# Patient Record
Sex: MALE | Race: BLACK OR AFRICAN AMERICAN | NOT HISPANIC OR LATINO | Employment: OTHER | URBAN - METROPOLITAN AREA
[De-identification: names, ages, dates, MRNs, and addresses within clinical notes are randomized per-mention and may not be internally consistent; named-entity substitution may affect disease eponyms.]

---

## 2018-02-26 ENCOUNTER — OFFICE VISIT CONVERTED (OUTPATIENT)
Dept: CARDIOLOGY | Facility: CLINIC | Age: 73
End: 2018-02-26
Attending: INTERNAL MEDICINE

## 2018-02-26 ENCOUNTER — CONVERSION ENCOUNTER (OUTPATIENT)
Dept: CARDIOLOGY | Facility: CLINIC | Age: 73
End: 2018-02-26

## 2018-03-12 ENCOUNTER — CONVERSION ENCOUNTER (OUTPATIENT)
Dept: PODIATRY | Facility: CLINIC | Age: 73
End: 2018-03-12

## 2018-03-12 ENCOUNTER — PROCEDURE VISIT CONVERTED (OUTPATIENT)
Dept: PODIATRY | Facility: CLINIC | Age: 73
End: 2018-03-12
Attending: PODIATRIST

## 2018-05-21 ENCOUNTER — PROCEDURE VISIT CONVERTED (OUTPATIENT)
Dept: PODIATRY | Facility: CLINIC | Age: 73
End: 2018-05-21
Attending: PODIATRIST

## 2018-06-01 ENCOUNTER — OFFICE VISIT CONVERTED (OUTPATIENT)
Dept: UROLOGY | Facility: CLINIC | Age: 73
End: 2018-06-01
Attending: UROLOGY

## 2018-06-04 ENCOUNTER — PROCEDURE VISIT CONVERTED (OUTPATIENT)
Dept: UROLOGY | Facility: CLINIC | Age: 73
End: 2018-06-04
Attending: UROLOGY

## 2018-06-27 ENCOUNTER — PROCEDURE VISIT CONVERTED (OUTPATIENT)
Dept: UROLOGY | Facility: CLINIC | Age: 73
End: 2018-06-27
Attending: NURSE PRACTITIONER

## 2018-08-01 ENCOUNTER — OFFICE VISIT CONVERTED (OUTPATIENT)
Dept: UROLOGY | Facility: CLINIC | Age: 73
End: 2018-08-01
Attending: UROLOGY

## 2018-08-13 ENCOUNTER — PROCEDURE VISIT CONVERTED (OUTPATIENT)
Dept: PODIATRY | Facility: CLINIC | Age: 73
End: 2018-08-13
Attending: PODIATRIST

## 2018-08-29 ENCOUNTER — PROCEDURE VISIT CONVERTED (OUTPATIENT)
Dept: UROLOGY | Facility: CLINIC | Age: 73
End: 2018-08-29
Attending: UROLOGY

## 2018-10-11 ENCOUNTER — OFFICE VISIT CONVERTED (OUTPATIENT)
Dept: UROLOGY | Facility: CLINIC | Age: 73
End: 2018-10-11
Attending: UROLOGY

## 2018-10-11 ENCOUNTER — CONVERSION ENCOUNTER (OUTPATIENT)
Dept: UROLOGY | Facility: CLINIC | Age: 73
End: 2018-10-11

## 2018-11-05 ENCOUNTER — PROCEDURE VISIT CONVERTED (OUTPATIENT)
Dept: PODIATRY | Facility: CLINIC | Age: 73
End: 2018-11-05
Attending: PODIATRIST

## 2018-11-05 ENCOUNTER — CONVERSION ENCOUNTER (OUTPATIENT)
Dept: PODIATRY | Facility: CLINIC | Age: 73
End: 2018-11-05

## 2019-01-29 ENCOUNTER — OFFICE VISIT CONVERTED (OUTPATIENT)
Dept: CARDIOLOGY | Facility: CLINIC | Age: 74
End: 2019-01-29
Attending: INTERNAL MEDICINE

## 2019-02-05 ENCOUNTER — PROCEDURE VISIT CONVERTED (OUTPATIENT)
Dept: PODIATRY | Facility: CLINIC | Age: 74
End: 2019-02-05
Attending: PODIATRIST

## 2019-03-07 ENCOUNTER — HOSPITAL ENCOUNTER (OUTPATIENT)
Dept: OTHER | Facility: HOSPITAL | Age: 74
Discharge: HOME | End: 2019-03-07
Attending: INTERNAL MEDICINE

## 2019-03-07 LAB
ALBUMIN SERPL-MCNC: 4.2 G/DL (ref 3.5–5)
ALBUMIN/GLOB SERPL: 1.2 {RATIO} (ref 1.4–2.6)
ALP SERPL-CCNC: 74 U/L (ref 56–155)
ALT SERPL-CCNC: 21 U/L (ref 10–40)
ANION GAP SERPL CALC-SCNC: 18 MMOL/L (ref 8–19)
APPEARANCE UR: CLEAR
AST SERPL-CCNC: 23 U/L (ref 15–50)
BASOPHILS # BLD AUTO: 0.03 10*3/UL (ref 0–0.2)
BASOPHILS NFR BLD AUTO: 0.5 % (ref 0–3)
BILIRUB SERPL-MCNC: 0.49 MG/DL (ref 0.2–1.3)
BILIRUB UR QL: NEGATIVE
BUN SERPL-MCNC: 26 MG/DL (ref 5–25)
BUN/CREAT SERPL: 15 {RATIO} (ref 6–20)
CALCIUM SERPL-MCNC: 9.6 MG/DL (ref 8.7–10.4)
CHLORIDE SERPL-SCNC: 97 MMOL/L (ref 99–111)
COLOR UR: YELLOW
CONV ABS IMM GRAN: 0.02 10*3/UL (ref 0–0.2)
CONV CO2: 24 MMOL/L (ref 22–32)
CONV COLLECTION SOURCE (UA): NORMAL
CONV CREATININE URINE, RANDOM: 57.6 MG/DL (ref 10–300)
CONV IMMATURE GRAN: 0.3 % (ref 0–1.8)
CONV MICROALBUM.,U,RANDOM: <12 MG/L (ref 0–20)
CONV PROTEIN TO CREATININE RATIO (RANDOM URINE): 0.07 {RATIO} (ref 0–0.1)
CONV TOTAL PROTEIN: 7.7 G/DL (ref 6.3–8.2)
CONV UROBILINOGEN IN URINE BY AUTOMATED TEST STRIP: 0.2 {EHRLICHU}/DL (ref 0.1–1)
CREAT UR-MCNC: 1.72 MG/DL (ref 0.7–1.2)
DEPRECATED RDW RBC AUTO: 39.6 FL (ref 35.1–43.9)
EOSINOPHIL # BLD AUTO: 0.05 10*3/UL (ref 0–0.7)
EOSINOPHIL # BLD AUTO: 0.8 % (ref 0–7)
ERYTHROCYTE [DISTWIDTH] IN BLOOD BY AUTOMATED COUNT: 11.9 % (ref 11.6–14.4)
EST. AVERAGE GLUCOSE BLD GHB EST-MCNC: 192 MG/DL
GFR SERPLBLD BASED ON 1.73 SQ M-ARVRAT: 45 ML/MIN/{1.73_M2}
GLOBULIN UR ELPH-MCNC: 3.5 G/DL (ref 2–3.5)
GLUCOSE SERPL-MCNC: 173 MG/DL (ref 70–99)
GLUCOSE UR QL: NEGATIVE MG/DL
HBA1C MFR BLD: 13.9 G/DL (ref 14–18)
HBA1C MFR BLD: 8.3 % (ref 3.5–5.7)
HCT VFR BLD AUTO: 42 % (ref 42–52)
HGB UR QL STRIP: NEGATIVE
KETONES UR QL STRIP: NEGATIVE MG/DL
LEUKOCYTE ESTERASE UR QL STRIP: NEGATIVE
LYMPHOCYTES # BLD AUTO: 3.72 10*3/UL (ref 1–5)
MCH RBC QN AUTO: 30.2 PG (ref 27–31)
MCHC RBC AUTO-ENTMCNC: 33.1 G/DL (ref 33–37)
MCV RBC AUTO: 91.3 FL (ref 80–96)
MICROALBUMIN/CREAT UR: 20.8 MG/G{CRE} (ref 0–25)
MONOCYTES # BLD AUTO: 0.64 10*3/UL (ref 0.2–1.2)
MONOCYTES NFR BLD AUTO: 9.9 % (ref 3–10)
NEUTROPHILS # BLD AUTO: 1.99 10*3/UL (ref 2–8)
NEUTROPHILS NFR BLD AUTO: 30.8 % (ref 30–85)
NITRITE UR QL STRIP: NEGATIVE
NRBC CBCN: 0 % (ref 0–0.7)
OSMOLALITY SERPL CALC.SUM OF ELEC: 287 MOSM/KG (ref 273–304)
PH UR STRIP.AUTO: 5 [PH] (ref 5–8)
PLATELET # BLD AUTO: 173 10*3/UL (ref 130–400)
PMV BLD AUTO: 11.2 FL (ref 9.4–12.4)
POTASSIUM SERPL-SCNC: 4.7 MMOL/L (ref 3.5–5.3)
PROT UR QL: NEGATIVE MG/DL
PROT UR-MCNC: <4 MG/DL
RBC # BLD AUTO: 4.6 10*6/UL (ref 4.7–6.1)
SODIUM SERPL-SCNC: 134 MMOL/L (ref 135–147)
SP GR UR: 1.01 (ref 1–1.03)
VARIANT LYMPHS NFR BLD MANUAL: 57.7 % (ref 20–45)
WBC # BLD AUTO: 6.45 10*3/UL (ref 4.8–10.8)

## 2019-03-25 ENCOUNTER — CONVERSION ENCOUNTER (OUTPATIENT)
Dept: UROLOGY | Facility: CLINIC | Age: 74
End: 2019-03-25

## 2019-03-25 ENCOUNTER — OFFICE VISIT CONVERTED (OUTPATIENT)
Dept: UROLOGY | Facility: CLINIC | Age: 74
End: 2019-03-25
Attending: NURSE PRACTITIONER

## 2019-03-25 ENCOUNTER — HOSPITAL ENCOUNTER (OUTPATIENT)
Dept: UROLOGY | Facility: CLINIC | Age: 74
Discharge: HOME | End: 2019-03-25
Attending: NURSE PRACTITIONER

## 2019-03-25 LAB — PSA SERPL-MCNC: 0.22 NG/ML (ref 0–4)

## 2019-04-23 ENCOUNTER — PROCEDURE VISIT CONVERTED (OUTPATIENT)
Dept: PODIATRY | Facility: CLINIC | Age: 74
End: 2019-04-23
Attending: PODIATRIST

## 2019-04-23 ENCOUNTER — CONVERSION ENCOUNTER (OUTPATIENT)
Dept: PODIATRY | Facility: CLINIC | Age: 74
End: 2019-04-23

## 2019-04-25 ENCOUNTER — PROCEDURE VISIT CONVERTED (OUTPATIENT)
Dept: UROLOGY | Facility: CLINIC | Age: 74
End: 2019-04-25
Attending: UROLOGY

## 2019-04-29 ENCOUNTER — CONVERSION ENCOUNTER (OUTPATIENT)
Dept: CARDIOLOGY | Facility: CLINIC | Age: 74
End: 2019-04-29

## 2019-04-29 ENCOUNTER — CONVERSION ENCOUNTER (OUTPATIENT)
Dept: CARDIOLOGY | Facility: CLINIC | Age: 74
End: 2019-04-29
Attending: INTERNAL MEDICINE

## 2019-05-02 ENCOUNTER — PROCEDURE VISIT CONVERTED (OUTPATIENT)
Dept: UROLOGY | Facility: CLINIC | Age: 74
End: 2019-05-02
Attending: UROLOGY

## 2019-05-10 ENCOUNTER — HOSPITAL ENCOUNTER (OUTPATIENT)
Dept: PREADMISSION TESTING | Facility: HOSPITAL | Age: 74
Discharge: HOME | End: 2019-05-10
Attending: UROLOGY

## 2019-05-10 LAB
ALBUMIN SERPL-MCNC: 4 G/DL (ref 3.5–5)
ALBUMIN/GLOB SERPL: 1.3 {RATIO} (ref 1.4–2.6)
ALP SERPL-CCNC: 83 U/L (ref 56–155)
ALT SERPL-CCNC: 22 U/L (ref 10–40)
ANION GAP SERPL CALC-SCNC: 16 MMOL/L (ref 8–19)
AST SERPL-CCNC: 27 U/L (ref 15–50)
BASOPHILS # BLD AUTO: 0.02 10*3/UL (ref 0–0.2)
BASOPHILS NFR BLD AUTO: 0.5 % (ref 0–3)
BILIRUB SERPL-MCNC: 0.38 MG/DL (ref 0.2–1.3)
BUN SERPL-MCNC: 18 MG/DL (ref 5–25)
BUN/CREAT SERPL: 12 {RATIO} (ref 6–20)
CALCIUM SERPL-MCNC: 9.4 MG/DL (ref 8.7–10.4)
CHLORIDE SERPL-SCNC: 97 MMOL/L (ref 99–111)
CONV ABS IMM GRAN: 0.01 10*3/UL (ref 0–0.2)
CONV CO2: 23 MMOL/L (ref 22–32)
CONV IMMATURE GRAN: 0.2 % (ref 0–1.8)
CONV TOTAL PROTEIN: 7.1 G/DL (ref 6.3–8.2)
CREAT UR-MCNC: 1.55 MG/DL (ref 0.7–1.2)
DEPRECATED RDW RBC AUTO: 39.9 FL (ref 35.1–43.9)
EOSINOPHIL # BLD AUTO: 0.05 10*3/UL (ref 0–0.7)
EOSINOPHIL # BLD AUTO: 1.2 % (ref 0–7)
ERYTHROCYTE [DISTWIDTH] IN BLOOD BY AUTOMATED COUNT: 11.9 % (ref 11.6–14.4)
GFR SERPLBLD BASED ON 1.73 SQ M-ARVRAT: 51 ML/MIN/{1.73_M2}
GLOBULIN UR ELPH-MCNC: 3.1 G/DL (ref 2–3.5)
GLUCOSE SERPL-MCNC: 179 MG/DL (ref 70–99)
HBA1C MFR BLD: 12.9 G/DL (ref 14–18)
HCT VFR BLD AUTO: 37.7 % (ref 42–52)
LYMPHOCYTES # BLD AUTO: 2.2 10*3/UL (ref 1–5)
MCH RBC QN AUTO: 31.3 PG (ref 27–31)
MCHC RBC AUTO-ENTMCNC: 34.2 G/DL (ref 33–37)
MCV RBC AUTO: 91.5 FL (ref 80–96)
MONOCYTES # BLD AUTO: 0.4 10*3/UL (ref 0.2–1.2)
MONOCYTES NFR BLD AUTO: 9.4 % (ref 3–10)
NEUTROPHILS # BLD AUTO: 1.56 10*3/UL (ref 2–8)
NEUTROPHILS NFR BLD AUTO: 36.8 % (ref 30–85)
NRBC CBCN: 0 % (ref 0–0.7)
OSMOLALITY SERPL CALC.SUM OF ELEC: 280 MOSM/KG (ref 273–304)
PLATELET # BLD AUTO: 158 10*3/UL (ref 130–400)
PMV BLD AUTO: 11.4 FL (ref 9.4–12.4)
POTASSIUM SERPL-SCNC: 4.3 MMOL/L (ref 3.5–5.3)
RBC # BLD AUTO: 4.12 10*6/UL (ref 4.7–6.1)
SODIUM SERPL-SCNC: 132 MMOL/L (ref 135–147)
VARIANT LYMPHS NFR BLD MANUAL: 51.9 % (ref 20–45)
WBC # BLD AUTO: 4.24 10*3/UL (ref 4.8–10.8)

## 2019-05-17 ENCOUNTER — HOSPITAL ENCOUNTER (OUTPATIENT)
Dept: PERIOP | Facility: HOSPITAL | Age: 74
Setting detail: HOSPITAL OUTPATIENT SURGERY
Discharge: HOME | End: 2019-05-18
Attending: UROLOGY

## 2019-05-17 LAB
GLUCOSE BLD-MCNC: 192 MG/DL (ref 70–99)
GLUCOSE BLD-MCNC: 194 MG/DL (ref 70–99)
GLUCOSE BLD-MCNC: 201 MG/DL (ref 70–99)
GLUCOSE BLD-MCNC: 212 MG/DL (ref 70–99)
GLUCOSE BLD-MCNC: 241 MG/DL (ref 70–99)

## 2019-05-18 LAB
ALBUMIN SERPL-MCNC: 3.9 G/DL (ref 3.5–5)
ALBUMIN/GLOB SERPL: 1.3 {RATIO} (ref 1.4–2.6)
ALP SERPL-CCNC: 67 U/L (ref 56–155)
ALT SERPL-CCNC: 16 U/L (ref 10–40)
ANION GAP SERPL CALC-SCNC: 18 MMOL/L (ref 8–19)
AST SERPL-CCNC: 17 U/L (ref 15–50)
BASOPHILS # BLD AUTO: 0.01 10*3/UL (ref 0–0.2)
BASOPHILS NFR BLD AUTO: 0.2 % (ref 0–3)
BILIRUB SERPL-MCNC: 0.64 MG/DL (ref 0.2–1.3)
BUN SERPL-MCNC: 17 MG/DL (ref 5–25)
BUN/CREAT SERPL: 11 {RATIO} (ref 6–20)
CALCIUM SERPL-MCNC: 9.7 MG/DL (ref 8.7–10.4)
CHLORIDE SERPL-SCNC: 98 MMOL/L (ref 99–111)
CONV ABS IMM GRAN: 0.01 10*3/UL (ref 0–0.2)
CONV CO2: 23 MMOL/L (ref 22–32)
CONV IMMATURE GRAN: 0.2 % (ref 0–1.8)
CONV TOTAL PROTEIN: 6.8 G/DL (ref 6.3–8.2)
CREAT UR-MCNC: 1.49 MG/DL (ref 0.7–1.2)
DEPRECATED RDW RBC AUTO: 39.3 FL (ref 35.1–43.9)
EOSINOPHIL # BLD AUTO: 0.06 10*3/UL (ref 0–0.7)
EOSINOPHIL # BLD AUTO: 1.2 % (ref 0–7)
ERYTHROCYTE [DISTWIDTH] IN BLOOD BY AUTOMATED COUNT: 11.6 % (ref 11.6–14.4)
GFR SERPLBLD BASED ON 1.73 SQ M-ARVRAT: 53 ML/MIN/{1.73_M2}
GLOBULIN UR ELPH-MCNC: 2.9 G/DL (ref 2–3.5)
GLUCOSE BLD-MCNC: 221 MG/DL (ref 70–99)
GLUCOSE SERPL-MCNC: 190 MG/DL (ref 70–99)
HBA1C MFR BLD: 14 G/DL (ref 14–18)
HCT VFR BLD AUTO: 41.8 % (ref 42–52)
LYMPHOCYTES # BLD AUTO: 2.45 10*3/UL (ref 1–5)
MCH RBC QN AUTO: 30.6 PG (ref 27–31)
MCHC RBC AUTO-ENTMCNC: 33.5 G/DL (ref 33–37)
MCV RBC AUTO: 91.5 FL (ref 80–96)
MONOCYTES # BLD AUTO: 0.53 10*3/UL (ref 0.2–1.2)
MONOCYTES NFR BLD AUTO: 10.8 % (ref 3–10)
NEUTROPHILS # BLD AUTO: 1.83 10*3/UL (ref 2–8)
NEUTROPHILS NFR BLD AUTO: 37.5 % (ref 30–85)
NRBC CBCN: 0 % (ref 0–0.7)
OSMOLALITY SERPL CALC.SUM OF ELEC: 285 MOSM/KG (ref 273–304)
PLATELET # BLD AUTO: 158 10*3/UL (ref 130–400)
PMV BLD AUTO: 11.7 FL (ref 9.4–12.4)
POTASSIUM SERPL-SCNC: 4.5 MMOL/L (ref 3.5–5.3)
RBC # BLD AUTO: 4.57 10*6/UL (ref 4.7–6.1)
SODIUM SERPL-SCNC: 134 MMOL/L (ref 135–147)
VARIANT LYMPHS NFR BLD MANUAL: 50.1 % (ref 20–45)
WBC # BLD AUTO: 4.89 10*3/UL (ref 4.8–10.8)

## 2019-06-05 ENCOUNTER — HOSPITAL ENCOUNTER (OUTPATIENT)
Dept: OTHER | Facility: HOSPITAL | Age: 74
Discharge: HOME | End: 2019-06-05
Attending: INTERNAL MEDICINE

## 2019-06-05 LAB
ALBUMIN SERPL-MCNC: 4.3 G/DL (ref 3.5–5)
ALBUMIN/GLOB SERPL: 1.3 {RATIO} (ref 1.4–2.6)
ALP SERPL-CCNC: 92 U/L (ref 56–155)
ALT SERPL-CCNC: 23 U/L (ref 10–40)
ANION GAP SERPL CALC-SCNC: 21 MMOL/L (ref 8–19)
APPEARANCE UR: ABNORMAL
AST SERPL-CCNC: 25 U/L (ref 15–50)
BASOPHILS # BLD AUTO: 0.02 10*3/UL (ref 0–0.2)
BASOPHILS NFR BLD AUTO: 0.4 % (ref 0–3)
BILIRUB SERPL-MCNC: 0.59 MG/DL (ref 0.2–1.3)
BILIRUB UR QL: NEGATIVE
BUN SERPL-MCNC: 27 MG/DL (ref 5–25)
BUN/CREAT SERPL: 14 {RATIO} (ref 6–20)
CALCIUM SERPL-MCNC: 9.8 MG/DL (ref 8.7–10.4)
CHLORIDE SERPL-SCNC: 96 MMOL/L (ref 99–111)
COLOR UR: YELLOW
CONV ABS IMM GRAN: 0.01 10*3/UL (ref 0–0.2)
CONV BACTERIA: NEGATIVE
CONV CO2: 23 MMOL/L (ref 22–32)
CONV COLLECTION SOURCE (UA): ABNORMAL
CONV CREATININE URINE, RANDOM: 98.2 MG/DL (ref 10–300)
CONV IMMATURE GRAN: 0.2 % (ref 0–1.8)
CONV MICROALBUM.,U,RANDOM: 63.3 MG/L (ref 0–20)
CONV PROTEIN TO CREATININE RATIO (RANDOM URINE): 0.14 {RATIO} (ref 0–0.1)
CONV TOTAL PROTEIN: 7.7 G/DL (ref 6.3–8.2)
CONV UROBILINOGEN IN URINE BY AUTOMATED TEST STRIP: 0.2 {EHRLICHU}/DL (ref 0.1–1)
CONV YEAST, UA: ABNORMAL
CREAT UR-MCNC: 1.99 MG/DL (ref 0.7–1.2)
DEPRECATED RDW RBC AUTO: 37.3 FL (ref 35.1–43.9)
EOSINOPHIL # BLD AUTO: 0.12 10*3/UL (ref 0–0.7)
EOSINOPHIL # BLD AUTO: 2.4 % (ref 0–7)
ERYTHROCYTE [DISTWIDTH] IN BLOOD BY AUTOMATED COUNT: 11.5 % (ref 11.6–14.4)
EST. AVERAGE GLUCOSE BLD GHB EST-MCNC: 194 MG/DL
GFR SERPLBLD BASED ON 1.73 SQ M-ARVRAT: 37 ML/MIN/{1.73_M2}
GLOBULIN UR ELPH-MCNC: 3.4 G/DL (ref 2–3.5)
GLUCOSE SERPL-MCNC: 225 MG/DL (ref 70–99)
GLUCOSE UR QL: NEGATIVE MG/DL
HBA1C MFR BLD: 14.5 G/DL (ref 14–18)
HBA1C MFR BLD: 8.4 % (ref 3.5–5.7)
HCT VFR BLD AUTO: 42.2 % (ref 42–52)
HGB UR QL STRIP: ABNORMAL
KETONES UR QL STRIP: NEGATIVE MG/DL
LEUKOCYTE ESTERASE UR QL STRIP: ABNORMAL
LYMPHOCYTES # BLD AUTO: 2.62 10*3/UL (ref 1–5)
MCH RBC QN AUTO: 30.7 PG (ref 27–31)
MCHC RBC AUTO-ENTMCNC: 34.4 G/DL (ref 33–37)
MCV RBC AUTO: 89.2 FL (ref 80–96)
MICROALBUMIN/CREAT UR: 64.5 MG/G{CRE} (ref 0–25)
MONOCYTES # BLD AUTO: 0.48 10*3/UL (ref 0.2–1.2)
MONOCYTES NFR BLD AUTO: 9.4 % (ref 3–10)
NEUTROPHILS # BLD AUTO: 1.83 10*3/UL (ref 2–8)
NEUTROPHILS NFR BLD AUTO: 36 % (ref 30–85)
NITRITE UR QL STRIP: NEGATIVE
NRBC CBCN: 0 % (ref 0–0.7)
OSMOLALITY SERPL CALC.SUM OF ELEC: 292 MOSM/KG (ref 273–304)
PH UR STRIP.AUTO: 5 [PH] (ref 5–8)
PLATELET # BLD AUTO: 179 10*3/UL (ref 130–400)
PMV BLD AUTO: 11.4 FL (ref 9.4–12.4)
POTASSIUM SERPL-SCNC: 4.5 MMOL/L (ref 3.5–5.3)
PROT UR QL: NEGATIVE MG/DL
PROT UR-MCNC: 13.6 MG/DL
RBC # BLD AUTO: 4.73 10*6/UL (ref 4.7–6.1)
RBC #/AREA URNS HPF: ABNORMAL /[HPF]
SODIUM SERPL-SCNC: 135 MMOL/L (ref 135–147)
SP GR UR: 1.01 (ref 1–1.03)
VARIANT LYMPHS NFR BLD MANUAL: 51.6 % (ref 20–45)
WBC # BLD AUTO: 5.08 10*3/UL (ref 4.8–10.8)
WBC #/AREA URNS HPF: ABNORMAL /[HPF]

## 2019-06-20 ENCOUNTER — OFFICE VISIT CONVERTED (OUTPATIENT)
Dept: UROLOGY | Facility: CLINIC | Age: 74
End: 2019-06-20
Attending: UROLOGY

## 2019-06-20 ENCOUNTER — CONVERSION ENCOUNTER (OUTPATIENT)
Dept: UROLOGY | Facility: CLINIC | Age: 74
End: 2019-06-20

## 2019-06-26 ENCOUNTER — HOSPITAL ENCOUNTER (OUTPATIENT)
Dept: CARDIOLOGY | Facility: HOSPITAL | Age: 74
Discharge: HOME | End: 2019-06-26
Attending: INTERNAL MEDICINE

## 2019-07-16 ENCOUNTER — PROCEDURE VISIT CONVERTED (OUTPATIENT)
Dept: PODIATRY | Facility: CLINIC | Age: 74
End: 2019-07-16
Attending: PODIATRIST

## 2019-07-25 ENCOUNTER — OFFICE VISIT CONVERTED (OUTPATIENT)
Dept: UROLOGY | Facility: CLINIC | Age: 74
End: 2019-07-25
Attending: UROLOGY

## 2019-10-01 ENCOUNTER — PROCEDURE VISIT CONVERTED (OUTPATIENT)
Dept: PODIATRY | Facility: CLINIC | Age: 74
End: 2019-10-01
Attending: PODIATRIST

## 2019-10-09 ENCOUNTER — HOSPITAL ENCOUNTER (OUTPATIENT)
Dept: OTHER | Facility: HOSPITAL | Age: 74
Discharge: HOME | End: 2019-10-09
Attending: INTERNAL MEDICINE

## 2019-10-09 LAB
ALBUMIN SERPL-MCNC: 4.3 G/DL (ref 3.5–5)
ALBUMIN/GLOB SERPL: 1.3 {RATIO} (ref 1.4–2.6)
ALP SERPL-CCNC: 79 U/L (ref 56–155)
ALT SERPL-CCNC: 23 U/L (ref 10–40)
ANION GAP SERPL CALC-SCNC: 19 MMOL/L (ref 8–19)
APPEARANCE UR: CLEAR
AST SERPL-CCNC: 27 U/L (ref 15–50)
BASOPHILS # BLD AUTO: 0.04 10*3/UL (ref 0–0.2)
BASOPHILS NFR BLD AUTO: 0.7 % (ref 0–3)
BILIRUB SERPL-MCNC: 0.52 MG/DL (ref 0.2–1.3)
BILIRUB UR QL: NEGATIVE
BUN SERPL-MCNC: 25 MG/DL (ref 5–25)
BUN/CREAT SERPL: 14 {RATIO} (ref 6–20)
CALCIUM SERPL-MCNC: 9.7 MG/DL (ref 8.7–10.4)
CHLORIDE SERPL-SCNC: 95 MMOL/L (ref 99–111)
COLOR UR: YELLOW
CONV ABS IMM GRAN: 0.02 10*3/UL (ref 0–0.2)
CONV CO2: 23 MMOL/L (ref 22–32)
CONV COLLECTION SOURCE (UA): NORMAL
CONV CREATININE URINE, RANDOM: 146 MG/DL (ref 10–300)
CONV IMMATURE GRAN: 0.4 % (ref 0–1.8)
CONV MICROALBUM.,U,RANDOM: <12 MG/L (ref 0–20)
CONV PROTEIN TO CREATININE RATIO (RANDOM URINE): 0.04 {RATIO} (ref 0–0.1)
CONV TOTAL PROTEIN: 7.7 G/DL (ref 6.3–8.2)
CONV UROBILINOGEN IN URINE BY AUTOMATED TEST STRIP: 0.2 {EHRLICHU}/DL (ref 0.1–1)
CREAT UR-MCNC: 1.79 MG/DL (ref 0.7–1.2)
DEPRECATED RDW RBC AUTO: 36.7 FL (ref 35.1–43.9)
EOSINOPHIL # BLD AUTO: 0.04 10*3/UL (ref 0–0.7)
EOSINOPHIL # BLD AUTO: 0.7 % (ref 0–7)
ERYTHROCYTE [DISTWIDTH] IN BLOOD BY AUTOMATED COUNT: 11.4 % (ref 11.6–14.4)
GFR SERPLBLD BASED ON 1.73 SQ M-ARVRAT: 42 ML/MIN/{1.73_M2}
GLOBULIN UR ELPH-MCNC: 3.4 G/DL (ref 2–3.5)
GLUCOSE SERPL-MCNC: 214 MG/DL (ref 70–99)
GLUCOSE UR QL: NEGATIVE MG/DL
HCT VFR BLD AUTO: 38.2 % (ref 42–52)
HGB BLD-MCNC: 13.2 G/DL (ref 14–18)
HGB UR QL STRIP: NEGATIVE
KETONES UR QL STRIP: NEGATIVE MG/DL
LEUKOCYTE ESTERASE UR QL STRIP: NEGATIVE
LYMPHOCYTES # BLD AUTO: 2.88 10*3/UL (ref 1–5)
LYMPHOCYTES NFR BLD AUTO: 52.8 % (ref 20–45)
MCH RBC QN AUTO: 30.6 PG (ref 27–31)
MCHC RBC AUTO-ENTMCNC: 34.6 G/DL (ref 33–37)
MCV RBC AUTO: 88.6 FL (ref 80–96)
MICROALBUMIN/CREAT UR: 8.2 MG/G{CRE} (ref 0–25)
MONOCYTES # BLD AUTO: 0.6 10*3/UL (ref 0.2–1.2)
MONOCYTES NFR BLD AUTO: 11 % (ref 3–10)
NEUTROPHILS # BLD AUTO: 1.87 10*3/UL (ref 2–8)
NEUTROPHILS NFR BLD AUTO: 34.4 % (ref 30–85)
NITRITE UR QL STRIP: NEGATIVE
NRBC CBCN: 0 % (ref 0–0.7)
OSMOLALITY SERPL CALC.SUM OF ELEC: 287 MOSM/KG (ref 273–304)
PH UR STRIP.AUTO: 5 [PH] (ref 5–8)
PHOSPHATE SERPL-MCNC: 3.2 MG/DL (ref 2.4–4.5)
PLATELET # BLD AUTO: 194 10*3/UL (ref 130–400)
PMV BLD AUTO: 11.3 FL (ref 9.4–12.4)
POTASSIUM SERPL-SCNC: 4.1 MMOL/L (ref 3.5–5.3)
PROT UR QL: NEGATIVE MG/DL
PROT UR-MCNC: 6.1 MG/DL
RBC # BLD AUTO: 4.31 10*6/UL (ref 4.7–6.1)
SODIUM SERPL-SCNC: 133 MMOL/L (ref 135–147)
SP GR UR: 1.02 (ref 1–1.03)
WBC # BLD AUTO: 5.45 10*3/UL (ref 4.8–10.8)

## 2019-10-31 ENCOUNTER — OFFICE VISIT CONVERTED (OUTPATIENT)
Dept: UROLOGY | Facility: CLINIC | Age: 74
End: 2019-10-31
Attending: UROLOGY

## 2019-10-31 ENCOUNTER — CONVERSION ENCOUNTER (OUTPATIENT)
Dept: UROLOGY | Facility: CLINIC | Age: 74
End: 2019-10-31

## 2019-12-17 ENCOUNTER — PROCEDURE VISIT CONVERTED (OUTPATIENT)
Dept: PODIATRY | Facility: CLINIC | Age: 74
End: 2019-12-17
Attending: PODIATRIST

## 2020-01-02 ENCOUNTER — HOSPITAL ENCOUNTER (OUTPATIENT)
Dept: OTHER | Facility: HOSPITAL | Age: 75
Discharge: HOME | End: 2020-01-02
Attending: NURSE PRACTITIONER

## 2020-01-02 LAB
ALBUMIN SERPL-MCNC: 4.3 G/DL (ref 3.5–5)
ALBUMIN/GLOB SERPL: 1.3 {RATIO} (ref 1.4–2.6)
ALP SERPL-CCNC: 83 U/L (ref 56–155)
ALT SERPL-CCNC: 20 U/L (ref 10–40)
ANION GAP SERPL CALC-SCNC: 18 MMOL/L (ref 8–19)
APPEARANCE UR: CLEAR
AST SERPL-CCNC: 21 U/L (ref 15–50)
BASOPHILS # BLD AUTO: 0.03 10*3/UL (ref 0–0.2)
BASOPHILS NFR BLD AUTO: 0.4 % (ref 0–3)
BILIRUB SERPL-MCNC: 0.42 MG/DL (ref 0.2–1.3)
BILIRUB UR QL: NEGATIVE
BUN SERPL-MCNC: 29 MG/DL (ref 5–25)
BUN/CREAT SERPL: 17 {RATIO} (ref 6–20)
CALCIUM SERPL-MCNC: 9.9 MG/DL (ref 8.7–10.4)
CHLORIDE SERPL-SCNC: 95 MMOL/L (ref 99–111)
COLOR UR: YELLOW
CONV ABS IMM GRAN: 0.02 10*3/UL (ref 0–0.2)
CONV CO2: 25 MMOL/L (ref 22–32)
CONV COLLECTION SOURCE (UA): ABNORMAL
CONV CREATININE URINE, RANDOM: 96.1 MG/DL (ref 10–300)
CONV IMMATURE GRAN: 0.3 % (ref 0–1.8)
CONV MICROALBUM.,U,RANDOM: <12 MG/L (ref 0–20)
CONV TOTAL PROTEIN: 7.5 G/DL (ref 6.3–8.2)
CONV UROBILINOGEN IN URINE BY AUTOMATED TEST STRIP: 0.2 {EHRLICHU}/DL (ref 0.1–1)
CREAT UR-MCNC: 1.73 MG/DL (ref 0.7–1.2)
DEPRECATED RDW RBC AUTO: 38.9 FL (ref 35.1–43.9)
EOSINOPHIL # BLD AUTO: 0.03 10*3/UL (ref 0–0.7)
EOSINOPHIL # BLD AUTO: 0.4 % (ref 0–7)
ERYTHROCYTE [DISTWIDTH] IN BLOOD BY AUTOMATED COUNT: 11.9 % (ref 11.6–14.4)
GFR SERPLBLD BASED ON 1.73 SQ M-ARVRAT: 44 ML/MIN/{1.73_M2}
GLOBULIN UR ELPH-MCNC: 3.2 G/DL (ref 2–3.5)
GLUCOSE SERPL-MCNC: 232 MG/DL (ref 70–99)
GLUCOSE UR QL: 250 MG/DL
HCT VFR BLD AUTO: 40.8 % (ref 42–52)
HGB BLD-MCNC: 14 G/DL (ref 14–18)
HGB UR QL STRIP: NEGATIVE
KETONES UR QL STRIP: NEGATIVE MG/DL
LEUKOCYTE ESTERASE UR QL STRIP: NEGATIVE
LYMPHOCYTES # BLD AUTO: 3.87 10*3/UL (ref 1–5)
LYMPHOCYTES NFR BLD AUTO: 50.1 % (ref 20–45)
MCH RBC QN AUTO: 31 PG (ref 27–31)
MCHC RBC AUTO-ENTMCNC: 34.3 G/DL (ref 33–37)
MCV RBC AUTO: 90.3 FL (ref 80–96)
MICROALBUMIN/CREAT UR: 12.5 MG/G{CRE} (ref 0–25)
MONOCYTES # BLD AUTO: 0.63 10*3/UL (ref 0.2–1.2)
MONOCYTES NFR BLD AUTO: 8.2 % (ref 3–10)
NEUTROPHILS # BLD AUTO: 3.15 10*3/UL (ref 2–8)
NEUTROPHILS NFR BLD AUTO: 40.6 % (ref 30–85)
NITRITE UR QL STRIP: NEGATIVE
NRBC CBCN: 0 % (ref 0–0.7)
OSMOLALITY SERPL CALC.SUM OF ELEC: 291 MOSM/KG (ref 273–304)
PH UR STRIP.AUTO: 5 [PH] (ref 5–8)
PLATELET # BLD AUTO: 174 10*3/UL (ref 130–400)
PMV BLD AUTO: 11.7 FL (ref 9.4–12.4)
POTASSIUM SERPL-SCNC: 4.2 MMOL/L (ref 3.5–5.3)
PROT UR QL: NEGATIVE MG/DL
RBC # BLD AUTO: 4.52 10*6/UL (ref 4.7–6.1)
SODIUM SERPL-SCNC: 134 MMOL/L (ref 135–147)
SP GR UR: 1.02 (ref 1–1.03)
WBC # BLD AUTO: 7.73 10*3/UL (ref 4.8–10.8)

## 2020-01-09 ENCOUNTER — OFFICE VISIT CONVERTED (OUTPATIENT)
Dept: PULMONOLOGY | Facility: CLINIC | Age: 75
End: 2020-01-09
Attending: INTERNAL MEDICINE

## 2020-01-09 ENCOUNTER — HOSPITAL ENCOUNTER (OUTPATIENT)
Dept: GENERAL RADIOLOGY | Facility: HOSPITAL | Age: 75
Discharge: HOME | End: 2020-01-09
Attending: INTERNAL MEDICINE

## 2020-01-27 ENCOUNTER — HOSPITAL ENCOUNTER (OUTPATIENT)
Dept: NUCLEAR MEDICINE | Facility: HOSPITAL | Age: 75
Discharge: HOME | End: 2020-01-27
Attending: INTERNAL MEDICINE

## 2020-02-06 ENCOUNTER — OFFICE VISIT CONVERTED (OUTPATIENT)
Dept: PULMONOLOGY | Facility: CLINIC | Age: 75
End: 2020-02-06
Attending: INTERNAL MEDICINE

## 2020-02-10 ENCOUNTER — OFFICE VISIT CONVERTED (OUTPATIENT)
Dept: CARDIOLOGY | Facility: CLINIC | Age: 75
End: 2020-02-10
Attending: INTERNAL MEDICINE

## 2020-02-20 ENCOUNTER — HOSPITAL ENCOUNTER (OUTPATIENT)
Dept: INFUSION THERAPY | Facility: HOSPITAL | Age: 75
Setting detail: HOSPITAL OUTPATIENT SURGERY
Discharge: HOME | End: 2020-02-20
Attending: INTERNAL MEDICINE

## 2020-02-20 LAB
ANION GAP SERPL CALC-SCNC: 17 MMOL/L (ref 8–19)
BASE EXCESS BLD CALC-SCNC: -0.9 MMOL/L
BASE EXCESS BLD CALC-SCNC: -1 MMOL/L
BASOPHILS # BLD AUTO: 0.02 10*3/UL (ref 0–0.2)
BASOPHILS NFR BLD AUTO: 0.4 % (ref 0–3)
BUN SERPL-MCNC: 29 MG/DL (ref 5–25)
BUN/CREAT SERPL: 16 {RATIO} (ref 6–20)
CALCIUM SERPL-MCNC: 9.6 MG/DL (ref 8.7–10.4)
CHLORIDE SERPL-SCNC: 96 MMOL/L (ref 99–111)
COHGB MFR BLD: 0.1 % (ref 0–1.5)
COHGB MFR BLD: 0.3 % (ref 0–1.5)
CONV ABS IMM GRAN: 0.01 10*3/UL (ref 0–0.2)
CONV CO2: 28 MMOL/L (ref 22–32)
CONV FHHB: 27.9 % (ref 0–5)
CONV FHHB: 28.3 % (ref 0–5)
CONV FIO2: ABNORMAL % (ref 21–100)
CONV FIO2: ABNORMAL % (ref 21–100)
CONV IMMATURE GRAN: 0.2 % (ref 0–1.8)
CONV SITE: ABNORMAL
CONV SITE: ABNORMAL
CREAT UR-MCNC: 1.86 MG/DL (ref 0.7–1.2)
DEPRECATED RDW RBC AUTO: 37.5 FL (ref 35.1–43.9)
EOSINOPHIL # BLD AUTO: 0.05 10*3/UL (ref 0–0.7)
EOSINOPHIL # BLD AUTO: 1.1 % (ref 0–7)
ERYTHROCYTE [DISTWIDTH] IN BLOOD BY AUTOMATED COUNT: 11.9 % (ref 11.6–14.4)
GFR SERPLBLD BASED ON 1.73 SQ M-ARVRAT: 40 ML/MIN/{1.73_M2}
GLUCOSE SERPL-MCNC: 269 MG/DL (ref 70–99)
HBA1C MFR BLD: 13 % (ref 13.8–16.4)
HBA1C MFR BLD: 13 % (ref 13.8–16.4)
HCO3 BLDA-SCNC: 25.1 MMOL/L (ref 22–26)
HCO3 BLDA-SCNC: 25.5 MMOL/L (ref 22–26)
HCT VFR BLD AUTO: 41.3 % (ref 42–52)
HGB BLD-MCNC: 14 G/DL (ref 14–18)
INR PPP: 1.08 (ref 2–3)
LITERS PER MINUTE: 2 L/MIN
LITERS PER MINUTE: 2 L/MIN
LYMPHOCYTES # BLD AUTO: 2.39 10*3/UL (ref 1–5)
LYMPHOCYTES NFR BLD AUTO: 50.2 % (ref 20–45)
Lab: ABNORMAL
Lab: ABNORMAL
MCH RBC QN AUTO: 30.2 PG (ref 27–31)
MCHC RBC AUTO-ENTMCNC: 33.9 G/DL (ref 33–37)
MCV RBC AUTO: 89.2 FL (ref 80–96)
METHGB MFR BLD: 0.4 % (ref 0–1.5)
METHGB MFR BLD: 0.4 % (ref 0–1.5)
MONOCYTES # BLD AUTO: 0.46 10*3/UL (ref 0.2–1.2)
MONOCYTES NFR BLD AUTO: 9.7 % (ref 3–10)
NEUTROPHILS # BLD AUTO: 1.83 10*3/UL (ref 2–8)
NEUTROPHILS NFR BLD AUTO: 38.4 % (ref 30–85)
NRBC CBCN: 0 % (ref 0–0.7)
OSMOLALITY SERPL CALC.SUM OF ELEC: 299 MOSM/KG (ref 273–304)
OXYHGB MFR BLD: 71.2 % (ref 94–98)
OXYHGB MFR BLD: 71.4 % (ref 94–98)
PCO2 BLD: 46.9 MM[HG] (ref 35–45)
PCO2 BLD: 49.4 MM[HG] (ref 35–45)
PH UR: 7.33 [PH] (ref 7.35–7.45)
PH UR: 7.35 [PH] (ref 7.35–7.45)
PLATELET # BLD AUTO: 179 10*3/UL (ref 130–400)
PMV BLD AUTO: 11.9 FL (ref 9.4–12.4)
PO2 BLD: <40.5 MM[HG] (ref 80–100)
PO2 BLD: <40.5 MM[HG] (ref 80–100)
POTASSIUM SERPL-SCNC: 4.2 MMOL/L (ref 3.5–5.3)
PROTHROMBIN TIME: 11.5 S (ref 9.4–12)
RBC # BLD AUTO: 4.63 10*6/UL (ref 4.7–6.1)
SAO2 % BLDCOA: 71.6 % (ref 95–99)
SAO2 % BLDCOA: 71.9 % (ref 95–99)
SODIUM SERPL-SCNC: 137 MMOL/L (ref 135–147)
SPECIMEN SOURCE: ABNORMAL
SPECIMEN SOURCE: ABNORMAL
WBC # BLD AUTO: 4.76 10*3/UL (ref 4.8–10.8)

## 2020-03-10 ENCOUNTER — PROCEDURE VISIT CONVERTED (OUTPATIENT)
Dept: PODIATRY | Facility: CLINIC | Age: 75
End: 2020-03-10
Attending: PODIATRIST

## 2020-03-11 ENCOUNTER — OFFICE VISIT CONVERTED (OUTPATIENT)
Dept: PULMONOLOGY | Facility: CLINIC | Age: 75
End: 2020-03-11
Attending: INTERNAL MEDICINE

## 2020-03-26 ENCOUNTER — HOSPITAL ENCOUNTER (OUTPATIENT)
Dept: OTHER | Facility: HOSPITAL | Age: 75
Discharge: HOME | End: 2020-03-26
Attending: INTERNAL MEDICINE

## 2020-03-26 LAB
ANION GAP SERPL CALC-SCNC: 20 MMOL/L (ref 8–19)
BUN SERPL-MCNC: 31 MG/DL (ref 5–25)
BUN/CREAT SERPL: 16 {RATIO} (ref 6–20)
CALCIUM SERPL-MCNC: 9.8 MG/DL (ref 8.7–10.4)
CHLORIDE SERPL-SCNC: 91 MMOL/L (ref 99–111)
CONV CO2: 26 MMOL/L (ref 22–32)
CREAT UR-MCNC: 1.89 MG/DL (ref 0.7–1.2)
GFR SERPLBLD BASED ON 1.73 SQ M-ARVRAT: 40 ML/MIN/{1.73_M2}
GLUCOSE SERPL-MCNC: 298 MG/DL (ref 70–99)
OSMOLALITY SERPL CALC.SUM OF ELEC: 294 MOSM/KG (ref 273–304)
POTASSIUM SERPL-SCNC: 4.2 MMOL/L (ref 3.5–5.3)
SODIUM SERPL-SCNC: 133 MMOL/L (ref 135–147)

## 2020-05-01 ENCOUNTER — HOSPITAL ENCOUNTER (OUTPATIENT)
Dept: OTHER | Facility: HOSPITAL | Age: 75
Discharge: HOME | End: 2020-05-01
Attending: INTERNAL MEDICINE

## 2020-05-01 LAB
ALBUMIN SERPL-MCNC: 4.3 G/DL (ref 3.5–5)
ALBUMIN/GLOB SERPL: 1.3 {RATIO} (ref 1.4–2.6)
ALP SERPL-CCNC: 78 U/L (ref 56–155)
ALT SERPL-CCNC: 18 U/L (ref 10–40)
ANION GAP SERPL CALC-SCNC: 21 MMOL/L (ref 8–19)
APPEARANCE UR: CLEAR
AST SERPL-CCNC: 23 U/L (ref 15–50)
BASOPHILS # BLD AUTO: 0.03 10*3/UL (ref 0–0.2)
BASOPHILS NFR BLD AUTO: 0.6 % (ref 0–3)
BILIRUB SERPL-MCNC: 0.55 MG/DL (ref 0.2–1.3)
BILIRUB UR QL: NEGATIVE
BUN SERPL-MCNC: 18 MG/DL (ref 5–25)
BUN/CREAT SERPL: 10 {RATIO} (ref 6–20)
CALCIUM SERPL-MCNC: 9.9 MG/DL (ref 8.7–10.4)
CHLORIDE SERPL-SCNC: 98 MMOL/L (ref 99–111)
COLOR UR: YELLOW
CONV ABS IMM GRAN: 0.01 10*3/UL (ref 0–0.2)
CONV CO2: 24 MMOL/L (ref 22–32)
CONV COLLECTION SOURCE (UA): ABNORMAL
CONV CREATININE URINE, RANDOM: 238.6 MG/DL (ref 10–300)
CONV IMMATURE GRAN: 0.2 % (ref 0–1.8)
CONV MICROALBUM.,U,RANDOM: <12 MG/L (ref 0–20)
CONV PROTEIN TO CREATININE RATIO (RANDOM URINE): 0.03 {RATIO} (ref 0–0.1)
CONV TOTAL PROTEIN: 7.6 G/DL (ref 6.3–8.2)
CONV UROBILINOGEN IN URINE BY AUTOMATED TEST STRIP: 1 {EHRLICHU}/DL (ref 0.1–1)
CREAT UR-MCNC: 1.89 MG/DL (ref 0.7–1.2)
DEPRECATED RDW RBC AUTO: 40.4 FL (ref 35.1–43.9)
EOSINOPHIL # BLD AUTO: 0.06 10*3/UL (ref 0–0.7)
EOSINOPHIL # BLD AUTO: 1.2 % (ref 0–7)
ERYTHROCYTE [DISTWIDTH] IN BLOOD BY AUTOMATED COUNT: 12.4 % (ref 11.6–14.4)
EST. AVERAGE GLUCOSE BLD GHB EST-MCNC: 217 MG/DL
GFR SERPLBLD BASED ON 1.73 SQ M-ARVRAT: 40 ML/MIN/{1.73_M2}
GLOBULIN UR ELPH-MCNC: 3.3 G/DL (ref 2–3.5)
GLUCOSE SERPL-MCNC: 198 MG/DL (ref 70–99)
GLUCOSE UR QL: 250 MG/DL
HBA1C MFR BLD: 9.2 % (ref 3.5–5.7)
HCT VFR BLD AUTO: 41.6 % (ref 42–52)
HGB BLD-MCNC: 14 G/DL (ref 14–18)
HGB UR QL STRIP: NEGATIVE
KETONES UR QL STRIP: NEGATIVE MG/DL
LEUKOCYTE ESTERASE UR QL STRIP: NEGATIVE
LYMPHOCYTES # BLD AUTO: 2.89 10*3/UL (ref 1–5)
LYMPHOCYTES NFR BLD AUTO: 59.2 % (ref 20–45)
MCH RBC QN AUTO: 30.2 PG (ref 27–31)
MCHC RBC AUTO-ENTMCNC: 33.7 G/DL (ref 33–37)
MCV RBC AUTO: 89.8 FL (ref 80–96)
MICROALBUMIN/CREAT UR: 5 MG/G{CRE} (ref 0–25)
MONOCYTES # BLD AUTO: 0.44 10*3/UL (ref 0.2–1.2)
MONOCYTES NFR BLD AUTO: 9 % (ref 3–10)
NEUTROPHILS # BLD AUTO: 1.45 10*3/UL (ref 2–8)
NEUTROPHILS NFR BLD AUTO: 29.8 % (ref 30–85)
NITRITE UR QL STRIP: NEGATIVE
NRBC CBCN: 0 % (ref 0–0.7)
OSMOLALITY SERPL CALC.SUM OF ELEC: 295 MOSM/KG (ref 273–304)
PH UR STRIP.AUTO: 5 [PH] (ref 5–8)
PLATELET # BLD AUTO: 182 10*3/UL (ref 130–400)
PMV BLD AUTO: 10.9 FL (ref 9.4–12.4)
POTASSIUM SERPL-SCNC: 4 MMOL/L (ref 3.5–5.3)
PROT UR QL: NEGATIVE MG/DL
PROT UR-MCNC: 7.5 MG/DL
RBC # BLD AUTO: 4.63 10*6/UL (ref 4.7–6.1)
SODIUM SERPL-SCNC: 139 MMOL/L (ref 135–147)
SP GR UR: 1.02 (ref 1–1.03)
WBC # BLD AUTO: 4.88 10*3/UL (ref 4.8–10.8)

## 2020-05-04 ENCOUNTER — TELEPHONE CONVERTED (OUTPATIENT)
Dept: GASTROENTEROLOGY | Facility: CLINIC | Age: 75
End: 2020-05-04
Attending: NURSE PRACTITIONER

## 2020-05-05 ENCOUNTER — OFFICE VISIT CONVERTED (OUTPATIENT)
Dept: PULMONOLOGY | Facility: CLINIC | Age: 75
End: 2020-05-05
Attending: INTERNAL MEDICINE

## 2020-05-26 ENCOUNTER — HOSPITAL ENCOUNTER (OUTPATIENT)
Dept: CARDIOLOGY | Facility: HOSPITAL | Age: 75
Discharge: HOME | End: 2020-05-26
Attending: INTERNAL MEDICINE

## 2020-05-26 ENCOUNTER — PROCEDURE VISIT CONVERTED (OUTPATIENT)
Dept: PODIATRY | Facility: CLINIC | Age: 75
End: 2020-05-26
Attending: PODIATRIST

## 2020-07-02 ENCOUNTER — HOSPITAL ENCOUNTER (OUTPATIENT)
Dept: PREADMISSION TESTING | Facility: HOSPITAL | Age: 75
Discharge: HOME | End: 2020-07-02
Attending: INTERNAL MEDICINE

## 2020-07-04 LAB — SARS-COV-2 RNA SPEC QL NAA+PROBE: NOT DETECTED

## 2020-07-06 ENCOUNTER — HOSPITAL ENCOUNTER (OUTPATIENT)
Dept: GASTROENTEROLOGY | Facility: HOSPITAL | Age: 75
Setting detail: HOSPITAL OUTPATIENT SURGERY
Discharge: HOME | End: 2020-07-06
Attending: INTERNAL MEDICINE

## 2020-07-06 LAB — GLUCOSE BLD-MCNC: 125 MG/DL (ref 70–99)

## 2020-07-08 ENCOUNTER — HOSPITAL ENCOUNTER (OUTPATIENT)
Dept: NUCLEAR MEDICINE | Facility: HOSPITAL | Age: 75
Discharge: HOME | End: 2020-07-08
Attending: INTERNAL MEDICINE

## 2020-07-27 ENCOUNTER — HOSPITAL ENCOUNTER (OUTPATIENT)
Dept: PREADMISSION TESTING | Facility: HOSPITAL | Age: 75
Discharge: HOME | End: 2020-07-27
Attending: INTERNAL MEDICINE

## 2020-07-27 LAB — SARS-COV-2 RNA SPEC QL NAA+PROBE: NOT DETECTED

## 2020-07-29 ENCOUNTER — HOSPITAL ENCOUNTER (OUTPATIENT)
Dept: CARDIOLOGY | Facility: HOSPITAL | Age: 75
Discharge: HOME | End: 2020-07-29
Attending: INTERNAL MEDICINE

## 2020-08-18 ENCOUNTER — PROCEDURE VISIT CONVERTED (OUTPATIENT)
Dept: PODIATRY | Facility: CLINIC | Age: 75
End: 2020-08-18
Attending: PODIATRIST

## 2020-08-18 ENCOUNTER — OFFICE VISIT CONVERTED (OUTPATIENT)
Dept: PULMONOLOGY | Facility: CLINIC | Age: 75
End: 2020-08-18
Attending: INTERNAL MEDICINE

## 2020-08-24 ENCOUNTER — OFFICE VISIT CONVERTED (OUTPATIENT)
Dept: CARDIOLOGY | Facility: CLINIC | Age: 75
End: 2020-08-24
Attending: INTERNAL MEDICINE

## 2020-09-01 ENCOUNTER — HOSPITAL ENCOUNTER (OUTPATIENT)
Dept: OTHER | Facility: HOSPITAL | Age: 75
Discharge: HOME | End: 2020-09-01
Attending: INTERNAL MEDICINE

## 2020-09-01 LAB
ALBUMIN SERPL-MCNC: 4.3 G/DL (ref 3.5–5)
ALBUMIN/GLOB SERPL: 1.2 {RATIO} (ref 1.4–2.6)
ALP SERPL-CCNC: 71 U/L (ref 56–155)
ALT SERPL-CCNC: 20 U/L (ref 10–40)
ANION GAP SERPL CALC-SCNC: 17 MMOL/L (ref 8–19)
APPEARANCE UR: CLEAR
AST SERPL-CCNC: 30 U/L (ref 15–50)
BASOPHILS # BLD AUTO: 0.03 10*3/UL (ref 0–0.2)
BASOPHILS NFR BLD AUTO: 0.6 % (ref 0–3)
BILIRUB SERPL-MCNC: 0.53 MG/DL (ref 0.2–1.3)
BILIRUB UR QL: NEGATIVE
BUN SERPL-MCNC: 25 MG/DL (ref 5–25)
BUN/CREAT SERPL: 14 {RATIO} (ref 6–20)
CALCIUM SERPL-MCNC: 10.4 MG/DL (ref 8.7–10.4)
CHLORIDE SERPL-SCNC: 97 MMOL/L (ref 99–111)
COLOR UR: YELLOW
CONV ABS IMM GRAN: 0 10*3/UL (ref 0–0.2)
CONV CO2: 26 MMOL/L (ref 22–32)
CONV COLLECTION SOURCE (UA): NORMAL
CONV CREATININE URINE, RANDOM: 62.4 MG/DL (ref 10–300)
CONV IMMATURE GRAN: 0 % (ref 0–1.8)
CONV MICROALBUM.,U,RANDOM: <12 MG/L (ref 0–20)
CONV PROTEIN TO CREATININE RATIO (RANDOM URINE): 0.06 {RATIO} (ref 0–0.1)
CONV TOTAL PROTEIN: 7.8 G/DL (ref 6.3–8.2)
CONV UROBILINOGEN IN URINE BY AUTOMATED TEST STRIP: 0.2 {EHRLICHU}/DL (ref 0.1–1)
CREAT UR-MCNC: 1.81 MG/DL (ref 0.7–1.2)
DEPRECATED RDW RBC AUTO: 40 FL (ref 35.1–43.9)
EOSINOPHIL # BLD AUTO: 0.07 10*3/UL (ref 0–0.7)
EOSINOPHIL # BLD AUTO: 1.4 % (ref 0–7)
ERYTHROCYTE [DISTWIDTH] IN BLOOD BY AUTOMATED COUNT: 12.1 % (ref 11.6–14.4)
EST. AVERAGE GLUCOSE BLD GHB EST-MCNC: 148 MG/DL
GFR SERPLBLD BASED ON 1.73 SQ M-ARVRAT: 42 ML/MIN/{1.73_M2}
GLOBULIN UR ELPH-MCNC: 3.5 G/DL (ref 2–3.5)
GLUCOSE SERPL-MCNC: 148 MG/DL (ref 70–99)
GLUCOSE UR QL: NEGATIVE MG/DL
HBA1C MFR BLD: 6.8 % (ref 3.5–5.7)
HCT VFR BLD AUTO: 43 % (ref 42–52)
HGB BLD-MCNC: 14.3 G/DL (ref 14–18)
HGB UR QL STRIP: NEGATIVE
KETONES UR QL STRIP: NEGATIVE MG/DL
LEUKOCYTE ESTERASE UR QL STRIP: NEGATIVE
LYMPHOCYTES # BLD AUTO: 2.7 10*3/UL (ref 1–5)
LYMPHOCYTES NFR BLD AUTO: 53.1 % (ref 20–45)
MCH RBC QN AUTO: 30.5 PG (ref 27–31)
MCHC RBC AUTO-ENTMCNC: 33.3 G/DL (ref 33–37)
MCV RBC AUTO: 91.7 FL (ref 80–96)
MICROALBUMIN/CREAT UR: 19.2 MG/G{CRE} (ref 0–25)
MONOCYTES # BLD AUTO: 0.53 10*3/UL (ref 0.2–1.2)
MONOCYTES NFR BLD AUTO: 10.4 % (ref 3–10)
NEUTROPHILS # BLD AUTO: 1.75 10*3/UL (ref 2–8)
NEUTROPHILS NFR BLD AUTO: 34.5 % (ref 30–85)
NITRITE UR QL STRIP: NEGATIVE
NRBC CBCN: 0 % (ref 0–0.7)
OSMOLALITY SERPL CALC.SUM OF ELEC: 289 MOSM/KG (ref 273–304)
PH UR STRIP.AUTO: 7 [PH] (ref 5–8)
PLATELET # BLD AUTO: 197 10*3/UL (ref 130–400)
PMV BLD AUTO: 12 FL (ref 9.4–12.4)
POTASSIUM SERPL-SCNC: 4.3 MMOL/L (ref 3.5–5.3)
PROT UR QL: NEGATIVE MG/DL
PROT UR-MCNC: <4 MG/DL
RBC # BLD AUTO: 4.69 10*6/UL (ref 4.7–6.1)
SODIUM SERPL-SCNC: 136 MMOL/L (ref 135–147)
SP GR UR: 1.01 (ref 1–1.03)
WBC # BLD AUTO: 5.08 10*3/UL (ref 4.8–10.8)

## 2020-09-03 ENCOUNTER — OFFICE VISIT CONVERTED (OUTPATIENT)
Dept: GASTROENTEROLOGY | Facility: CLINIC | Age: 75
End: 2020-09-03
Attending: NURSE PRACTITIONER

## 2020-10-12 ENCOUNTER — HOSPITAL ENCOUNTER (OUTPATIENT)
Dept: PREADMISSION TESTING | Facility: HOSPITAL | Age: 75
Discharge: HOME | End: 2020-10-12
Attending: INTERNAL MEDICINE

## 2020-10-13 LAB — SARS-COV-2 RNA SPEC QL NAA+PROBE: NOT DETECTED

## 2020-10-16 ENCOUNTER — HOSPITAL ENCOUNTER (OUTPATIENT)
Dept: GASTROENTEROLOGY | Facility: HOSPITAL | Age: 75
Setting detail: HOSPITAL OUTPATIENT SURGERY
Discharge: HOME | End: 2020-10-16
Attending: INTERNAL MEDICINE

## 2020-10-16 LAB — GLUCOSE BLD-MCNC: 119 MG/DL (ref 70–99)

## 2020-11-05 ENCOUNTER — CONVERSION ENCOUNTER (OUTPATIENT)
Dept: UROLOGY | Facility: CLINIC | Age: 75
End: 2020-11-05

## 2020-11-05 ENCOUNTER — OFFICE VISIT CONVERTED (OUTPATIENT)
Dept: UROLOGY | Facility: CLINIC | Age: 75
End: 2020-11-05
Attending: UROLOGY

## 2020-11-10 ENCOUNTER — HOSPITAL ENCOUNTER (OUTPATIENT)
Dept: OTHER | Facility: HOSPITAL | Age: 75
Discharge: HOME | End: 2020-11-10
Attending: UROLOGY

## 2020-11-10 ENCOUNTER — PROCEDURE VISIT CONVERTED (OUTPATIENT)
Dept: PODIATRY | Facility: CLINIC | Age: 75
End: 2020-11-10
Attending: PODIATRIST

## 2020-11-10 LAB — PSA SERPL-MCNC: 0.26 NG/ML (ref 0–4)

## 2020-12-23 ENCOUNTER — HOSPITAL ENCOUNTER (OUTPATIENT)
Dept: OTHER | Facility: HOSPITAL | Age: 75
Discharge: HOME | End: 2020-12-23
Attending: INTERNAL MEDICINE

## 2020-12-23 LAB
ALBUMIN SERPL-MCNC: 4.2 G/DL (ref 3.5–5)
ALBUMIN/GLOB SERPL: 1.2 {RATIO} (ref 1.4–2.6)
ALP SERPL-CCNC: 93 U/L (ref 56–155)
ALT SERPL-CCNC: 22 U/L (ref 10–40)
ANION GAP SERPL CALC-SCNC: 17 MMOL/L (ref 8–19)
APPEARANCE UR: CLEAR
AST SERPL-CCNC: 29 U/L (ref 15–50)
BASOPHILS # BLD AUTO: 0.03 10*3/UL (ref 0–0.2)
BASOPHILS NFR BLD AUTO: 0.5 % (ref 0–3)
BILIRUB SERPL-MCNC: 0.55 MG/DL (ref 0.2–1.3)
BILIRUB UR QL: NEGATIVE
BUN SERPL-MCNC: 19 MG/DL (ref 5–25)
BUN/CREAT SERPL: 12 {RATIO} (ref 6–20)
CALCIUM SERPL-MCNC: 9.9 MG/DL (ref 8.7–10.4)
CHLORIDE SERPL-SCNC: 99 MMOL/L (ref 99–111)
COLOR UR: YELLOW
CONV ABS IMM GRAN: 0.01 10*3/UL (ref 0–0.2)
CONV CO2: 24 MMOL/L (ref 22–32)
CONV COLLECTION SOURCE (UA): NORMAL
CONV CREATININE URINE, RANDOM: 24.1 MG/DL (ref 10–300)
CONV IMMATURE GRAN: 0.2 % (ref 0–1.8)
CONV MICROALBUM.,U,RANDOM: 46.6 MG/L (ref 0–20)
CONV PROTEIN TO CREATININE RATIO (RANDOM URINE): 0.34 {RATIO} (ref 0–0.1)
CONV TOTAL PROTEIN: 7.7 G/DL (ref 6.3–8.2)
CONV UROBILINOGEN IN URINE BY AUTOMATED TEST STRIP: 0.2 {EHRLICHU}/DL (ref 0.1–1)
CREAT UR-MCNC: 1.55 MG/DL (ref 0.7–1.2)
DEPRECATED RDW RBC AUTO: 38.2 FL (ref 35.1–43.9)
EOSINOPHIL # BLD AUTO: 0.03 10*3/UL (ref 0–0.7)
EOSINOPHIL # BLD AUTO: 0.5 % (ref 0–7)
ERYTHROCYTE [DISTWIDTH] IN BLOOD BY AUTOMATED COUNT: 11.8 % (ref 11.6–14.4)
EST. AVERAGE GLUCOSE BLD GHB EST-MCNC: 177 MG/DL
GFR SERPLBLD BASED ON 1.73 SQ M-ARVRAT: 50 ML/MIN/{1.73_M2}
GLOBULIN UR ELPH-MCNC: 3.5 G/DL (ref 2–3.5)
GLUCOSE SERPL-MCNC: 177 MG/DL (ref 70–99)
GLUCOSE UR QL: NEGATIVE MG/DL
HBA1C MFR BLD: 7.8 % (ref 3.5–5.7)
HCT VFR BLD AUTO: 41.9 % (ref 42–52)
HGB BLD-MCNC: 14.3 G/DL (ref 14–18)
HGB UR QL STRIP: NEGATIVE
KETONES UR QL STRIP: NEGATIVE MG/DL
LEUKOCYTE ESTERASE UR QL STRIP: NEGATIVE
LYMPHOCYTES # BLD AUTO: 3.31 10*3/UL (ref 1–5)
LYMPHOCYTES NFR BLD AUTO: 59.9 % (ref 20–45)
MCH RBC QN AUTO: 30.6 PG (ref 27–31)
MCHC RBC AUTO-ENTMCNC: 34.1 G/DL (ref 33–37)
MCV RBC AUTO: 89.5 FL (ref 80–96)
MICROALBUMIN/CREAT UR: 193.4 MG/G{CRE} (ref 0–25)
MONOCYTES # BLD AUTO: 0.6 10*3/UL (ref 0.2–1.2)
MONOCYTES NFR BLD AUTO: 10.8 % (ref 3–10)
NEUTROPHILS # BLD AUTO: 1.55 10*3/UL (ref 2–8)
NEUTROPHILS NFR BLD AUTO: 28.1 % (ref 30–85)
NITRITE UR QL STRIP: NEGATIVE
NRBC CBCN: 0 % (ref 0–0.7)
OSMOLALITY SERPL CALC.SUM OF ELEC: 289 MOSM/KG (ref 273–304)
PH UR STRIP.AUTO: 7 [PH] (ref 5–8)
PLATELET # BLD AUTO: 182 10*3/UL (ref 130–400)
PMV BLD AUTO: 11.4 FL (ref 9.4–12.4)
POTASSIUM SERPL-SCNC: 4.4 MMOL/L (ref 3.5–5.3)
PROT UR QL: NEGATIVE MG/DL
PROT UR-MCNC: 8.2 MG/DL
RBC # BLD AUTO: 4.68 10*6/UL (ref 4.7–6.1)
SODIUM SERPL-SCNC: 136 MMOL/L (ref 135–147)
SP GR UR: 1 (ref 1–1.03)
WBC # BLD AUTO: 5.53 10*3/UL (ref 4.8–10.8)

## 2021-01-11 ENCOUNTER — OFFICE VISIT CONVERTED (OUTPATIENT)
Dept: CARDIOLOGY | Facility: CLINIC | Age: 76
End: 2021-01-11
Attending: INTERNAL MEDICINE

## 2021-02-02 ENCOUNTER — PROCEDURE VISIT CONVERTED (OUTPATIENT)
Dept: PODIATRY | Facility: CLINIC | Age: 76
End: 2021-02-02
Attending: PODIATRIST

## 2021-02-02 ENCOUNTER — HOSPITAL ENCOUNTER (OUTPATIENT)
Dept: ULTRASOUND IMAGING | Facility: HOSPITAL | Age: 76
Discharge: HOME | End: 2021-02-02
Attending: INTERNAL MEDICINE

## 2021-02-04 ENCOUNTER — OFFICE VISIT CONVERTED (OUTPATIENT)
Dept: GASTROENTEROLOGY | Facility: CLINIC | Age: 76
End: 2021-02-04
Attending: NURSE PRACTITIONER

## 2021-02-16 ENCOUNTER — HOSPITAL ENCOUNTER (OUTPATIENT)
Dept: OTHER | Facility: HOSPITAL | Age: 76
Discharge: HOME | End: 2021-02-16
Attending: INTERNAL MEDICINE

## 2021-02-16 LAB
ALBUMIN SERPL-MCNC: 4.4 G/DL (ref 3.5–5)
ALBUMIN/GLOB SERPL: 1.3 {RATIO} (ref 1.4–2.6)
ALP SERPL-CCNC: 92 U/L (ref 56–155)
ALT SERPL-CCNC: 19 U/L (ref 10–40)
ANION GAP SERPL CALC-SCNC: 14 MMOL/L (ref 8–19)
AST SERPL-CCNC: 21 U/L (ref 15–50)
BASOPHILS # BLD AUTO: 0.02 10*3/UL (ref 0–0.2)
BASOPHILS NFR BLD AUTO: 0.5 % (ref 0–3)
BILIRUB SERPL-MCNC: 0.43 MG/DL (ref 0.2–1.3)
BUN SERPL-MCNC: 21 MG/DL (ref 5–25)
BUN/CREAT SERPL: 14 {RATIO} (ref 6–20)
CALCIUM SERPL-MCNC: 9.8 MG/DL (ref 8.7–10.4)
CHLORIDE 24H UR-SCNC: 125.9 MMOL/L
CHLORIDE SERPL-SCNC: 97 MMOL/L (ref 99–111)
CONV ABS IMM GRAN: 0.01 10*3/UL (ref 0–0.2)
CONV CO2: 29 MMOL/L (ref 22–32)
CONV CREATININE URINE, RANDOM: 104.8 MG/DL (ref 10–300)
CONV IMMATURE GRAN: 0.2 % (ref 0–1.8)
CONV MICROALBUM.,U,RANDOM: 32.4 MG/L (ref 0–20)
CONV PROTEIN TO CREATININE RATIO (RANDOM URINE): 0.11 {RATIO} (ref 0–0.1)
CONV TOTAL PROTEIN: 7.7 G/DL (ref 6.3–8.2)
CREAT UR-MCNC: 1.51 MG/DL (ref 0.7–1.2)
DEPRECATED RDW RBC AUTO: 39.2 FL (ref 35.1–43.9)
EOSINOPHIL # BLD AUTO: 0.04 10*3/UL (ref 0–0.7)
EOSINOPHIL # BLD AUTO: 0.9 % (ref 0–7)
ERYTHROCYTE [DISTWIDTH] IN BLOOD BY AUTOMATED COUNT: 12 % (ref 11.6–14.4)
EST. AVERAGE GLUCOSE BLD GHB EST-MCNC: 200 MG/DL
GFR SERPLBLD BASED ON 1.73 SQ M-ARVRAT: 52 ML/MIN/{1.73_M2}
GLOBULIN UR ELPH-MCNC: 3.3 G/DL (ref 2–3.5)
GLUCOSE SERPL-MCNC: 188 MG/DL (ref 70–99)
HBA1C MFR BLD: 8.6 % (ref 3.5–5.7)
HCT VFR BLD AUTO: 41.6 % (ref 42–52)
HGB BLD-MCNC: 14.3 G/DL (ref 14–18)
LYMPHOCYTES # BLD AUTO: 2.15 10*3/UL (ref 1–5)
LYMPHOCYTES NFR BLD AUTO: 48.9 % (ref 20–45)
MAGNESIUM SERPL-MCNC: 1.77 MG/DL (ref 1.6–2.3)
MCH RBC QN AUTO: 30.9 PG (ref 27–31)
MCHC RBC AUTO-ENTMCNC: 34.4 G/DL (ref 33–37)
MCV RBC AUTO: 89.8 FL (ref 80–96)
MICROALBUMIN/CREAT UR: 30.9 MG/G{CRE} (ref 0–25)
MONOCYTES # BLD AUTO: 0.5 10*3/UL (ref 0.2–1.2)
MONOCYTES NFR BLD AUTO: 11.4 % (ref 3–10)
NEUTROPHILS # BLD AUTO: 1.68 10*3/UL (ref 2–8)
NEUTROPHILS NFR BLD AUTO: 38.1 % (ref 30–85)
NRBC CBCN: 0 % (ref 0–0.7)
OSMOLALITY SERPL CALC.SUM OF ELEC: 288 MOSM/KG (ref 273–304)
PLATELET # BLD AUTO: 192 10*3/UL (ref 130–400)
PMV BLD AUTO: 11.1 FL (ref 9.4–12.4)
POTASSIUM SERPL-SCNC: 4.5 MMOL/L (ref 3.5–5.3)
POTASSIUM UR-SCNC: 85.3 MMOL/L
PROT UR-MCNC: 11.1 MG/DL
RBC # BLD AUTO: 4.63 10*6/UL (ref 4.7–6.1)
SODIUM SERPL-SCNC: 135 MMOL/L (ref 135–147)
SODIUM UR-SCNC: 108 MMOL/L
WBC # BLD AUTO: 4.4 10*3/UL (ref 4.8–10.8)

## 2021-02-17 LAB
1,25(OH)2D3 SERPL-MCNC: 59.4 PG/ML (ref 19.9–79.3)
ACE SERPL-CCNC: 102 U/L (ref 14–82)
CALCIUM 24H UR-MCNC: 3.2 MG/DL
CALCIUM/CREAT UR: 34 MG/G CREAT (ref 14–318)
CREAT UR-MCNC: 94.7 MG/DL
FREE LIGHT CHAINS: 33.7 MG/L (ref 3.3–19.4)
KAPPA LC/LAMBDA SER: 1.76 {RATIO} (ref 0.26–1.65)
LAMBDA LC FREE SERPL-MCNC: 19.1 MG/L (ref 5.7–26.3)

## 2021-02-18 LAB
DSDNA AB SER-ACNC: NEGATIVE [IU]/ML
ENA AB SER IA-ACNC: NEGATIVE {RATIO}

## 2021-03-04 ENCOUNTER — HOSPITAL ENCOUNTER (OUTPATIENT)
Dept: ULTRASOUND IMAGING | Facility: HOSPITAL | Age: 76
Discharge: HOME | End: 2021-03-04
Attending: INTERNAL MEDICINE

## 2021-03-04 LAB
ABO GROUP BLD: NORMAL
ALBUMIN SERPL-MCNC: 4.2 G/DL (ref 3.5–5)
ALBUMIN/GLOB SERPL: 1.3 {RATIO} (ref 1.4–2.6)
ALP SERPL-CCNC: 73 U/L (ref 56–155)
ALT SERPL-CCNC: 23 U/L (ref 10–40)
ANION GAP SERPL CALC-SCNC: 12 MMOL/L (ref 8–19)
APTT BLD: 23.6 S (ref 22.2–34.2)
AST SERPL-CCNC: 32 U/L (ref 15–50)
BASOPHILS # BLD AUTO: 0.02 10*3/UL (ref 0–0.2)
BASOPHILS NFR BLD AUTO: 0.4 % (ref 0–3)
BILIRUB SERPL-MCNC: 0.41 MG/DL (ref 0.2–1.3)
BLD GP AB SCN SERPL QL: NORMAL
BUN SERPL-MCNC: 21 MG/DL (ref 5–25)
BUN/CREAT SERPL: 12 {RATIO} (ref 6–20)
CALCIUM SERPL-MCNC: 10.9 MG/DL (ref 8.7–10.4)
CHLORIDE SERPL-SCNC: 99 MMOL/L (ref 99–111)
CONV ABD CONTROL: NORMAL
CONV ABS IMM GRAN: 0.01 10*3/UL (ref 0–0.2)
CONV CO2: 30 MMOL/L (ref 22–32)
CONV IMMATURE GRAN: 0.2 % (ref 0–1.8)
CONV TOTAL PROTEIN: 7.5 G/DL (ref 6.3–8.2)
CREAT UR-MCNC: 1.71 MG/DL (ref 0.7–1.2)
DEPRECATED RDW RBC AUTO: 38.8 FL (ref 35.1–43.9)
EOSINOPHIL # BLD AUTO: 0.03 10*3/UL (ref 0–0.7)
EOSINOPHIL # BLD AUTO: 0.6 % (ref 0–7)
ERYTHROCYTE [DISTWIDTH] IN BLOOD BY AUTOMATED COUNT: 12 % (ref 11.6–14.4)
GFR SERPLBLD BASED ON 1.73 SQ M-ARVRAT: 44 ML/MIN/{1.73_M2}
GLOBULIN UR ELPH-MCNC: 3.3 G/DL (ref 2–3.5)
GLUCOSE SERPL-MCNC: 90 MG/DL (ref 70–99)
HCT VFR BLD AUTO: 39 % (ref 42–52)
HCT VFR BLD AUTO: 43 % (ref 42–52)
HGB BLD-MCNC: 13.5 G/DL (ref 14–18)
HGB BLD-MCNC: 14.1 G/DL (ref 14–18)
INR PPP: 1.02 (ref 2–3)
LYMPHOCYTES # BLD AUTO: 2.35 10*3/UL (ref 1–5)
LYMPHOCYTES NFR BLD AUTO: 44.3 % (ref 20–45)
MCH RBC QN AUTO: 31 PG (ref 27–31)
MCHC RBC AUTO-ENTMCNC: 34.6 G/DL (ref 33–37)
MCV RBC AUTO: 89.4 FL (ref 80–96)
MONOCYTES # BLD AUTO: 0.52 10*3/UL (ref 0.2–1.2)
MONOCYTES NFR BLD AUTO: 9.8 % (ref 3–10)
NEUTROPHILS # BLD AUTO: 2.37 10*3/UL (ref 2–8)
NEUTROPHILS NFR BLD AUTO: 44.7 % (ref 30–85)
NRBC CBCN: 0 % (ref 0–0.7)
OSMOLALITY SERPL CALC.SUM OF ELEC: 287 MOSM/KG (ref 273–304)
PLATELET # BLD AUTO: 179 10*3/UL (ref 130–400)
PMV BLD AUTO: 11.5 FL (ref 9.4–12.4)
POTASSIUM SERPL-SCNC: 4.1 MMOL/L (ref 3.5–5.3)
PROTHROMBIN TIME: 11.2 S (ref 9.4–12)
RBC # BLD AUTO: 4.36 10*6/UL (ref 4.7–6.1)
RH BLD: NORMAL
SODIUM SERPL-SCNC: 137 MMOL/L (ref 135–147)
WBC # BLD AUTO: 5.3 10*3/UL (ref 4.8–10.8)

## 2021-03-10 ENCOUNTER — HOSPITAL ENCOUNTER (OUTPATIENT)
Dept: OTHER | Facility: HOSPITAL | Age: 76
Discharge: HOME | End: 2021-03-10
Attending: INTERNAL MEDICINE

## 2021-03-10 LAB
ALBUMIN SERPL-MCNC: 4.1 G/DL (ref 3.5–5)
ALBUMIN/GLOB SERPL: 1.3 {RATIO} (ref 1.4–2.6)
ALP SERPL-CCNC: 88 U/L (ref 56–155)
ALT SERPL-CCNC: 20 U/L (ref 10–40)
ANION GAP SERPL CALC-SCNC: 10 MMOL/L (ref 8–19)
APPEARANCE UR: CLEAR
AST SERPL-CCNC: 23 U/L (ref 15–50)
BASOPHILS # BLD AUTO: 0.03 10*3/UL (ref 0–0.2)
BASOPHILS NFR BLD AUTO: 0.6 % (ref 0–3)
BILIRUB SERPL-MCNC: 0.34 MG/DL (ref 0.2–1.3)
BILIRUB UR QL: NEGATIVE
BUN SERPL-MCNC: 23 MG/DL (ref 5–25)
BUN/CREAT SERPL: 12 {RATIO} (ref 6–20)
CALCIUM SERPL-MCNC: 9.7 MG/DL (ref 8.7–10.4)
CHLORIDE SERPL-SCNC: 100 MMOL/L (ref 99–111)
COLOR UR: YELLOW
CONV ABS IMM GRAN: 0.01 10*3/UL (ref 0–0.2)
CONV CO2: 30 MMOL/L (ref 22–32)
CONV COLLECTION SOURCE (UA): NORMAL
CONV CREATININE URINE, RANDOM: 81.6 MG/DL (ref 10–300)
CONV IMMATURE GRAN: 0.2 % (ref 0–1.8)
CONV PROTEIN TO CREATININE RATIO (RANDOM URINE): 0.05 {RATIO} (ref 0–0.1)
CONV TOTAL PROTEIN: 7.3 G/DL (ref 6.3–8.2)
CONV UROBILINOGEN IN URINE BY AUTOMATED TEST STRIP: 1 {EHRLICHU}/DL (ref 0.1–1)
CREAT UR-MCNC: 1.9 MG/DL (ref 0.7–1.2)
DEPRECATED RDW RBC AUTO: 39.5 FL (ref 35.1–43.9)
EOSINOPHIL # BLD AUTO: 0.05 10*3/UL (ref 0–0.7)
EOSINOPHIL # BLD AUTO: 1 % (ref 0–7)
ERYTHROCYTE [DISTWIDTH] IN BLOOD BY AUTOMATED COUNT: 11.9 % (ref 11.6–14.4)
GFR SERPLBLD BASED ON 1.73 SQ M-ARVRAT: 39 ML/MIN/{1.73_M2}
GLOBULIN UR ELPH-MCNC: 3.2 G/DL (ref 2–3.5)
GLUCOSE SERPL-MCNC: 148 MG/DL (ref 70–99)
GLUCOSE UR QL: NEGATIVE MG/DL
HCT VFR BLD AUTO: 40.5 % (ref 42–52)
HGB BLD-MCNC: 13.7 G/DL (ref 14–18)
HGB UR QL STRIP: NEGATIVE
KETONES UR QL STRIP: NEGATIVE MG/DL
LEUKOCYTE ESTERASE UR QL STRIP: NEGATIVE
LYMPHOCYTES # BLD AUTO: 2.88 10*3/UL (ref 1–5)
LYMPHOCYTES NFR BLD AUTO: 56.4 % (ref 20–45)
MCH RBC QN AUTO: 30.6 PG (ref 27–31)
MCHC RBC AUTO-ENTMCNC: 33.8 G/DL (ref 33–37)
MCV RBC AUTO: 90.4 FL (ref 80–96)
MONOCYTES # BLD AUTO: 0.57 10*3/UL (ref 0.2–1.2)
MONOCYTES NFR BLD AUTO: 11.2 % (ref 3–10)
NEUTROPHILS # BLD AUTO: 1.57 10*3/UL (ref 2–8)
NEUTROPHILS NFR BLD AUTO: 30.6 % (ref 30–85)
NITRITE UR QL STRIP: NEGATIVE
NRBC CBCN: 0 % (ref 0–0.7)
OSMOLALITY SERPL CALC.SUM OF ELEC: 288 MOSM/KG (ref 273–304)
PH UR STRIP.AUTO: 6.5 [PH] (ref 5–8)
PLATELET # BLD AUTO: 172 10*3/UL (ref 130–400)
PMV BLD AUTO: 11.7 FL (ref 9.4–12.4)
POTASSIUM SERPL-SCNC: 4.4 MMOL/L (ref 3.5–5.3)
PROT UR QL: NEGATIVE MG/DL
PROT UR-MCNC: 4.4 MG/DL
RBC # BLD AUTO: 4.48 10*6/UL (ref 4.7–6.1)
SODIUM SERPL-SCNC: 136 MMOL/L (ref 135–147)
SP GR UR: 1.01 (ref 1–1.03)
WBC # BLD AUTO: 5.11 10*3/UL (ref 4.8–10.8)

## 2021-03-24 ENCOUNTER — OFFICE VISIT CONVERTED (OUTPATIENT)
Dept: GASTROENTEROLOGY | Facility: CLINIC | Age: 76
End: 2021-03-24
Attending: NURSE PRACTITIONER

## 2021-03-31 ENCOUNTER — HOSPITAL ENCOUNTER (OUTPATIENT)
Dept: LAB | Facility: HOSPITAL | Age: 76
Discharge: HOME | End: 2021-03-31
Attending: INTERNAL MEDICINE

## 2021-03-31 LAB
25(OH)D3 SERPL-MCNC: 42.6 NG/ML (ref 30–100)
ANION GAP SERPL CALC-SCNC: 17 MMOL/L (ref 8–19)
BUN SERPL-MCNC: 27 MG/DL (ref 5–25)
BUN/CREAT SERPL: 16 {RATIO} (ref 6–20)
CALCIUM SERPL-MCNC: 9.9 MG/DL (ref 8.7–10.4)
CHLORIDE SERPL-SCNC: 97 MMOL/L (ref 99–111)
CONV CO2: 27 MMOL/L (ref 22–32)
CREAT UR-MCNC: 1.7 MG/DL (ref 0.7–1.2)
GFR SERPLBLD BASED ON 1.73 SQ M-ARVRAT: 45 ML/MIN/{1.73_M2}
GLUCOSE SERPL-MCNC: 156 MG/DL (ref 70–99)
OSMOLALITY SERPL CALC.SUM OF ELEC: 290 MOSM/KG (ref 273–304)
POTASSIUM SERPL-SCNC: 4.5 MMOL/L (ref 3.5–5.3)
SODIUM SERPL-SCNC: 136 MMOL/L (ref 135–147)

## 2021-04-01 LAB
PTH-INTACT SERPL-MCNC: 38.5 PG/ML (ref 11.1–79.5)
VIT B12 SERPL-MCNC: 555 PG/ML (ref 211–911)

## 2021-04-02 LAB — 1,25(OH)2D3 SERPL-MCNC: 72.7 PG/ML (ref 19.9–79.3)

## 2021-04-27 ENCOUNTER — PROCEDURE VISIT CONVERTED (OUTPATIENT)
Dept: PODIATRY | Facility: CLINIC | Age: 76
End: 2021-04-27
Attending: PODIATRIST

## 2021-05-13 NOTE — PROGRESS NOTES
Progress Note      Patient Name: Joon Zhao   Patient ID: [id number]   Sex: Male   YOB: 1945    Primary Care Provider: Brian Henson MD   Referring Provider: Carl Payne DPM    Visit Date: November 10, 2020    Provider: Carl Payne DPM   Location: [facility]   Location Address: [address] [place] [address]  [id number]   Location Phone: [phone]          Chief Complaint  · Routine Foot Care Visit  · Diabetic Foot Examination      History Of Present Illness  Joon Zhao complains of painful, elongated toenails which are thickened, yellowed, chalky, and cause pain with shoe gear and ambulation.      New, Established, New Problem:  Established   Location:  Toenails  Duration:   Greater than five years  Onset:  Gradual  Nature:  Sore with palpation.  Stable, worsening, improving:   worsening  Aggravating factors:  Pain with shoe gear and ambulation.  Previous Treatment:  Debridement    Patient denies any fevers, chills, nausea, vomiting, nor any other constitutional signs nor symptoms.    Patient relates no medical changes since their last visit.           Past Medical History  Anemia; Arthritis; BPH (benign prostatic hyperplasia); BPH with Urinary Obstruction; CAD (coronary artery disease); CKD (chronic kidney disease); Controlled diabetes mellitus with diabetic polyneuropathy; Dizziness; DM2 (diabetes mellitus, type 2); DVT (deep venous thrombosis); ED (erectile dysfunction) of organic origin; Foot pain, left; Foot pain, right; High cholesterol; HTN (hypertension); Ingrowing nail; Pain in left foot; Pain in right foot; Polyneuropathy; Tinea unguium         Past Surgical History  Adrenalectomy; Colonoscopy; Cystoscopy; TUNA; TURP         Medication List  acetaminophen-codeine 300-30 mg oral tablet; amlodipine 10 mg oral tablet; aspirin 81 mg oral tablet,delayed release (DR/EC); azelastine 137 mcg (0.1 %) nasal aerosol,spray; chlorthalidone 25 mg oral  "tablet; Claritin-D 12 Hour 5-120 mg oral tablet extended release 12 hr; Co Q-10 100 mg oral capsule; Eliquis 5 mg oral tablet; ergocalciferol (vitamin D2) 1,250 mcg (50,000 unit) oral capsule; fiber oral powder; Flonase Allergy Relief 50 mcg/actuation nasal spray,suspension; gabapentin 600 mg oral tablet; glipizide 5 mg oral tablet extended release 24hr; hydroxyzine HCl 25 mg oral tablet; latanoprost 0.005 % ophthalmic drops; LiquiTears 1.4 % ophthalmic (eye) drops; omeprazole 20 mg oral capsule,delayed release(DR/EC); Probiotic Acidophilus 1.5 mg (250 million cell) oral capsule; ropinirole 0.25 mg oral tablet; Trulicity 0.75 mg/0.5 mL subcutaneous pen injector; Ventolin HFA 90 mcg/actuation inhalation HFA aerosol inhaler         Allergy List  hydrochlorothiazide; Lasix; STATINS-HMG-COA REDUCTASE INHIBITORS; Zetia       Allergies Reconciled  Family Medical History  Hypertension; Diabetes; No family history of colorectal cancer         Social History  Alcohol (Current some day); ; Retired; Tobacco (Never)         Review of Systems  · Constitutional  o Denies  o : fever, chills  · Eyes  o Denies  o : double vision  · HENT  o Denies  o : vertigo, recent head injury, hearing loss or ringing  · Cardiovascular  o Denies  o : chest pain, irregular heart beats  · Respiratory  o Denies  o : shortness of breath, productive cough  · Gastrointestinal  o Denies  o : nausea, vomiting  · Integument  o * See HPI  · Neurologic  o Admits  o : tingling or numbness  o Denies  o : altered mental status, seizures  · Musculoskeletal  o Denies  o : joint pain, joint swelling, limitation of motion      Vitals  Date Time BP Position Site L\R Cuff Size HR RR TEMP (F) WT  HT  BMI kg/m2 BSA m2 O2 Sat FR L/min FiO2 HC       11/10/2020 07:56 /93 Sitting    68 - R  97.1 219lbs 0oz 5'  9\" 32.34 2.2 100 %      11/10/2020 07:56 /86 Sitting                       Physical Examination  · Constitutional  o Appearance  o : " well-nourished, well developed, no obvious deformities present, appears to be chronically ill   · Eyes  o Vision  o : Patient wearing glasses.   · Respiratory  o Respiratory Effort  o : No labored breathing. Good respiratory effort.   · Cardiovascular  o Peripheral Vascular System  o :   § Pedal Pulses  § : Pedal Pulses are 2+ and symmetrical  § Extremities  § : There is no edema of the lower extremities  · Musculoskeletal  o Extremeties/Joint  o : Lower extremity muscle strength and range of motion is equal and symmetrical bilaterally. The knees are noted to be in normal alignment. Ankle alignment and range of motion is normal and foot structure is normal.  · Skin and Subcutaneous Tissue  o General Inspection  o : no lesions present, no areas of discoloration, skin turgor normal, texture normal  · Neurologic  o Sensation  o : Huntington-Willie 5.07 monofilament absent to all assessed areas. Sharp/dull sensation is absent.   · Toes  o Toes: Right Foot  o :   § Toenails  § : Toenails are hypertrophic, mycotc, dystrophic, thickened, with sublingual debris, incurvated, brittle toenail(s) at nail-1, 2, 3, 4,, Oncholysis of the foot   o Toes: Left Foot  o :   § Toenails  § : Toenails are hypertrophic, mycotc, dystrophic, thickened, with sublingual debris, incurvated, brittle toenail(s) at nail-1, 2, 3,, Onycholysis of the foot   · Procedures  o Nail Debridement  o : Nail debridement is indicated for the following toenails:-left hallux, left 2nd toe, left 3rd toe, right hallux, right 2nd toe, right 3rd toe, right 4th toe,, The nail was debrided of excessive thickness to appropriate levels of comfort and contour using nail nippers. The procedure was without complications          Assessment  · Foot pain, bilateral       Pain in right foot     729.5/M79.671  Pain in left foot     729.5/M79.672  · Ingrowing nail     703.0/L60.0  · Polyneuropathy     356.9/G62.9  · Tinea unguium     110.1/B35.1  · DM2 (diabetes mellitus, type  2)     250.00/E11.9  · Pain in left foot     729.5/M79.672  · Pain in right foot     729.5/M79.671      Plan  · Orders  o Debridement of six or more nails (13594, 02621, 36052, 40245, 16044, 36620, 98035, 99897) - 729.5/M79.672, 729.5/M79.671, 250.00/E11.9, 110.1/B35.1 - 11/10/2020  o ACO-16: BMI above normal range and follow-up plan is documented (, , , , , , , ) - - 11/10/2020  o Diabetic Foot (Motor and Sensory) Exam Completed [facility] (, , 2028F) - - 11/10/2020  · Medications  o Medications have been Reconciled  o Transition of Care or Provider Policy  · Instructions  o I have discussed the findings of this evaluation with the patient. The discussion included a complete verbal explanation of any changes in the examination results, diagnosis, and the current treatment plan. A schedule for future care needs was explained. If any questions should arise after returning home, I have encouraged the patient to feel free to contact Dr. Payne. The patient states understanding and agreement with this plan.   o Patient is to monitor for problems and to contact Dr. Payne for follow-up should such signs occur. Patient states understanding and agreement with this plan.   o Encouraged to follow-up with Primary Care Provider for preventative care.   o BMI Counseling: Discussed weight loss and the need for exercise.   o Follow up in 9 weeks for Routine Foot Care.   o Electronically Identified Patient Education Materials Provided Electronically  · Disposition  o Call or Return if symptoms worsen or persist.            Electronically Signed by: Carl Payne DPM -Author on November 10, 2020 08:09:04 AM

## 2021-05-13 NOTE — PROGRESS NOTES
Quick Note      Patient Name: Joon Zhao   Patient ID: [id number]   Sex: Male   YOB: 1945    Primary Care Provider: Brian Henson MD   Referring Provider: Arturo Ruffin MD    Visit Date: May 4, 2020    Provider: ROSALIA Boateng   Location: [facility]   Location Address: [address] [place] [address]  [id number]   Location Phone: [phone]          History Of Present Illness  TELEHEALTH TELEPHONE VISIT  Chief Complaint: Pt states last week he had diarrhea uncontrollable. Stomach cramps on left side. Pt states he went to er on the 22nd. Pt states he was taking linzess but stopped due to diarrhea.   Joon Zhao is a 74 year old /Black male who is presenting for evaluation via telehealth telephone visit. Verbal consent obtained before beginning visit.   Provider spent 17 minutes with the patient during telehealth visit.   The following staff were present during this visit: Doris Young MA; Kalyn Donaldson MA   Past Medical History/Overview of Patient Symptoms     Patient initially presented November 2014--not available today.  Patient has not been seen since May 2016--not available today. Chart has been requested.   Last colonoscopy January 2015--severe diverticulosis of the right colon all the way to cecum, internal hemorrhoids.     Today, pt c/o abd pain on left side, +tender, states he's had this on/off for a couple years. Reports going to ER for diarrhea. ER 4/22/20 for abd pain and diarrhea--CT abd/pelvis w/o IV contrast--negative, CBC unremarkable, CMP unremarkable except GFR 37, follows w Dr Quiroga, lipase 121. Cdiff negative.  No diarrhea now. No blood in stool. 1 black BM about 3 weeks ago. No unint wt loss. Fair/poor appetite but no change. Stools are formed now and denies straining.   Pt is taking Omeprazole, he takes PRN, does not require daily dosing. Has had EGD prior to 2014.     On Eliquis d/t hx DVT 2014, 2019.            Assessment  · Left lower quadrant abdominal pain     789.04/R10.32  · Diverticulosis     562.10/K57.90  · Heartburn     787.1/R12  intermittent, not persistent      Plan  · Orders  o Physician Telephone Evaluation, 11-20 minutes (64018) - - 05/04/2020  o [facility] Pre-Op Covid-19 Screening (48057) - - 05/04/2020  · Medications  o NuLYTELY with Flavor Packs 420 gram oral recon soln   SIG: take as directed for 1 day per office instructions   DISP: (1) 4000 ml bottle with 0 refills  Prescribed on 05/04/2020     o Medications have been Reconciled  o Transition of Care or Provider Policy  · Instructions  o Chronic conditions reviewed and taken into consideration for today's treatment plan.  o Patient instructed to seek medical attention urgently for new or worsening symptoms.  o Patient was educated/instructed on their diagnosis, treatment and medications prior to discharge from the clinic today.  o Pt advised to self isolate after COVID testing  o Please Sign Permit for: Colonoscopy Indication: LLQ pain Surgical Risk and Benefits: Possible risks/complications, benefits, and alternatives to surgical or invasive procedure have been explained to patient and/or legal guardian; Patient has been evaluated and can tolerate anesthesia and/or sedation. Risks, benefits, and alternatives to anesthesia and sedation have been explained to patient and/or legal guardian. OTHER: Vince Ruffin            Electronically Signed by: ROSALIA Boateng -Author on May 4, 2020 02:47:59 PM

## 2021-05-13 NOTE — PROGRESS NOTES
Progress Note      Patient Name: Joon Zhao   Patient ID: [id number]   Sex: Male   YOB: 1945    Primary Care Provider: Brian Henson MD   Referring Provider: Gogo LINDSEY    Visit Date: September 3, 2020    Provider: ROSALIA Boateng   Location: [facility]   Location Address: [address] [place] [address]  [id number]   Location Phone: [phone]          Chief Complaint  · stomach pain   · follow up colon       History Of Present Illness  Joon Zhao is a 74 year old /Black male who presents to the office today.      Patient was last seen in May 2020 in follow-up from an ER visit in April where he had abdominal pain and diarrhea; cdiff neg.  CT of the abdomen and pelvis without IV contrast there was negative, labs were negative except GFR 37patient follows with Dr. Phong Galvez.  Patient is on Eliquis due to history of DVT in 2014, 2019, also had PE.    Patient was also seen here in 2014and 2016 and chart has been requested.    Colonoscopy 7/6/2020: Adequate prep, multiple diverticula seen in the whole colon otherwise negative, random colon biopsies negative    Pt states he's had some abd cramping w meals or w drinking cold liquids, pt states is subsides after a few minutes. Denies HB/acid reflux c/o. Pt states this started after his colonoscopy.   Pt takes Omeprazole PRN, hasn't taken recently. Pt is on Eliquis and ASA.  Bowels moving every 2-3 days, +urgency. Takes Metamucil daily, denies constipation. LLQ pain resolved.          Past Medical History  Anemia; Arthritis; BPH (benign prostatic hyperplasia); BPH with Urinary Obstruction; CAD (coronary artery disease); CKD (chronic kidney disease); Controlled diabetes mellitus with diabetic polyneuropathy; Dizziness; DM2 (diabetes mellitus, type 2); DVT (deep venous thrombosis); ED (erectile dysfunction) of organic origin; Foot pain, left; Foot pain, right; High cholesterol; HTN  (hypertension); Ingrowing nail; Pain in left foot; Pain in right foot; Polyneuropathy; Tinea unguium         Past Surgical History  Adrenalectomy; Colonoscopy; Cystoscopy; TUNA; TURP         Medication List  Name Date Started Instructions   acetaminophen-codeine 300-30 mg oral tablet  take 1-2 tabs qhs   amlodipine 10 mg oral tablet  take 1 tablet (10 mg) by oral route once daily   aspirin 81 mg oral tablet,delayed release (DR/EC)  take 1 tablet (81 mg) by oral route once daily   azelastine 137 mcg (0.1 %) nasal aerosol,spray  spray 2 sprays in each nostril by intranasal route 2 times per day   chlorthalidone 25 mg oral tablet  take 1 tablet (25 mg) by oral route once daily   Claritin-D 12 Hour 5-120 mg oral tablet extended release 12 hr  take 1 tablet by oral route 2 times per day   Co Q-10 100 mg oral capsule  take 1 capsule by oral route daily   Eliquis 5 mg oral tablet  take 1 tablet (5 mg) by oral route 2 times per day   ergocalciferol (vitamin D2) 1,250 mcg (50,000 unit) oral capsule  take 1 capsule by oral route   fiber oral powder  use as directed by oral route daily   Flonase Allergy Relief 50 mcg/actuation nasal spray,suspension  spray 1 spray (50 mcg) in each nostril by intranasal route once daily   gabapentin 600 mg oral tablet  take 1 tablet (600 mg) by oral route 3 times per day   glipizide 5 mg oral tablet extended release 24hr  take 1 tablet (5 mg) by oral route bid daily with food   hydroxyzine HCl 25 mg oral tablet  take 1 tablet (25 mg) by oral route 4 times per day as needed   latanoprost 0.005 % ophthalmic drops  instill 1 drop into affected eye(s) by ophthalmic route once daily in the evening   LiquiTears 1.4 % ophthalmic (eye) drops  instill 1 drop in affected eye As needed   omeprazole 20 mg oral capsule,delayed release(DR/EC)  take 1 capsule by oral route 2 times a day   Probiotic Acidophilus 1.5 mg (250 million cell) oral capsule  take 1 capsule by oral route daily   ropinirole 0.25 mg oral  "tablet  take 1 tablet (0.25 mg) by oral route 1-3 hours before bedtime   Trulicity 0.75 mg/0.5 mL subcutaneous pen injector  inject 0.5 milliliter (0.75 mg) by subcutaneous route every 7 days in the abdomen, thigh, or upper arm rotating injection sites   Ventolin HFA 90 mcg/actuation inhalation HFA aerosol inhaler  inhale 2 puffs (180 mcg) by inhalation route every 6 hours         Allergy List  hydrochlorothiazide; Lasix; STATINS-HMG-COA REDUCTASE INHIBITORS; Zetia         Family Medical History  Hypertension; Diabetes; No family history of colorectal cancer         Social History  Alcohol (Current some day); ; Retired; Tobacco (Never)         Review of Systems  · Constitutional  o Admits  o : good general health lately, no acute distress  · Cardiovascular  o Admits  o : blood clots or phlebitis--on Eliquis, Dr Ruffin  o Denies  o : chest pain  · Respiratory  o Admits  o : shortness of breath--on exertion, not new, PCP aware per pt and cardio-had stress test and neg per pt  · Gastrointestinal  o Denies  o : additional gastrointestinal symptoms except as noted in the HPI  · Psychiatric  o Admits  o : pleasant affect      Vitals  Date Time BP Position Site L\R Cuff Size HR RR TEMP (F) WT  HT  BMI kg/m2 BSA m2 O2 Sat        08/24/2020 10:44 /82 Sitting    62 - R   214lbs 16oz 5'  9\" 31.75 2.18     09/03/2020 10:57 /90 Sitting    66 - R  97.7 220lbs 4oz 5'  9\" 32.52 2.21           Physical Examination  · Constitutional  o Appearance  o : well developed, well-nourished, in no acute distress  · Head and Face  o Head  o :   § Inspection  § : atraumatic, normocephalic  · Eyes  o Sclerae  o : sclerae white, no sclerae icterus  · Neck  o Inspection/Palpation  o : supple  · Respiratory  o Respiratory Effort  o : breathing unlabored  o Inspection of Chest  o : normal appearance, no retractions  · Cardiovascular  o Peripheral Vascular System  o :   § Extremities  § : no cyanosis, clubbing or " edema  · Gastrointestinal  o Abdominal Examination  o : soft, nontender to palpation--pt had some tenderness to LUQ  · Skin and Subcutaneous Tissue  o General Inspection  o : no lesions present, no rashes present  · Neurologic  o Mental Status Examination  o :   § Orientation  § : grossly oriented to person, place and time  § Speech/Language  § : communication ability within normal limits, voice quality normal, articulation of speech normal, no evidence of aphasia  § Attention  § : attention normal, concentration abilities normal  o Sensation  o : grossly intact  o Gait and Station  o :   § Gait Screening  § : normal gait  · Psychiatric  o General  o : Alert and oriented x3  o Mood and Affect  o : Mood and affect are appropriate to circumstances          Assessment  · Pre-op exam     V72.84/Z01.818  · Abdominal pain     789.00/R10.9  epigastric w meals; LUQ w palpation  · Diverticulosis     562.10/K57.90      Plan  · Orders  o [facility] Pre-Op Covid-19 Screening (70213) - - 09/03/2020  · Medications  o Medications have been Reconciled  o Transition of Care or Provider Policy  · Instructions  o Please Sign Permit for: EGD  o Indication: abd pain w meals; on Eliquis and ASA  o Surgical Risk and Benefits: Possible risks/complications, benefits, and alternatives to surgical or invasive procedure have been explained to patient and/or legal guardian; Patient has been evaluated and can tolerate anesthesia and/or sedation. Risks, benefits, and alternatives to anesthesia and sedation have been explained to patient and/or legal guardian.  o Follow Up after Procedure.  o Other Instructions: Dr Ruffin/Dr norman  o Patient was educated/instructed on their diagnosis, treatment and medications prior to discharge from the clinic today.  o Patient instructed to seek medical attention urgently for new or worsening symptoms.  o Encouraged to follow-up with Primary Care Provider for preventative care.  o Advised pt to stay on PPI for  prevention since he's on Eliquis and ASA and age >60  o add Colace to regimen of Fiber            Electronically Signed by: ROSALIA Boateng -Author on September 3, 2020 11:43:47 AM

## 2021-05-13 NOTE — PROGRESS NOTES
Progress Note      Patient Name: Joon Zhao   Patient ID: [id number]   Sex: Male   YOB: 1945    Primary Care Provider: Brian Henson MD   Referring Provider: Carl Payne DPM    Visit Date: August 24, 2020    Provider: Trip Mirza MD   Location: [facility]   Location Address: [address] [place] [address], [facility]   [place], [place]  [id number]   Location Phone: [phone]          Chief Complaint     Followup visit for shortness of breath, recent pulmonary embolism.       History Of Present Illness  REFERRING CARE PROVIDER: Carl Payne DPM   Joon Zhao is a 74 year old /Black male with hypertension, chronic kidney disease, diabetes mellitus, and history of DVT and pulmonary embolism who is here for a followup visit. Since last visit in February 2020, patient underwent a right heart catherization, which showed normal right-sided filling pressures. Patient also had a CT scan of the chest, which showed complete resolution of the pulmonary embolism. Today, he reports the shortness of breath is significantly better than before. Denies having any chest pain, tightness, or pressure. He still has pedal edema, which is stable.   PAST MEDICAL HISTORY: 1) Deep venous thrombosis and pulmonary embolism, second one on anticoagulation; 2) Hypertension; 3) Chronic kidney disease, stage 3; 4) Diabetes mellitus; 5) Negative for coronary artery disease.   PSYCHOSOCIAL HISTORY: Denies tobacco use. Rarely consumes alcohol. Admits mood changes and depression.   CURRENT MEDICATIONS: Acetaminophen-codeine 300-30 mg b.i.d. q. h.s.; albuterol; amlodipine 10 mg daily; apixaban 5 mg b.i.d.; artificial saliva 240 mL 3 sprays p.o. b.i.d. p.r.n.; aspirin 81 mg daily; azelastine 137 mcg spray b.i.d.; Claritin-D p.r.n.; chlorthalidone 25 mg b.i.d.; dulaglutide 0.7 mg subcutaneous weekly; ergocalciferol 50,000 units weekly; Flonase; gabapentin 600 mg t.i.d.; glipizide 5 mg  "b.i.d.; hydroxyzine 25 mg q. 6 h. p.r.n.; latanoprost eye drops; omeprazole 20 mg p.r.n.; Liquitears p.r.n.; ropinirole 0.25 mg q. h.s.; Co-Q10 100 mg daily; fiber supplement; probiotic.       Review of Systems  · Cardiovascular  o Admits  o : swelling (feet, ankles, hands)  o Denies  o : palpitations (fast, fluttering, or skipping beats), shortness of breath while walking or lying flat, chest pain or angina pectoris   · Respiratory  o Denies  o : chronic or frequent cough, asthma or wheezing      Vitals  Date Time BP Position Site L\R Cuff Size HR RR TEMP (F) WT  HT  BMI kg/m2 BSA m2 O2 Sat HC       08/24/2020 10:44 /82 Sitting    62 - R   214lbs 16oz 5'  9\" 31.75 2.18           Physical Examination  · Respiratory  o Auscultation of Lungs  o : Clear to auscultation bilaterally. No crackles or rhonchi.  · Cardiovascular  o Heart  o : S1, S2 normally heard. No S3. No murmur, rubs, or gallops.  · Gastrointestinal  o Abdominal Examination  o : Soft, nontender, nondistended. No free fluid. Bowel sounds heard in all four quadrants.  · Extremities  o Extremities  o : Warm and well perfused. 1+ pitting pedal edema bilaterally. Distal pulses present.  · Labs  o Labs  o : 05/01/2020, hemoglobin 14, hematocrit 41.6, WBC 4.88, platelet count 182,000, sodium 139, potassium 4.0, chloride 98, BUN 18, creatinine 1.89, hemoglobin A1c 9.2%, calcium 9.9, total bilirubin 0.55, AST 23, ALT 18, alkaline phosphatase 78, total protein 7.6, albumin 4.3, globulin 3.3.          Assessment     ASSESSMENT & PLAN:    1.  Shortness of breath/pulmonary embolism.  Currently, symptoms are better.  Continue anticoagulation        indefinitely.  Patient is also followed by Dr. Ruffin, hematologist, and Dr. Cazares, pulmonologist.  2.  Hypertension.  Blood pressure is well controlled.  Continue current regimen.  3.  Follow up in 9 months.        MD GISSELL Thomas:vm             Electronically Signed by: Lillian KeenanMedical " , Other -Author on August 25, 2020 10:40:26 AM  Electronically Co-signed by: Trip Mirza MD -Reviewer on August 25, 2020 07:13:46 PM

## 2021-05-13 NOTE — PROGRESS NOTES
"   Progress Note      Patient Name: Joon Zhao   Patient ID: [id number]   Sex: Male   YOB: 1945    Primary Care Provider: Brian Henson MD   Referring Provider: Gogo LINDSEY    Visit Date: November 5, 2020    Provider: Isabelle Florian MD   Location: [facility]   Location Address: [address] [place] [address], [address] [place] [address]  [id number]   Location Phone: [phone]          Chief Complaint  · BPH      History Of Present Illness  Joon Zhao is a 74 year old /Black male who comes in for a scheduled follow-up visit for BPH. Patient is urinating with no problems. His stream is good. His stream is good in the daytime he has no problem with urination. No dysuria or gross hematuria. Patient has pulmonary problem with blood clots. And has back problem. Patient is a [place]  and was exposed to agent orange.       Review of Systems  · Constitutional  o Denies  o : fever, chills  · Cardiovascular  o Denies  o : chest pain, cardiac murmurs, irregular heart beats, dyspnea on exertion  · Respiratory  o Denies  o : shortness of breath, wheezing  · Gastrointestinal  o Denies  o : nausea, vomiting, change in abdominal girth, diarrhea, constipation, blood in stools  · Genitourinary  o Denies  o : additional symptoms, except as noted in HPI  · Neurologic  o Denies  o : tingling or numbness, incoordination, seizures      Vitals  Date Time BP Position Site L\R Cuff Size HR RR TEMP (F) WT  HT  BMI kg/m2 BSA m2 O2 Sat FR L/min FiO2        11/05/2020 10:30 /93 Sitting    66 - R  97.6 220lbs 0oz 5'  9\" 32.49 2.2             Physical Examination  · Constitutional  o Appearance  o : well-nourished, well developed, alert, in no acute distress. 74-year-old -American male who is obese  · Neck  o Thyroid  o : gland size normal, nontender, no nodules or masses present on palpation  · Respiratory  o Respiratory Effort  o : breathing unlabored  o Inspection of " Chest  o : normal appearance, no retractions  o Auscultation of Lungs  o : normal breath sounds throughout  · Cardiovascular  o Heart  o :   § Auscultation of Heart  § : regular rate and rhythm, no murmurs, gallops or rubs  o Peripheral Vascular System  o : No abnormalities  · Gastrointestinal  o Abdominal Examination  o : abdomen nontender to palpation, tone normal without rigidity or guarding, no masses present, abdominal contour obese with divarication of recti  o Liver and spleen  o : no abnormalities  · Genitourinary  o Bladder  o : no abnormalities  o Penis  o : Normal appearance without lesion, discharge or masses.  o Urethral Meatus  o : no abnormalities  o Scrotum and Scrotal Contents  o :   § Scrotum  § : no abnormalities  § Epididymides  § : no abnormalities  § Testes  § : no abnormalities  o Digital Rectal Examination  o :   § Prostate  § : nontender to palpation, size normal, no nodules present, consistency normal  · Lymphatic  o Groin  o : no lymphadenopathy present, normal size  · Skin and Subcutaneous Tissue  o General Inspection  o : no lesions present, no areas of discoloration, skin turgor normal, texture normal  · Neurologic  o Mental Status Examination  o : grossly oriented to person, place and time  o Gait and Station  o : normal gait, able to stand without difficulty  · Psychiatric  o Mood and Affect  o : mood normal, affect appropriate          Results  · In-Office Procedures  o Lab procedure  § Automated dipstick urinalysis with microscopy (30214)   § Color Ur: Yellow   § Clarity Ur: Clear   § Glucose Ur Ql Strip: 250 mg/dL   § Bilirub Ur Ql Strip: Negative   § Ketones Ur Ql Strip: Negative   § Sp Gr Ur Qn: 1.020   § Hgb Ur Ql Strip: Negative   § pH Ur-LsCnc: 5.5   § Prot Ur Ql Strip: Negative   § Urobilinogen Ur Strip-mCnc: 0.2 E.U./dL   § Nitrite Ur Ql Strip: Negative   § WBC Est Ur Ql Strip: Negative   § RBC UrnS Qn HPF: 0   § WBC UrnS Qn HPF: 0   § Bacteria UrnS Qn HPF: 0   § Crystals  UrnS Qn HPF: 0-1   § Epithelial Cells (non renal): 0 /HPF  § Epithelial Cells (renal): 0       Assessment  · Prostate Cancer Screening     V76.44/Z12.5  · BPH (benign prostatic hyperplasia)     600.00/N40.0      Plan  · Orders  o PSA Ultrasensitive, ANNUAL SCREENING [facility] (11158, ) - V76.44/Z12.5 - 11/05/2020  · Instructions  o Patient is doing very well as per his urologic health is concerned. We will recheck him in 1 year's time            Electronically Signed by: Isabelle Florian MD -Author on November 5, 2020 10:59:46 AM

## 2021-05-13 NOTE — PROGRESS NOTES
Progress Note      Patient Name: Joon Zhao   Patient ID: [id number]   Sex: Male   YOB: 1945    Primary Care Provider: Brian Henson MD   Referring Provider: Carl Payne DPM    Visit Date: August 18, 2020    Provider: Carl Payne DPM   Location: [facility]   Location Address: [address] [place] [address]  [id number]   Location Phone: [phone]          Chief Complaint  · Routine Foot Care Visit  · Diabetic Foot Examination      History Of Present Illness  Joon Zhao complains of painful, elongated toenails which are thickened, yellowed, chalky, and cause pain with shoe gear and ambulation.      New, Established, New Problem:  Established   Location:  Toenails  Duration:   Greater than five years  Onset:  Gradual  Nature:  Sore with palpation.  Stable, worsening, improving:   stable  Aggravating factors:  Pain with shoe gear and ambulation.  Previous Treatment:  Debridement    Patient denies any fevers, chills, nausea, vomiting, nor any other constitutional signs nor symptoms.    Patient reports that no changes in their medications with their recent appointment with their primary care provider.    Pt states their most recent blood glucose reading was 143.       Past Medical History  Anemia; Arthritis; BPH (benign prostatic hyperplasia); BPH with Urinary Obstruction; CAD (coronary artery disease); CKD (chronic kidney disease); Controlled diabetes mellitus with diabetic polyneuropathy; Dizziness; DM2 (diabetes mellitus, type 2); DVT (deep venous thrombosis); ED (erectile dysfunction) of organic origin; Foot pain, left; Foot pain, right; High cholesterol; HTN (hypertension); Ingrowing nail; Pain in left foot; Pain in right foot; Polyneuropathy; Tinea unguium         Past Surgical History  Adrenalectomy; Colonoscopy; Cystoscopy; TUNA; TURP         Medication List  acetaminophen-codeine 300-30 mg oral tablet; amlodipine 10 mg oral tablet;  "aspirin 81 mg oral tablet,delayed release (DR/EC); azelastine 137 mcg (0.1 %) nasal aerosol,spray; chlorthalidone 25 mg oral tablet; Claritin-D 12 Hour 5-120 mg oral tablet extended release 12 hr; Co Q-10 100 mg oral capsule; Eliquis 5 mg oral tablet; ergocalciferol (vitamin D2) 1,250 mcg (50,000 unit) oral capsule; fiber oral powder; Flonase Allergy Relief 50 mcg/actuation nasal spray,suspension; gabapentin 600 mg oral tablet; glipizide 5 mg oral tablet extended release 24hr; hydroxyzine HCl 25 mg oral tablet; latanoprost 0.005 % ophthalmic drops; LiquiTears 1.4 % ophthalmic (eye) drops; omeprazole 20 mg oral capsule,delayed release(DR/EC); Probiotic Acidophilus 1.5 mg (250 million cell) oral capsule; ropinirole 0.25 mg oral tablet; Trulicity 0.75 mg/0.5 mL subcutaneous pen injector; Ventolin HFA 90 mcg/actuation inhalation HFA aerosol inhaler         Allergy List  hydrochlorothiazide; Lasix; STATINS-HMG-COA REDUCTASE INHIBITORS; Zetia       Allergies Reconciled  Family Medical History  Hypertension; Diabetes; No family history of colorectal cancer         Social History  Alcohol (Current some day); ; Retired; Tobacco (Never)         Review of Systems  · Constitutional  o Denies  o : fever, chills  · Eyes  o Denies  o : double vision  · HENT  o Denies  o : vertigo, recent head injury, hearing loss or ringing  · Cardiovascular  o Denies  o : chest pain, irregular heart beats  · Respiratory  o Denies  o : shortness of breath, productive cough  · Gastrointestinal  o Denies  o : nausea, vomiting  · Integument  o * See HPI  · Neurologic  o Admits  o : tingling or numbness  o Denies  o : altered mental status, seizures  · Musculoskeletal  o Denies  o : joint pain, joint swelling, limitation of motion      Vitals  Date Time BP Position Site L\R Cuff Size HR RR TEMP (F) WT  HT  BMI kg/m2 BSA m2 O2 Sat        08/18/2020 07:52 /80 Sitting    65 - R  97.1 218lbs 0oz 5'  9\" 32.19 2.19 100 %          Physical " Examination  · Constitutional  o Appearance  o : well-nourished, well developed, no obvious deformities present, appears to be chronically ill   · Eyes  o Vision  o : Patient wearing glasses.   · Respiratory  o Respiratory Effort  o : No labored breathing. Good respiratory effort.   · Cardiovascular  o Peripheral Vascular System  o :   § Pedal Pulses  § : Pedal Pulses are 2+ and symmetrical  § Extremities  § : There is no edema of the lower extremities  · Musculoskeletal  o Extremeties/Joint  o : Lower extremity muscle strength and range of motion is equal and symmetrical bilaterally. The knees are noted to be in normal alignment. Ankle alignment and range of motion is normal and foot structure is normal.  · Skin and Subcutaneous Tissue  o General Inspection  o : no lesions present, no areas of discoloration, skin turgor normal, texture normal  · Neurologic  o Sensation  o : [place]-Willie 5.07 monofilament absent to all assessed areas. Sharp/dull sensation is absent.   · Toes  o Toes: Right Foot  o :   § Toenails  § : Toenails are hypertrophic, mycotc, dystrophic, thickened, with sublingual debris, incurvated, brittle toenail(s) at nail-1, 2, 3, 4,, Oncholysis of the foot   o Toes: Left Foot  o :   § Toenails  § : Toenails are hypertrophic, mycotc, dystrophic, thickened, with sublingual debris, incurvated, brittle toenail(s) at nail-1, 2, 3,, Onycholysis of the foot   · Procedures  o Nail Debridement  o : Nail debridement is indicated for the following toenails:-left hallux, left 2nd toe, left 3rd toe, right hallux, right 2nd toe, right 3rd toe, right 4th toe,, The nail was debrided of excessive thickness to appropriate levels of comfort and contour using nail nippers. The procedure was without complications          Assessment  · Ingrowing nail     703.0/L60.0  · Polyneuropathy     356.9/G62.9  · Tinea unguium     110.1/B35.1  · DM2 (diabetes mellitus, type 2)     250.00/E11.9  · Pain in left  foot     729.5/M79.672  · Pain in right foot     729.5/M79.671      Plan  · Orders  o Debridement of six or more nails (24650, 03762, 41844, 46665, 06816, 43703, 43492) - 729.5/M79.672, 729.5/M79.671, 250.00/E11.9, 110.1/B35.1 - 08/18/2020  o ACO-16: BMI above normal range and follow-up plan is documented (, , , , , , ) - - 08/18/2020  o Diabetic Foot (Motor and Sensory) Exam Completed [facility] (, , 2028F) - - 08/18/2020  · Medications  o Medications have been Reconciled  o Transition of Care or Provider Policy  · Instructions  o I have discussed the findings of this evaluation with the patient. The discussion included a complete verbal explanation of any changes in the examination results, diagnosis, and the current treatment plan. A schedule for future care needs was explained. If any questions should arise after returning home, I have encouraged the patient to feel free to contact Dr. Payne. The patient states understanding and agreement with this plan.   o Patient is to monitor for problems and to contact Dr. Payne for follow-up should such signs occur. Patient states understanding and agreement with this plan.   o Encouraged to follow-up with Primary Care Provider for preventative care.   o BMI Counseling: Discussed weight loss and the need for exercise.   o Follow up in 9 weeks for Routine Foot Care.   o Electronically Identified Patient Education Materials Provided Electronically  · Disposition  o Call or Return if symptoms worsen or persist.            Electronically Signed by: Carl Payne DPM -Author on August 18, 2020 08:15:55 AM

## 2021-05-13 NOTE — PROGRESS NOTES
Progress Note      Patient Name: Joon Zhao   Patient ID: [id number]   Sex: Male   YOB: 1945    Primary Care Provider: Brian Henson MD   Referring Provider: Carl Payne DPM    Visit Date: May 26, 2020    Provider: Carl Payne DPM   Location: [facility]   Location Address: [address] [place] [address]  [id number]   Location Phone: [phone]          Chief Complaint  · Routine Foot Care Visit  · Diabetic Foot Examination      History Of Present Illness  Joon Zhao complains of painful, elongated toenails which are thickened, yellowed, chalky, and cause pain with shoe gear and ambulation.      New, Established, New Problem:  Established   Location:  Toenails  Duration:   Greater than five years  Onset:  Gradual  Nature:  Sore with palpation.  Stable, worsening, improving:   stable  Aggravating factors:  Pain with shoe gear and ambulation.  Previous Treatment:  Debridement    Patient denies any fevers, chills, nausea, vomiting, nor any other constitutional signs nor symptoms.    Patient relates no medical changes since their last visit.    Pt states their most recent blood glucose reading was 177.       Past Medical History  Anemia; Arthritis; BPH (benign prostatic hyperplasia); BPH with urinary obstruction; CAD (coronary artery disease); CKD (chronic kidney disease); Controlled diabetes mellitus with diabetic polyneuropathy; Dizziness; DM2 (diabetes mellitus, type 2); DVT (deep venous thrombosis); ED (erectile dysfunction) of organic origin; Foot pain, left; Foot pain, right; High cholesterol; HTN (hypertension); Ingrowing nail; Pain in left foot; Pain in right foot; Polyneuropathy; Tinea unguium         Past Surgical History  Adrenalectomy; Colonoscopy; Cystoscopy; TUNA; TURP         Medication List  acetaminophen-codeine 300-30 mg oral tablet; amlodipine 10 mg oral tablet; aspirin 81 mg oral tablet,delayed release (DR/EC);  "azelastine 137 mcg (0.1 %) nasal aerosol,spray; chlorthalidone 25 mg oral tablet; Claritin-D 12 Hour 5-120 mg oral tablet extended release 12 hr; Co Q-10 100 mg oral capsule; Eliquis 5 mg oral tablet; fiber oral powder; Fiber Supplement (inulin) 2 gram oral tablet,chewable; gabapentin 600 mg oral tablet; glipizide 5 mg oral tablet extended release 24hr; latanoprost 0.005 % ophthalmic drops; LiquiTears 1.4 % ophthalmic (eye) drops; NuLYTELY with Flavor Packs 420 gram oral recon soln; omeprazole 20 mg oral capsule,delayed release(DR/EC); Probiotic oral         Allergy List  hydrochlorothiazide; Lasix; STATINS-HMG-COA REDUCTASE INHIBITORS; Zetia       Allergies Reconciled  Family Medical History  Hypertension; Diabetes; No family history of colorectal cancer         Social History  Alcohol (Current some day); ; Retired; Tobacco (Never)         Review of Systems  · Constitutional  o Denies  o : fever, chills  · Eyes  o Denies  o : double vision  · HENT  o Denies  o : vertigo, recent head injury, hearing loss or ringing  · Cardiovascular  o Denies  o : chest pain, irregular heart beats  · Respiratory  o Denies  o : shortness of breath, productive cough  · Gastrointestinal  o Denies  o : nausea, vomiting  · Integument  o * See HPI  · Neurologic  o Admits  o : tingling or numbness  o Denies  o : altered mental status, seizures  · Musculoskeletal  o Denies  o : joint pain, joint swelling, limitation of motion      Vitals  Date Time BP Position Site L\R Cuff Size HR RR TEMP (F) WT  HT  BMI kg/m2 BSA m2 O2 Sat HC       05/26/2020 08:09 /75 Sitting    71 - R  98 220lbs 0oz 5'  9\" 32.49 2.2 100 %          Physical Examination  · Constitutional  o Appearance  o : well-nourished, well developed, no obvious deformities present, appears to be chronically ill   · Eyes  o Vision  o : Patient wearing glasses.   · Respiratory  o Respiratory Effort  o : No labored breathing. Good respiratory effort. "   · Cardiovascular  o Peripheral Vascular System  o :   § Pedal Pulses  § : Pedal Pulses are 2+ and symmetrical  § Extremities  § : There is no edema of the lower extremities  · Musculoskeletal  o Extremeties/Joint  o : Lower extremity muscle strength and range of motion is equal and symmetrical bilaterally. The knees are noted to be in normal alignment. Ankle alignment and range of motion is normal and foot structure is normal.  · Skin and Subcutaneous Tissue  o General Inspection  o : no lesions present, no areas of discoloration, skin turgor normal, texture normal  · Neurologic  o Sensation  o : [place]-Willie 5.07 monofilament absent to all assessed areas. Sharp/dull sensation is absent.   · Toes  o Toes: Right Foot  o :   § Toenails  § : Toenails are hypertrophic, mycotc, dystrophic, thickened, with sublingual debris, incurvated, brittle toenail(s) at nail-1, 2, 3, 4,, Oncholysis of the foot   o Toes: Left Foot  o :   § Toenails  § : Toenails are hypertrophic, mycotc, dystrophic, thickened, with sublingual debris, incurvated, brittle toenail(s) at nail-1, 2, 3,, Onycholysis of the foot   · Procedures  o Nail Debridement  o : Nail debridement is indicated for the following toenails:-left hallux, left 2nd toe, left 3rd toe, right hallux, right 2nd toe, right 3rd toe, right 4th toe,, The nail was debrided of excessive thickness to appropriate levels of comfort and contour using nail nippers. The procedure was without complications              Assessment  · Ingrowing nail     703.0/L60.0  · Polyneuropathy     356.9/G62.9  · Tinea unguium     110.1/B35.1  · DM2 (diabetes mellitus, type 2)     250.00/E11.9  · Pain in left foot     729.5/M79.672  · Pain in right foot     729.5/M79.671      Plan  · Orders  o Debridement of six or more nails (15003, 34686, 54540, 18337, 93307, 01117) - 729.5/M79.672, 729.5/M79.671, 250.00/E11.9, 110.1/B35.1 - 05/26/2020  o ACO-16: BMI above normal range and follow-up plan is  documented (, , , , , ) - - 05/26/2020  o Diabetic Foot (Motor and Sensory) Exam Completed [facility] (, , 2028F) - - 05/26/2020  · Medications  o Medications have been Reconciled  o Transition of Care or Provider Policy  · Instructions  o I have discussed the findings of this evaluation with the patient. The discussion included a complete verbal explanation of any changes in the examination results, diagnosis, and the current treatment plan. A schedule for future care needs was explained. If any questions should arise after returning home, I have encouraged the patient to feel free to contact Dr. Payne. The patient states understanding and agreement with this plan.   o Patient is to monitor for problems and to contact Dr. Payne for follow-up should such signs occur. Patient states understanding and agreement with this plan.   o Encouraged to follow-up with Primary Care Provider for preventative care.   o BMI Counseling: Discussed weight loss and the need for exercise.   o Follow up in 9 weeks for Routine Foot Care.   o Electronically Identified Patient Education Materials Provided Electronically  · Disposition  o Call or Return if symptoms worsen or persist.            Electronically Signed by: Carl Payne DPM -Author on May 26, 2020 08:16:45 AM

## 2021-05-14 VITALS
SYSTOLIC BLOOD PRESSURE: 137 MMHG | DIASTOLIC BLOOD PRESSURE: 93 MMHG | HEART RATE: 68 BPM | HEIGHT: 69 IN | WEIGHT: 219 LBS | BODY MASS INDEX: 32.44 KG/M2 | TEMPERATURE: 97.1 F | OXYGEN SATURATION: 100 %

## 2021-05-14 VITALS
HEART RATE: 78 BPM | OXYGEN SATURATION: 100 % | TEMPERATURE: 97.3 F | DIASTOLIC BLOOD PRESSURE: 74 MMHG | WEIGHT: 220 LBS | HEIGHT: 69 IN | SYSTOLIC BLOOD PRESSURE: 123 MMHG | BODY MASS INDEX: 32.58 KG/M2

## 2021-05-14 VITALS
BODY MASS INDEX: 32.88 KG/M2 | DIASTOLIC BLOOD PRESSURE: 88 MMHG | HEART RATE: 84 BPM | WEIGHT: 222 LBS | HEIGHT: 69 IN | SYSTOLIC BLOOD PRESSURE: 140 MMHG

## 2021-05-14 VITALS
TEMPERATURE: 97.8 F | HEIGHT: 69 IN | WEIGHT: 222.25 LBS | SYSTOLIC BLOOD PRESSURE: 134 MMHG | DIASTOLIC BLOOD PRESSURE: 84 MMHG | HEART RATE: 70 BPM | BODY MASS INDEX: 32.92 KG/M2

## 2021-05-14 VITALS
BODY MASS INDEX: 32.58 KG/M2 | DIASTOLIC BLOOD PRESSURE: 93 MMHG | HEART RATE: 66 BPM | TEMPERATURE: 97.6 F | WEIGHT: 220 LBS | SYSTOLIC BLOOD PRESSURE: 149 MMHG | HEIGHT: 69 IN

## 2021-05-14 VITALS
DIASTOLIC BLOOD PRESSURE: 65 MMHG | SYSTOLIC BLOOD PRESSURE: 116 MMHG | HEIGHT: 69 IN | WEIGHT: 228.25 LBS | TEMPERATURE: 98.1 F | BODY MASS INDEX: 33.81 KG/M2 | HEART RATE: 75 BPM

## 2021-05-14 VITALS
HEIGHT: 69 IN | DIASTOLIC BLOOD PRESSURE: 59 MMHG | HEART RATE: 78 BPM | SYSTOLIC BLOOD PRESSURE: 115 MMHG | OXYGEN SATURATION: 100 % | WEIGHT: 227 LBS | BODY MASS INDEX: 33.62 KG/M2 | TEMPERATURE: 97.1 F

## 2021-05-14 VITALS
WEIGHT: 215 LBS | HEIGHT: 69 IN | SYSTOLIC BLOOD PRESSURE: 128 MMHG | BODY MASS INDEX: 31.84 KG/M2 | HEART RATE: 62 BPM | DIASTOLIC BLOOD PRESSURE: 82 MMHG

## 2021-05-14 VITALS
HEART RATE: 66 BPM | SYSTOLIC BLOOD PRESSURE: 126 MMHG | TEMPERATURE: 97.7 F | WEIGHT: 220.25 LBS | DIASTOLIC BLOOD PRESSURE: 90 MMHG | BODY MASS INDEX: 32.62 KG/M2 | HEIGHT: 69 IN

## 2021-05-14 NOTE — PROGRESS NOTES
"   Progress Note      Patient Name: Joon Zhao   Patient ID: [id number]   Sex: Male   YOB: 1945    Primary Care Provider: Brian Henson MD   Referring Provider: Gogo LINDSEY    Visit Date: March 24, 2021    Provider: ROSALIA Boateng   Location: [facility]   Location Address: [address] [place] [address]  [id number]   Location Phone: [phone]          Chief Complaint  · F/U ABD pain      History Of Present Illness  Joon Zhao is a 75 year old /Black male who presents to the office today.      Patient initially presented November 2014, he has been seen intermittently but returned in 2020 to follow-up from an ER visit for abdominal pain and diarrhea.  CT of the abdomen and pelvis without IV contrast was negative, C. difficile was negative.    Patient follows with Dr. Phong Galvez for low GFR. On Eliquis and ASA d/t hx DVT and PE.   Follows w Dr Ruffin also, reported \"abnormal blood tests.\"     Colonoscopy July 2020: negative with negative random colon biopsies, diverticulosis seen, adequate prep.  EGD 10/16/2020:small hiatal hernia otherwise neg., stomach bx negative  CT abd/pelvis w/o contrast 1/2021 by DR SNEED: diverticulosis, umbilical hernia 3.6 cm containing nondilated loops of bowel.    Pt last seen Feb 2021 c/o abd pain after meals, more in LLQ, w negative w/u so far, Hyoscyamine was Rx'd.     Today, pt states Hyoscyamine has helped abd pain. States LLQ pain not as often. States after exercising he had abd pain (points lower) that radiated into chest and back lasting 45 min 2 days ago. Pt laid down and improved. Pt does see cardio states he has had w/u w stress test and echo in Dec and states negative. Pt has exercised 2 other days this week since then w/o any issue. Pt is taking all morning meds on empty stomach. No blood in stool or constipation. States tracie moving regularly.       Past Medical History  Anemia; " Arthritis; BPH (benign prostatic hyperplasia); BPH with Urinary Obstruction; CAD (coronary artery disease); CKD (chronic kidney disease); Controlled diabetes mellitus with diabetic polyneuropathy; Dizziness; DM2 (diabetes mellitus, type 2); DVT (deep venous thrombosis); ED (erectile dysfunction) of organic origin; Foot pain, left; Foot pain, right; High cholesterol; HTN (hypertension); Ingrowing nail; Pain in left foot; Pain in right foot; Polyneuropathy; Tinea unguium         Past Surgical History  Adrenalectomy; Colonoscopy; Cystoscopy; TUNA; TURP         Medication List  Name Date Started Instructions   acetaminophen-codeine 300-30 mg oral tablet  take 1-2 tabs qhs   amlodipine 10 mg oral tablet  take 1 tablet (10 mg) by oral route once daily   aspirin 81 mg oral tablet,delayed release (DR/EC)  take 1 tablet (81 mg) by oral route once daily   azelastine 137 mcg (0.1 %) nasal aerosol,spray  spray 2 sprays in each nostril by intranasal route 2 times per day   baclofen 10 mg oral tablet  take 1 tablet by oral route 2 times a day   chlorthalidone 25 mg oral tablet  take 1 tablet (25 mg) by oral route once daily   Claritin-D 12 Hour 5-120 mg oral tablet extended release 12 hr  take 1 tablet by oral route 2 times per day   Co Q-10 100 mg oral capsule  take 1 capsule by oral route daily   Eliquis 5 mg oral tablet  take 1 tablet (5 mg) by oral route 2 times per day   ergocalciferol (vitamin D2) 1,250 mcg (50,000 unit) oral capsule  take 1 capsule by oral route   fiber oral powder  use as directed by oral route daily   Flonase Allergy Relief 50 mcg/actuation nasal spray,suspension  spray 1 spray (50 mcg) in each nostril by intranasal route once daily   gabapentin 800 mg oral tablet  take 1 tablet (800 mg) by oral route 3 times per day   glipizide 5 mg oral tablet extended release 24hr  take 1 tablet (5 mg) by oral route bid daily with food   hydroxyzine HCl 25 mg oral tablet  take 1 tablet (25 mg) by oral route 4 times  "per day as needed   hyoscyamine sulfate 0.125 mg sublingual tablet, sublingual 02/04/2021 place 1 tablet under tongue by translingual route every 4 to 6 hours for 30 days PRN diarrhea/abd pain   latanoprost 0.005 % ophthalmic (eye) drops  instill 1 drop into affected eye(s) by ophthalmic route once daily in the evening   losartan 25 mg oral tablet  take 1 tablet (25 mg) by oral route once daily   Ozzy-Stir mucous membrane spray with pump  take 3 spray with pumps by mucous membrane route 2 times a day   omeprazole 20 mg oral capsule,delayed release(DR/EC)  take 1 capsule by oral route 2 times a day   Probiotic Acidophilus 1.5 mg (250 million cell) oral capsule  take 1 capsule by oral route daily   ropinirole 0.25 mg oral tablet  take 1 tablet (0.25 mg) by oral route 1-3 hours before bedtime   Rubbing Alcohol (ethanol) 70 % topical solution  apply to affected area by topical route As needed   Ventolin HFA 90 mcg/actuation inhalation HFA aerosol inhaler  inhale 2 puffs (180 mcg) by inhalation route every 6 hours         Allergy List  hydrochlorothiazide; Lasix; STATINS-HMG-COA REDUCTASE INHIBITORS       Allergies Reconciled  Family Medical History  Hypertension; Diabetes; No family history of colorectal cancer         Social History  Alcohol (Current some day); ; Retired; Tobacco (Never)         Review of Systems  · Constitutional  o Admits  o : good general health lately, no acute distress  · Gastrointestinal  o Denies  o : additional gastrointestinal symptoms except as noted in the HPI  · Psychiatric  o Admits  o : pleasant affect      Vitals  Date Time BP Position Site L\R Cuff Size HR RR TEMP (F) WT  HT  BMI kg/m2 BSA m2 O2 Sat FR L/min FiO2 HC       02/04/2021 08:38 /84 Sitting    70 - R  97.8 222lbs 4oz 5'  9\" 32.82 2.22       03/24/2021 08:31 /65 Sitting    75 - R  98.1 228lbs 4oz 5'  9\" 33.71 2.25             Physical Examination  · Constitutional  o Appearance  o : well developed, " well-nourished, in no acute distress  · Head and Face  o Head  o :   § Inspection  § : atraumatic, normocephalic  · Eyes  o Sclerae  o : sclerae white, no sclerae icterus  · Neck  o Inspection/Palpation  o : supple  · Respiratory  o Respiratory Effort  o : breathing unlabored  o Inspection of Chest  o : normal appearance, no retractions  · Cardiovascular  o Peripheral Vascular System  o :   § Extremities  § : no cyanosis, clubbing or edema  · Gastrointestinal  o Abdominal Examination  o : soft, nontender to palpation  · Skin and Subcutaneous Tissue  o General Inspection  o : no lesions present, no rashes present  · Neurologic  o Mental Status Examination  o :   § Orientation  § : grossly oriented to person, place and time  § Speech/Language  § : communication ability within normal limits, voice quality normal, articulation of speech normal, no evidence of aphasia  § Attention  § : attention normal, concentration abilities normal  o Sensation  o : grossly intact  o Gait and Station  o :   § Gait Screening  § : normal gait  · Psychiatric  o General  o : Alert and oriented x3  o Mood and Affect  o : Mood and affect are appropriate to circumstances          Assessment  · Diverticulosis     562.10/K57.90  · Hiatal hernia     553.3/K44.9  · Irritable bowel syndrome     564.1/K58.9      Plan  · Medications  o Medications have been Reconciled  o Transition of Care or Provider Policy  · Instructions  o Encouraged to follow-up with Primary Care Provider for preventative care.  o Patient was educated/instructed on their diagnosis, treatment and medications prior to discharge from the clinic today.  o Patient instructed to seek medical attention urgently for new or worsening symptoms.  o F/U PRN. Advised pt to call if any GI symptoms such as change in bowel pattern, abd pain, wt loss, or blood in stool.   o Cont w current regimen, can add FLorajen 3 daily; also recommended pt take morning meds w food/after meal (except for  Omeprazole and Glipizide, he will take prior to meal).             Electronically Signed by: ROSALIA Boateng -Author on March 24, 2021 04:22:21 PM

## 2021-05-14 NOTE — PROGRESS NOTES
Progress Note      Patient Name: Joon Zhao   Patient ID: [id number]   Sex: Male   YOB: 1945    Primary Care Provider: Brian Henson MD   Referring Provider: Carl Payne DPM    Visit Date: April 27, 2021    Provider: Carl Payne DPM   Location: [facility]   Location Address: [address] [place] [address]  [id number]   Location Phone: [phone]          Chief Complaint  · Routine Foot Care Visit  · Diabetic Foot Examination      History Of Present Illness  Joon Zhao complains of painful, elongated toenails which are thickened, yellowed, chalky, and cause pain with shoe gear and ambulation.      New, Established, New Problem:  Established   Location:  Toenails  Duration:   Greater than five years  Onset:  Gradual  Nature:  Sore with palpation.  Stable, worsening, improving:   worsening  Aggravating factors:  Pain with shoe gear and ambulation.  Previous Treatment:  Debridement    Patient denies any fevers, chills, nausea, vomiting, nor any other constitutional signs nor symptoms.    Patient relates no medical changes since their last visit.    Pt states their most recent blood glucose reading was 175.       Past Medical History  Anemia; Arthritis; BPH (benign prostatic hyperplasia); BPH with Urinary Obstruction; CAD (coronary artery disease); CKD (chronic kidney disease); Controlled diabetes mellitus with diabetic polyneuropathy; Dizziness; DM2 (diabetes mellitus, type 2); DVT (deep venous thrombosis); ED (erectile dysfunction) of organic origin; Foot pain, left; Foot pain, right; High cholesterol; HTN (hypertension); Ingrowing nail; Pain in left foot; Pain in right foot; Polyneuropathy; Tinea unguium         Past Surgical History  Adrenalectomy; Colonoscopy; Cystoscopy; TUNA; TURP         Medication List  acetaminophen-codeine 300-30 mg oral tablet; amlodipine 5 mg oral tablet; aspirin 81 mg oral tablet,delayed release (DR/EC); azelastine 137 mcg (0.1  %) nasal aerosol,spray; baclofen 10 mg oral tablet; chlorthalidone 25 mg oral tablet; Claritin-D 12 Hour 5-120 mg oral tablet extended release 12 hr; Co Q-10 100 mg oral capsule; Eliquis 5 mg oral tablet; ergocalciferol (vitamin D2) 1,250 mcg (50,000 unit) oral capsule; fiber oral powder; Flonase Allergy Relief 50 mcg/actuation nasal spray,suspension; gabapentin 800 mg oral tablet; glipizide 5 mg oral tablet extended release 24hr; hydroxyzine HCl 25 mg oral tablet; hyoscyamine sulfate 0.125 mg sublingual tablet, sublingual; latanoprost 0.005 % ophthalmic (eye) drops; losartan 25 mg oral tablet; magnesium oxide 400 mg magnesium oral tablet; Ozzy-Stir mucous membrane spray with pump; omeprazole 20 mg oral capsule,delayed release(DR/EC); polyvinyl alcohol 1.4 % ophthalmic (eye) drops; Probiotic Acidophilus 1.5 mg (250 million cell) oral capsule; ropinirole 0.25 mg oral tablet; Trulicity 1.5 mg/0.5 mL subcutaneous pen injector; Ventolin HFA 90 mcg/actuation inhalation HFA aerosol inhaler         Allergy List  hydrochlorothiazide; Lasix; STATINS-HMG-COA REDUCTASE INHIBITORS       Allergies Reconciled  Family Medical History  Hypertension; Diabetes; No family history of colorectal cancer         Social History  Alcohol (Current some day); ; Retired; Tobacco (Never)         Review of Systems  · Constitutional  o Denies  o : fever, chills  · Eyes  o Denies  o : double vision  · HENT  o Denies  o : vertigo, recent head injury, hearing loss or ringing  · Cardiovascular  o Denies  o : chest pain, irregular heart beats  · Respiratory  o Denies  o : shortness of breath, productive cough  · Gastrointestinal  o Denies  o : nausea, vomiting  · Integument  o * See HPI  · Neurologic  o Admits  o : tingling or numbness  o Denies  o : altered mental status, seizures  · Musculoskeletal  o Denies  o : joint pain, joint swelling, limitation of motion      Vitals  Date Time BP Position Site L\R Cuff Size HR RR TEMP (F) WT  HT  BMI  "kg/m2 BSA m2 O2 Sat FR L/min FiO2 HC       04/27/2021 07:53 /59 Sitting    78 - R  97.1 227lbs 0oz 5'  9\" 33.52 2.24 100 %            Physical Examination  · Constitutional  o Appearance  o : well-nourished, well developed, no obvious deformities present, appears to be chronically ill   · Eyes  o Vision  o : Patient wearing glasses.   · Respiratory  o Respiratory Effort  o : No labored breathing. Good respiratory effort.   · Cardiovascular  o Peripheral Vascular System  o :   § Pedal Pulses  § : Pedal Pulses are 2+ and symmetrical  § Extremities  § : There is no edema of the lower extremities  · Musculoskeletal  o Extremeties/Joint  o : Lower extremity muscle strength and range of motion is equal and symmetrical bilaterally. The knees are noted to be in normal alignment. Ankle alignment and range of motion is normal and foot structure is normal.  · Skin and Subcutaneous Tissue  o General Inspection  o : no lesions present, no areas of discoloration, skin turgor normal, texture normal  · Neurologic  o Sensation  o : Charlotte-Willie 5.07 monofilament absent to all assessed areas. Sharp/dull sensation is absent.   · Toes  o Toes: Right Foot  o :   § Toenails  § : Toenails are hypertrophic, mycotc, dystrophic, thickened, with sublingual debris, incurvated, brittle toenail(s) at nail-1, 2, 3, 4,, Oncholysis of the foot   o Toes: Left Foot  o :   § Toenails  § : Toenails are hypertrophic, mycotc, dystrophic, thickened, with sublingual debris, incurvated, brittle toenail(s) at nail-1, 2, 3,, Onycholysis of the foot   · Procedures  o Nail Debridement  o : Nail debridement is indicated for the following toenails:-left hallux, left 2nd toe, left 3rd toe, right hallux, right 2nd toe, right 3rd toe, right 4th toe,, The nail was debrided of excessive thickness to appropriate levels of comfort and contour using nail nippers. The procedure was without complications          Assessment  · Foot pain, bilateral       Pain in " right foot     729.5/M79.671  Pain in left foot     729.5/M79.672  · Ingrowing nail     703.0/L60.0  · Polyneuropathy     356.9/G62.9  · Tinea unguium     110.1/B35.1  · DM2 (diabetes mellitus, type 2)     250.00/E11.9  · Pain in left foot     729.5/M79.672  · Pain in right foot     729.5/M79.671      Plan  · Orders  o Debridement of six or more nails (39320, 93453, 38170, 53773, 84620, 41650, 33194, 87228, 29879, 21211) - 729.5/M79.672, 729.5/M79.671, 250.00/E11.9, 110.1/B35.1 - 04/27/2021  o ACO-16: BMI above normal range and follow-up plan is documented (, , , , , , , , , ) - - 04/27/2021  o Diabetic Foot (Motor and Sensory) Exam Completed [facility] (, , 2028F) - - 04/27/2021  · Medications  o Medications have been Reconciled  o Transition of Care or Provider Policy  · Instructions  o I have discussed the findings of this evaluation with the patient. The discussion included a complete verbal explanation of any changes in the examination results, diagnosis, and the current treatment plan. A schedule for future care needs was explained. If any questions should arise after returning home, I have encouraged the patient to feel free to contact Dr. Payne. The patient states understanding and agreement with this plan.   o Patient is to monitor for problems and to contact Dr. Payne for follow-up should such signs occur. Patient states understanding and agreement with this plan.   o Encouraged to follow-up with Primary Care Provider for preventative care.   o BMI Counseling: Discussed weight loss and the need for exercise.   o Follow up in 9 weeks for Routine Foot Care.   o Electronically Identified Patient Education Materials Provided Electronically  · Disposition  o Call or Return if symptoms worsen or persist.            Electronically Signed by: Carl Payne DPM -Author on April 27, 2021 08:16:39 AM

## 2021-05-14 NOTE — PROGRESS NOTES
Progress Note      Patient Name: Joon Zhao   Patient ID: [id number]   Sex: Male   YOB: 1945    Primary Care Provider: Brian Henson MD   Referring Provider: Gogo LINDSEY    Visit Date: January 11, 2021    Provider: Trip Mirza MD   Location: [facility]   Location Address: [address] [place] [address], [address]   [place]  [id number]   Location Phone: [phone]          Chief Complaint  · Followup for recent hospital stay, chest pain.      History Of Present Illness  REFERRING CARE PROVIDER: Brian Henson MD   Joon Zhao is a 75-year-old male with hypertension, history of DVT and PE, chronic kidney disease, and diabetes mellitus. The patient was recently admitted to the hospital on 12/27/2020 because of chest pain. Multiple cardiac markers were unremarkable. He also reports significant headache as well. Echocardiogram was unremarkable. SPECT stress test did not show any reversible ischemic. He was discharged home the next day. Today the patient reports that the headache is still persisting, almost happening on a daily basis. He also has chest pain, almost on a daily basis but with milder intensity. The pain is not related to activities.   PAST MEDICAL HISTORY: 1) Deep venous thrombosis and pulmonary embolism, second one on anticoagulation; 2) Hypertension; 3) Chronic kidney disease, stage 3; 4) Diabetes mellitus; 5) Negative for coronary artery disease.   PSYCHOSOCIAL HISTORY: Denies alcohol or tobacco use.   CURRENT MEDICATIONS: Acetaminophen/Codeine 300/30 mg 1-2 daily; Amlodipine 10 mg daily; Apixaban 5 mg b.i.d.; aspirin 81 mg daily; Chlorthalidone 25mg b.i.d.; vitamin D 50,000 units once a week; Gabapentin 800 mg t.i.d.; Glipizide 5 mg b.i.d.; Ropinirole 0.25 mg daily; Albuterol; Azelastine 137 mcg b.i.d.; Flonase; Latanoprost; Omeprazole.      ALLERGIES: HCTZ, Spironolactone.       Review of Systems  · Cardiovascular  o Admits  o : swelling (feet, ankles,  "hands), chest pain or angina pectoris   o Denies  o : palpitations (fast, fluttering, or skipping beats), shortness of breath while walking or lying flat  · Respiratory  o Denies  o : chronic or frequent cough      Vitals  Date Time BP Position Site L\R Cuff Size HR RR TEMP (F) WT  HT  BMI kg/m2 BSA m2 O2 Sat FR L/min FiO2 HC       01/11/2021 01:28 /88 Sitting    84 - R   222lbs 0oz 5'  9\" 32.78 2.21       01/11/2021 01:28 /82 Sitting                       Physical Examination  · Respiratory  o Auscultation of Lungs  o : Clear to auscultation bilaterally. No crackles or rhonchi.  · Cardiovascular  o Heart  o : S1, S2 is normally heard. No S3. No murmur, rubs, or gallops.  · Gastrointestinal  o Abdominal Examination  o : Soft, nontender, nondistended. No free fluid. Bowel sounds heard in all four quadrants.  · Extremities  o Extremities  o : Warm and well perfused. No pitting pedal edema. Distal pulses present.     Records from recent hospital stay including labs and EKG reviewed.           Assessment     ASSESSMENT AND PLAN:  1.  Chest pain. Symptoms are rather atypical for angina. The patient has no history of CAD, and SPECT stress        test done during the recent hospital stay did not show any reversible ischemia. The options were discussed        with the patient in detail. If the symptoms continue to persist, a cardiac catheterization is certainly an        option. However, the patient wants to wait since he had two cardiac caths in the past which were        unremarkable. He cannot tolerate long-acting nitrates because of severe headache. Will continue        Amlodipine and aspirin.  2.  Hypertension, well controlled. Continue current regimen.  3.  Followup closely in the next 3 to 4 months. He is advised to call our office earlier for any worsening or        recurrent symptoms.       MD GISSELL Thomas/pap             Electronically Signed by: Vicenta Lange-, Other -Author " on January 19, 2021 12:42:29 PM  Electronically Co-signed by: Trip Mirza MD -Reviewer on January 19, 2021 12:49:47 PM

## 2021-05-14 NOTE — PROGRESS NOTES
Progress Note      Patient Name: Joon Zhao   Patient ID: [id number]   Sex: Male   YOB: 1945    Primary Care Provider: Brian Henson MD   Referring Provider: Gogo LINDSEY    Visit Date: February 2, 2021    Provider: Carl Payne DPM   Location: [facility]   Location Address: [address] [place] [address]  [id number]   Location Phone: [phone]          Chief Complaint  · Routine Foot Care Visit  · Diabetic Foot Examination      History Of Present Illness  Joon Zhao complains of painful, elongated toenails which are thickened, yellowed, chalky, and cause pain with shoe gear and ambulation.      New, Established, New Problem:  Established   Location:  Toenails  Duration:   Greater than five years  Onset:  Gradual  Nature:  Sore with palpation.  Stable, worsening, improving:   worsening  Aggravating factors:  Pain with shoe gear and ambulation.  Previous Treatment:  Debridement    Patient denies any fevers, chills, nausea, vomiting, nor any other constitutional signs nor symptoms.    Patient reports the following medical changes since their last visit:    - [facility] admission due to cardiac issues    Pt states their most recent blood glucose reading was 147.       Past Medical History  Anemia; Arthritis; BPH (benign prostatic hyperplasia); BPH with Urinary Obstruction; CAD (coronary artery disease); CKD (chronic kidney disease); Controlled diabetes mellitus with diabetic polyneuropathy; Dizziness; DM2 (diabetes mellitus, type 2); DVT (deep venous thrombosis); ED (erectile dysfunction) of organic origin; Foot pain, left; Foot pain, right; High cholesterol; HTN (hypertension); Ingrowing nail; Pain in left foot; Pain in right foot; Polyneuropathy; Tinea unguium         Past Surgical History  Adrenalectomy; Colonoscopy; Cystoscopy; TUNA; TURP         Medication List  acetaminophen-codeine 300-30 mg oral tablet; amlodipine 10 mg oral tablet; aspirin 81 mg  "oral tablet,delayed release (DR/EC); azelastine 137 mcg (0.1 %) nasal aerosol,spray; chlorthalidone 25 mg oral tablet; Claritin-D 12 Hour 5-120 mg oral tablet extended release 12 hr; Co Q-10 100 mg oral capsule; Eliquis 5 mg oral tablet; ergocalciferol (vitamin D2) 1,250 mcg (50,000 unit) oral capsule; fiber oral powder; Flonase Allergy Relief 50 mcg/actuation nasal spray,suspension; gabapentin 600 mg oral tablet; glipizide 5 mg oral tablet extended release 24hr; hydroxyzine HCl 25 mg oral tablet; latanoprost 0.005 % ophthalmic drops; LiquiTears 1.4 % ophthalmic (eye) drops; omeprazole 20 mg oral capsule,delayed release(DR/EC); Probiotic Acidophilus 1.5 mg (250 million cell) oral capsule; ropinirole 0.25 mg oral tablet; Trulicity 0.75 mg/0.5 mL subcutaneous pen injector; Ventolin HFA 90 mcg/actuation inhalation HFA aerosol inhaler         Allergy List  hydrochlorothiazide; Lasix; STATINS-HMG-COA REDUCTASE INHIBITORS; Zetia       Allergies Reconciled  Family Medical History  Hypertension; Diabetes; No family history of colorectal cancer         Social History  Alcohol (Current some day); ; Retired; Tobacco (Never)         Review of Systems  · Constitutional  o Denies  o : fever, chills  · Eyes  o Denies  o : double vision  · HENT  o Denies  o : vertigo, recent head injury, hearing loss or ringing  · Cardiovascular  o Denies  o : chest pain, irregular heart beats  · Respiratory  o Denies  o : shortness of breath, productive cough  · Gastrointestinal  o Denies  o : nausea, vomiting  · Integument  o * See HPI  · Neurologic  o Admits  o : tingling or numbness  o Denies  o : altered mental status, seizures  · Musculoskeletal  o Denies  o : joint pain, joint swelling, limitation of motion      Vitals  Date Time BP Position Site L\R Cuff Size HR RR TEMP (F) WT  HT  BMI kg/m2 BSA m2 O2 Sat FR L/min FiO2        02/02/2021 07:50 /74 Sitting    78 - R  97.3 220lbs 0oz 5'  9\" 32.49 2.2 100 %            Physical " Examination  · Constitutional  o Appearance  o : well-nourished, well developed, no obvious deformities present, appears to be chronically ill   · Eyes  o Vision  o : Patient wearing glasses.   · Respiratory  o Respiratory Effort  o : No labored breathing. Good respiratory effort.   · Cardiovascular  o Peripheral Vascular System  o :   § Pedal Pulses  § : Pedal Pulses are 2+ and symmetrical  § Extremities  § : There is no edema of the lower extremities  · Musculoskeletal  o Extremeties/Joint  o : Lower extremity muscle strength and range of motion is equal and symmetrical bilaterally. The knees are noted to be in normal alignment. Ankle alignment and range of motion is normal and foot structure is normal.  · Skin and Subcutaneous Tissue  o General Inspection  o : no lesions present, no areas of discoloration, skin turgor normal, texture normal  · Neurologic  o Sensation  o : Antlers-Willie 5.07 monofilament absent to all assessed areas. Sharp/dull sensation is absent.   · Toes  o Toes: Right Foot  o :   § Toenails  § : Toenails are hypertrophic, mycotc, dystrophic, thickened, with sublingual debris, incurvated, brittle toenail(s) at nail-1, 2, 3, 4,, Oncholysis of the foot   o Toes: Left Foot  o :   § Toenails  § : Toenails are hypertrophic, mycotc, dystrophic, thickened, with sublingual debris, incurvated, brittle toenail(s) at nail-1, 2, 3,, Onycholysis of the foot   · Procedures  o Nail Debridement  o : Nail debridement is indicated for the following toenails:-left hallux, left 2nd toe, left 3rd toe, right hallux, right 2nd toe, right 3rd toe, right 4th toe,, The nail was debrided of excessive thickness to appropriate levels of comfort and contour using nail nippers. The procedure was without complications          Assessment  · Foot pain, bilateral       Pain in right foot     729.5/M79.671  Pain in left foot     729.5/M79.672  · Ingrowing nail     703.0/L60.0  · Polyneuropathy     356.9/G62.9  · Tinea  unguium     110.1/B35.1  · DM2 (diabetes mellitus, type 2)     250.00/E11.9  · Pain in left foot     729.5/M79.672  · Pain in right foot     729.5/M79.671      Plan  · Orders  o Debridement of six or more nails (09368, 86515, 35375, 57339, 44274, 89812, 84351, 28474, 69270) - 729.5/M79.672, 729.5/M79.671, 250.00/E11.9, 110.1/B35.1 - 02/02/2021  o ACO-16: BMI above normal range and follow-up plan is documented (, , , , , , , , ) - - 02/02/2021  o Diabetic Foot (Motor and Sensory) Exam Completed [facility] (, , 2028F) - - 02/02/2021  · Medications  o Medications have been Reconciled  o Transition of Care or Provider Policy  · Instructions  o I have discussed the findings of this evaluation with the patient. The discussion included a complete verbal explanation of any changes in the examination results, diagnosis, and the current treatment plan. A schedule for future care needs was explained. If any questions should arise after returning home, I have encouraged the patient to feel free to contact Dr. Payne. The patient states understanding and agreement with this plan.   o Patient is to monitor for problems and to contact Dr. Payne for follow-up should such signs occur. Patient states understanding and agreement with this plan.   o Encouraged to follow-up with Primary Care Provider for preventative care.   o BMI Counseling: Discussed weight loss and the need for exercise.   o Follow up in 9 weeks for Routine Foot Care.   o Electronically Identified Patient Education Materials Provided Electronically  · Disposition  o Call or Return if symptoms worsen or persist.            Electronically Signed by: Carl Payne DPM -Author on February 2, 2021 07:58:46 AM

## 2021-05-14 NOTE — PROGRESS NOTES
Progress Note      Patient Name: Joon Zhao   Patient ID: [id number]   Sex: Male   YOB: 1945    Primary Care Provider: Brian Henson MD   Referring Provider: Gogo LINDSEY    Visit Date: February 4, 2021    Provider: ROSALIA Boateng   [facility]   Location Address: [address] [place] [address]  [id number]   Location Phone: [phone]          Chief Complaint  · Follow up   · ABD pain       History Of Present Illness  Joon Zhao is a 75 year old /Black male who presents to the office today.      Patient initially presented November 2014, he has been seen intermittently but returned in 2020 to follow-up from an ER visit for abdominal pain and diarrhea.  CT of the abdomen and pelvis without IV contrast was negative, C. difficile was negative.  Patient follows with Dr. Phong Galvez for low GFR.    Colonoscopy performed in July 2020 which was negative with negative random colon biopsies, diverticulosis seen, adequate prep.    Patient was last seen September 2020 reporting some abdominal cramping with meals or with drinking cold liquids, he reported the symptoms began after the colonoscopy.  Was taking omeprazole as needed, patient is on Eliquis and aspirin due to history of DVT and PE.  EGD was ordered, recommended PPI use preventatively due to age and anticoagulants.    EGD 10/16/20: small hiatal hernia otherwise negative, stomach bx negative    Pt states he is still having abd pain after PO intake, but describes it now occurring more in the LLQ. This pain comes and goes but does occur daily. Denies constipation or blood in stool. He also notices pain w movement at times. Denies pain at umbilicus, he has noticed this will protrude at times. Denies HB, pt is on Omeprazole also on Eliquis.   Follows w Dr Ruffin for some abnormal blood tests, pt states he is going to have a bone marrow bx.     CT abd pelvis w/o contrast  1/13/21 by DR Ruffin diverticulosis, umbilical hernia 3.6 cm containing nondilated loops of bowel.       Past Medical History  Anemia; Arthritis; BPH (benign prostatic hyperplasia); BPH with Urinary Obstruction; CAD (coronary artery disease); CKD (chronic kidney disease); Controlled diabetes mellitus with diabetic polyneuropathy; Dizziness; DM2 (diabetes mellitus, type 2); DVT (deep venous thrombosis); ED (erectile dysfunction) of organic origin; Foot pain, left; Foot pain, right; High cholesterol; HTN (hypertension); Ingrowing nail; Pain in left foot; Pain in right foot; Polyneuropathy; Tinea unguium         Past Surgical History  Adrenalectomy; Colonoscopy; Cystoscopy; TUNA; TURP         Medication List  Name Date Started Instructions   acetaminophen-codeine 300-30 mg oral tablet  take 1-2 tabs qhs   amlodipine 10 mg oral tablet  take 1 tablet (10 mg) by oral route once daily   aspirin 81 mg oral tablet,delayed release (/EC)  take 1 tablet (81 mg) by oral route once daily   azelastine 137 mcg (0.1 %) nasal aerosol,spray  spray 2 sprays in each nostril by intranasal route 2 times per day   chlorthalidone 25 mg oral tablet  take 1 tablet (25 mg) by oral route once daily   Claritin-D 12 Hour 5-120 mg oral tablet extended release 12 hr  take 1 tablet by oral route 2 times per day   Co Q-10 100 mg oral capsule  take 1 capsule by oral route daily   Eliquis 5 mg oral tablet  take 1 tablet (5 mg) by oral route 2 times per day   ergocalciferol (vitamin D2) 1,250 mcg (50,000 unit) oral capsule  take 1 capsule by oral route   fiber oral powder  use as directed by oral route daily   Flonase Allergy Relief 50 mcg/actuation nasal spray,suspension  spray 1 spray (50 mcg) in each nostril by intranasal route once daily   gabapentin 600 mg oral tablet  take 1 tablet (600 mg) by oral route 3 times per day   glipizide 5 mg oral tablet extended release 24hr  take 1 tablet (5 mg) by oral route bid daily with food   hydroxyzine HCl  "25 mg oral tablet  take 1 tablet (25 mg) by oral route 4 times per day as needed   omeprazole 20 mg oral capsule,delayed release(DR/EC)  take 1 capsule by oral route 2 times a day   Probiotic Acidophilus 1.5 mg (250 million cell) oral capsule  take 1 capsule by oral route daily   ropinirole 0.25 mg oral tablet  take 1 tablet (0.25 mg) by oral route 1-3 hours before bedtime   Rubbing Alcohol (ethanol) 70 % topical solution  apply to affected area by topical route As needed   Trulicity 0.75 mg/0.5 mL subcutaneous pen injector  inject 0.5 milliliter (0.75 mg) by subcutaneous route every 7 days in the abdomen, thigh, or upper arm rotating injection sites   Ventolin HFA 90 mcg/actuation inhalation HFA aerosol inhaler  inhale 2 puffs (180 mcg) by inhalation route every 6 hours         Allergy List  hydrochlorothiazide; Lasix; STATINS-HMG-COA REDUCTASE INHIBITORS         Family Medical History  Hypertension; Diabetes; No family history of colorectal cancer         Social History  Alcohol (Current some day); ; Retired; Tobacco (Never)         Review of Systems  · Constitutional  o Admits  o : good general health lately, no acute distress  · Gastrointestinal  o Denies  o : additional gastrointestinal symptoms except as noted in the HPI  · Psychiatric  o Admits  o : pleasant affect      Vitals  Date Time BP Position Site L\R Cuff Size HR RR TEMP (F) WT  HT  BMI kg/m2 BSA m2 O2 Sat FR L/min FiO2 HC       02/02/2021 07:50 /74 Sitting    78 - R  97.3 220lbs 0oz 5'  9\" 32.49 2.2 100 %      02/04/2021 08:38 /84 Sitting    70 - R  97.8 222lbs 4oz 5'  9\" 32.82 2.22             Physical Examination  · Constitutional  o Appearance  o : well developed, well-nourished, in no acute distress  · Head and Face  o Head  o :   § Inspection  § : atraumatic, normocephalic  · Eyes  o Sclerae  o : sclerae white, no sclerae icterus  · Neck  o Inspection/Palpation  o : supple  · Respiratory  o Respiratory Effort  o : breathing " unlabored  o Inspection of Chest  o : normal appearance, no retractions  · Cardiovascular  o Peripheral Vascular System  o :   § Extremities  § : no cyanosis, clubbing or edema  · Gastrointestinal  o Abdominal Examination  o : soft, nontender to palpation  · Skin and Subcutaneous Tissue  o General Inspection  o : no lesions present, no rashes present  · Neurologic  o Mental Status Examination  o :   § Orientation  § : grossly oriented to person, place and time  § Speech/Language  § : communication ability within normal limits, voice quality normal, articulation of speech normal, no evidence of aphasia  § Attention  § : attention normal, concentration abilities normal  o Sensation  o : grossly intact  o Gait and Station  o :   § Gait Screening  § : normal gait  · Psychiatric  o General  o : Alert and oriented x3  o Mood and Affect  o : Mood and affect are appropriate to circumstances          Assessment  · Abdominal pain     789.00/R10.9  · Diverticulosis     562.10/K57.90  · Hiatal hernia     553.3/K44.9      Plan  · Medications  o hyoscyamine sulfate 0.125 mg sublingual tablet, sublingual   SIG: place 1 tablet under tongue by translingual route every 4 to 6 hours for 30 days PRN diarrhea/abd pain   DISP: (90) Tablet with 1 refills  Prescribed on 02/04/2021     o Medications have been Reconciled  o Transition of Care or Provider Policy  · Instructions  o Encouraged to follow-up with Primary Care Provider for preventative care.  o Patient was educated/instructed on their diagnosis, treatment and medications prior to discharge from the clinic today.  o Patient instructed to seek medical attention urgently for new or worsening symptoms.  o Follow Up: Next avaliable appointment  o R/W pt negative w/u other than umbilical hernia finding, and the pain he describes and location of LLQ does not seem to coincide with this. Recommended trial of Hyoscyamine to see if this helps, ?IBS.             Electronically Signed by:  Gogo Thomas, APRN -Author on February 8, 2021 04:50:17 PM

## 2021-05-15 VITALS
WEIGHT: 220 LBS | SYSTOLIC BLOOD PRESSURE: 128 MMHG | HEIGHT: 69 IN | HEART RATE: 71 BPM | BODY MASS INDEX: 32.58 KG/M2 | OXYGEN SATURATION: 100 % | DIASTOLIC BLOOD PRESSURE: 75 MMHG | TEMPERATURE: 98 F

## 2021-05-15 VITALS
SYSTOLIC BLOOD PRESSURE: 134 MMHG | BODY MASS INDEX: 31.7 KG/M2 | DIASTOLIC BLOOD PRESSURE: 92 MMHG | HEIGHT: 69 IN | HEART RATE: 70 BPM | WEIGHT: 214 LBS

## 2021-05-15 VITALS
WEIGHT: 227 LBS | DIASTOLIC BLOOD PRESSURE: 69 MMHG | OXYGEN SATURATION: 100 % | BODY MASS INDEX: 33.62 KG/M2 | HEART RATE: 66 BPM | SYSTOLIC BLOOD PRESSURE: 121 MMHG | HEIGHT: 69 IN

## 2021-05-15 VITALS
OXYGEN SATURATION: 100 % | BODY MASS INDEX: 32.58 KG/M2 | HEIGHT: 69 IN | SYSTOLIC BLOOD PRESSURE: 117 MMHG | HEART RATE: 68 BPM | DIASTOLIC BLOOD PRESSURE: 74 MMHG | WEIGHT: 220 LBS

## 2021-05-15 VITALS
BODY MASS INDEX: 32.14 KG/M2 | DIASTOLIC BLOOD PRESSURE: 75 MMHG | WEIGHT: 217 LBS | SYSTOLIC BLOOD PRESSURE: 133 MMHG | HEART RATE: 64 BPM | OXYGEN SATURATION: 100 % | HEIGHT: 69 IN

## 2021-05-15 VITALS
WEIGHT: 221 LBS | HEIGHT: 69 IN | BODY MASS INDEX: 32.73 KG/M2 | HEART RATE: 60 BPM | DIASTOLIC BLOOD PRESSURE: 96 MMHG | SYSTOLIC BLOOD PRESSURE: 144 MMHG

## 2021-05-15 VITALS
DIASTOLIC BLOOD PRESSURE: 88 MMHG | SYSTOLIC BLOOD PRESSURE: 135 MMHG | TEMPERATURE: 98.4 F | HEIGHT: 69 IN | BODY MASS INDEX: 32.14 KG/M2 | WEIGHT: 217 LBS | HEART RATE: 69 BPM

## 2021-05-15 VITALS
DIASTOLIC BLOOD PRESSURE: 85 MMHG | BODY MASS INDEX: 32.73 KG/M2 | HEIGHT: 69 IN | TEMPERATURE: 98.1 F | HEART RATE: 79 BPM | SYSTOLIC BLOOD PRESSURE: 139 MMHG | WEIGHT: 221 LBS

## 2021-05-15 VITALS
DIASTOLIC BLOOD PRESSURE: 80 MMHG | HEART RATE: 65 BPM | BODY MASS INDEX: 32.29 KG/M2 | HEIGHT: 69 IN | SYSTOLIC BLOOD PRESSURE: 120 MMHG | WEIGHT: 218 LBS | OXYGEN SATURATION: 100 % | TEMPERATURE: 97.1 F

## 2021-05-15 VITALS
TEMPERATURE: 98 F | DIASTOLIC BLOOD PRESSURE: 99 MMHG | HEART RATE: 61 BPM | SYSTOLIC BLOOD PRESSURE: 146 MMHG | HEIGHT: 69 IN | WEIGHT: 220 LBS | BODY MASS INDEX: 32.58 KG/M2

## 2021-05-15 VITALS
SYSTOLIC BLOOD PRESSURE: 128 MMHG | HEIGHT: 69 IN | BODY MASS INDEX: 32.58 KG/M2 | DIASTOLIC BLOOD PRESSURE: 85 MMHG | OXYGEN SATURATION: 100 % | HEART RATE: 65 BPM | WEIGHT: 220 LBS

## 2021-05-15 VITALS — HEART RATE: 65 BPM | TEMPERATURE: 98.1 F | DIASTOLIC BLOOD PRESSURE: 90 MMHG | SYSTOLIC BLOOD PRESSURE: 132 MMHG

## 2021-05-15 VITALS
BODY MASS INDEX: 32.88 KG/M2 | WEIGHT: 222 LBS | SYSTOLIC BLOOD PRESSURE: 132 MMHG | HEIGHT: 69 IN | OXYGEN SATURATION: 98 % | HEART RATE: 80 BPM | DIASTOLIC BLOOD PRESSURE: 77 MMHG

## 2021-05-16 VITALS
DIASTOLIC BLOOD PRESSURE: 86 MMHG | SYSTOLIC BLOOD PRESSURE: 145 MMHG | HEIGHT: 69 IN | HEART RATE: 64 BPM | TEMPERATURE: 98.5 F | WEIGHT: 221 LBS | BODY MASS INDEX: 32.73 KG/M2

## 2021-05-16 VITALS — OXYGEN SATURATION: 96 % | HEART RATE: 74 BPM | BODY MASS INDEX: 33.62 KG/M2 | WEIGHT: 227 LBS | HEIGHT: 69 IN

## 2021-05-16 VITALS
WEIGHT: 226 LBS | SYSTOLIC BLOOD PRESSURE: 132 MMHG | BODY MASS INDEX: 33.47 KG/M2 | HEIGHT: 69 IN | DIASTOLIC BLOOD PRESSURE: 86 MMHG | HEART RATE: 66 BPM

## 2021-05-16 VITALS
SYSTOLIC BLOOD PRESSURE: 150 MMHG | WEIGHT: 225 LBS | HEART RATE: 64 BPM | BODY MASS INDEX: 33.33 KG/M2 | DIASTOLIC BLOOD PRESSURE: 86 MMHG | TEMPERATURE: 98.3 F | HEIGHT: 69 IN

## 2021-05-16 VITALS
SYSTOLIC BLOOD PRESSURE: 126 MMHG | TEMPERATURE: 98.4 F | HEIGHT: 69 IN | WEIGHT: 225 LBS | HEART RATE: 72 BPM | DIASTOLIC BLOOD PRESSURE: 79 MMHG | BODY MASS INDEX: 33.33 KG/M2

## 2021-05-16 VITALS — OXYGEN SATURATION: 97 % | BODY MASS INDEX: 33.18 KG/M2 | HEIGHT: 69 IN | WEIGHT: 224 LBS | HEART RATE: 64 BPM

## 2021-05-16 VITALS
HEART RATE: 70 BPM | HEIGHT: 69 IN | BODY MASS INDEX: 31.99 KG/M2 | SYSTOLIC BLOOD PRESSURE: 150 MMHG | DIASTOLIC BLOOD PRESSURE: 100 MMHG | WEIGHT: 216 LBS

## 2021-05-16 VITALS
BODY MASS INDEX: 31.4 KG/M2 | OXYGEN SATURATION: 99 % | WEIGHT: 212 LBS | DIASTOLIC BLOOD PRESSURE: 84 MMHG | SYSTOLIC BLOOD PRESSURE: 132 MMHG | HEART RATE: 76 BPM | HEIGHT: 69 IN

## 2021-05-16 VITALS — BODY MASS INDEX: 33.47 KG/M2 | WEIGHT: 226 LBS | OXYGEN SATURATION: 97 % | HEIGHT: 69 IN | HEART RATE: 87 BPM

## 2021-05-16 VITALS
BODY MASS INDEX: 32.88 KG/M2 | OXYGEN SATURATION: 97 % | HEART RATE: 66 BPM | SYSTOLIC BLOOD PRESSURE: 137 MMHG | WEIGHT: 222 LBS | HEIGHT: 69 IN | DIASTOLIC BLOOD PRESSURE: 90 MMHG

## 2021-05-24 ENCOUNTER — OFFICE VISIT CONVERTED (OUTPATIENT)
Dept: CARDIOLOGY | Facility: CLINIC | Age: 76
End: 2021-05-24
Attending: INTERNAL MEDICINE

## 2021-05-28 VITALS
TEMPERATURE: 97.8 F | HEART RATE: 62 BPM | BODY MASS INDEX: 32.58 KG/M2 | OXYGEN SATURATION: 100 % | RESPIRATION RATE: 16 BRPM | HEIGHT: 69 IN | TEMPERATURE: 98.2 F | BODY MASS INDEX: 31.77 KG/M2 | SYSTOLIC BLOOD PRESSURE: 133 MMHG | RESPIRATION RATE: 16 BRPM | DIASTOLIC BLOOD PRESSURE: 88 MMHG | WEIGHT: 220 LBS | OXYGEN SATURATION: 98 % | DIASTOLIC BLOOD PRESSURE: 92 MMHG | WEIGHT: 214.5 LBS | OXYGEN SATURATION: 90 % | SYSTOLIC BLOOD PRESSURE: 123 MMHG | RESPIRATION RATE: 16 BRPM | TEMPERATURE: 99 F | HEART RATE: 98 BPM | DIASTOLIC BLOOD PRESSURE: 88 MMHG | WEIGHT: 217 LBS | RESPIRATION RATE: 16 BRPM | HEIGHT: 69 IN | BODY MASS INDEX: 32.14 KG/M2 | HEIGHT: 69 IN | SYSTOLIC BLOOD PRESSURE: 149 MMHG | HEART RATE: 101 BPM | DIASTOLIC BLOOD PRESSURE: 94 MMHG | SYSTOLIC BLOOD PRESSURE: 136 MMHG | OXYGEN SATURATION: 98 % | HEART RATE: 69 BPM | BODY MASS INDEX: 31.84 KG/M2 | HEIGHT: 69 IN | WEIGHT: 215 LBS

## 2021-05-28 NOTE — PROGRESS NOTES
Patient: ISIDORO FAIR     Acct: [id number]     Report: #[id number]  UNIT #: [id number]     : 1945    Encounter Date:2020  PRIMARY CARE: CARMEN PACE  ***Signed***  --------------------------------------------------------------------------------------------------------------------  Chief Complaint      Encounter Date      Aug 18, 2020            Primary Care Provider      CARMEN PACE            Referring Provider      GRACIELA APONTE            Patient Complaint      Patient is complaining of      Pt here for 3m f/u and test results, chad            VITALS      Height 5 ft 9 in / 175.26 cm      Weight 217 lbs 0 oz / 98.368933 kg      BSA 2.14 m2      BMI 32.0 kg/m2      Temperature 99.0 F / 37.22 C - Temporal      Pulse 101      Respirations 16      Blood Pressure 136/88 Sitting, Left Arm      Pulse Oximetry 90%, Room air      Initial Exhaled Nitrous Oxide      Date:  2020      Exhaled Nitrous Oxide Results:  25            HPI      The patient is a very pleasant 74 year old -American male who is here     today for follow up.  The patient had a CT scan that showed resolution of the     previously seen clots. The patient feels that his breathing is much better. The     patient had PFTs that showed 25% improvement in DLCO. The patient feels that his    breathing is nearly at his baseline.  He will have some very mild shortness of     breath at times, however his breathing is overall much improved since his     anticoagulation dosing has been improved.            ROS      Constitutional:  Denies: Fatigue, Fever, Weight gain, Weight loss, Chills, Insom    jake, Other      Respiratory/Breathing:  Complains of: Shortness of air; Denies: Wheezing, Cough,    Hemoptysis, Pleuritic pain, Other      Endocrine:  Denies: Polydipsia, Polyuria, Heat/cold intolerance, Diabetes, Other      Eyes:  Denies: Blurred vision, Vision Changes, Other      Ears, nose, mouth, throat:  Denies: Mouth lesions,  Thrush, Throat pain,     Hoarseness, Allergies/Hay Fever, Post Nasal Drip, Headaches, Recent Head Injury,    Nose Bleeding, Neck Stiffness, Thyroid Mass, Hearing Loss, Ear Fullness, Dry     Mouth, Nasal or Sinus Pain, Dry Lips, Nasal discharge, Nasal congestion, Other      Cardiovascular:  Denies: Palpitations, Syncope, Claudication, Chest Pain, Wake     up Gasping for air, Leg Swelling, Irregular Heart Rate, Cyanosis, Dyspnea on     Exertion, Other      Gastrointestinal:  Denies: Nausea, Constipation, Diarrhea, Abdominal pain,     Vomiting, Difficulty Swallowing, Reflux/Heartburn, Dysphagia, Jaundice,     Bloating, Melena, Bloody stools, Other      Genitourinary:  Denies: Urinary frequency, Incontinence, Hematuria, Urgency, N    octuria, Dysuria, Testicular problems, Other      Musculoskeletal:  Denies: Joint Pain, Joint Stiffness, Joint Swelling, Myalgias,    Other      Hematologic/lymphatic:  DENIES: Lymphadenopathy, Bruising, Bleeding tendencies,     Other      Neurological:  Denies: Headache, Numbness, Weakness, Seizures, Other      Psychiatric:  Denies: Anxiety, Appropriate Effect, Depression, Other      Sleep:  No: Excessive daytime sleep, Morning Headache?, Snoring, Insomnia?, Stop    breathing at sleep?, Other      Integumentary:  Denies: Rash, Dry skin, Skin Warm to Touch, Other      Immunologic/Allergic:  Denies: Latex allergy, Seasonal allergies, Asthma,     Urticaria, Eczema, Other      Immunization status:  No: Up to date            FAMILY/SOCIAL/MEDICAL HX      Surgical History:  Yes: Bladder Surgery (CYSTOSCOPY WITH URO LIFT, PROSTATE SX     2019), Bowel Surgery (COLONOSCOPY ); No: Abdominal Surgery, Appendectomy, CABG,     Cholecystectomy, Head Surgery, Oral Surgery, Orthopedic Surgery, Vascular     Surgery      Heart - Family Hx:  Sister      Diabetes - Family Hx:  Child, Uncle      Is Father Still Living?:  No      Is Mother Still Living?:  No      Social History:  No Tobacco Use, No Alcohol Use,  No Recreational Drug use      Smoking status:  Former smoker (1 year in 1965.)      Anticoagulation Therapy:  Yes      Antibiotic Prophylaxis:  No      Medical History:  Yes: Allergies, Arthritis, Depression, Anxiety, Diabetes (TYPE    II  ), Glaucoma, Hemorrhoids/Rectal Prob (IBS, DIARRHEA, ABD PAIN , LLQ PAIN,     HIATAL HERNIA, ), Hiatal Hernia (gerd, medicated for), High Blood Pressure,     Reflux Disease, Shortness Of Breath ( SOA- RECENT PE IN 1/2019 - ON ELIQUIS );     No: Asthma, Blood Disease, Chemotherapy/Cancer, Chronic Bronchitis/COPD,     Congestive Heart Failu, Deafness or Ringing Ears, Seizures, Heart Attack (NO     FURTHER PROBLEMS), Sinus Trouble, Miscellaneous Medical/oth      Psychiatric History      Anxiety and depression            PREVENTION      Hx Influenza Vaccination:  Yes      Date Influenza Vaccine Given:  Oct 1, 2019      Influenza Vaccine Declined:  No      2 or More Falls in Past Year?:  No      Fall Past Year with Injury?:  No      Hx Pneumococcal Vaccination:  Yes      Encouraged to follow-up with:  PCP regarding preventative exams.      Chart initiated by      Nan Cardoso MA            ALLERGIES/MEDICATIONS      Allergies:        Coded Allergies:             FUROSEMIDE (Verified  Allergy, Unknown, BREAST ENLARGEMENT, 8/18/20)           HYDROCHLOROTHIAZIDE (Verified  Allergy, Unknown, HIVES, 8/18/20)           STATINS-HMG-COA REDUCTASE INHIBITOR (Verified  Adverse Reaction, Unknown,     CRAMPS, PAIN CHEST HANDS LEGS, 8/18/20)      Medications    Last Reconciled on 8/18/20 10:24 by NORTH VARGAS MD      Dulaglutide (Trulicity) 0.75 Mg/0.5 Ml Pen.injctr      0.75 MG SUBQ Q7DAY, #1 PEN.INJCTR         Reported         8/18/20       hydrOXYzine HCL (hydrOXYzine HCL) 25 Mg Tablet      25 MG PO Q6H PRN for ITCHING, #90 TAB 0 Refills         Reported         8/18/20       Gabapentin (Gabapentin) 600 Mg Tablet      600 MG PO TID, #90 TAB 0 Refills         Reported         8/18/20        Ki-Fluticasone (Fluticasone 50 mcg) 16 Gm Spray.susp      2 PUFFS NARE EACH QDAY, #1 BOTTLE 0 Refills         Reported         8/18/20       Apixaban (Eliquis) 5 Mg Tablet      5 MG PO BID for 30 Days, #60 TAB         Reported         5/5/20       Cholecalciferol (Vitamin D3) 50,000 Unit Capsule      35958 UNITS PO Q7D, CAP         Reported         5/5/20       rOPINIRole HCl (rOPINIRole HCl) 0.25 Mg Tablet      0.25 MG PO HS for 30 Days, #30 TAB         Reported         2/6/20       Polyvinyl Alcohol (Liquitears*) 15 Ml Bottle      2 DROPS EYE EACH BID PRN for DRY EYES, BOTTLE         Reported         1/9/20       amLODIPine (amLODIPine) 10 Mg Tablet      10 MG PO QDAY, #30 TAB 0 Refills         Reported         5/17/19       Gabapentin (Gabapentin) 600 Mg Tablet      600 MG PO QID, #90 TAB 0 Refills         Reported         5/17/19       Lactobacillus Rhamnosus  (Probiotic Digestive Care) 1 Each Capsule      1 EACH PO QDAY, CAP         Reported         5/10/19       Methylcellulose (Fiber) 500 Mg Tablet      500 MG PO QDAY, TAB         Reported         5/10/19       glipiZIDE XL (glipiZIDE XL) 5 Mg Tab.er.24      5 MG PO BID, #30 TAB.SR 0 Refills         Reported         5/10/19       Chlorthalidone (CHLORTHALIDONE) 25 Mg Tablet      25 MG PO BID for 30 Days, #60 TAB         Reported         5/10/19       (Artificial Saliva )   No Conflict Check      3 SPRAYS PO BID PRN for DRY MOUTH         Reported         5/10/19       Acetaminophen/Codeine 300/30 MG (Acetaminophen with Codeine #3) 1 Tab Tablet      1 TAB PO HS PRN for JOINT PAIN, #21 TAB         Reported         1/21/19       Aspirin Chew (Aspirin Baby) 81 Mg Tab.chew      81 MG PO QDAY, #30 TAB.CHEW 0 Refills         Reported         1/21/19       Latanoprost (Xalatan Ophth*) 2.5 Ml Drops      1 DROPS EYE EACH QDAY, BOTTLE         Reported         1/21/19       Loratadine/Pseudoephedrine 10/240 MG (CLARITIN-D 24 HOUR TABLET) 1 Each     Tab.er.24h       0.5 TAB PO QDAY for may take up to 1 tab qd, TAB         Reported         6/22/18       Omeprazole (priLOSEC) 20 Mg Capcr      20 MG PO prn, CAP         Reported         5/28/14       Azelastine Hcl (Astelin*) 137 Mcg Spray.pump      1 PUFFS NARE EACH QDAY         Reported         1/14/14       MDI-Albuterol (Ventolin HFA) 8.5 Gm Aer.w.adap      2 PUFFS INH RTQID PRN for SHORTNESS OF BREATH/WHEEZING, INH         Reported         1/14/14       Ki-Fluticasone (Flonase*) 16 Gm Naspr      1 PUFF NA QDAY         Reported         4/8/12       Coenzyme Q10 (Co Q-10) 100 Mg Capsule      100 MG PO QDAY         Reported         4/8/12      Current Medications      Current Medications Reviewed 8/18/20            EXAM      Vital Signs Reviewed      Gen: WDWN, Alert, NAD.        HEENT:  PERRL, EOMI.  OP, nares clear, no sinus tenderness.      Neck:  Supple, no JVD, no thyromegaly.      Lymph: No axillary, cervical, supraclavicular lymphadenopathy noted bilaterally.      Chest:  Good aeration, clear to auscultation bilaterally, tympanic to percussion    bilaterally, no work of breathing noted.      CV:  RRR, no MGR, pulses 2+, equal.      Abd:  Soft, NT, ND, + BS, no HSM.      EXT:  No clubbing, no cyanosis, no edema, no joint tenderness.       Neuro:  A  Skin: No rashes or lesions.      Vtials      Vitals:             Height 5 ft 9 in / 175.26 cm           Weight 217 lbs 0 oz / 98.655392 kg           BSA 2.14 m2           BMI 32.0 kg/m2           Temperature 99.0 F / 37.22 C - Temporal           Pulse 101           Respirations 16           Blood Pressure 136/88 Sitting, Left Arm           Pulse Oximetry 90%, Room air            REVIEW      Results Reviewed      PCCS Results Reviewed?:  Yes Prev Lab Results, Yes Prev Radiology Results, Yes     Previous [facility] Records            Assessment      Notes      New Medications      * Ki-Fluticasone (Fluticasone 50 mcg) 16 GM SPRAY.SUSP: 2 PUFFS NARE EACH QDAY       #1      *  Gabapentin 600 MG TABLET: 600 MG PO TID #90      * hydrOXYzine HCL 25 MG TABLET: 25 MG PO Q6H PRN ITCHING #90      * DULAGLUTIDE (Trulicity) 0.75 MG/0.5 ML PEN.INJCTR: 0.75 MG SUBQ Q7DAY #1      ASSESSMENT/PLAN:       1. Chronic thromboembolic disease, appears to have resolved since being on     appropriate anticoagulation. Patient doing well with this.        2. We will see patient back in one year.        3. I have personally reviewed laboratory data, imaging as well as previous     medical records.            Patient Education      Education resources provided:  Yes      Patient Education Provided:  Pulmonary Hypertension            Electronically signed by Tristian Cazares  08/25/2020 11:09       Disclaimer: Converted document may not contain table formatting or lab diagrams. Please see [facility] for the authenticated document.

## 2021-05-28 NOTE — PROGRESS NOTES
Patient: ISIDORO FAIR     Acct: [id number]     Report: #[id number]  UNIT #: [id number]     : 1945    Encounter Date:2020  PRIMARY CARE: CARMEN PACE  ***Signed***  --------------------------------------------------------------------------------------------------------------------  Chief Complaint      Encounter Date      2020            Primary Care Provider      CARMEN PACE            Referring Provider      GRACIELA APONTE            Patient Complaint      Patient is complaining of      Pt here for f/u,  chad and pulmonary hypertension            VITALS      Height 5 ft 9 in / 175.26 cm      Weight 214 lbs 8 oz / 97.26574 kg      BSA 2.13 m2      BMI 31.7 kg/m2      Pulse 69      Respirations 16      Blood Pressure 123/92 Sitting, Left Arm      Pulse Oximetry 98%, Room air      Initial Exhaled Nitrous Oxide      Date:  2020      Exhaled Nitrous Oxide Results:  25            HPI      The patient is a very pleasant 74 year old  male who presents     for follow up today.             He had pulmonary function test since his last office visit that showed decreased    DLCO, echocardiogram showed mild elevation of PA pressures            atient is a very pleasant 74 year old  male who presents for     follow up today.             He had pulmonary function test since his last office visit that showed decreased    DLCO, echocardiogram showed mild elevation in PA pressures. The patient had a VQ    scan that showed persistent clot in the anterior segment of the left upper lobe.    He is still dyspneic, worse with exertion, relieved with rest but no worse since    his last office visit. He has some persistent right lower extremity swelling due    to his chronic nonresolving clot it the right lower leg. The patient has no     fever or chills. Shortness of breath is moderate in severity, no significant     improvement with the use of bronchodilator therapies  which he has tried in the     past. He describes his shortness of breath overall as moderate in severity and     it does limit his day to day activities.            ROS      Constitutional:  Denies: Fatigue, Fever, Weight gain, Weight loss, Chills,     Insomnia, Other      Respiratory/Breathing:  Complains of: Shortness of air; Denies: Wheezing, Cough,    Hemoptysis, Pleuritic pain, Other      Endocrine:  Denies: Polydipsia, Polyuria, Heat/cold intolerance, Diabetes, Other      Eyes:  Denies: Blurred vision, Vision Changes, Other      Ears, nose, mouth, throat:  Denies: Mouth lesions, Thrush, Throat pain,     Hoarseness, Allergies/Hay Fever, Post Nasal Drip, Headaches, Recent Head Injury,    Nose Bleeding, Neck Stiffness, Thyroid Mass, Hearing Loss, Ear Fullness, Dry     Mouth, Nasal or Sinus Pain, Dry Lips, Nasal discharge, Nasal congestion, Other      Cardiovascular:  Denies: Palpitations, Syncope, Claudication, Chest Pain, Wake     up Gasping for air, Leg Swelling, Irregular Heart Rate, Cyanosis, Dyspnea on     Exertion, Other      Gastrointestinal:  Denies: Nausea, Constipation, Diarrhea, Abdominal pain,     Vomiting, Difficulty Swallowing, Reflux/Heartburn, Dysphagia, Jaundice,     Bloating, Melena, Bloody stools, Other      Genitourinary:  Denies: Urinary frequency, Incontinence, Hematuria, Urgency,     Nocturia, Dysuria, Testicular problems, Other      Musculoskeletal:  Denies: Joint Pain, Joint Stiffness, Joint Swelling, Myalgias,    Other      Hematologic/lymphatic:  DENIES: Lymphadenopathy, Bruising, Bleeding tendencies,     Other      Neurological:  Denies: Headache, Numbness, Weakness, Seizures, Other      Psychiatric:  Denies: Anxiety, Appropriate Effect, Depression, Other      Sleep:  No: Excessive daytime sleep, Morning Headache?, Snoring, Insomnia?, Stop    breathing at sleep?, Other      Integumentary:  Denies: Rash, Dry skin, Skin Warm to Touch, Other      Immunologic/Allergic:  Denies: Latex  allergy, Seasonal allergies, Asthma, U    rticaria, Eczema, Other      Immunization status:  No: Up to date            FAMILY/SOCIAL/MEDICAL HX      Surgical History:  Yes: Bladder Surgery (CYSTOSCOPY WITH URO LIFT ), Bowel     Surgery (COLONOSCOPY 2014); No: Abdominal Surgery, Appendectomy, CABG,     Cholecystectomy, Head Surgery, Oral Surgery, Orthopedic Surgery, Vascular     Surgery      Heart - Family Hx:  Sister      Diabetes - Family Hx:  Child, Uncle      Is Father Still Living?:  No      Is Mother Still Living?:  No      Social History:  No Tobacco Use, No Alcohol Use, No Recreational Drug use      Smoking status:  Former smoker (1 year in 1965.)      Anticoagulation Therapy:  Yes      Antibiotic Prophylaxis:  No      Medical History:  Yes: Allergies, Arthritis, Depression, Anxiety, 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), Glaucoma, Hemorrhoids/Rectal Prob (IBS, DIARRHEA, ABD PAIN ),     Hiatal Hernia (gerd, medicated for), High Blood Pressure, Reflux Disease,     Shortness Of Breath (SLEEP APNEA, SOA- RECENT PE IN 1/2019 - ON ELIQUIS ); No:     Asthma, Blood Disease, Chemotherapy/Cancer, Chronic Bronchitis/COPD, Congestive     Heart Failu, Deafness or Ringing Ears, Seizures, Heart Attack, Sinus Trouble,     Miscellaneous Medical/oth      Psychiatric History      Anxiety and depression            PREVENTION      Hx Influenza Vaccination:  Yes      Date Influenza Vaccine Given:  Oct 1, 2019      Influenza Vaccine Declined:  No      2 or More Falls Past Year?:  No      Fall Past Year with Injury?:  No      Hx Pneumococcal Vaccination:  Yes      Encouraged to follow-up with:  PCP regarding preventative exams.      Chart initiated by      Nan Cardoso MA            ALLERGIES/MEDICATIONS      Allergies:        Coded Allergies:             FUROSEMIDE (Verified  Allergy, Unknown, BREAST ENLARGEMENT, 1/9/20)           HYDROCHLOROTHIAZIDE (Verified  Allergy, Unknown, HIVES, 1/9/20)           STATINS-HMG-COA REDUCTASE  INHIBITOR (Verified  Adverse Reaction, Unknown,     CRAMPS, PAIN CHEST HANDS LEGS, 1/9/20)      Medications    Last Reconciled on 2/6/20 12:20 by NORTH VARGAS MD      rOPINIRole HCl (rOPINIRole HCl) 0.25 Mg Tablet      0.25 MG PO HS for 30 Days, #30 TAB         Reported         2/6/20       Polyvinyl Alcohol (Liquitears*) 15 Ml Bottle      2 DROPS EYE EACH BID PRN for DRY EYES, BOTTLE         Reported         1/9/20       amLODIPine (amLODIPine) 10 Mg Tablet      10 MG PO QDAY, #30 TAB 0 Refills         Reported         5/17/19       Gabapentin (Gabapentin) 600 Mg Tablet      600 MG PO QID, #90 TAB 0 Refills         Reported         5/17/19       Lactobacillus Rhamnosus  (Probiotic Digestive Care) 1 Each Capsule      1 EACH PO QDAY, CAP         Reported         5/10/19       Methylcellulose (Fiber) 500 Mg Tablet      500 MG PO QDAY, TAB         Reported         5/10/19       glipiZIDE XL (glipiZIDE XL) 5 Mg Tab.er.24      5 MG PO QDAY, #30 TAB.SR 0 Refills         Reported         5/10/19       Chlorthalidone (CHLORTHALIDONE) 25 Mg Tablet      25 MG PO BID for 30 Days, #60 TAB         Reported         5/10/19       (Artificial Saliva )   No Conflict Check      3 SPRAYS PO BID PRN for DRY MOUTH         Reported         5/10/19       Apixaban (Eliquis) 5 Mg Tablet      5 MG PO BID for 30 Days, #60 TAB         Reported         5/10/19       Acetaminophen/Codeine 300/30 MG (Acetaminophen with Codeine #3) 1 Tab Tablet      1 TAB PO HS PRN for JOINT PAIN, #21 TAB         Reported         1/21/19       Aspirin Chew (Aspirin Baby) 81 Mg Tab.chew      81 MG PO QDAY, #30 TAB.CHEW 0 Refills         Reported         1/21/19       Latanoprost (Xalatan Ophth*) 2.5 Ml Drops      1 DROPS EYE EACH QDAY, BOTTLE         Reported         1/21/19       Loratadine/Pseudoephedrine 10/240 MG (CLARITIN-D 24 HOUR TABLET) 1 Each     Tab.er.24h      0.5 TAB PO QDAY for may take up to 1 tab qd, TAB         Reported         6/22/18        Colesevelam HCl (Welchol) 3.75 Gm Packet      3.75 GM PO QDAY, PACKET         Reported         5/28/14       Omeprazole (priLOSEC) 20 Mg Capcr      20 MG PO prn, CAP         Reported         5/28/14       Azelastine Hcl (Astelin*) 137 Mcg Spray.pump      1 PUFFS NARE EACH QDAY         Reported         1/14/14       MDI-Albuterol (Ventolin HFA) 8.5 Gm Aer.w.adap      2 PUFFS INH RTQID PRN for SHORTNESS OF BREATH/WHEEZING, INH         Reported         1/14/14       Ki-Fluticasone (Flonase*) 16 Gm Naspr      1 PUFF NA QDAY         Reported         4/8/12       Coenzyme Q10 (Co Q-10) 100 Mg Capsule      100 MG PO QDAY         Reported         4/8/12      Current Medications      Current Medications Reviewed 2/6/20            EXAM      Vital Signs Reviewed      Gen: WDWN, Alert, NAD.        HEENT:  PERRL, EOMI.  OP, nares clear, no sinus tenderness.      Neck:  Supple, no JVD, no thyromegaly.      Lymph: No axillary, cervical, supraclavicular lymphadenopathy noted bilaterally.      Chest:  Good aeration, clear to auscultation bilaterally, tympanic to percussion    bilaterally, no work of breathing noted.      CV:  RRR, no MGR, pulses 2+, equal.      Abd:  Soft, NT, ND, + BS, no HSM.      EXT:  No clubbing, no cyanosis, no edema, no joint tenderness.       Neuro:  A  Skin: No rashes or lesions.      Vtials      Vitals:             Height 5 ft 9 in / 175.26 cm           Weight 214 lbs 8 oz / 97.87889 kg           BSA 2.13 m2           BMI 31.7 kg/m2           Pulse 69           Respirations 16           Blood Pressure 123/92 Sitting, Left Arm           Pulse Oximetry 98%, Room air            REVIEW      Results Reviewed      PCCS Results Reviewed?:  Yes Prev Lab Results, Yes Prev Radiology Results, Yes     Previous [facility] Records            Assessment      Notes      New Medications      * rOPINIRole HCl 0.25 MG TABLET: 0.25 MG PO HS 30 Days #30      Changed Medications      * Gabapentin 600 MG TABLET: 600 MG PO QID #90          Instructions: PT  MG FILLED ON 4/3/19. PHARMACIST SAID IT WAS CHANGED         MG 4/27/19 TID.      New Referrals      * Cardiology, SCHEDULED PROCEDURE         MORENA JOHNSTON with [facility]         Status changed from Active to Complete.         Dx: Aortic root enlargement - I77.89      ASSESSMENT:       1. Chronic thromboembolic disease.       2.?  Chronic thromboembolic pulmonary hypertension.       3. Dyspnea.       4. Reduced DLCO.       5. History of PE.             PLAN:      1.  Continue eliquis, the patient will need lifelong anticoagulation.       2. We will make referral for the patient to see Dr. Johnston his cardiologist and    recommend right heart catheterization. The patient has elevated PVR and elevated    PA pressures. The patient will need to be started on medications and may need to    be referred to a tertiary care center to be evaluated for possible pulmonary     artery endarterectomy. I explained these findings to the patient and he is     agreeable.        3. I have personally reviewed laboratory data, imaging and previous medical     records.            Patient Education      Education resources provided:  Yes      Patient Education Provided:  Pulmonary Hypertension            Electronically signed by Tristian Cazares  02/26/2020 11:39       Disclaimer: Converted document may not contain table formatting or lab diagrams. Please see [facility] for the authenticated document.

## 2021-05-28 NOTE — PROGRESS NOTES
Patient: ISIDORO FAIR     Acct: [id number]     Report: #[id number]  UNIT #: [id number]     : 1945    Encounter Date:2020  PRIMARY CARE: CARMEN PACE  ***Signed***  --------------------------------------------------------------------------------------------------------------------  Chief Complaint      Encounter Date      2020            Primary Care Provider      CARMEN PACE            Referring Provider      GRACIELA APONTE            Patient Complaint      Patient is complaining of      New pt here for exertion            VITALS      Height 5 ft 9 in / 175.26 cm      Weight 220 lbs 0 oz / 99.056501 kg      BSA 2.15 m2      BMI 32.5 kg/m2      Temperature 98.2 F / 36.78 C - Oral      Pulse 62      Respirations 16      Blood Pressure 149/94 Sitting, Left Arm      Pulse Oximetry 98%, Room air      Initial Exhaled Nitrous Oxide      Date:  2020      Exhaled Nitrous Oxide Results:  25            HPI      The patient is a 74 year old  male who is here today to be     evaluated for shortness of breath.             I had seen the patient back in 2019 and he was diagnosed with a PE at     that time after having a trauma, that was his second PE and he is now on chronic    lifelong anticoagulation. He takes eliquis as prescribed 5 mg PO twice daily and    has to stop it 3 days prior to his injections that he gets for his chronic pain     management. He has dyspnea that has been going on now and getting worse over the    course of the past 1 year. It is worse with exertion such as raking leaves,     worse when he walks long distances such as [place] [facility] [place] [facility]     [place]. He denies having any associate cough but does have some occasional     chest discomfort. The patient has had a recent extensive evaluation by     cardiology that showed no evidence of any coronary artery disease or blockages.     The patient had an echocardiogram back in January  2019 that showed hypokensis of    the RV.  The patient has no infectious type symptoms at this time, he has no     other aggravating or relieving factors for his shortness of breath other than     rest.            ROS      Constitutional:  Denies: Fatigue, Fever, Weight gain, Weight loss, Chills,     Insomnia, Other      Respiratory/Breathing:  Complains of: Shortness of air; Denies: Wheezing, Cough,    Hemoptysis, Pleuritic pain, Other      Endocrine:  Denies: Polydipsia, Polyuria, Heat/cold intolerance, Diabetes, Other      Eyes:  Denies: Blurred vision, Vision Changes, Other      Ears, nose, mouth, throat:  Denies: Mouth lesions, Thrush, Throat pain, Hoarse    ness, Allergies/Hay Fever, Post Nasal Drip, Headaches, Recent Head Injury, Nose     Bleeding, Neck Stiffness, Thyroid Mass, Hearing Loss, Ear Fullness, Dry Mouth,     Nasal or Sinus Pain, Dry Lips, Nasal discharge, Nasal congestion, Other      Cardiovascular:  Denies: Palpitations, Syncope, Claudication, Chest Pain, Wake     up Gasping for air, Leg Swelling, Irregular Heart Rate, Cyanosis, Dyspnea on     Exertion, Other      Gastrointestinal:  Denies: Nausea, Constipation, Diarrhea, Abdominal pain,     Vomiting, Difficulty Swallowing, Reflux/Heartburn, Dysphagia, Jaundice,     Bloating, Melena, Bloody stools, Other      Genitourinary:  Denies: Urinary frequency, Incontinence, Hematuria, Urgency,     Nocturia, Dysuria, Testicular problems, Other      Musculoskeletal:  Denies: Joint Pain, Joint Stiffness, Joint Swelling, Myalgias,    Other      Hematologic/lymphatic:  DENIES: Lymphadenopathy, Bruising, Bleeding tendencies,     Other      Neurological:  Denies: Headache, Numbness, Weakness, Seizures, Other      Psychiatric:  Denies: Anxiety, Appropriate Effect, Depression, Other      Sleep:  No: Excessive daytime sleep, Morning Headache?, Snoring, Insomnia?, Stop    breathing at sleep?, Other      Integumentary:  Denies: Rash, Dry skin, Skin Warm to Touch,  Other      Immunologic/Allergic:  Denies: Latex allergy, Seasonal allergies, Asthma,     Urticaria, Eczema, Other      Immunization status:  No: Up to date            FAMILY/SOCIAL/MEDICAL HX      Surgical History:  Yes: Bladder Surgery (CYSTOSCOPY WITH URO LIFT ), Bowel     Surgery (COLONOSCOPY 2014); No: Abdominal Surgery, Appendectomy, CABG,     Cholecystectomy, Head Surgery, Oral Surgery, Orthopedic Surgery, Vascular     Surgery      Heart - Family Hx:  Sister      Diabetes - Family Hx:  Child, Uncle      Is Father Still Living?:  No      Is Mother Still Living?:  No      Social History:  No Tobacco Use, No Alcohol Use, No Recreational Drug use      Smoking status:  Former smoker (1 year in 1965.)      Anticoagulation Therapy:  Yes      Antibiotic Prophylaxis:  No      Medical History:  Yes: Allergies, Arthritis, Depression, Anxiety, 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), Glaucoma, Hemorrhoids/Rectal Prob (IBS, DIARRHEA, ABD PAIN ),     Hiatal Hernia (gerd, medicated for), High Blood Pressure, Reflux Disease,     Shortness Of Breath (SLEEP APNEA, SOA- RECENT PE IN 1/2019 - ON ELIQUIS ); No:     Asthma, Blood Disease, Chemotherapy/Cancer, Chronic Bronchitis/COPD, Congestive     Heart Failu, Deafness or Ringing Ears, Seizures, Heart Attack, Miscellaneous     Medical/oth      Psychiatric History      Anxiety and depression            PREVENTION      Hx Influenza Vaccination:  Yes      Date Influenza Vaccine Given:  Oct 1, 2019      Influenza Vaccine Declined:  No      2 or More Falls Past Year?:  Yes      Fall Past Year with Injury?:  Yes      Hx Pneumococcal Vaccination:  Yes      Encouraged to follow-up with:  PCP regarding preventative exams.      Chart initiated by      Nan Cardoso MA            ALLERGIES/MEDICATIONS      Allergies:        Coded Allergies:             FUROSEMIDE (Verified  Allergy, Unknown, BREAST ENLARGEMENT, 1/9/20)           HYDROCHLOROTHIAZIDE (Verified  Allergy, Unknown, HIVES, 1/9/20)            STATINS-HMG-COA REDUCTASE INHIBITOR (Verified  Adverse Reaction, Unknown,     CRAMPS, PAIN CHEST HANDS LEGS, 1/9/20)      Medications    Last Reconciled on 1/9/20 14:33 by NORTH VARGAS MD      Colesevelam HCl (Welchol) 3.75 Gm Packet      3.75 GM PO QDAY, PACKET         Reported         1/9/20       Polyvinyl Alcohol (Liquitears*) 15 Ml Bottle      2 DROPS EYE EACH BID PRN for DRY EYES, BOTTLE         Reported         1/9/20       amLODIPine (amLODIPine) 10 Mg Tablet      10 MG PO QDAY, #30 TAB 0 Refills         Reported         5/17/19       Gabapentin (Gabapentin) 600 Mg Tablet      600 MG PO TID, #90 TAB 0 Refills         Reported         5/17/19       Lactobacillus Rhamnosus  (Probiotic Digestive Care) 1 Each Capsule      1 EACH PO QDAY, CAP         Reported         5/10/19       Methylcellulose (Fiber) 500 Mg Tablet      500 MG PO QDAY, TAB         Reported         5/10/19       glipiZIDE XL (glipiZIDE XL) 5 Mg Tab.er.24      5 MG PO QDAY, #30 TAB.SR 0 Refills         Reported         5/10/19       Chlorthalidone (CHLORTHALIDONE) 25 Mg Tablet      25 MG PO BID for 30 Days, #60 TAB         Reported         5/10/19       (Artificial Saliva )   No Conflict Check      3 SPRAYS PO BID PRN for DRY MOUTH         Reported         5/10/19       Apixaban (Eliquis) 5 Mg Tablet      5 MG PO BID for 30 Days, #60 TAB         Reported         5/10/19       Acetaminophen/Codeine 300/30 MG (Acetaminophen with Codeine #3) 1 Tab Tablet      1 TAB PO HS PRN for JOINT PAIN, #21 TAB         Reported         1/21/19       Aspirin Chew (Aspirin Baby) 81 Mg Tab.chew      81 MG PO QDAY, #30 TAB.CHEW 0 Refills         Reported         1/21/19       Latanoprost (Xalatan Ophth*) 2.5 Ml Drops      1 DROPS EYE EACH QDAY, BOTTLE         Reported         1/21/19       Loratadine/Pseudoephedrine 10/240 MG (CLARITIN-D 24 HOUR TABLET) 1 Each     Tab.er.24h      0.5 TAB PO QDAY for may take up to 1 tab qd, TAB         Reported          6/22/18       Colesevelam HCl (Welchol) 3.75 Gm Packet      3.75 GM PO QDAY, PACKET         Reported         5/28/14       Omeprazole (priLOSEC) 20 Mg Capcr      20 MG PO prn, CAP         Reported         5/28/14       Azelastine Hcl (Astelin*) 137 Mcg Spray.pump      1 PUFFS NARE EACH QDAY         Reported         1/14/14       MDI-Albuterol (Ventolin HFA) 8.5 Gm Aer.w.adap      2 PUFFS INH RTQID PRN for SHORTNESS OF BREATH/WHEEZING, INH         Reported         1/14/14       Ki-Fluticasone (Flonase*) 16 Gm Naspr      1 PUFF NA QDAY         Reported         4/8/12       Coenzyme Q10 (Co Q-10) 100 Mg Capsule      100 MG PO QDAY         Reported         4/8/12      Current Medications      Current Medications Reviewed 1/9/20            EXAM      Vital Signs Reviewed      Gen: WDWN, Alert, NAD.        HEENT:  PERRL, EOMI.  OP, nares clear, no sinus tenderness.      Neck:  Supple, no JVD, no thyromegaly.      Lymph: No axillary, cervical, supraclavicular lymphadenopathy noted bilaterally.      Chest:  Good aeration, clear to auscultation bilaterally, tympanic to percussion    bilaterally, no work of breathing noted.      CV:  RRR, no MGR, pulses 2+, equal.      Abd:  Soft, NT, ND, + BS, no HSM.      EXT:  No clubbing, no cyanosis, no edema, no joint tenderness.       Neuro:  A  Skin: No rashes or lesions.      Vtials      Vitals:             Height 5 ft 9 in / 175.26 cm           Weight 220 lbs 0 oz / 99.686610 kg           BSA 2.15 m2           BMI 32.5 kg/m2           Temperature 98.2 F / 36.78 C - Oral           Pulse 62           Respirations 16           Blood Pressure 149/94 Sitting, Left Arm           Pulse Oximetry 98%, Room air            REVIEW      Results Reviewed      PCCS Results Reviewed?:  Yes Prev Lab Results, Yes Prev Radiology Results, Yes     Previous [facility] Records            Assessment      Pulmonary hypertension - I27.20            Notes      New Medications      * Polyvinyl Alcohol  (Liquitears*) 15 ML BOTTLE: 2 DROPS EYE EACH BID PRN DRY       EYES      * Colesevelam HCl (Welchol) 3.75 GM PACKET: 3.75 GM PO QDAY      New Diagnostics      * Echo Complete, Week         Dx: Pulmonary hypertension - I27.20      * PFT-Comp, PrePost,DLCO,BodyBox, Week         Dx: Pulmonary hypertension - I27.20      * Chest 2 View, Week         Dx: Pulmonary hypertension - I27.20      * Lung Vent.Perf NM, SCHEDULED PROCEDURE         Dx: Pulmonary hypertension - I27.20      ASSESSMENT:      1.  Dyspnea on exertion.       2. ? Pulmonary hypertension.       3. ? Thromboembolic disease.       4. Abnormal RV.       5. Obstructive sleep apnea.             PLAN:      1. At this time we will obtain echocardiogram to assess the patient's RV, the     patient had hypokenetic RV back in January 2019. I suspect this was related to     acute clot however I need to make sure the patient has chronic RV failure or     pulmonary hypertension. This will be established with an echocardiogram.       2. Obtain full pulmonary function test. The patient recently had pulmonary     function test in June 2019 however he only had spirometry at that time.      3. We will obtain chest x-ray.       4. We will obtain ventilation profusion scan to rule out chronic thromboembolic     pulmonary disease which is the gold standard in diagnosing this with VQ scan.       5. We will see the patient back in approximately 4 weeks to go over results of     these studies.       6.  I have personally reviewed laboratory data, imaging and previous medical     records.            Patient Education      Education resources provided:  Yes            Electronically signed by Tristian Cazares  01/23/2020 11:56       Disclaimer: Converted document may not contain table formatting or lab diagrams. Please see [employer] for the authenticated document.

## 2021-05-28 NOTE — PROGRESS NOTES
Patient: ISIDORO FAIR     Acct: [id number]     Report: #[id number]  UNIT #: [id number]     : 1945    Encounter Date:2020  PRIMARY CARE: CARMEN PACE  ***Signed***  --------------------------------------------------------------------------------------------------------------------  Chief Complaint      Encounter Date      Mar 11, 2020            Primary Care Provider      CARMEN PACE            Referring Provider      GRACIELA APONTE            Patient Complaint      Patient is complaining of      Pt here for f/u, chad            VITALS      Height 5 ft 9 in / 175.26 cm      Weight 215 lbs 0 oz / 97.358892 kg      BSA 2.13 m2      BMI 31.7 kg/m2      Temperature 97.8 F / 36.56 C - Oral      Pulse 98      Respirations 16      Blood Pressure 133/88 Sitting, Left Arm      Pulse Oximetry 100%, Room air      Initial Exhaled Nitrous Oxide      Date:  2020      Exhaled Nitrous Oxide Results:  25            HPI      The patient is a 74 year old -American male who is here today for follow     up.  The patient is doing well.  He had right and left heart catheterization     since last office visit that was unremarkable for any pulmonary hypertension.      The patient's mean pulmonary artery pressure was indeed four to be 3. He had a     low wedge and had a very good cardiac output and had very low PVR.  The patient     still has dyspnea, worse with exertion, better with rest.  He does have     associated chest pains at times. He was recently in the ED due to some leg pain.    The patient had an ultrasound that showed an acute on chronic clot on 2020    in his right popliteal vein.  The patient states he has been taking Eliquis as     prescribed, he takes 5 mg one tablet twice a day and has not missed a dose other    than having his pain injections.  He denies any fevers or chills. He denies     having any worsening shortness of breath over his baseline. He does have his     chronic daily  shortness of breath, worse with exertion, better with rest,     moderate in severity.            ROS      Constitutional:  Complains of: Fatigue; Denies: Fever, Weight gain, Weight loss,    Chills, Insomnia, Other      Respiratory/Breathing:  Denies: Shortness of air, Wheezing, Cough, Hemoptysis,     Pleuritic pain, Other      Endocrine:  Denies: Polydipsia, Polyuria, Heat/cold intolerance, Diabetes, Other      Eyes:  Denies: Blurred vision, Vision Changes, Other      Ears, nose, mouth, throat:  Denies: Mouth lesions, Thrush, Throat pain,     Hoarseness, Allergies/Hay Fever, Post Nasal Drip, Headaches, Recent Head Injury,    Nose Bleeding, Neck Stiffness, Thyroid Mass, Hearing Loss, Ear Fullness, Dry     Mouth, Nasal or Sinus Pain, Dry Lips, Nasal discharge, Nasal congestion, Other      Cardiovascular:  Denies: Palpitations, Syncope, Claudication, Chest Pain, Wake     up Gasping for air, Leg Swelling, Irregular Heart Rate, Cyanosis, Dyspnea on     Exertion, Other      Gastrointestinal:  Denies: Nausea, Constipation, Diarrhea, Abdominal pain,     Vomiting, Difficulty Swallowing, Reflux/Heartburn, Dysphagia, Jaundice,     Bloating, Melena, Bloody stools, Other      Genitourinary:  Denies: Urinary frequency, Incontinence, Hematuria, Urgency,     Nocturia, Dysuria, Testicular problems, Other      Musculoskeletal:  Denies: Joint Pain, Joint Stiffness, Joint Swelling, Myalgias,    Other      Hematologic/lymphatic:  DENIES: Lymphadenopathy, Bruising, Bleeding tendencies,     Other      Neurological:  Denies: Headache, Numbness, Weakness, Seizures, Other      Psychiatric:  Denies: Anxiety, Appropriate Effect, Depression, Other      Sleep:  No: Excessive daytime sleep, Morning Headache?, Snoring, Insomnia?, Stop    breathing at sleep?, Other      Integumentary:  Denies: Rash, Dry skin, Skin Warm to Touch, Other      Immunologic/Allergic:  Denies: Latex allergy, Seasonal allergies, Asthma,     Urticaria, Eczema, Other       Immunization status:  No: Up to date            FAMILY/SOCIAL/MEDICAL HX      Surgical History:  Yes: Bladder Surgery (CYSTOSCOPY WITH URO LIFT, PROSTATE SX     2019), Bowel Surgery (COLONOSCOPY 2014); No: Abdominal Surgery, Appendectomy,     CABG, Cholecystectomy, Head Surgery, Oral Surgery, Orthopedic Surgery, Vascular     Surgery      Heart - Family Hx:  Sister      Diabetes - Family Hx:  Child, Uncle      Is Father Still Living?:  No      Is Mother Still Living?:  No      Social History:  No Tobacco Use, No Alcohol Use, No Recreational Drug use      Smoking status:  Former smoker (1 year in 1965.)      Anticoagulation Therapy:  Yes      Antibiotic Prophylaxis:  No      Medical History:  Yes: Allergies, Arthritis, Depression, Anxiety, 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), Glaucoma, Hemorrhoids/Rectal Prob (IBS, DIARRHEA, ABD PAIN ),     Hiatal Hernia (gerd, medicated for), High Blood Pressure, Reflux Disease,     Shortness Of Breath (SLEEP APNEA, SOA- RECENT PE IN 1/2019 - ON ELIQUIS ); No:     Asthma, Blood Disease, Chemotherapy/Cancer, Chronic Bronchitis/COPD, Congestive     Heart Failu, Deafness or Ringing Ears, Seizures, Heart Attack, Sinus Trouble,     Miscellaneous Medical/oth      Psychiatric History      Depression and anxiety            PREVENTION      Hx Influenza Vaccination:  Yes      Date Influenza Vaccine Given:  Oct 1, 2019      Influenza Vaccine Declined:  No      2 or More Falls Past Year?:  No      Fall Past Year with Injury?:  No      Hx Pneumococcal Vaccination:  Yes      Encouraged to follow-up with:  PCP regarding preventative exams.      Chart initiated by      Nan Cardoso MA            ALLERGIES/MEDICATIONS      Allergies:        Coded Allergies:             FUROSEMIDE (Verified  Allergy, Unknown, BREAST ENLARGEMENT, 3/11/20)           HYDROCHLOROTHIAZIDE (Verified  Allergy, Unknown, HIVES, 3/11/20)           STATINS-HMG-COA REDUCTASE INHIBITOR (Verified  Adverse Reaction, Unknown,      CRAMPS, PAIN CHEST HANDS LEGS, 3/11/20)      Medications    Last Reconciled on 3/11/20 12:43 by NORTH CAZARES MD      Warfarin Sod (Coumadin) 5 Mg Tablet      5 MG PO QDAY@16, #30 TAB         Prov: [place]         3/11/20       Enoxaparin (Lovenox) 100 Mg/1 Ml Syringe      100 MG SUBQ Q12H, #20 SYRINGE         Prov: [place] Cazares         3/11/20       rOPINIRole HCl (rOPINIRole HCl) 0.25 Mg Tablet      0.25 MG PO HS for 30 Days, #30 TAB         Reported         2/6/20       Polyvinyl Alcohol (Liquitears*) 15 Ml Bottle      2 DROPS EYE EACH BID PRN for DRY EYES, BOTTLE         Reported         1/9/20       amLODIPine (amLODIPine) 10 Mg Tablet      10 MG PO QDAY, #30 TAB 0 Refills         Reported         5/17/19       Gabapentin (Gabapentin) 600 Mg Tablet      600 MG PO QID, #90 TAB 0 Refills         Reported         5/17/19       Lactobacillus Rhamnosus  (Probiotic Digestive Care) 1 Each Capsule      1 EACH PO QDAY, CAP         Reported         5/10/19       Methylcellulose (Fiber) 500 Mg Tablet      500 MG PO QDAY, TAB         Reported         5/10/19       glipiZIDE XL (glipiZIDE XL) 5 Mg Tab.er.24      5 MG PO QDAY, #30 TAB.SR 0 Refills         Reported         5/10/19       Chlorthalidone (CHLORTHALIDONE) 25 Mg Tablet      25 MG PO BID for 30 Days, #60 TAB         Reported         5/10/19       (Artificial Saliva )   No Conflict Check      3 SPRAYS PO BID PRN for DRY MOUTH         Reported         5/10/19       Acetaminophen/Codeine 300/30 MG (Acetaminophen with Codeine #3) 1 Tab Tablet      1 TAB PO HS PRN for JOINT PAIN, #21 TAB         Reported         1/21/19       Aspirin Chew (Aspirin Baby) 81 Mg Tab.chew      81 MG PO QDAY, #30 TAB.CHEW 0 Refills         Reported         1/21/19       Latanoprost (Xalatan Ophth*) 2.5 Ml Drops      1 DROPS EYE EACH QDAY, BOTTLE         Reported         1/21/19       Loratadine/Pseudoephedrine 10/240 MG (CLARITIN-D 24 HOUR TABLET) 1 Each     Tab.er.24h       0.5 TAB PO QDAY for may take up to 1 tab qd, TAB         Reported         6/22/18       Omeprazole (priLOSEC) 20 Mg Capcr      20 MG PO prn, CAP         Reported         5/28/14       Azelastine Hcl (Astelin*) 137 Mcg Spray.pump      1 PUFFS NARE EACH QDAY         Reported         1/14/14       MDI-Albuterol (Ventolin HFA) 8.5 Gm Aer.w.adap      2 PUFFS INH RTQID PRN for SHORTNESS OF BREATH/WHEEZING, INH         Reported         1/14/14       Ki-Fluticasone (Flonase*) 16 Gm Naspr      1 PUFF NA QDAY         Reported         4/8/12       Coenzyme Q10 (Co Q-10) 100 Mg Capsule      100 MG PO QDAY         Reported         4/8/12      Current Medications      Current Medications Reviewed 3/11/20            EXAM      Vital Signs Reviewed      Gen: WDWN, Alert, NAD.        HEENT:  PERRL, EOMI.  OP, nares clear, no sinus tenderness.      Neck:  Supple, no JVD, no thyromegaly.      Lymph: No axillary, cervical, supraclavicular lymphadenopathy noted bilaterally.      Chest:  Good aeration, clear to auscultation bilaterally, tympanic to percussion    bilaterally, no work of breathing noted.      CV:  RRR, no MGR, pulses 2+, equal.      Abd:  Soft, NT, ND, + BS, no HSM.      EXT:  No clubbing, no cyanosis, no edema, no joint tenderness.       Neuro:  A  Skin: No rashes or lesions.      Vtials      Vitals:             Height 5 ft 9 in / 175.26 cm           Weight 215 lbs 0 oz / 97.714736 kg           BSA 2.13 m2           BMI 31.7 kg/m2           Temperature 97.8 F / 36.56 C - Oral           Pulse 98           Respirations 16           Blood Pressure 133/88 Sitting, Left Arm           Pulse Oximetry 100%, Room air            REVIEW      Results Reviewed      PCCS Results Reviewed?:  Yes Prev Lab Results, Yes Prev Radiology Results, Yes     Previous [facility] Records            Assessment      Thrombophilia - D68.59            Notes      New Medications      * Enoxaparin (Lovenox) 100 MG/1 ML SYRINGE: 100 MG SUBQ Q12H #20      *  Warfarin Sod (Coumadin) 5 MG TABLET: 5 MG PO QDAY@16 #30      New Referrals      * Hematology/Oncology, SCHEDULED PROCEDURE         Arturo Ruffin with [facility].         Status changed from Active to Complete.         Dx: Thrombophilia - D68.59      ASSESSMENT:       1. Chronic thromboembolic disease.      2.  No evidence of pulmonary hypertension.      3. Dyspnea.      4. Acute on chronic DVT.            PLAN:      1. At this time, I am concerned that the patient is failing outpatient Eliquis     given his recurrent clots despite being on Eliquis. I have explained this to the    patient at this time, he also agrees, he feels that he has had several clots     since being on Eliquis in regards to symptoms that he has with his legs and some    episodic chest pain and also feels that sometimes his symptoms are worse when he    holds his Eliquis for a few days prior to having his pain injections.  At this     time, I will discontinue Eliquis and I will start patient on Warfarin 5 mg daily    as well as Lovenox 100 mg injected subcu q 12 hours.  I discussed with pharmacy     this dosing and they agree based on his height, weight age, sex and renal     function.      2. I have also talked to Dr. Ruffin who has agreed to see the patient on     Monday to assume care of the patient's chronic anticoagulation.      3. For the patient's dyspnea, I feel this will improve once his clot burden has     significantly improved.      4. Follow up in two months.      5. I have personally reviewed laboratory data, imaging as well as previous     medical records.            Patient Education      Education resources provided:  Yes      Patient Education Provided:  Pulmonary Hypertension            Electronically signed by Tristian Cazares  04/01/2020 15:02       Disclaimer: Converted document may not contain table formatting or lab diagrams. Please see [employer] for the authenticated document.

## 2021-05-28 NOTE — PROGRESS NOTES
Patient: ISIDORO ZHAO     Acct: [id number]     Report: #[id number]  UNIT #: [id number]     : 1945    Encounter Date:2020  PRIMARY CARE: [facility]  ***Signed***  --------------------------------------------------------------------------------------------------------------------  TELEHEALTH NOTE      History of Present Illness      Chief Complaint: f/u            Isidoro Zhao is presenting for evaluation via Telehealth visit. Verbal     consent obtained before beginning visit.            Provider spent 11 minutes with the patient during telehealth visit.            The following staff were present during the visit: Nan Cardoso MA, Tristian Cazares DO            The patient is a 74 year old  male with blood clots and dyspnea     who presents for Telehealth visit today. He is still dyspneic but his dyspnea     has not gotten any worse. It is worse when he walks up an incline or graded     hill. He has no chest pains at this time, no significant cough, no fever or     chills. He reports compliance with the use of his anticoagulation. He is     currently taking eliquis and the dose has been increased to 5 mg twice daily.     The patient has evidence of on VQ scan that there ws chronic clot present. The     patient had a right heart catheterization that showed normal pulmonary pressures    and no increased PVR.             Physical exam is deferred due to Telehealth visit.                           Past Med History      PAH      Never smoked      Flu-Current      Pneumonia-Current      Overview of Symptoms      Pt complains of soa            Most Recent Lab Findings      Laboratory Tests      20 07:29            Allergies/Medications      Allergies:        Coded Allergies:             FUROSEMIDE (Verified  Allergy, Unknown, BREAST ENLARGEMENT, 20)           HYDROCHLOROTHIAZIDE (Verified  Allergy, Unknown, HIVES, 20)           STATINS-HMG-COA REDUCTASE  INHIBITOR (Verified  Adverse Reaction, Unknown,     CRAMPS, PAIN CHEST HANDS LEGS, 5/5/20)      Medications    Last Reconciled on 3/11/20 12:43 by NORTH CAZARES MD      Apixaban (Eliquis) 5 Mg Tablet      5 MG PO BID for 30 Days, #60 TAB         Reported         5/5/20       Cholecalciferol (Vitamin D3) 50,000 Unit Capsule      18300 UNITS PO Q7D, CAP         Reported         5/5/20       Enoxaparin (Lovenox) 100 Mg/1 Ml Syringe      100 MG SUBQ Q12H, #20 SYRINGE         Prov: North Cazares         3/18/20       rOPINIRole HCl (rOPINIRole HCl) 0.25 Mg Tablet      0.25 MG PO HS for 30 Days, #30 TAB         Reported         2/6/20       Polyvinyl Alcohol (Liquitears*) 15 Ml Bottle      2 DROPS EYE EACH BID PRN for DRY EYES, BOTTLE         Reported         1/9/20       amLODIPine (amLODIPine) 10 Mg Tablet      10 MG PO QDAY, #30 TAB 0 Refills         Reported         5/17/19       Gabapentin (Gabapentin) 600 Mg Tablet      600 MG PO QID, #90 TAB 0 Refills         Reported         5/17/19       Lactobacillus Rhamnosus  (Probiotic Digestive Care) 1 Each Capsule      1 EACH PO QDAY, CAP         Reported         5/10/19       Methylcellulose (Fiber) 500 Mg Tablet      500 MG PO QDAY, TAB         Reported         5/10/19       glipiZIDE XL (glipiZIDE XL) 5 Mg Tab.er.24      5 MG PO QDAY, #30 TAB.SR 0 Refills         Reported         5/10/19       Chlorthalidone (CHLORTHALIDONE) 25 Mg Tablet      25 MG PO BID for 30 Days, #60 TAB         Reported         5/10/19       (Artificial Saliva )   No Conflict Check      3 SPRAYS PO BID PRN for DRY MOUTH         Reported         5/10/19       Acetaminophen/Codeine 300/30 MG (Acetaminophen with Codeine #3) 1 Tab Tablet      1 TAB PO HS PRN for JOINT PAIN, #21 TAB         Reported         1/21/19       Aspirin Chew (Aspirin Baby) 81 Mg Tab.chew      81 MG PO QDAY, #30 TAB.CHEW 0 Refills         Reported         1/21/19       Latanoprost (Xalatan Ophth*) 2.5 Ml Drops      1  DROPS EYE EACH QDAY, BOTTLE         Reported         1/21/19       Loratadine/Pseudoephedrine 10/240 MG (CLARITIN-D 24 HOUR TABLET) 1 Each     Tab.er.24h      0.5 TAB PO QDAY for may take up to 1 tab qd, TAB         Reported         6/22/18       Omeprazole (priLOSEC) 20 Mg Capcr      20 MG PO prn, CAP         Reported         5/28/14       Azelastine Hcl (Astelin*) 137 Mcg Spray.pump      1 PUFFS NARE EACH QDAY         Reported         1/14/14       MDI-Albuterol (Ventolin HFA) 8.5 Gm Aer.w.adap      2 PUFFS INH RTQID PRN for SHORTNESS OF BREATH/WHEEZING, INH         Reported         1/14/14       Ki-Fluticasone (Flonase*) 16 Gm Naspr      1 PUFF NA QDAY         Reported         4/8/12       Coenzyme Q10 (Co Q-10) 100 Mg Capsule      100 MG PO QDAY         Reported         4/8/12            Plan/Instructions      Ambulatory Assessment/Plan:        Notes      New Medications      * Cholecalciferol (Vitamin D3) 50,000 UNIT CAPSULE: 50,000 UNITS PO Q7D      * Apixaban (Eliquis) 5 MG TABLET: 5 MG PO BID 30 Days #60      Discontinued Medications      * Warfarin Sod (Coumadin) 5 MG TABLET: 5 MG PO QDAY@16 #30      New Diagnostics      * PFT-Comp, PrePost,DLCO,BodyBox, 2 Months         Dx: Dyspnea - R06.00      Plan/Instructions      * Plan Of Care            * Chronic conditions reviewed and taken into consideration for today's treatment      plan.      * Patient instructed to seek medical attention urgently for new or worsening       symptoms.      * Patient was educated/instructed on their diagnosis, treatment and medications       prior to discharge from the clinic today.            ASSESSMENT:      1. Dyspnea.       2. Chronic clot in left upper lobe on VQ scan.       3. Recurrent PE's and deep vein thrombosis on chronic anticoagulation.             PLAN:      1. Continue eliquis at current dose. The patient has been on a higher dose of     eliquis for about 6 weeks. We will obtain a VQ scan and a pulmonary function      test in 2 months. If the VQ scan shows persistent clot and the pulmonary     function test show consistent decreased DLCO and if the patient has persistent     dyspnea, we will make referral to pulmonary vascular center to be evaluated.     Despite not having PA pressures, the patient could still have increased dead     space ventilation due to chronic clot causing his significant dyspnea.       2. I have personally reviewed laboratory data, imaging and previous medical     records.      Codes:  Phone Eval 11-20 mi [id number]            Electronically signed by Tristian Cazares  05/06/2020 15:45       Disclaimer: Converted document may not contain table formatting or lab diagrams. Please see [employer] for the authenticated document.

## 2021-06-05 NOTE — PROGRESS NOTES
Progress Note      Patient Name: Joon Zhao   Patient ID: [id number]   Sex: Male   YOB: 1945    Primary Care Provider: Brian Henson MD   Referring Provider: Gogo LINDSEY    Visit Date: May 24, 2021    Provider: Trip Mirza MD   Location: [facility]   Location Address: [address] [place] [address], [address]   [place]  [id number]   Location Phone: [phone]          Chief Complaint     Chest pain and recurrent DVTs.       History Of Present Illness  REFERRING CARE PROVIDER: Gogo LINDSEY   Joon Zhao is a 75 year old /Black male with a history of recurrent DVT and PE, chronic kidney disease and diabetes mellitus who is here for a follow-up visit. Recently, the patient is reporting chest pain, both at rest and with activities. A SPECT stress test was done in the hospital in December which was negative for ischemia. Today, the patient again reports having some chest pain but better than before. Denies any palpitations. He has shortness of breath and pedal edema which is stable. No recent weight gain. Currently, he is following up with an endocrinologist as well.   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 (2) Hypertension. (3) Chronic kidney disease, Stage 3. (4) Diabetes mellitus. (5) Negative for coronary artery disease.   PSYCHOSOCIAL HISTORY: Denies alcohol consumption.   CURRENT MEDICATIONS: Medications have been reviewed and are as stated.      ALLERGIES:  HCTZ; Spironolactone.       Review of Systems  · Cardiovascular  o Admits  o : swelling (feet, ankles, hands), shortness of breath while walking or lying flat, chest pain or angina pectoris   o Denies  o : palpitations (fast, fluttering, or skipping beats)  · Respiratory  o Denies  o : chronic or frequent cough      Vitals  Date Time BP Position Site L\R Cuff Size HR RR TEMP (F) WT  HT  BMI kg/m2 BSA m2 O2 Sat FR  "L/min FiO2 HC       05/24/2021 08:57 /88 Sitting    70 - R   214lbs 16oz 5'  9\" 31.75 2.18             Physical Examination  · Respiratory  o Auscultation of Lungs  o : Clear to auscultation bilaterally. No crackles or rhonchi.  · Cardiovascular  o Heart  o : S1, S2 is normally heard. No S3. No murmur, rubs, or gallops.  · Gastrointestinal  o Abdominal Examination  o : Soft, nontender, nondistended. No free fluid. Bowel sounds heard in all four quadrants.  · Extremities  o Extremities  o : Trace pitting pedal edema bilaterally, distal pulses are present.  · Labs  o Labs  o : Done on 03/10/2021 shows hgb of 13.7, hct is 40.5, platelet count is 172, sodium is 136, potassium is 4.5, BUN is 27, creatinine is 1.70.           Assessment     1.  Chest pain. He again reports recurrent chest pain episodes. A SPECT stress test done in December 2020 was negative for ischemia. The option of cardiac catheterization was discussed again with the patient today because of ongoing pain, however, he prefers not to have any testing done, especially since he had 2 cardiac catheterizations done in the past which are unremarkable. Of note, the patient has a history of recurrent DVT and PE which also could be causing some pleuritic chest pain. We will monitor for now. He is unable to take any long-acting nitrates because of severe headache. We will continue amlodipine for antianginal benefits along with aspirin.  2.  Hypertension, well controlled. Continue current regimen.   3.  Recurrent DVT and PE. On long term anticoagulation, managed by primary care provider.  4.  Follow-up in 6 months with repeat labs.         ROSALIA Henry  JF:                Electronically Signed by: Jocelynn Carvajal-, OT -Author on May 31, 2021 07:46:10 AM  Electronically Co-signed by: Trip Mirza MD -Reviewer on May 31, 2021 12:20:46 PM  "

## 2021-06-15 ENCOUNTER — TRANSCRIBE ORDERS (OUTPATIENT)
Dept: CARDIOLOGY | Facility: CLINIC | Age: 76
End: 2021-06-15

## 2021-06-15 DIAGNOSIS — I10 HYPERTENSION, UNSPECIFIED TYPE: Primary | ICD-10-CM

## 2021-07-12 ENCOUNTER — APPOINTMENT (OUTPATIENT)
Dept: GENERAL RADIOLOGY | Facility: HOSPITAL | Age: 76
End: 2021-07-12

## 2021-07-12 ENCOUNTER — HOSPITAL ENCOUNTER (EMERGENCY)
Facility: HOSPITAL | Age: 76
Discharge: HOME | End: 2021-07-12
Attending: EMERGENCY MEDICINE | Admitting: EMERGENCY MEDICINE

## 2021-07-12 VITALS
TEMPERATURE: 98.5 F | OXYGEN SATURATION: 98 % | DIASTOLIC BLOOD PRESSURE: 82 MMHG | SYSTOLIC BLOOD PRESSURE: 113 MMHG | BODY MASS INDEX: 32.03 KG/M2 | WEIGHT: 216.27 LBS | HEIGHT: 69 IN | HEART RATE: 64 BPM | RESPIRATION RATE: 18 BRPM

## 2021-07-12 DIAGNOSIS — R07.9 CHEST PAIN, UNSPECIFIED TYPE: Primary | ICD-10-CM

## 2021-07-12 LAB
ALBUMIN SERPL-MCNC: 4.7 G/DL (ref 3.5–5.2)
ALBUMIN/GLOB SERPL: 1.5 G/DL
ALP SERPL-CCNC: 87 U/L (ref 39–117)
ALT SERPL W P-5'-P-CCNC: 28 U/L (ref 1–41)
ANION GAP SERPL CALCULATED.3IONS-SCNC: 11.6 MMOL/L (ref 5–15)
AST SERPL-CCNC: 28 U/L (ref 1–40)
BASOPHILS # BLD AUTO: 0.04 10*3/MM3 (ref 0–0.2)
BASOPHILS NFR BLD AUTO: 0.6 % (ref 0–1.5)
BILIRUB SERPL-MCNC: 0.4 MG/DL (ref 0–1.2)
BUN SERPL-MCNC: 29 MG/DL (ref 8–23)
BUN/CREAT SERPL: 15.1 (ref 7–25)
CALCIUM SPEC-SCNC: 10 MG/DL (ref 8.6–10.5)
CHLORIDE SERPL-SCNC: 98 MMOL/L (ref 98–107)
CK MB SERPL-CCNC: 6.11 NG/ML
CK SERPL-CCNC: 335 U/L (ref 20–200)
CO2 SERPL-SCNC: 26.4 MMOL/L (ref 22–29)
CREAT SERPL-MCNC: 1.92 MG/DL (ref 0.76–1.27)
DEPRECATED RDW RBC AUTO: 40.5 FL (ref 37–54)
EOSINOPHIL # BLD AUTO: 0.08 10*3/MM3 (ref 0–0.4)
EOSINOPHIL NFR BLD AUTO: 1.2 % (ref 0.3–6.2)
ERYTHROCYTE [DISTWIDTH] IN BLOOD BY AUTOMATED COUNT: 12.2 % (ref 12.3–15.4)
GFR SERPL CREATININE-BSD FRML MDRD: 42 ML/MIN/1.73
GLOBULIN UR ELPH-MCNC: 3.1 GM/DL
GLUCOSE SERPL-MCNC: 166 MG/DL (ref 65–99)
HCT VFR BLD AUTO: 41.6 % (ref 37.5–51)
HGB BLD-MCNC: 14.1 G/DL (ref 13–17.7)
HOLD SPECIMEN: NORMAL
IMM GRANULOCYTES # BLD AUTO: 0.01 10*3/MM3 (ref 0–0.05)
IMM GRANULOCYTES NFR BLD AUTO: 0.2 % (ref 0–0.5)
LIPASE SERPL-CCNC: 125 U/L (ref 13–60)
LYMPHOCYTES # BLD AUTO: 3.24 10*3/MM3 (ref 0.7–3.1)
LYMPHOCYTES NFR BLD AUTO: 49.5 % (ref 19.6–45.3)
MAGNESIUM SERPL-MCNC: 1.5 MG/DL (ref 1.6–2.4)
MCH RBC QN AUTO: 30.9 PG (ref 26.6–33)
MCHC RBC AUTO-ENTMCNC: 33.9 G/DL (ref 31.5–35.7)
MCV RBC AUTO: 91.2 FL (ref 79–97)
MONOCYTES # BLD AUTO: 0.67 10*3/MM3 (ref 0.1–0.9)
MONOCYTES NFR BLD AUTO: 10.2 % (ref 5–12)
NEUTROPHILS NFR BLD AUTO: 2.51 10*3/MM3 (ref 1.7–7)
NEUTROPHILS NFR BLD AUTO: 38.3 % (ref 42.7–76)
NRBC BLD AUTO-RTO: 0 /100 WBC (ref 0–0.2)
NT-PROBNP SERPL-MCNC: 19.3 PG/ML (ref 0–1800)
PLATELET # BLD AUTO: 196 10*3/MM3 (ref 140–450)
PMV BLD AUTO: 10.9 FL (ref 6–12)
POTASSIUM SERPL-SCNC: 4.2 MMOL/L (ref 3.5–5.2)
PROT SERPL-MCNC: 7.8 G/DL (ref 6–8.5)
RBC # BLD AUTO: 4.56 10*6/MM3 (ref 4.14–5.8)
SODIUM SERPL-SCNC: 136 MMOL/L (ref 136–145)
TROPONIN I SERPL-MCNC: 0 NG/ML (ref 0–0.6)
TROPONIN I SERPL-MCNC: 0 NG/ML (ref 0–0.6)
WBC # BLD AUTO: 6.55 10*3/MM3 (ref 3.4–10.8)
WHOLE BLOOD HOLD SPECIMEN: NORMAL

## 2021-07-12 PROCEDURE — 83880 ASSAY OF NATRIURETIC PEPTIDE: CPT | Performed by: EMERGENCY MEDICINE

## 2021-07-12 PROCEDURE — 93005 ELECTROCARDIOGRAM TRACING: CPT

## 2021-07-12 PROCEDURE — 85025 COMPLETE CBC W/AUTO DIFF WBC: CPT

## 2021-07-12 PROCEDURE — 82550 ASSAY OF CK (CPK): CPT | Performed by: EMERGENCY MEDICINE

## 2021-07-12 PROCEDURE — 84484 ASSAY OF TROPONIN QUANT: CPT

## 2021-07-12 PROCEDURE — 93010 ELECTROCARDIOGRAM REPORT: CPT | Performed by: INTERNAL MEDICINE

## 2021-07-12 PROCEDURE — 71045 X-RAY EXAM CHEST 1 VIEW: CPT

## 2021-07-12 PROCEDURE — 80053 COMPREHEN METABOLIC PANEL: CPT | Performed by: EMERGENCY MEDICINE

## 2021-07-12 PROCEDURE — 82553 CREATINE MB FRACTION: CPT | Performed by: EMERGENCY MEDICINE

## 2021-07-12 PROCEDURE — 36415 COLL VENOUS BLD VENIPUNCTURE: CPT

## 2021-07-12 PROCEDURE — 83690 ASSAY OF LIPASE: CPT | Performed by: EMERGENCY MEDICINE

## 2021-07-12 PROCEDURE — 99283 EMERGENCY DEPT VISIT LOW MDM: CPT

## 2021-07-12 PROCEDURE — 83735 ASSAY OF MAGNESIUM: CPT | Performed by: EMERGENCY MEDICINE

## 2021-07-12 PROCEDURE — 93005 ELECTROCARDIOGRAM TRACING: CPT | Performed by: EMERGENCY MEDICINE

## 2021-07-12 RX ORDER — ASPIRIN 81 MG/1
324 TABLET, CHEWABLE ORAL ONCE
Status: COMPLETED | OUTPATIENT
Start: 2021-07-12 | End: 2021-07-12

## 2021-07-12 RX ORDER — ALUMINA, MAGNESIA, AND SIMETHICONE 2400; 2400; 240 MG/30ML; MG/30ML; MG/30ML
15 SUSPENSION ORAL ONCE
Status: COMPLETED | OUTPATIENT
Start: 2021-07-12 | End: 2021-07-12

## 2021-07-12 RX ORDER — LIDOCAINE HYDROCHLORIDE 20 MG/ML
15 SOLUTION OROPHARYNGEAL ONCE
Status: COMPLETED | OUTPATIENT
Start: 2021-07-12 | End: 2021-07-12

## 2021-07-12 RX ORDER — SODIUM CHLORIDE 0.9 % (FLUSH) 0.9 %
10 SYRINGE (ML) INJECTION AS NEEDED
Status: DISCONTINUED | OUTPATIENT
Start: 2021-07-12 | End: 2021-07-12 | Stop reason: HOSPADM

## 2021-07-12 RX ORDER — ACETAMINOPHEN AND CODEINE PHOSPHATE 300; 30 MG/1; MG/1
1 TABLET ORAL EVERY 4 HOURS PRN
COMMUNITY

## 2021-07-12 RX ADMIN — ALUMINUM HYDROXIDE, MAGNESIUM HYDROXIDE, AND DIMETHICONE 15 ML: 400; 400; 40 SUSPENSION ORAL at 04:56

## 2021-07-12 RX ADMIN — ASPIRIN 324 MG: 81 TABLET, CHEWABLE ORAL at 01:51

## 2021-07-12 RX ADMIN — LIDOCAINE HYDROCHLORIDE 15 ML: 20 SOLUTION ORAL; TOPICAL at 04:56

## 2021-07-12 NOTE — ED PROVIDER NOTES
Time: 1:47 AM EDT  Arrived by: private car  Chief Complaint:   Chief Complaint   Patient presents with   • Chest Pain     PATIENT STATES CHEST PAIN STARTED LAST NIGHT. PATIENT STATES HAD ELEVATED BLOOD PRESSURE 167/105 AT HOME    • Dizziness     History provided by: pt    History of Present Illness:  Patient is a 75 y.o. year old male that presents to the emergency department with CHEST PAIN.    Pt presents to the ED with complaints of chest pain that started one day ago and is still present and constant. The symptoms are described as moderate in severity. Nothing improves or worsens these symtoms.  Pt complains of stomach pain with diarrhea that started this morning and chest pain that radiates to his back. Pt states the chest pain is sharp in the middle of his chest. Pt states that his legs have been swollen for a while.  Pt has hx of hypertension. Pt denies any prior issues with his heart. Pt had hx of clots in his lungs.      History provided by:  Patient  History limited by: N/A.   used: No        Similar Symptoms Previously: none  Recently seen: not recently seen in this ED    Patient Care Team  Primary Care Provider: Brian Henson MD    Past Medical History:     Allergies   Allergen Reactions   • Atorvastatin Hives   • Ezetimibe Unknown - High Severity   • Furosemide Unknown - High Severity   • Simvastatin Hives   • Statins Unknown - High Severity   • Hydrochlorothiazide Hives     Past Medical History:   Diagnosis Date   • Anemia    • Arthritis    • Back pain     CHRONIC NECK AND BACK PAIN   • BPH (benign prostatic hyperplasia) 03/02/2015   • BPH with urinary obstruction 06/01/2018   • CKD (chronic kidney disease), stage III (CMS/[facility])    • Controlled type 2 diabetes mellitus with diabetic polyneuropathy (CMS/[facility]) 07/05/2017   • Coronary artery disease    • Diabetes mellitus (CMS/[facility])    • Dizziness 08/10/2017   • DVT (deep venous thrombosis) (CMS/[facility])    • ED (erectile dysfunction) of  organic origin    • Foot pain, bilateral 03/12/2018   • Glaucoma (increased eye pressure)    • High cholesterol    • Hyperlipidemia    • Hypertension    • Ingrowing nail 07/05/2017   • Kidney failure     STAGE III   • Pain in both feet    • PE (pulmonary thromboembolism) (CMS/HCC)    • Polyneuropathy 07/05/2017   • Tinea unguium 07/05/2017     Past Surgical History:   Procedure Laterality Date   • ADRENAL GLAND SURGERY     • COLONOSCOPY     • CYSTOSCOPY     • KIDNEY SURGERY      KIDNEY BIOSPY   • PROSTATE SURGERY  12/2007   • TURP / TRANSURETHRAL INCISION / DRAINAGE PROSTATE  06/2019     Family History   Problem Relation Age of Onset   • Hypertension Mother    • Hypertension Sister    • Diabetes Brother    • Diabetes Maternal Grandmother    • Diabetes Other    • Diabetes Son        Home Medications:  Prior to Admission medications    Medication Sig Start Date End Date Taking? Authorizing Provider   albuterol sulfate HFA (Ventolin HFA) 108 (90 Base) MCG/ACT inhaler Ventolin HFA 90 mcg/actuation inhalation HFA aerosol inhaler inhale 2 puffs (180 mcg) by inhalation route every 6 hours   Active    Emergency, Nurse Che RN   Alcohol, USP 70 % solution Rubbing Alcohol (ethanol) 70 % topical solution apply to affected area by topical route As needed   Suspended    Emergency, Nurse Che, RN   amLODIPine (NORVASC) 5 MG tablet  5/24/21   Emergency, Nurse Epic, RN   aspirin (aspirin) 81 MG EC tablet aspirin 81 mg oral tablet,delayed release (DR/EC) take 1 tablet (81 mg) by oral route once daily   Active    Emergency, Nurse Epic, RN   azelastine (ASTELIN) 0.1 % nasal spray azelastine 137 mcg (0.1 %) nasal aerosol,spray spray 2 sprays in each nostril by intranasal route 2 times per day   Active    Emergency, Nurse Che RN   baclofen (LIORESAL) 10 MG tablet  6/7/21   Emergency, Nurse Che RN   chlorthalidone (HYGROTON) 25 MG tablet  5/11/21   Emergency, Nurse Che RN   coenzyme Q10 100 MG capsule Co Q-10 100 mg oral capsule  take 1 capsule by oral route daily   Active    Emergency, Nurse Epic, RN   Eliquis 5 MG tablet tablet  5/11/21   Emergency, Nurse Che RN   ergocalciferol (ERGOCALCIFEROL) 1.25 MG (96243 UT) capsule ergocalciferol (vitamin D2) 1,250 mcg (50,000 unit) oral capsule take 1 capsule by oral route   Active    Emergency, Nurse Che RN   fexofenadine (ALLEGRA) 180 MG tablet fexofenadine 180 mg oral tablet take 1 tablet (180 mg) by oral route once daily   Suspended    Emergency, Nurse Epic, RN   fluticasone (Flonase) 50 MCG/ACT nasal spray Flonase 50 mcg/actuation nasal spray,suspension inhale 1 spray (50 mcg) in each nostril by intranasal route once daily   Suspended    Emergency, Nurse Che RN   gabapentin (NEURONTIN) 800 MG tablet  5/24/21   Emergency, Nurse Che RN   glipizide (GLUCOTROL XL) 5 MG ER tablet glipizide 5 mg oral tablet extended release 24hr take 1 tablet (5 mg) by oral route bid daily with food   Active    Emergency, Nurse Che RN   hydrOXYzine (ATARAX) 25 MG tablet hydroxyzine HCl 25 mg oral tablet take 1 tablet (25 mg) by oral route 4 times per day as needed   Active    Emergency, Nurse Che RN   Hyoscyamine Sulfate SL 0.125 MG sublingual tablet  2/4/21   Emergency, Nurse Che RN   latanoprost (XALATAN) 0.005 % ophthalmic solution 1 drop.    Emergency, Nurse Che RN   loratadine-pseudoephedrine (Claritin-D 12 Hour) 5-120 MG per 12 hr tablet Claritin-D 12 Hour 5-120 mg oral tablet extended release 12 hr take 1 tablet by oral route 2 times per day   Active    Emergency, Nurse Che RN   losartan (COZAAR) 25 MG tablet  6/9/21   Emergency, Nurse Che RN   Magnesium Oxide 400 (240 Mg) MG tablet  5/11/21   Emergency, Nurse Epic, RN   rOPINIRole (REQUIP) 0.25 MG tablet  5/11/21   Emergency, Nurse Epic, RN   Trulicity 1.5 MG/0.5ML solution pen-injector  6/1/21   Emergency, Nurse KASIE Bolton        Social History:   Social History     Tobacco Use   • Smoking status: Never Smoker   • Smokeless tobacco: Never  "Used   Vaping Use   • Vaping Use: Never used   Substance Use Topics   • Alcohol use: Yes     Comment: rare   • Drug use: Never     Recent travel: not applicable     Review of Systems:  Review of Systems   Constitutional: Negative for chills and fever.   HENT: Negative for congestion, ear pain, rhinorrhea, sore throat and tinnitus.    Eyes: Negative for photophobia and pain.   Respiratory: Negative for choking and shortness of breath.    Cardiovascular: Positive for chest pain and leg swelling. Negative for palpitations.   Gastrointestinal: Positive for abdominal pain and diarrhea. Negative for abdominal distention, nausea and vomiting.   Endocrine: Negative for polydipsia.   Genitourinary: Negative for difficulty urinating, dysuria and hematuria.   Musculoskeletal: Positive for back pain. Negative for gait problem and neck pain.   Skin: Negative for rash.   Neurological: Negative for dizziness, seizures, speech difficulty, weakness, light-headedness, numbness and headaches.   Hematological: Negative for adenopathy.   Psychiatric/Behavioral: Negative for agitation, confusion, self-injury and suicidal ideas.        Physical Exam:  /82   Pulse 64   Temp 98.5 °F (36.9 °C) (Oral)   Resp 18   Ht 175.3 cm (69\")   Wt 98.1 kg (216 lb 4.3 oz)   SpO2 98%   BMI 31.94 kg/m²     Physical Exam  Vitals and nursing note reviewed.   Constitutional:       Appearance: Normal appearance. He is well-developed.   HENT:      Head: Normocephalic and atraumatic.      Right Ear: External ear normal.      Left Ear: External ear normal.      Nose: Nose normal.      Mouth/Throat:      Mouth: Mucous membranes are moist.      Pharynx: Oropharynx is clear.   Eyes:      General: Lids are normal.      Extraocular Movements: Extraocular movements intact.      Conjunctiva/sclera: Conjunctivae normal.      Pupils: Pupils are equal, round, and reactive to light.   Cardiovascular:      Rate and Rhythm: Normal rate and regular rhythm.      " Pulses: Normal pulses.      Heart sounds: Normal heart sounds. No murmur heard.     Pulmonary:      Effort: Pulmonary effort is normal.      Breath sounds: Normal breath sounds. No stridor. No wheezing.   Abdominal:      Palpations: Abdomen is soft.      Tenderness: There is no abdominal tenderness.   Musculoskeletal:         General: Normal range of motion.      Cervical back: Full passive range of motion without pain, normal range of motion and neck supple.      Right lower leg: Edema (chronic) present.      Left lower leg: No edema.   Skin:     General: Skin is warm and dry.      Capillary Refill: Capillary refill takes less than 2 seconds.   Neurological:      General: No focal deficit present.      Mental Status: He is alert and oriented to person, place, and time. Mental status is at baseline.      Cranial Nerves: Cranial nerves are intact.      Sensory: Sensation is intact.      Motor: Motor function is intact.      Coordination: Coordination is intact.   Psychiatric:         Attention and Perception: Attention and perception normal.         Mood and Affect: Mood and affect normal.         Speech: Speech normal.         Behavior: Behavior normal. Behavior is cooperative.         Thought Content: Thought content normal.         Cognition and Memory: Cognition and memory normal.                Medications in the Emergency Department:  Medications   aspirin chewable tablet 324 mg (324 mg Oral Given 7/12/21 0151)   aluminum-magnesium hydroxide-simethicone (MAALOX MAX) 400-400-40 MG/5ML suspension 15 mL (15 mL Oral Given 7/12/21 0456)   Lidocaine Viscous HCl (XYLOCAINE) 2 % mouth solution 15 mL (15 mL Mouth/Throat Given 7/12/21 8016)        Labs  Lab Results (last 24 hours)     Procedure Component Value Units Date/Time    POC Troponin I with Hold Tube [[id number]] Collected: 07/12/21 0123    Specimen: Blood Updated: 07/12/21 6796    Narrative:      The following orders were created for panel order POC Troponin I  with Hold Tube.  Procedure                               Abnormality         Status                     ---------                               -----------         ------                     POC Troponin I[[id number]]                                                              HOLD Troponin-I Tube[[id number]]                             Final result                 Please view results for these tests on the individual orders.    CBC & Differential [[id number]]  (Abnormal) Collected: 07/12/21 0123    Specimen: Blood Updated: 07/12/21 0138    Narrative:      The following orders were created for panel order CBC & Differential.  Procedure                               Abnormality         Status                     ---------                               -----------         ------                     CBC Auto Differential[[id number]]        Abnormal            Final result                 Please view results for these tests on the individual orders.    Comprehensive Metabolic Panel [[id number]]  (Abnormal) Collected: 07/12/21 0123    Specimen: Blood Updated: 07/12/21 0153     Glucose 166 mg/dL      BUN 29 mg/dL      Creatinine 1.92 mg/dL      Sodium 136 mmol/L      Potassium 4.2 mmol/L      Chloride 98 mmol/L      CO2 26.4 mmol/L      Calcium 10.0 mg/dL      Total Protein 7.8 g/dL      Albumin 4.70 g/dL      ALT (SGPT) 28 U/L      AST (SGOT) 28 U/L      Alkaline Phosphatase 87 U/L      Total Bilirubin 0.4 mg/dL      eGFR  African Amer 42 mL/min/1.73      Globulin 3.1 gm/dL      A/G Ratio 1.5 g/dL      BUN/Creatinine Ratio 15.1     Anion Gap 11.6 mmol/L     Narrative:      GFR Normal >60  Chronic Kidney Disease <60  Kidney Failure <15      Lipase [[id number]]  (Abnormal) Collected: 07/12/21 0123    Specimen: Blood Updated: 07/12/21 0153     Lipase 125 U/L     BNP [[id number]]  (Normal) Collected: 07/12/21 0123    Specimen: Blood Updated: 07/12/21 0151     proBNP 19.3 pg/mL     Narrative:      Among patients with dyspnea,  NT-proBNP is highly sensitive for the detection of acute congestive heart failure. In addition NT-proBNP of <300 pg/ml effectively rules out acute congestive heart failure with 99% negative predictive value.    Results may be falsely decreased if patient taking Biotin.      Magnesium [[id number]]  (Abnormal) Collected: 07/12/21 0123    Specimen: Blood Updated: 07/12/21 0153     Magnesium 1.5 mg/dL     CK Total & CKMB [[id number]]  (Abnormal) Collected: 07/12/21 0123    Specimen: Blood Updated: 07/12/21 0153     CKMB 6.11 ng/mL      Creatine Kinase 335 U/L     Narrative:      CKMB results may be falsely decreased if patient taking Biotin.    CBC Auto Differential [[id number]]  (Abnormal) Collected: 07/12/21 0123    Specimen: Blood Updated: 07/12/21 0138     WBC 6.55 10*3/mm3      RBC 4.56 10*6/mm3      Hemoglobin 14.1 g/dL      Hematocrit 41.6 %      MCV 91.2 fL      MCH 30.9 pg      MCHC 33.9 g/dL      RDW 12.2 %      RDW-SD 40.5 fl      MPV 10.9 fL      Platelets 196 10*3/mm3      Neutrophil % 38.3 %      Lymphocyte % 49.5 %      Monocyte % 10.2 %      Eosinophil % 1.2 %      Basophil % 0.6 %      Immature Grans % 0.2 %      Neutrophils, Absolute 2.51 10*3/mm3      Lymphocytes, Absolute 3.24 10*3/mm3      Monocytes, Absolute 0.67 10*3/mm3      Eosinophils, Absolute 0.08 10*3/mm3      Basophils, Absolute 0.04 10*3/mm3      Immature Grans, Absolute 0.01 10*3/mm3      nRBC 0.0 /100 WBC     POC Troponin I [[id number]]  (Normal) Collected: 07/12/21 0125    Specimen: Blood Updated: 07/12/21 0137     Troponin I 0.00 ng/mL      Comment: Serial Number: [id number]:  [id number]       POC Troponin I [[id number]]  (Normal) Collected: 07/12/21 0406    Specimen: Blood Updated: 07/12/21 0418     Troponin I 0.00 ng/mL      Comment: Serial Number: [id number]:  [id number]       POC Troponin I with Hold Tube [[id number]] Collected: 07/12/21 0407    Specimen: Blood Updated: 07/12/21 0756    Narrative:      The following orders were created  for panel order POC Troponin I with Hold Tube.  Procedure                               Abnormality         Status                     ---------                               -----------         ------                     POC Troponin I[[id number]]                                                              HOLD Troponin-I Tube[[id number]]                             Final result                 Please view results for these tests on the individual orders.           Imaging:  XR Chest 1 View    Result Date: 7/12/2021  PROCEDURE: XR CHEST 1 VW  COMPARISON: [facility], , CHEST AP/PA 1 VIEW, 12/27/2020, 17:42.  INDICATIONS: CHEST PAIN.  FINDINGS:A single AP upright portable chest radiograph was performed.  No cardiac enlargement is seen.  No acute infiltrate is appreciated.  No pleural effusion or pneumothorax is identified.  The thoracic aorta is atherosclerotic and ectatic.  There is slight pulmonary hypoinflation.  No significant interval change is seen since the prior study.  CONCLUSION:No acute infiltrate is appreciated.      SERVANDO KHAN JR, MD       Electronically Signed and Approved By: SERVANDO KHAN JR, MD on 7/12/2021 at 2:22               Procedures:  Procedures    Progress  ED Course as of Jul 12 1616   Mon Jul 12, 2021   0119 Normal sinus rhythm with rate of 75. QRS normal. FL interval normal. QTc interval is normal. No ST elevation or depression. No T wave abnormalities. Borderline LAD. This EKG was interpreted by me.          ECG 12 Lead [LD]   0400 Normal sinus rhythm with rate of 68. QRS normal. FL interval normal. QTc interval is normal. No ST elevation or depression. No T wave abnormalities. Borderline LAD. No change when compared to yessy. This EKG was interpreted by me.          ECG 12 Lead [LD]      ED Course User Index  [LD] Sarah Roblero MD                            Medical Decision Making:  MDM  Number of Diagnoses or Management Options  Chest pain, unspecified  type  Diagnosis management comments: Patient is afebrile and nontoxic-appearing.  Vital signs are stable.  At time of evaluation he is lying in bed in no acute distress.  EKG shows sinus rhythm no acute ST elevation.  2 sets of troponins 3 hours apart were both negative.  Labs were obtained that showed an elevated creatinine of 1.9 which is similar when compared to previous.  He also has an elevated lipase of 125 that is similar to previous.  He has no significant abdominal tenderness on exam.  Chest x-ray showed no acute findings.  Symptoms improved with GI cocktail.  Patient states he has an appointment with his cardiologist later today.  Also recommended close follow-up with his primary physician.  Discussed return precautions, discharge instructions and answered all his questions.       Amount and/or Complexity of Data Reviewed  Clinical lab tests: reviewed and ordered  Tests in the radiology section of CPT®: reviewed  Tests in the medicine section of CPT®: reviewed and ordered  Review and summarize past medical records: yes  Independent visualization of images, tracings, or specimens: yes    Risk of Complications, Morbidity, and/or Mortality  Presenting problems: moderate  Diagnostic procedures: moderate  Management options: moderate    Patient Progress  Patient progress: stable       Final diagnoses:   Chest pain, unspecified type        Disposition:  ED Disposition     ED Disposition Condition Comment    Discharge Stable           Documentation assistance provided by Jo Tong acting as scribe for Sarah Roblero MD. Information recorded by the scribe was done at my direction and has been verified and validated by me.               Sarah Roblero MD  07/12/21 4083

## 2021-07-15 VITALS
SYSTOLIC BLOOD PRESSURE: 132 MMHG | DIASTOLIC BLOOD PRESSURE: 88 MMHG | HEART RATE: 70 BPM | WEIGHT: 215 LBS | HEIGHT: 69 IN | BODY MASS INDEX: 31.84 KG/M2

## 2021-07-16 LAB
QT INTERVAL: 387 MS
QT INTERVAL: 396 MS

## 2021-07-20 ENCOUNTER — OFFICE VISIT (OUTPATIENT)
Dept: PODIATRY | Facility: CLINIC | Age: 76
End: 2021-07-20

## 2021-07-20 VITALS
BODY MASS INDEX: 32.58 KG/M2 | TEMPERATURE: 96.9 F | HEIGHT: 69 IN | WEIGHT: 220 LBS | DIASTOLIC BLOOD PRESSURE: 85 MMHG | OXYGEN SATURATION: 100 % | HEART RATE: 64 BPM | SYSTOLIC BLOOD PRESSURE: 136 MMHG

## 2021-07-20 DIAGNOSIS — E11.9 NON-INSULIN DEPENDENT TYPE 2 DIABETES MELLITUS (HCC): ICD-10-CM

## 2021-07-20 DIAGNOSIS — M79.671 FOOT PAIN, BILATERAL: Primary | ICD-10-CM

## 2021-07-20 DIAGNOSIS — G62.9 NEUROPATHY: ICD-10-CM

## 2021-07-20 DIAGNOSIS — B35.1 ONYCHOMYCOSIS: ICD-10-CM

## 2021-07-20 DIAGNOSIS — E11.8 DIABETIC FOOT (HCC): ICD-10-CM

## 2021-07-20 DIAGNOSIS — L60.0 ONYCHOCRYPTOSIS: ICD-10-CM

## 2021-07-20 DIAGNOSIS — M79.672 FOOT PAIN, BILATERAL: Primary | ICD-10-CM

## 2021-07-20 PROCEDURE — G8404 LOW EXTEMITY NEUR EXAM DOCUM: HCPCS | Performed by: PODIATRIST

## 2021-07-20 PROCEDURE — 11721 DEBRIDE NAIL 6 OR MORE: CPT | Performed by: PODIATRIST

## 2021-07-20 RX ORDER — MELATONIN
1000 DAILY
COMMUNITY
Start: 2021-06-15

## 2021-07-20 RX ORDER — POLYVINYL ALCOHOL 14 MG/ML
1 SOLUTION/ DROPS OPHTHALMIC AS NEEDED
COMMUNITY
End: 2021-11-04

## 2021-07-20 RX ORDER — OMEPRAZOLE 20 MG/1
20 CAPSULE, DELAYED RELEASE ORAL AS NEEDED
COMMUNITY
Start: 2021-06-29

## 2021-07-20 RX ORDER — NALOXONE HYDROCHLORIDE 4 MG/.1ML
SPRAY NASAL
COMMUNITY
Start: 2021-04-22 | End: 2021-11-04

## 2021-07-20 NOTE — PATIENT INSTRUCTIONS
Diabetic foot exam performed and documented this date, compliant with CQM required standards. Detail of findings as noted in physical exam.  Lower extremity Neurologic exam for diabetic patient performed and documented this date, compliant with PQRS required standards. Detail of findings as noted in physical exam.  Advised patient importance of good routine lower extremity hygiene. Advised patient importance of evaluating for intact skin and pain free nail borders.  Advised patient to use mirror to evaluate plantar/ soles of feet for better visualization. Advised patient monitor and phone office to be seen if any cracking to skin, open lesions, painful nail borders or if nails become elongated prior to next visit. Advised patient importance of daily cleansing of lower extremities, followed by good skin cream to maintain normal hydration of skin. Also advised patient importance of close daily monitoring of blood sugar. Advised to regulate diet and medications to maintain control of blood sugar in optimal range. Contact primary care provider if difficulties maintaining blood sugar levels.  Advised Patient of presence of Diabetes Mellitus condition.  Advised Patient risk of progression and worsening or improvement, then return of condition.  Will monitor condition for any change in future. Treat with most appropriate treatment pending status of condition.  Counseled and advised patient extensively on nature and ramifications of diabetes. Standard instructions given to patient for good diabetic foot care and maintenance. Advised importance of careful monitoring to avoid break down and complications secondary to diabetes. Advised patient importance of strict maintenance of blood sugar control. Advised patient of possible ominous results from neglect of condition, i.e.: amputation/ loss of digits, feet and legs, or even death.  Patient states understands counseling, will monitor closely, continue good hygiene and  routine diabetic foot care. Patient will contact office is questions or problems.      Patient is to monitor for recurrence and any new symptoms and to contact Dr. Payne's office for a follow-up appointment.      The patient states understanding and agreement with this plan.

## 2021-07-20 NOTE — PROGRESS NOTES
[facility] - PODIATRY    Today's Date: 07/20/21    Patient Name: Joon Zhao  MRN: [id number]  CSN: [id number]  PCP: Brian Henson MD  Referring Provider: No ref. provider found    SUBJECTIVE     Chief Complaint   Patient presents with   • Left Foot - Nail Problem   • Right Foot - Nail Problem     HPI: Joon Zhao, a 75 y.o.male, comes to clinic.    New, Established, New Problem:  established     Location:  Toenails    Duration:   Greater than five years    Onset:  Gradual    Nature:  sore with palpation.    Stable, worsening, improving:   Stable    Aggravating factors:  Pain with shoe gear and ambulation.    Previous Treatment:  debridement  __________________________________    New, Established, New Problem:  Established    Location:  bilateral feet    Duration:    Onset:  gradual    Nature:   NIDDM     Stable, worsening, improving:  stable    Patient reported last blood glucose: Patient states they are unsure of the most recent blood glucose reading.  __________________________________    Patient reports the following medical changes since their last visit: None.    No other pedal complaints at this time.    Patient denies any fevers, chills, nausea, vomiting, shortness of breathe, nor any other constitutional signs nor symptoms.       Last PCP Visit:  Brian Henson MD, April 2021    Past Medical History:   Diagnosis Date   • Anemia    • Arthritis    • Back pain     CHRONIC NECK AND BACK PAIN   • BPH (benign prostatic hyperplasia) 03/02/2015   • BPH with urinary obstruction 06/01/2018   • CKD (chronic kidney disease), stage III (CMS/[facility])    • Controlled type 2 diabetes mellitus with diabetic polyneuropathy (CMS/[facility]) 07/05/2017   • Coronary artery disease    • Diabetes mellitus (CMS/[facility])    • Dizziness 08/10/2017   • DVT (deep venous thrombosis) (CMS/[facility])    • ED (erectile dysfunction) of organic origin    • Foot pain, bilateral 03/12/2018   • Glaucoma (increased eye pressure)    •  High cholesterol    • Hyperlipidemia    • Hypertension    • Ingrowing nail 07/05/2017   • Kidney failure     STAGE III   • Pain in both feet    • PE (pulmonary thromboembolism) (CMS/HCC)    • Polyneuropathy 07/05/2017   • Tinea unguium 07/05/2017     Past Surgical History:   Procedure Laterality Date   • ADRENAL GLAND SURGERY     • COLONOSCOPY     • CYSTOSCOPY     • KIDNEY SURGERY      KIDNEY BIOSPY   • PROSTATE SURGERY  12/2007   • TURP / TRANSURETHRAL INCISION / DRAINAGE PROSTATE  06/2019     Family History   Problem Relation Age of Onset   • Hypertension Mother    • Hypertension Sister    • Diabetes Brother    • Diabetes Maternal Grandmother    • Diabetes Other    • Diabetes Son      Social History     Socioeconomic History   • Marital status:      Spouse name: Not on file   • Number of children: Not on file   • Years of education: Not on file   • Highest education level: Not on file   Tobacco Use   • Smoking status: Never Smoker   • Smokeless tobacco: Never Used   Vaping Use   • Vaping Use: Never used   Substance and Sexual Activity   • Alcohol use: Yes     Comment: rare   • Drug use: Never   • Sexual activity: Defer     Allergies   Allergen Reactions   • Atorvastatin Hives   • Ezetimibe Unknown - High Severity   • Furosemide Unknown - High Severity   • Simvastatin Hives   • Statins Unknown - High Severity   • Hydrochlorothiazide Hives     Current Outpatient Medications   Medication Sig Dispense Refill   • acetaminophen-codeine (TYLENOL #3) 300-30 MG per tablet Take 1 tablet by mouth Every 4 (Four) Hours As Needed for Moderate Pain .     • albuterol sulfate HFA (Ventolin HFA) 108 (90 Base) MCG/ACT inhaler Ventolin HFA 90 mcg/actuation inhalation HFA aerosol inhaler inhale 2 puffs (180 mcg) by inhalation route every 6 hours   Active     • amLODIPine (NORVASC) 5 MG tablet Take 5 mg by mouth Daily.     • ARTIFICIAL SALIVA MT Apply  to the mouth or throat 2 (Two) Times a Day As Needed.     • aspirin (aspirin)  81 MG EC tablet aspirin 81 mg oral tablet,delayed release (DR/EC) take 1 tablet (81 mg) by oral route once daily   Active     • azelastine (ASTELIN) 0.1 % nasal spray azelastine 137 mcg (0.1 %) nasal aerosol,spray spray 2 sprays in each nostril by intranasal route 2 times per day   Active     • baclofen (LIORESAL) 10 MG tablet Take 10 mg by mouth 2 (Two) Times a Day.     • chlorthalidone (HYGROTON) 25 MG tablet Take 25 mg by mouth 2 (two) times a day.     • cholecalciferol (VITAMIN D3) 25 MCG (1000 UT) tablet Take 1,000 Units by mouth Daily.     • coenzyme Q10 100 MG capsule Co Q-10 100 mg oral capsule take 1 capsule by oral route daily   Active     • Eliquis 5 MG tablet tablet Take 5 mg by mouth Every 12 (Twelve) Hours.     • EQ FIBER SUPPLEMENT PO Take 2 teaspoon(s) by mouth Daily.     • fluticasone (Flonase) 50 MCG/ACT nasal spray Flonase 50 mcg/actuation nasal spray,suspension inhale 1 spray (50 mcg) in each nostril by intranasal route once daily   Suspended     • gabapentin (NEURONTIN) 800 MG tablet Take 800 mg by mouth 3 (Three) Times a Day.     • glipizide (GLUCOTROL XL) 5 MG ER tablet glipizide 5 mg oral tablet extended release 24hr take 1 tablet (5 mg) by oral route bid daily with food   Active     • hydrOXYzine (ATARAX) 25 MG tablet hydroxyzine HCl 25 mg oral tablet take 1 tablet (25 mg) by oral route 4 times per day as needed   Active     • Hyoscyamine Sulfate SL 0.125 MG sublingual tablet 4 (Four) Times a Day.     • Lactobacillus (ACIDOPHILUS PO) Take  by mouth Daily.     • latanoprost (XALATAN) 0.005 % ophthalmic solution 1 drop.     • loratadine-pseudoephedrine (Claritin-D 12 Hour) 5-120 MG per 12 hr tablet Claritin-D 12 Hour 5-120 mg oral tablet extended release 12 hr take 1 tablet by oral route 2 times per day   Active     • losartan (COZAAR) 25 MG tablet Take 25 mg by mouth Daily.     • Magnesium Oxide 400 (240 Mg) MG tablet Take 400 mg by mouth Daily.     • omeprazole (priLOSEC) 20 MG capsule Take  20 mg by mouth As Needed.     • polyvinyl alcohol (LiquiTears) 1.4 % ophthalmic solution 1 drop As Needed for Dry Eyes.     • rOPINIRole (REQUIP) 0.25 MG tablet Take 0.25 mg by mouth every night at bedtime.     • Trulicity 1.5 MG/0.5ML solution pen-injector 1 (One) Time Per Week.     • Alcohol, USP 70 % solution Rubbing Alcohol (ethanol) 70 % topical solution apply to affected area by topical route As needed   Suspended     • ergocalciferol (ERGOCALCIFEROL) 1.25 MG (55941 UT) capsule ergocalciferol (vitamin D2) 1,250 mcg (50,000 unit) oral capsule take 1 capsule by oral route   Active     • fexofenadine (ALLEGRA) 180 MG tablet fexofenadine 180 mg oral tablet take 1 tablet (180 mg) by oral route once daily   Suspended     • Narcan 4 MG/0.1ML nasal spray        No current facility-administered medications for this visit.     Review of Systems   Constitutional: Negative.    Skin:        Painful toenails       OBJECTIVE     Vitals:    07/20/21 0801   BP: 136/85   Pulse: 64   Temp: 96.9 °F (36.1 °C)   SpO2: 100%       Lab Results   Component Value Date    HGBA1C 8.6 (H) 02/16/2021       Lab Results   Component Value Date    GLUCOSE 166 (H) 07/12/2021    CALCIUM 10.0 07/12/2021     07/12/2021    K 4.2 07/12/2021    CO2 26.4 07/12/2021    CL 98 07/12/2021    BUN 29 (H) 07/12/2021    CREATININE 1.92 (H) 07/12/2021    EGFRIFAFRI 42 (L) 07/12/2021    BCR 15.1 07/12/2021    ANIONGAP 11.6 07/12/2021       Patient seen in no apparent distress.      PHYSICAL EXAM:     Foot/Ankle Exam:       General:   Diabetic Foot Exam Performed    Appearance: elderly    Orientation: AAOx3    Affect: appropriate    Gait: unimpaired    Shoe Gear:  Casual shoes    VASCULAR      Right Foot Vascularity   Normal vascular exam    Dorsalis pedis:  2+  Posterior tibial:  2+  Skin Temperature: warm    Edema Grading:  None  CFT:  < 3 seconds  Pedal Hair Growth:  Present  Varicosities: none       Left Foot Vascularity   Normal vascular exam     Dorsalis pedis:  2+  Posterior tibial:  2+  Skin Temperature: warm    Edema Grading:  None  CFT:  < 3 seconds  Pedal Hair Growth:  Present  Varicosities: none        NEUROLOGIC     Right Foot Neurologic   Light touch sensation:  Absent  Vibratory sensation:  Absent  Hot/Cold sensation: absent    Protective Sensation using [place] Monofilament:  0     Left Foot Neurologic   Light touch sensation:  Absent  Vibratory sensation:  Absent  Hot/cold sensation: absent    Protective Sensation using [place] Monofilament:  0     MUSCULOSKELETAL      Right Foot Musculoskeletal   Arch:  Normal     Left Foot Musculoskeletal   Arch:  Normal     MUSCLE STRENGTH     Right Foot Muscle Strength   Normal strength    Foot dorsiflexion:  5  Foot plantar flexion:  5  Foot inversion:  5  Foot eversion:  5     Left Foot Muscle Strength   Foot dorsiflexion:  5  Foot plantar flexion:  5  Foot inversion:  5  Foot eversion:  5     RANGE OF MOTION      Right Foot Range of Motion   Foot and ankle ROM within normal limits       Left Foot Range of Motion   Foot and ankle ROM within normal limits       DERMATOLOGIC     Right Foot Dermatologic   Skin: skin intact    Nails: onychomycosis, abnormally thick, subungual debris, dystrophic nails and ingrown toenail    Nails comment:  Toenails 1 through 4     Left Foot Dermatologic   Skin: skin intact    Nails: onychomycosis, abnormally thick, subungual debris, dystrophic nails and ingrown toenail    Nails comment:  Toenails 1 through 3      Diabetic Foot Exam Performed    ASSESSMENT/PLAN     Diagnoses and all orders for this visit:    1. Foot pain, bilateral (Primary)    2. Diabetic foot (CMS/HCC)    3. Onychomycosis    4. Onychocryptosis    5. Non-insulin dependent type 2 diabetes mellitus (CMS/HCC)    6. Neuropathy        Comprehensive lower extremity examination and evaluation was performed.    Discussed findings and treatment plan including risks, benefits, and treatment options  with patient in detail. Patient agreed with treatment plan.    Toenails 1 through 4 on right and toenails 1 through 3 on the left were debrided in thickness and length and then smoothed with a Dremel Tool.  Tolerated the procedure well without complications.    Diabetic foot exam performed and documented this date, compliant with CQM required standards. Detail of findings as noted in physical exam.  Lower extremity Neurologic exam for diabetic patient performed and documented this date, compliant with PQRS required standards. Detail of findings as noted in physical exam.  Advised patient importance of good routine lower extremity hygiene. Advised patient importance of evaluating for intact skin and pain free nail borders.  Advised patient to use mirror to evaluate plantar/ soles of feet for better visualization. Advised patient monitor and phone office to be seen if any cracking to skin, open lesions, painful nail borders or if nails become elongated prior to next visit. Advised patient importance of daily cleansing of lower extremities, followed by good skin cream to maintain normal hydration of skin. Also advised patient importance of close daily monitoring of blood sugar. Advised to regulate diet and medications to maintain control of blood sugar in optimal range. Contact primary care provider if difficulties maintaining blood sugar levels.  Advised Patient of presence of Diabetes Mellitus condition.  Advised Patient risk of progression and worsening or improvement, then return of condition.  Will monitor condition for any change in future. Treat with most appropriate treatment pending status of condition.  Counseled and advised patient extensively on nature and ramifications of diabetes. Standard instructions given to patient for good diabetic foot care and maintenance. Advised importance of careful monitoring to avoid break down and complications secondary to diabetes. Advised patient importance of strict  maintenance of blood sugar control. Advised patient of possible ominous results from neglect of condition, i.e.: amputation/ loss of digits, feet and legs, or even death.  Patient states understands counseling, will monitor closely, continue good hygiene and routine diabetic foot care. Patient will contact office is questions or problems.      An After Visit Summary was printed and given to the patient at discharge, including (if requested) any available informative/educational handouts regarding diagnosis, treatment, or medications. All questions were answered to patient/family satisfaction. Should symptoms fail to improve or worsen they agree to call or return to clinic or to go to the Emergency Department. Discussed the importance of following up with any needed screening tests/labs/specialist appointments and any requested follow-up recommended by me today. Importance of maintaining follow-up discussed and patient accepts that missed appointments can delay diagnosis and potentially lead to worsening of conditions.    Return in about 9 weeks (around 9/21/2021)., or sooner if acute issues arise.    This document has been electronically signed by Carl Payne DPM on July 20, 2021 08:49 EDT

## 2021-08-18 ENCOUNTER — OFFICE VISIT (OUTPATIENT)
Dept: PULMONOLOGY | Facility: CLINIC | Age: 76
End: 2021-08-18

## 2021-08-18 VITALS
DIASTOLIC BLOOD PRESSURE: 61 MMHG | TEMPERATURE: 98.2 F | WEIGHT: 220 LBS | HEIGHT: 69 IN | OXYGEN SATURATION: 97 % | SYSTOLIC BLOOD PRESSURE: 102 MMHG | RESPIRATION RATE: 17 BRPM | HEART RATE: 69 BPM | BODY MASS INDEX: 32.58 KG/M2

## 2021-08-18 DIAGNOSIS — G47.33 OSA (OBSTRUCTIVE SLEEP APNEA): ICD-10-CM

## 2021-08-18 DIAGNOSIS — R06.09 DYSPNEA ON EXERTION: ICD-10-CM

## 2021-08-18 DIAGNOSIS — E66.9 CLASS 1 OBESITY WITH BODY MASS INDEX (BMI) OF 32.0 TO 32.9 IN ADULT, UNSPECIFIED OBESITY TYPE, UNSPECIFIED WHETHER SERIOUS COMORBIDITY PRESENT: ICD-10-CM

## 2021-08-18 DIAGNOSIS — I74.9 CHRONIC THROMBOEMBOLIC DISEASE (HCC): Primary | ICD-10-CM

## 2021-08-18 DIAGNOSIS — J30.9 ALLERGIC RHINITIS, UNSPECIFIED SEASONALITY, UNSPECIFIED TRIGGER: ICD-10-CM

## 2021-08-18 DIAGNOSIS — J30.2 SEASONAL ALLERGIES: ICD-10-CM

## 2021-08-18 PROCEDURE — 99213 OFFICE O/P EST LOW 20 MIN: CPT | Performed by: NURSE PRACTITIONER

## 2021-08-18 RX ORDER — ZOSTER VACCINE RECOMBINANT, ADJUVANTED 50 MCG/0.5
KIT INTRAMUSCULAR
COMMUNITY
End: 2021-11-29

## 2021-08-18 RX ORDER — LIDOCAINE HYDROCHLORIDE 20 MG/ML
SOLUTION OROPHARYNGEAL
COMMUNITY
Start: 2021-08-17 | End: 2021-11-04

## 2021-08-18 RX ORDER — LOSARTAN POTASSIUM 50 MG/1
TABLET ORAL
COMMUNITY
Start: 2021-08-16 | End: 2021-11-04

## 2021-08-18 NOTE — PROGRESS NOTES
Primary Care Provider  Brian Henson MD     Referring Provider  No ref. provider found     Chief Complaint  Follow-up (One year f/u)    Subjective          Joon Zhao presents to [facility] PULMONARY & CRITICAL CARE MEDICINE  History of Present Illness  Joon Zhao is a 75 y.o. male patient of Dr. Cazares with a history of sleep apnea, and chronic thrombolytic disease.  He is here for a follow-up visit today.    Patient states that he is doing well since his last visit.  He denies any antibiotic or steroid use for his lungs.  He states that his shortness of breath has improved greatly.  He continues to take his Eliquis as prescribed.  He states that he rarely uses his albuterol inhaler.  He continues to wear his CPAP machine at night and with naps and [facility]  He continues to take Claritin, Flonase, azelastine nasal spray, and Allegra for seasonal allergies and allergic rhinitis.  He is up-to-date with his pneumonia and Covid vaccines.  He will be due for a flu vaccine this fall.  He is able to form his ADLs without difficulty.      His history of smoking is      Tobacco Use: Low Risk    • Smoking Tobacco Use: Never Smoker   • Smokeless Tobacco Use: Never Used   .    Review of Systems   Constitutional: Negative for chills, fatigue, fever, unexpected weight gain and unexpected weight loss.   HENT: Negative for congestion (Nasal), hearing loss, mouth sores, nosebleeds, postnasal drip, sore throat and trouble swallowing.    Eyes: Negative for blurred vision and visual disturbance.   Respiratory: Positive for shortness of breath. Negative for apnea, cough and wheezing.         Negative for Hemoptysis     Cardiovascular: Negative for chest pain, palpitations and leg swelling.   Gastrointestinal: Negative for abdominal pain, constipation, diarrhea, nausea, vomiting and GERD.        Negative for Jaundice  Negative for Bloating  Negative for Melena    Musculoskeletal: Negative for joint swelling and myalgias.        Negative for Joint pain  Negative for Joint stiffness   Skin: Negative for color change.        Negative for cyanosis   Neurological: Negative for syncope, weakness, numbness and headache.      Sleep: Negative for Excessive daytime sleep  Negative for for morning headaches  Negative for Snoring    Family History   Problem Relation Age of Onset   • Hypertension Mother    • Hypertension Sister    • Diabetes Brother    • Diabetes Maternal Grandmother    • Diabetes Other    • Diabetes Son         Social History     Socioeconomic History   • Marital status:      Spouse name: Not on file   • Number of children: Not on file   • Years of education: Not on file   • Highest education level: Not on file   Tobacco Use   • Smoking status: Never Smoker   • Smokeless tobacco: Never Used   Vaping Use   • Vaping Use: Never used   Substance and Sexual Activity   • Alcohol use: Yes     Comment: rare   • Drug use: Never   • Sexual activity: Defer        Past Medical History:   Diagnosis Date   • Anemia    • Arthritis    • Back pain     CHRONIC NECK AND BACK PAIN   • BPH (benign prostatic hyperplasia) 03/02/2015   • BPH with urinary obstruction 06/01/2018   • CKD (chronic kidney disease), stage III (CMS/[facility])    • Controlled type 2 diabetes mellitus with diabetic polyneuropathy (CMS/[facility]) 07/05/2017   • Coronary artery disease    • Diabetes mellitus (CMS/[facility])    • Dizziness 08/10/2017   • DVT (deep venous thrombosis) (CMS/[facility])    • ED (erectile dysfunction) of organic origin    • Foot pain, bilateral 03/12/2018   • Glaucoma (increased eye pressure)    • High cholesterol    • Hyperlipidemia    • Hypertension    • Ingrowing nail 07/05/2017   • Kidney failure     STAGE III   • Pain in both feet    • PE (pulmonary thromboembolism) (CMS/[facility])    • Polyneuropathy 07/05/2017   • Tinea unguium 07/05/2017        Immunization History   Administered Date(s) Administered   •  COVID-19 (MODERNA) 03/16/2021, 04/13/2021   • Influenza, Unspecified 09/15/2020   • Pneumococcal Conjugate 13-Valent (PCV13) 03/20/2020   • Pneumococcal Polysaccharide (PPSV23) 09/25/2017   • Shingrix 03/20/2020, 09/15/2020         Allergies   Allergen Reactions   • Atorvastatin Hives   • Ezetimibe Unknown - High Severity   • Furosemide Unknown - High Severity   • Simvastatin Hives   • Statins Unknown - High Severity   • Hydrochlorothiazide Hives          Current Outpatient Medications:   •  acetaminophen-codeine (TYLENOL #3) 300-30 MG per tablet, Take 1 tablet by mouth Every 4 (Four) Hours As Needed for Moderate Pain ., Disp: , Rfl:   •  albuterol sulfate HFA (Ventolin HFA) 108 (90 Base) MCG/ACT inhaler, Ventolin HFA 90 mcg/actuation inhalation HFA aerosol inhaler inhale 2 puffs (180 mcg) by inhalation route every 6 hours   Active, Disp: , Rfl:   •  amLODIPine (NORVASC) 5 MG tablet, Take 5 mg by mouth Daily., Disp: , Rfl:   •  ARTIFICIAL SALIVA MT, Apply  to the mouth or throat 2 (Two) Times a Day As Needed., Disp: , Rfl:   •  aspirin (aspirin) 81 MG EC tablet, aspirin 81 mg oral tablet,delayed release (DR/EC) take 1 tablet (81 mg) by oral route once daily   Active, Disp: , Rfl:   •  azelastine (ASTELIN) 0.1 % nasal spray, azelastine 137 mcg (0.1 %) nasal aerosol,spray spray 2 sprays in each nostril by intranasal route 2 times per day   Active, Disp: , Rfl:   •  baclofen (LIORESAL) 10 MG tablet, Take 10 mg by mouth 2 (Two) Times a Day., Disp: , Rfl:   •  chlorthalidone (HYGROTON) 25 MG tablet, Take 25 mg by mouth 2 (two) times a day., Disp: , Rfl:   •  cholecalciferol (VITAMIN D3) 25 MCG (1000 UT) tablet, Take 1,000 Units by mouth Daily., Disp: , Rfl:   •  coenzyme Q10 100 MG capsule, Co Q-10 100 mg oral capsule take 1 capsule by oral route daily   Active, Disp: , Rfl:   •  Eliquis 5 MG tablet tablet, Take 5 mg by mouth Every 12 (Twelve) Hours., Disp: , Rfl:   •  EQ FIBER SUPPLEMENT PO, Take 2 teaspoon(s) by mouth  Daily., Disp: , Rfl:   •  fexofenadine (ALLEGRA) 180 MG tablet, fexofenadine 180 mg oral tablet take 1 tablet (180 mg) by oral route once daily   Suspended, Disp: , Rfl:   •  fluticasone (Flonase) 50 MCG/ACT nasal spray, Flonase 50 mcg/actuation nasal spray,suspension inhale 1 spray (50 mcg) in each nostril by intranasal route once daily   Suspended, Disp: , Rfl:   •  gabapentin (NEURONTIN) 800 MG tablet, Take 800 mg by mouth 3 (Three) Times a Day., Disp: , Rfl:   •  glipizide (GLUCOTROL XL) 5 MG ER tablet, glipizide 5 mg oral tablet extended release 24hr take 1 tablet (5 mg) by oral route bid daily with food   Active, Disp: , Rfl:   •  hydrOXYzine (ATARAX) 25 MG tablet, hydroxyzine HCl 25 mg oral tablet take 1 tablet (25 mg) by oral route 4 times per day as needed   Active, Disp: , Rfl:   •  Hyoscyamine Sulfate SL 0.125 MG sublingual tablet, 4 (Four) Times a Day., Disp: , Rfl:   •  Lactobacillus (ACIDOPHILUS PO), Take  by mouth Daily., Disp: , Rfl:   •  latanoprost (XALATAN) 0.005 % ophthalmic solution, 1 drop., Disp: , Rfl:   •  Lidocaine Viscous HCl (XYLOCAINE) 2 % solution, , Disp: , Rfl:   •  loratadine-pseudoephedrine (Claritin-D 12 Hour) 5-120 MG per 12 hr tablet, Claritin-D 12 Hour 5-120 mg oral tablet extended release 12 hr take 1 tablet by oral route 2 times per day   Active, Disp: , Rfl:   •  losartan (COZAAR) 25 MG tablet, Take 25 mg by mouth Daily., Disp: , Rfl:   •  losartan (COZAAR) 50 MG tablet, , Disp: , Rfl:   •  Magnesium Oxide 400 (240 Mg) MG tablet, Take 400 mg by mouth Daily., Disp: , Rfl:   •  Narcan 4 MG/0.1ML nasal spray, , Disp: , Rfl:   •  nystatin (MYCOSTATIN) 078744 UNIT/ML suspension, , Disp: , Rfl:   •  omeprazole (priLOSEC) 20 MG capsule, Take 20 mg by mouth As Needed., Disp: , Rfl:   •  polyvinyl alcohol (LiquiTears) 1.4 % ophthalmic solution, 1 drop As Needed for Dry Eyes., Disp: , Rfl:   •  rOPINIRole (REQUIP) 0.25 MG tablet, Take 0.25 mg by mouth every night at bedtime., Disp: ,  Rfl:   •  Trulicity 1.5 MG/0.5ML solution pen-injector, 1 (One) Time Per Week., Disp: , Rfl:   •  Zoster Vac Recomb Adjuvanted (Shingrix) 50 MCG/0.5ML reconstituted suspension, Shingrix (PF) 50 mcg/0.5 mL intramuscular suspension, kit, Disp: , Rfl:      Objective   Physical Exam  Constitutional:       General: He is not in acute distress.     Appearance: Normal appearance. He is normal weight.   HENT:      Right Ear: Hearing normal.      Left Ear: Hearing normal.      Nose: No nasal tenderness or congestion.      Mouth/Throat:      Mouth: Mucous membranes are moist. No oral lesions.   Eyes:      Extraocular Movements: Extraocular movements intact.      Pupils: Pupils are equal, round, and reactive to light.   Neck:      Thyroid: No thyroid mass or thyromegaly.   Cardiovascular:      Rate and Rhythm: Normal rate and regular rhythm.      Pulses: Normal pulses.      Heart sounds: Normal heart sounds. No murmur heard.     Pulmonary:      Effort: Pulmonary effort is normal.      Breath sounds: Normal breath sounds. No wheezing, rhonchi or rales.   Abdominal:      General: Bowel sounds are normal. There is no distension.      Palpations: Abdomen is soft.      Tenderness: There is no abdominal tenderness.   Musculoskeletal:      Cervical back: Neck supple.      Right lower leg: No edema.      Left lower leg: No edema.   Lymphadenopathy:      Cervical: No cervical adenopathy.      Upper Body:      Right upper body: No axillary adenopathy.   Skin:     General: Skin is warm and dry.      Findings: No lesion or rash.   Neurological:      General: No focal deficit present.      Mental Status: He is alert and oriented to person, place, and time.      Cranial Nerves: Cranial nerves are intact.   Psychiatric:         Mood and Affect: Affect normal. Mood is not anxious or depressed.         Vital Signs:   /61 (BP Location: Left arm, Patient Position: Sitting, Cuff Size: Large Adult)   Pulse 69   Temp 98.2 °F (36.8 °C)  "(Temporal)   Resp 17   Ht 175.3 cm (69\")   Wt 99.8 kg (220 lb)   SpO2 97% Comment: room air  BMI 32.49 kg/m²        Result Review :     CMP    CMP 3/10/21 3/31/21 7/12/21   Glucose   166 (A)   Glucose 148 (A) 156 (A)    BUN 23 27 (A) 29 (A)   Creatinine 1.90 (A) 1.70 (A) 1.92 (A)   eGFR African Am   42 (A)   Sodium 136 136 136   Potassium 4.4 4.5 4.2   Chloride 100 97 (A) 98   Calcium 9.7 9.9 10.0   Albumin 4.1  4.70   Total Bilirubin 0.34  0.4   Alkaline Phosphatase 88  87   AST (SGOT) 23  28   ALT (SGPT) 20  28   (A) Abnormal value       Comments are available for some flowsheets but are not being displayed.           CBC w/diff    CBC w/Diff 3/4/21 3/4/21 3/10/21 7/12/21    1155 1653     WBC 5.30  5.11 6.55   RBC 4.36 (A)  4.48 (A) 4.56   Hemoglobin 13.5 (A) 14.1 13.7 (A) 14.1   Hematocrit 39.0 (A) 43.0 40.5 (A) 41.6   MCV 89.4  90.4 91.2   MCH 31.0  30.6 30.9   MCHC 34.6  33.8 33.9   RDW 12.0  11.9 12.2 (A)   Platelets 179  172 196   Neutrophil Rel % 44.7  30.6 38.3 (A)   Immature Granulocyte Rel %    0.2   Lymphocyte Rel % 44.3  56.4 (A) 49.5 (A)   Monocyte Rel % 9.8  11.2 (A) 10.2   Eosinophil Rel % 0.6  1.0 1.2   Basophil Rel % 0.4  0.6 0.6   (A) Abnormal value            Data reviewed: Radiologic studies Chest x-ray 7/12/2021, pulmonary function test 7/29/2020 and Dr. Cazares's last office note   Procedures        Assessment and Plan    Diagnoses and all orders for this visit:    1. Chronic thromboembolic disease (CMS/HCC) (Primary)    2. Dyspnea on exertion    3. Seasonal allergies    4. GRAY (obstructive sleep apnea)    5. Class 1 obesity with body mass index (BMI) of 32.0 to 32.9 in adult, unspecified obesity type, unspecified whether serious comorbidity present    6. Allergic rhinitis, unspecified seasonality, unspecified trigger    7.  Continue daily Eliquis as prescribed.  8.  Continue albuterol as needed.  9.  Continue Flonase, azelastine nasal spray, Claritin, and Allegra.  10.  Continue CPAP " machine at night and [facility].  11. Instructed patient to call the office, 911, or go to the emergency room with any new or worsening symptoms  I spent 25 minutes caring for Joon on this date of service. This time includes time spent by me in the following activities:preparing for the visit, reviewing tests, obtaining and/or reviewing a separately obtained history, performing a medically appropriate examination and/or evaluation , counseling and educating the patient/family/caregiver and documenting information in the medical record    Follow Up   Return in about 1 year (around 8/18/2022) for Recheck with Dr. Cazares.  Patient was given instructions and counseling regarding his condition or for health maintenance advice. Please see specific information pulled into the AVS if appropriate.

## 2021-08-18 NOTE — PROGRESS NOTES
"Primary Care Provider  Brian Henson MD     Primary Care Provider  Brian Henson MD    Referring Provider  No ref. provider found     Chief Complaint  Follow-up (One year f/u)    Subjective     {Problem List  Visit Diagnosis   Encounters  Notes  Medications  Labs  Result Review Imaging  Media :23}     Vital Signs:   /61 (BP Location: Left arm, Patient Position: Sitting, Cuff Size: Large Adult)   Pulse 69   Temp 98.2 °F (36.8 °C) (Temporal)   Resp 17   Ht 175.3 cm (69\")   Wt 99.8 kg (220 lb)   SpO2 97% Comment: room air  BMI 32.49 kg/m²     Joon Zhao presents to [facility] PULMONARY & CRITICAL CARE MEDICINE  History of Present Illness  Joon Zhao is a 75 y.o. male patient of Dr. Whipple who had been referred to our office back on *** due to ***.  His history of smoking is    Tobacco Use: Low Risk    • Smoking Tobacco Use: Never Smoker   • Smokeless Tobacco Use: Never Used   .    Review of Systems   Sleep: Negative for Excessive daytime sleep  Negative for for morning headaches  Negative for Snoring    Family History   Problem Relation Age of Onset   • Hypertension Mother    • Hypertension Sister    • Diabetes Brother    • Diabetes Maternal Grandmother    • Diabetes Other    • Diabetes Son         Social History     Socioeconomic History   • Marital status:      Spouse name: Not on file   • Number of children: Not on file   • Years of education: Not on file   • Highest education level: Not on file   Tobacco Use   • Smoking status: Never Smoker   • Smokeless tobacco: Never Used   Vaping Use   • Vaping Use: Never used   Substance and Sexual Activity   • Alcohol use: Yes     Comment: rare   • Drug use: Never   • Sexual activity: Defer        Past Medical History:   Diagnosis Date   • Anemia    • Arthritis    • Back pain     CHRONIC NECK AND BACK PAIN   • BPH (benign prostatic hyperplasia) 03/02/2015   • BPH with urinary obstruction 06/01/2018   • CKD (chronic " kidney disease), stage III (CMS/[facility])    • Controlled type 2 diabetes mellitus with diabetic polyneuropathy (CMS/HCC) 07/05/2017   • Coronary artery disease    • Diabetes mellitus (CMS/[facility])    • Dizziness 08/10/2017   • DVT (deep venous thrombosis) (CMS/HCC)    • ED (erectile dysfunction) of organic origin    • Foot pain, bilateral 03/12/2018   • Glaucoma (increased eye pressure)    • High cholesterol    • Hyperlipidemia    • Hypertension    • Ingrowing nail 07/05/2017   • Kidney failure     STAGE III   • Pain in both feet    • PE (pulmonary thromboembolism) (CMS/[facility])    • Polyneuropathy 07/05/2017   • Tinea unguium 07/05/2017        Immunization History   Administered Date(s) Administered   • COVID-19 (MODERNA) 03/16/2021, 04/13/2021   • Influenza, Unspecified 09/15/2020   • Pneumococcal Conjugate 13-Valent (PCV13) 03/20/2020   • Pneumococcal Polysaccharide (PPSV23) 09/25/2017   • Shingrix 03/20/2020, 09/15/2020        Allergies   Allergen Reactions   • Atorvastatin Hives   • Ezetimibe Unknown - High Severity   • Furosemide Unknown - High Severity   • Simvastatin Hives   • Statins Unknown - High Severity   • Hydrochlorothiazide Hives          Current Outpatient Medications:   •  acetaminophen-codeine (TYLENOL #3) 300-30 MG per tablet, Take 1 tablet by mouth Every 4 (Four) Hours As Needed for Moderate Pain ., Disp: , Rfl:   •  albuterol sulfate HFA (Ventolin HFA) 108 (90 Base) MCG/ACT inhaler, Ventolin HFA 90 mcg/actuation inhalation HFA aerosol inhaler inhale 2 puffs (180 mcg) by inhalation route every 6 hours   Active, Disp: , Rfl:   •  amLODIPine (NORVASC) 5 MG tablet, Take 5 mg by mouth Daily., Disp: , Rfl:   •  ARTIFICIAL SALIVA MT, Apply  to the mouth or throat 2 (Two) Times a Day As Needed., Disp: , Rfl:   •  aspirin (aspirin) 81 MG EC tablet, aspirin 81 mg oral tablet,delayed release (DR/EC) take 1 tablet (81 mg) by oral route once daily   Active, Disp: , Rfl:   •  azelastine (ASTELIN) 0.1 % nasal spray,  azelastine 137 mcg (0.1 %) nasal aerosol,spray spray 2 sprays in each nostril by intranasal route 2 times per day   Active, Disp: , Rfl:   •  baclofen (LIORESAL) 10 MG tablet, Take 10 mg by mouth 2 (Two) Times a Day., Disp: , Rfl:   •  chlorthalidone (HYGROTON) 25 MG tablet, Take 25 mg by mouth 2 (two) times a day., Disp: , Rfl:   •  cholecalciferol (VITAMIN D3) 25 MCG (1000 UT) tablet, Take 1,000 Units by mouth Daily., Disp: , Rfl:   •  coenzyme Q10 100 MG capsule, Co Q-10 100 mg oral capsule take 1 capsule by oral route daily   Active, Disp: , Rfl:   •  Eliquis 5 MG tablet tablet, Take 5 mg by mouth Every 12 (Twelve) Hours., Disp: , Rfl:   •  EQ FIBER SUPPLEMENT PO, Take 2 teaspoon(s) by mouth Daily., Disp: , Rfl:   •  fexofenadine (ALLEGRA) 180 MG tablet, fexofenadine 180 mg oral tablet take 1 tablet (180 mg) by oral route once daily   Suspended, Disp: , Rfl:   •  fluticasone (Flonase) 50 MCG/ACT nasal spray, Flonase 50 mcg/actuation nasal spray,suspension inhale 1 spray (50 mcg) in each nostril by intranasal route once daily   Suspended, Disp: , Rfl:   •  gabapentin (NEURONTIN) 800 MG tablet, Take 800 mg by mouth 3 (Three) Times a Day., Disp: , Rfl:   •  glipizide (GLUCOTROL XL) 5 MG ER tablet, glipizide 5 mg oral tablet extended release 24hr take 1 tablet (5 mg) by oral route bid daily with food   Active, Disp: , Rfl:   •  hydrOXYzine (ATARAX) 25 MG tablet, hydroxyzine HCl 25 mg oral tablet take 1 tablet (25 mg) by oral route 4 times per day as needed   Active, Disp: , Rfl:   •  Hyoscyamine Sulfate SL 0.125 MG sublingual tablet, 4 (Four) Times a Day., Disp: , Rfl:   •  Lactobacillus (ACIDOPHILUS PO), Take  by mouth Daily., Disp: , Rfl:   •  latanoprost (XALATAN) 0.005 % ophthalmic solution, 1 drop., Disp: , Rfl:   •  Lidocaine Viscous HCl (XYLOCAINE) 2 % solution, , Disp: , Rfl:   •  loratadine-pseudoephedrine (Claritin-D 12 Hour) 5-120 MG per 12 hr tablet, Claritin-D 12 Hour 5-120 mg oral tablet extended  "release 12 hr take 1 tablet by oral route 2 times per day   Active, Disp: , Rfl:   •  losartan (COZAAR) 25 MG tablet, Take 25 mg by mouth Daily., Disp: , Rfl:   •  losartan (COZAAR) 50 MG tablet, , Disp: , Rfl:   •  Magnesium Oxide 400 (240 Mg) MG tablet, Take 400 mg by mouth Daily., Disp: , Rfl:   •  Narcan 4 MG/0.1ML nasal spray, , Disp: , Rfl:   •  nystatin (MYCOSTATIN) [id number] UNIT/ML suspension, , Disp: , Rfl:   •  omeprazole (priLOSEC) 20 MG capsule, Take 20 mg by mouth As Needed., Disp: , Rfl:   •  polyvinyl alcohol (LiquiTears) 1.4 % ophthalmic solution, 1 drop As Needed for Dry Eyes., Disp: , Rfl:   •  rOPINIRole (REQUIP) 0.25 MG tablet, Take 0.25 mg by mouth every night at bedtime., Disp: , Rfl:   •  Trulicity 1.5 MG/0.5ML solution pen-injector, 1 (One) Time Per Week., Disp: , Rfl:   •  Zoster Vac Recomb Adjuvanted (Shingrix) 50 MCG/0.5ML reconstituted suspension, Shingrix (PF) 50 mcg/0.5 mL intramuscular suspension, kit, Disp: , Rfl:      Objective   Physical Exam      Vital Signs:   /61 (BP Location: Left arm, Patient Position: Sitting, Cuff Size: Large Adult)   Pulse 69   Temp 98.2 °F (36.8 °C) (Temporal)   Resp 17   Ht 175.3 cm (69\")   Wt 99.8 kg (220 lb)   SpO2 97% Comment: room air  BMI 32.49 kg/m²      Result Review :{Labs  Result Review  Imaging  Med Tab  Media  Procedures :23}   {The following data was reviewed by (Optional):30804}  {Ambulatory Labs (Optional):26964}  {Data reviewed (Optional):29049:::1}   Procedures        Impression and Plan {CC Problem List  Visit Diagnosis   ROS  Review (Popup)  Health Maintenance  Quality  BestPractice  Medications  SmartSets  SnapShot Encounters  Media :23}     Impression  ***    Plan  ***    {Time Spent (Optional):30319}    Follow Up {Instructions Charge Capture  Follow-up Communications :23}  No follow-ups on file.  Patient was given instructions and counseling regarding his condition or for health maintenance advice. Please " see specific information pulled into the AVS if appropriate.

## 2021-09-15 ENCOUNTER — TRANSCRIBE ORDERS (OUTPATIENT)
Dept: ADMINISTRATIVE | Facility: HOSPITAL | Age: 76
End: 2021-09-15

## 2021-09-15 ENCOUNTER — LAB (OUTPATIENT)
Dept: LAB | Facility: HOSPITAL | Age: 76
End: 2021-09-15

## 2021-09-15 DIAGNOSIS — N18.30 MALIGNANT HYPERTENSIVE HEART AND CHRONIC KIDNEY DISEASE STAGE III (HCC): ICD-10-CM

## 2021-09-15 DIAGNOSIS — N18.30 MALIGNANT HYPERTENSIVE HEART AND CHRONIC KIDNEY DISEASE STAGE III (HCC): Primary | ICD-10-CM

## 2021-09-15 DIAGNOSIS — E11.9 DIABETES MELLITUS WITHOUT COMPLICATION (HCC): ICD-10-CM

## 2021-09-15 DIAGNOSIS — I10 ESSENTIAL HYPERTENSION, MALIGNANT: ICD-10-CM

## 2021-09-15 DIAGNOSIS — I13.10 MALIGNANT HYPERTENSIVE HEART AND CHRONIC KIDNEY DISEASE STAGE III (HCC): ICD-10-CM

## 2021-09-15 DIAGNOSIS — I13.10 MALIGNANT HYPERTENSIVE HEART AND CHRONIC KIDNEY DISEASE STAGE III (HCC): Primary | ICD-10-CM

## 2021-09-15 LAB
ALBUMIN SERPL-MCNC: 4.6 G/DL (ref 3.5–5.2)
ALBUMIN UR-MCNC: <1.2 MG/DL
ALBUMIN/GLOB SERPL: 1.5 G/DL
ALP SERPL-CCNC: 71 U/L (ref 39–117)
ALT SERPL W P-5'-P-CCNC: 17 U/L (ref 1–41)
ANION GAP SERPL CALCULATED.3IONS-SCNC: 11.4 MMOL/L (ref 5–15)
AST SERPL-CCNC: 26 U/L (ref 1–40)
BILIRUB SERPL-MCNC: 0.5 MG/DL (ref 0–1.2)
BILIRUB UR QL STRIP: NEGATIVE
BUN SERPL-MCNC: 29 MG/DL (ref 8–23)
BUN/CREAT SERPL: 16.1 (ref 7–25)
CALCIUM SPEC-SCNC: 9.7 MG/DL (ref 8.6–10.5)
CHLORIDE SERPL-SCNC: 97 MMOL/L (ref 98–107)
CLARITY UR: CLEAR
CO2 SERPL-SCNC: 27.6 MMOL/L (ref 22–29)
COLOR UR: YELLOW
CREAT SERPL-MCNC: 1.8 MG/DL (ref 0.76–1.27)
CREAT UR-MCNC: 65.6 MG/DL
DEPRECATED RDW RBC AUTO: 39.1 FL (ref 37–54)
ERYTHROCYTE [DISTWIDTH] IN BLOOD BY AUTOMATED COUNT: 11.7 % (ref 12.3–15.4)
GFR SERPL CREATININE-BSD FRML MDRD: 45 ML/MIN/1.73
GLOBULIN UR ELPH-MCNC: 3.1 GM/DL
GLUCOSE SERPL-MCNC: 153 MG/DL (ref 65–99)
GLUCOSE UR STRIP-MCNC: NEGATIVE MG/DL
HBA1C MFR BLD: 7.11 % (ref 4.8–5.6)
HCT VFR BLD AUTO: 41 % (ref 37.5–51)
HGB BLD-MCNC: 13.8 G/DL (ref 13–17.7)
HGB UR QL STRIP.AUTO: NEGATIVE
KETONES UR QL STRIP: NEGATIVE
LEUKOCYTE ESTERASE UR QL STRIP.AUTO: NEGATIVE
LYMPHOCYTES # BLD MANUAL: 2.35 10*3/MM3 (ref 0.7–3.1)
LYMPHOCYTES NFR BLD MANUAL: 4.9 % (ref 5–12)
LYMPHOCYTES NFR BLD MANUAL: 54.3 % (ref 19.6–45.3)
MCH RBC QN AUTO: 30.9 PG (ref 26.6–33)
MCHC RBC AUTO-ENTMCNC: 33.7 G/DL (ref 31.5–35.7)
MCV RBC AUTO: 91.9 FL (ref 79–97)
MONOCYTES # BLD AUTO: 0.21 10*3/MM3 (ref 0.1–0.9)
NEUTROPHILS # BLD AUTO: 1.76 10*3/MM3 (ref 1.7–7)
NEUTROPHILS NFR BLD MANUAL: 40.7 % (ref 42.7–76)
NITRITE UR QL STRIP: NEGATIVE
PH UR STRIP.AUTO: 6.5 [PH] (ref 5–8)
PLAT MORPH BLD: NORMAL
PLATELET # BLD AUTO: 175 10*3/MM3 (ref 140–450)
PMV BLD AUTO: 12.4 FL (ref 6–12)
POTASSIUM SERPL-SCNC: 4.5 MMOL/L (ref 3.5–5.2)
PROT SERPL-MCNC: 7.7 G/DL (ref 6–8.5)
PROT UR QL STRIP: NEGATIVE
PROT UR-MCNC: 4.1 MG/DL
PROT/CREAT UR: 0.1 MG/G{CREAT}
RBC # BLD AUTO: 4.46 10*6/MM3 (ref 4.14–5.8)
RBC MORPH BLD: NORMAL
SMUDGE CELLS BLD QL SMEAR: ABNORMAL
SODIUM SERPL-SCNC: 136 MMOL/L (ref 136–145)
SP GR UR STRIP: 1.01 (ref 1–1.03)
UROBILINOGEN UR QL STRIP: NORMAL
WBC # BLD AUTO: 4.32 10*3/MM3 (ref 3.4–10.8)

## 2021-09-15 PROCEDURE — 82570 ASSAY OF URINE CREATININE: CPT

## 2021-09-15 PROCEDURE — 82043 UR ALBUMIN QUANTITATIVE: CPT

## 2021-09-15 PROCEDURE — 83036 HEMOGLOBIN GLYCOSYLATED A1C: CPT

## 2021-09-15 PROCEDURE — 84156 ASSAY OF PROTEIN URINE: CPT

## 2021-09-15 PROCEDURE — 81003 URINALYSIS AUTO W/O SCOPE: CPT

## 2021-09-15 PROCEDURE — 85025 COMPLETE CBC W/AUTO DIFF WBC: CPT

## 2021-09-15 PROCEDURE — 36415 COLL VENOUS BLD VENIPUNCTURE: CPT

## 2021-09-15 PROCEDURE — 85007 BL SMEAR W/DIFF WBC COUNT: CPT

## 2021-09-15 PROCEDURE — 80053 COMPREHEN METABOLIC PANEL: CPT

## 2021-09-27 ENCOUNTER — TELEPHONE (OUTPATIENT)
Dept: CARDIOLOGY | Facility: CLINIC | Age: 76
End: 2021-09-27

## 2021-09-27 NOTE — TELEPHONE ENCOUNTER
Procedure:Lumbar Steroid Injection    Med Directive: Eliquis (managed by PCP)    PMH: DVT; HTN; CKD; DM; Neg for CAD    Last Seen: 5/24/2021

## 2021-09-28 NOTE — TELEPHONE ENCOUNTER
He is on long-term Eliquis for DVT and PE, managed by primary care provider.  Please contact PCP office to manage anticoagulation      No further cardiac testing is required before the anticipated surgery.  He will be at moderate risk for perioperative cardiac events.  Okay to proceed with the procedure from cardiac standpoint.

## 2021-10-12 ENCOUNTER — OFFICE VISIT (OUTPATIENT)
Dept: PODIATRY | Facility: CLINIC | Age: 76
End: 2021-10-12

## 2021-10-12 VITALS
TEMPERATURE: 97.5 F | SYSTOLIC BLOOD PRESSURE: 124 MMHG | BODY MASS INDEX: 32.58 KG/M2 | OXYGEN SATURATION: 100 % | WEIGHT: 220 LBS | DIASTOLIC BLOOD PRESSURE: 77 MMHG | HEIGHT: 69 IN | HEART RATE: 78 BPM

## 2021-10-12 DIAGNOSIS — E11.9 NON-INSULIN DEPENDENT TYPE 2 DIABETES MELLITUS (HCC): ICD-10-CM

## 2021-10-12 DIAGNOSIS — B35.1 ONYCHOMYCOSIS: ICD-10-CM

## 2021-10-12 DIAGNOSIS — G62.9 NEUROPATHY: ICD-10-CM

## 2021-10-12 DIAGNOSIS — M79.671 FOOT PAIN, BILATERAL: Primary | ICD-10-CM

## 2021-10-12 DIAGNOSIS — L60.0 ONYCHOCRYPTOSIS: ICD-10-CM

## 2021-10-12 DIAGNOSIS — E11.8 DIABETIC FOOT (HCC): ICD-10-CM

## 2021-10-12 DIAGNOSIS — M79.672 FOOT PAIN, BILATERAL: Primary | ICD-10-CM

## 2021-10-12 PROCEDURE — 11721 DEBRIDE NAIL 6 OR MORE: CPT | Performed by: PODIATRIST

## 2021-10-12 PROCEDURE — G8404 LOW EXTEMITY NEUR EXAM DOCUM: HCPCS | Performed by: PODIATRIST

## 2021-10-12 RX ORDER — ALUMINA, MAGNESIA, AND SIMETHICONE 2400; 2400; 240 MG/30ML; MG/30ML; MG/30ML
15 SUSPENSION ORAL EVERY 8 HOURS SCHEDULED
COMMUNITY
End: 2021-11-29

## 2021-10-12 NOTE — PROGRESS NOTES
[facility] - PODIATRY    Today's Date: 10/12/21    Patient Name: Joon Zhao  MRN: [id number]  CSN: [id number]  PCP: Brian Henson MD  Referring Provider: No ref. provider found    SUBJECTIVE     Chief Complaint   Patient presents with   • Left Foot - Nail Problem   • Right Foot - Nail Problem     HPI: Joon Zhao, a 75 y.o.male, comes to clinic.    New, Established, New Problem:  established     Location:  Toenails    Duration:   Greater than five years    Onset:  Gradual    Nature:  sore with palpation.    Stable, worsening, improving:   Stable    Aggravating factors:  Pain with shoe gear and ambulation.    Previous Treatment:  debridement  __________________________________    New, Established, New Problem:  Established    Location:  bilateral feet    Duration:    Onset:  gradual    Nature:   NIDDM     Stable, worsening, improving:  stable    Patient reported last blood glucose: 131  __________________________________    Patient reports the following medical changes since their last visit: None.    No other pedal complaints at this time.    Patient denies any fevers, chills, nausea, vomiting, shortness of breathe, nor any other constitutional signs nor symptoms.       Last PCP Visit:  Brian Henson MD, 08 Oct 2021    Past Medical History:   Diagnosis Date   • Anemia    • Arthritis    • Back pain     CHRONIC NECK AND BACK PAIN   • BPH (benign prostatic hyperplasia) 03/02/2015   • BPH with urinary obstruction 06/01/2018   • CKD (chronic kidney disease), stage III ([facility])    • Controlled type 2 diabetes mellitus with diabetic polyneuropathy ([facility]) 07/05/2017   • Coronary artery disease    • Diabetes mellitus ([facility])    • Dizziness 08/10/2017   • DVT (deep venous thrombosis) ([facility])    • ED (erectile dysfunction) of organic origin    • Foot pain, bilateral 03/12/2018   • Glaucoma (increased eye pressure)    • High cholesterol    • Hyperlipidemia    • Hypertension    • Ingrowing nail  07/05/2017   • Kidney failure     STAGE III   • Pain in both feet    • PE (pulmonary thromboembolism) ([facility])    • Polyneuropathy 07/05/2017   • Tinea unguium 07/05/2017     Past Surgical History:   Procedure Laterality Date   • ADRENAL GLAND SURGERY     • COLONOSCOPY     • CYSTOSCOPY     • KIDNEY SURGERY      KIDNEY BIOSPY   • PROSTATE SURGERY  12/2007   • TURP / TRANSURETHRAL INCISION / DRAINAGE PROSTATE  06/2019     Family History   Problem Relation Age of Onset   • Hypertension Mother    • Hypertension Sister    • Diabetes Brother    • Diabetes Maternal Grandmother    • Diabetes Other    • Diabetes Son      Social History     Socioeconomic History   • Marital status:    Tobacco Use   • Smoking status: Never Smoker   • Smokeless tobacco: Never Used   Vaping Use   • Vaping Use: Never used   Substance and Sexual Activity   • Alcohol use: Yes     Comment: rare   • Drug use: Never   • Sexual activity: Defer     Allergies   Allergen Reactions   • Atorvastatin Hives   • Ezetimibe Unknown - High Severity   • Furosemide Unknown - High Severity   • Simvastatin Hives   • Statins Unknown - High Severity   • Hydrochlorothiazide Hives     Current Outpatient Medications   Medication Sig Dispense Refill   • acetaminophen-codeine (TYLENOL #3) 300-30 MG per tablet Take 1 tablet by mouth Every 4 (Four) Hours As Needed for Moderate Pain .     • albuterol sulfate HFA (Ventolin HFA) 108 (90 Base) MCG/ACT inhaler Ventolin HFA 90 mcg/actuation inhalation HFA aerosol inhaler inhale 2 puffs (180 mcg) by inhalation route every 6 hours   Active     • aluminum-magnesium hydroxide-simethicone (MAALOX MAX) 400-400-40 MG/5ML suspension 15 mL Every 8 (Eight) Hours.     • amLODIPine (NORVASC) 5 MG tablet Take 5 mg by mouth Daily.     • ARTIFICIAL SALIVA MT Apply  to the mouth or throat 2 (Two) Times a Day As Needed.     • aspirin (aspirin) 81 MG EC tablet aspirin 81 mg oral tablet,delayed release (DR/EC) take 1 tablet (81 mg) by oral route  once daily   Active     • azelastine (ASTELIN) 0.1 % nasal spray azelastine 137 mcg (0.1 %) nasal aerosol,spray spray 2 sprays in each nostril by intranasal route 2 times per day   Active     • baclofen (LIORESAL) 10 MG tablet Take 10 mg by mouth 2 (Two) Times a Day.     • chlorthalidone (HYGROTON) 25 MG tablet Take 25 mg by mouth 2 (two) times a day.     • cholecalciferol (VITAMIN D3) 25 MCG (1000 UT) tablet Take 1,000 Units by mouth Daily.     • coenzyme Q10 100 MG capsule Co Q-10 100 mg oral capsule take 1 capsule by oral route daily   Active     • Eliquis 5 MG tablet tablet Take 5 mg by mouth Every 12 (Twelve) Hours.     • EQ FIBER SUPPLEMENT PO Take 2 teaspoon(s) by mouth Daily.     • fexofenadine (ALLEGRA) 180 MG tablet fexofenadine 180 mg oral tablet take 1 tablet (180 mg) by oral route once daily   Suspended     • fluticasone (Flonase) 50 MCG/ACT nasal spray Flonase 50 mcg/actuation nasal spray,suspension inhale 1 spray (50 mcg) in each nostril by intranasal route once daily   Suspended     • gabapentin (NEURONTIN) 800 MG tablet Take 800 mg by mouth 3 (Three) Times a Day.     • glipizide (GLUCOTROL XL) 5 MG ER tablet glipizide 5 mg oral tablet extended release 24hr take 1 tablet (5 mg) by oral route bid daily with food   Active     • hydrOXYzine (ATARAX) 25 MG tablet hydroxyzine HCl 25 mg oral tablet take 1 tablet (25 mg) by oral route 4 times per day as needed   Active     • Hyoscyamine Sulfate SL 0.125 MG sublingual tablet 4 (Four) Times a Day.     • Lactobacillus (ACIDOPHILUS PO) Take  by mouth Daily.     • latanoprost (XALATAN) 0.005 % ophthalmic solution 1 drop.     • Lidocaine Viscous HCl (XYLOCAINE) 2 % solution      • loratadine-pseudoephedrine (Claritin-D 12 Hour) 5-120 MG per 12 hr tablet Claritin-D 12 Hour 5-120 mg oral tablet extended release 12 hr take 1 tablet by oral route 2 times per day   Active     • losartan (COZAAR) 50 MG tablet Take 1/2 tablet daily     • Magnesium Oxide 400 (240 Mg) MG  tablet Take 400 mg by mouth Daily.     • nystatin (MYCOSTATIN) 682234 UNIT/ML suspension As Needed.     • omeprazole (priLOSEC) 20 MG capsule Take 20 mg by mouth As Needed.     • polyvinyl alcohol (LiquiTears) 1.4 % ophthalmic solution 1 drop As Needed for Dry Eyes.     • rOPINIRole (REQUIP) 0.25 MG tablet Take 0.25 mg by mouth every night at bedtime.     • Trulicity 1.5 MG/0.5ML solution pen-injector 1 (One) Time Per Week.     • losartan (COZAAR) 25 MG tablet Take 25 mg by mouth Daily.     • Narcan 4 MG/0.1ML nasal spray      • Zoster Vac Recomb Adjuvanted (Shingrix) 50 MCG/0.5ML reconstituted suspension Shingrix (PF) 50 mcg/0.5 mL intramuscular suspension, kit       No current facility-administered medications for this visit.     Review of Systems   Constitutional: Negative.    Skin:        Painful toenails       OBJECTIVE     Vitals:    10/12/21 0800   BP: 124/77   Pulse: 78   Temp: 97.5 °F (36.4 °C)   SpO2: 100%       Lab Results   Component Value Date    HGBA1C 7.11 (H) 09/15/2021       Lab Results   Component Value Date    GLUCOSE 153 (H) 09/15/2021    CALCIUM 9.7 09/15/2021     09/15/2021    K 4.5 09/15/2021    CO2 27.6 09/15/2021    CL 97 (L) 09/15/2021    BUN 29 (H) 09/15/2021    CREATININE 1.80 (H) 09/15/2021    EGFRIFAFRI 45 (L) 09/15/2021    BCR 16.1 09/15/2021    ANIONGAP 11.4 09/15/2021       Patient seen in no apparent distress.      PHYSICAL EXAM:     Foot/Ankle Exam:       General:   Appearance: elderly    Orientation: AAOx3    Affect: appropriate    Gait: unimpaired    Shoe Gear:  Casual shoes    VASCULAR      Right Foot Vascularity   Normal vascular exam    Dorsalis pedis:  2+  Posterior tibial:  2+  Skin Temperature: warm    Edema Grading:  None  CFT:  < 3 seconds  Pedal Hair Growth:  Present  Varicosities: none       Left Foot Vascularity   Normal vascular exam    Dorsalis pedis:  2+  Posterior tibial:  2+  Skin Temperature: warm    Edema Grading:  None  CFT:  < 3 seconds  Pedal Hair  Growth:  Present  Varicosities: none        NEUROLOGIC     Right Foot Neurologic   Light touch sensation:  Absent  Vibratory sensation:  Absent  Hot/Cold sensation: absent    Protective Sensation using [place] Monofilament:  0     Left Foot Neurologic   Light touch sensation:  Absent  Vibratory sensation:  Absent  Hot/cold sensation: absent    Protective Sensation using [place] Monofilament:  0     MUSCULOSKELETAL      Right Foot Musculoskeletal   Arch:  Normal     Left Foot Musculoskeletal   Arch:  Normal     MUSCLE STRENGTH     Right Foot Muscle Strength   Normal strength    Foot dorsiflexion:  5  Foot plantar flexion:  5  Foot inversion:  5  Foot eversion:  5     Left Foot Muscle Strength   Foot dorsiflexion:  5  Foot plantar flexion:  5  Foot inversion:  5  Foot eversion:  5     RANGE OF MOTION      Right Foot Range of Motion   Foot and ankle ROM within normal limits       Left Foot Range of Motion   Foot and ankle ROM within normal limits       DERMATOLOGIC     Right Foot Dermatologic   Skin: skin intact    Nails: onychomycosis, abnormally thick, subungual debris, dystrophic nails and ingrown toenail    Nails comment:  Toenails 1 through 4     Left Foot Dermatologic   Skin: skin intact    Nails: onychomycosis, abnormally thick, subungual debris, dystrophic nails and ingrown toenail    Nails comment:  Toenails 1 through 3      Diabetic Foot Exam Performed    ASSESSMENT/PLAN     Diagnoses and all orders for this visit:    1. Foot pain, bilateral (Primary)    2. Onychomycosis    3. Onychocryptosis    4. Diabetic foot (HCC)    5. Non-insulin dependent type 2 diabetes mellitus (HCC)    6. Neuropathy        Comprehensive lower extremity examination and evaluation was performed.    Discussed findings and treatment plan including risks, benefits, and treatment options with patient in detail. Patient agreed with treatment plan.    Toenails 1 through 4 on right and toenails 1 through 3 on the left were  debrided in thickness and length and then smoothed with a Dremel Tool.  Tolerated the procedure well without complications.    Diabetic foot exam performed and documented this date, compliant with CQM required standards. Detail of findings as noted in physical exam.  Lower extremity Neurologic exam for diabetic patient performed and documented this date, compliant with PQRS required standards. Detail of findings as noted in physical exam.  Advised patient importance of good routine lower extremity hygiene. Advised patient importance of evaluating for intact skin and pain free nail borders.  Advised patient to use mirror to evaluate plantar/ soles of feet for better visualization. Advised patient monitor and phone office to be seen if any cracking to skin, open lesions, painful nail borders or if nails become elongated prior to next visit. Advised patient importance of daily cleansing of lower extremities, followed by good skin cream to maintain normal hydration of skin. Also advised patient importance of close daily monitoring of blood sugar. Advised to regulate diet and medications to maintain control of blood sugar in optimal range. Contact primary care provider if difficulties maintaining blood sugar levels.  Advised Patient of presence of Diabetes Mellitus condition.  Advised Patient risk of progression and worsening or improvement, then return of condition.  Will monitor condition for any change in future. Treat with most appropriate treatment pending status of condition.  Counseled and advised patient extensively on nature and ramifications of diabetes. Standard instructions given to patient for good diabetic foot care and maintenance. Advised importance of careful monitoring to avoid break down and complications secondary to diabetes. Advised patient importance of strict maintenance of blood sugar control. Advised patient of possible ominous results from neglect of condition, i.e.: amputation/ loss of digits,  feet and legs, or even death.  Patient states understands counseling, will monitor closely, continue good hygiene and routine diabetic foot care. Patient will contact office is questions or problems.      An After Visit Summary was printed and given to the patient at discharge, including (if requested) any available informative/educational handouts regarding diagnosis, treatment, or medications. All questions were answered to patient/family satisfaction. Should symptoms fail to improve or worsen they agree to call or return to clinic or to go to the Emergency Department. Discussed the importance of following up with any needed screening tests/labs/specialist appointments and any requested follow-up recommended by me today. Importance of maintaining follow-up discussed and patient accepts that missed appointments can delay diagnosis and potentially lead to worsening of conditions.    Return in about 9 weeks (around 12/14/2021) for Toenail Care., or sooner if acute issues arise.    This document has been electronically signed by Carl Payne DPM on October 12, 2021 08:14 EDT

## 2021-11-04 ENCOUNTER — OFFICE VISIT (OUTPATIENT)
Dept: UROLOGY | Facility: CLINIC | Age: 76
End: 2021-11-04

## 2021-11-04 VITALS
BODY MASS INDEX: 32.58 KG/M2 | HEIGHT: 69 IN | HEART RATE: 73 BPM | SYSTOLIC BLOOD PRESSURE: 123 MMHG | WEIGHT: 220 LBS | DIASTOLIC BLOOD PRESSURE: 78 MMHG | TEMPERATURE: 96.7 F

## 2021-11-04 DIAGNOSIS — N40.0 BENIGN PROSTATIC HYPERPLASIA WITHOUT LOWER URINARY TRACT SYMPTOMS: Primary | ICD-10-CM

## 2021-11-04 LAB
BACTERIA UR QL AUTO: NORMAL /HPF
BILIRUB BLD-MCNC: NEGATIVE MG/DL
CLARITY, POC: CLEAR
COLOR UR: YELLOW
EPI CELLS #/AREA URNS HPF: NORMAL /[HPF]
EXPIRATION DATE: ABNORMAL
GLUCOSE UR STRIP-MCNC: ABNORMAL MG/DL
KETONES UR QL: NEGATIVE
LEUKOCYTE EST, POC: NEGATIVE
Lab: ABNORMAL
NITRITE UR-MCNC: NEGATIVE MG/ML
PH UR: 7 [PH] (ref 5–8)
PROT UR STRIP-MCNC: NEGATIVE MG/DL
RBC # UR STRIP: NEGATIVE /UL
RBC # UR STRIP: NORMAL /HPF
RENAL EPITHELIAL, POC: NORMAL
SP GR UR: 1.01 (ref 1–1.03)
UNIDENT CRYS URNS QL MICRO: NORMAL /HPF
UROBILINOGEN UR QL: NORMAL
WBC # UR STRIP: NORMAL /HPF

## 2021-11-04 PROCEDURE — 99212 OFFICE O/P EST SF 10 MIN: CPT | Performed by: UROLOGY

## 2021-11-04 PROCEDURE — 81003 URINALYSIS AUTO W/O SCOPE: CPT | Performed by: UROLOGY

## 2021-11-04 NOTE — PROGRESS NOTES
"Chief Complaint  Benign Prostatic Hypertrophy (YEARLY )    Subjective          Joon Zhao presents to [facility] UROLOGY  History of Present Illness    Patient is doing very well and has no problem with urination. No dysuria or gross hematuria. Empties bladder very well and does not have to get up at night. His stream is good. Patient does have his brother and half brothers who have prostate cancer.    Objective   Vital Signs:   /78   Pulse 73   Temp 96.7 °F (35.9 °C)   Ht 175.3 cm (69\")   Wt 99.8 kg (220 lb)   BMI 32.49 kg/m²     Allergies   Allergen Reactions   • Atorvastatin Hives   • Ezetimibe Unknown - High Severity   • Furosemide Unknown - High Severity   • Simvastatin Hives   • Statins Unknown - High Severity   • Hydrochlorothiazide Hives      Review of Systems   Constitutional: Negative.    HENT: Negative.    Eyes: Negative.    Respiratory: Negative.    Cardiovascular: Negative.    Gastrointestinal: Negative.    Endocrine: Negative.    Genitourinary: Negative.    Musculoskeletal: Negative.    Skin: Negative.    Allergic/Immunologic: Negative.    Neurological: Negative.    Hematological: Negative.    Psychiatric/Behavioral: Negative.    All other systems reviewed and are negative.       Physical Exam  Constitutional:       General: He is not in acute distress.     Appearance: Normal appearance. He is obese. He is not ill-appearing.   HENT:      Head: Normocephalic and atraumatic.      Nose: Nose normal.   Eyes:      Pupils: Pupils are equal, round, and reactive to light.   Cardiovascular:      Rate and Rhythm: Normal rate and regular rhythm.      Pulses: Normal pulses.      Heart sounds: Normal heart sounds. No murmur heard.      Pulmonary:      Effort: Pulmonary effort is normal.      Breath sounds: Normal breath sounds. No rhonchi or rales.   Abdominal:      General: Bowel sounds are normal. There is no distension.      Palpations: Abdomen is soft.      Tenderness: There " is no right CVA tenderness or left CVA tenderness.      Hernia: No hernia is present.   Genitourinary:     Penis: Normal.       Testes: Normal.      Prostate: Normal.      Rectum: Normal.   Musculoskeletal:         General: No swelling. Normal range of motion.      Cervical back: Normal range of motion and neck supple. No rigidity or tenderness.   Lymphadenopathy:      Cervical: No cervical adenopathy.   Skin:     General: Skin is warm.      Coloration: Skin is not jaundiced.   Neurological:      General: No focal deficit present.      Mental Status: He is alert and oriented to person, place, and time.      Motor: No weakness.      Gait: Gait normal.   Psychiatric:         Mood and Affect: Mood normal.         Behavior: Behavior normal.         Thought Content: Thought content normal.         Judgment: Judgment normal.        Result Review :                 Assessment and Plan    Diagnoses and all orders for this visit:    1. Benign prostatic hyperplasia without lower urinary tract symptoms (Primary)  -     POC Urinalysis Dipstick, Automated  -     POC Urine Microscopic Only    Patient is doing very well and his prostate gland is normal. We'll reevaluate him in 1 year's time    Follow Up   Return in about 1 year (around 11/4/2022).  Patient was given instructions and counseling regarding his condition or for health maintenance advice. Please see specific information pulled into the AVS if appropriate.     Isabelle Florian MD

## 2021-11-29 ENCOUNTER — OFFICE VISIT (OUTPATIENT)
Dept: CARDIOLOGY | Facility: CLINIC | Age: 76
End: 2021-11-29

## 2021-11-29 VITALS
HEART RATE: 61 BPM | DIASTOLIC BLOOD PRESSURE: 90 MMHG | HEIGHT: 69 IN | BODY MASS INDEX: 31.7 KG/M2 | WEIGHT: 214 LBS | SYSTOLIC BLOOD PRESSURE: 146 MMHG

## 2021-11-29 DIAGNOSIS — Z86.711 HISTORY OF PULMONARY EMBOLISM: ICD-10-CM

## 2021-11-29 DIAGNOSIS — R07.89 CHEST PAIN, ATYPICAL: ICD-10-CM

## 2021-11-29 DIAGNOSIS — I10 ESSENTIAL HYPERTENSION: Primary | ICD-10-CM

## 2021-11-29 PROCEDURE — 99213 OFFICE O/P EST LOW 20 MIN: CPT | Performed by: INTERNAL MEDICINE

## 2021-11-29 RX ORDER — HYDROXYZINE HYDROCHLORIDE 25 MG/1
25 TABLET, FILM COATED ORAL EVERY 6 HOURS PRN
COMMUNITY
End: 2022-11-29 | Stop reason: ALTCHOICE

## 2021-11-29 NOTE — ASSESSMENT & PLAN NOTE
History of recurrent DVT and pulmonary embolism, on long-term anticoagulation with Eliquis.  Hemoglobin stable.  Patient also follows up with pulmonology.

## 2021-11-29 NOTE — ASSESSMENT & PLAN NOTE
Longstanding problem.  Symptoms are rather atypical for angina.  Previous SPECT stress test done last year was negative for ischemia.  Symptoms are reasonably well controlled with the omeprazole.  In the past he did not want to have a cardiac catheterization done because of concerns for worsening kidney functions.  Continue medical management for now.

## 2021-11-29 NOTE — ASSESSMENT & PLAN NOTE
Diastolic blood pressure on the higher side in the office today.  He reports having pain now.  Blood pressure well controlled during recent PCP visits.  Recommend to continue amlodipine at the current dose.

## 2021-11-29 NOTE — PROGRESS NOTES
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  He had a visit to the emergency room in July of this year for chest pain.  After evaluation it was determined that the pain is of GI origin.  Currently he is taking omeprazole.  He has not had any chest pain recently.  He reports left shoulder pain this morning, ever since he came back from his workout in the gym.  Of note, he takes back and shoulder injections for pain the pain gets worse with raising the arm.  He denies having any increased shortness of breath, palpitations or dizziness.  He is taking all the medications including Eliquis as prescribed.       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 (2) Hypertension. (3) Chronic kidney disease, Stage 3. (4) Diabetes mellitus. (5) Negative for coronary artery disease.     Medical History:  Past Medical History:   Diagnosis Date   • Anemia    • Arthritis    • Back pain     CHRONIC NECK AND BACK PAIN   • BPH (benign prostatic hyperplasia) 03/02/2015   • BPH with urinary obstruction 06/01/2018   • CKD (chronic kidney disease), stage III ([facility])    • Controlled type 2 diabetes mellitus with diabetic polyneuropathy ([facility]) 07/05/2017   • Coronary artery disease    • Diabetes mellitus ([facility])    • Dizziness 08/10/2017   • DVT (deep venous thrombosis) ([facility])    • ED (erectile dysfunction) of organic origin    • Foot pain, bilateral 03/12/2018   • Glaucoma (increased eye pressure)    • High cholesterol    • Hyperlipidemia    • Hypertension    • Ingrowing nail 07/05/2017   •  Kidney failure     STAGE III   • Pain in both feet    • PE (pulmonary thromboembolism) ([facility])    • Polyneuropathy 07/05/2017   • Tinea unguium 07/05/2017       Surgical History: has a past surgical history that includes Adrenal gland surgery; Kidney surgery; Colonoscopy; Cystoscopy; Prostate surgery (12/2007); and TURP / transurethral incision / drainage prostate (06/2019).     Family History: family history includes Diabetes in his brother, maternal grandmother, son, and another family member; Hypertension in his mother and sister.     Social History: reports that he has never smoked. He has never used smokeless tobacco. He reports current alcohol use. He reports that he does not use drugs.    Allergies: Atorvastatin, Ezetimibe, Furosemide, Simvastatin, Statins, and Hydrochlorothiazide    Current Outpatient Medications on File Prior to Visit   Medication Sig   • acetaminophen-codeine (TYLENOL #3) 300-30 MG per tablet Take 1 tablet by mouth Every 4 (Four) Hours As Needed for Moderate Pain .   • albuterol sulfate HFA (Ventolin HFA) 108 (90 Base) MCG/ACT inhaler Ventolin HFA 90 mcg/actuation inhalation HFA aerosol inhaler inhale 2 puffs (180 mcg) by inhalation route every 6 hours   Active   • amLODIPine (NORVASC) 5 MG tablet Take 5 mg by mouth Daily.   • apixaban (ELIQUIS) 5 MG tablet tablet Take 5 mg by mouth 2 (Two) Times a Day.   • ARTIFICIAL SALIVA MT Apply  to the mouth or throat 2 (Two) Times a Day As Needed.   • aspirin (aspirin) 81 MG EC tablet aspirin 81 mg oral tablet,delayed release (DR/EC) take 1 tablet (81 mg) by oral route once daily   Active   • azelastine (ASTELIN) 0.1 % nasal spray azelastine 137 mcg (0.1 %) nasal aerosol,spray spray 2 sprays in each nostril by intranasal route 2 times per day   Active   • baclofen (LIORESAL) 10 MG tablet Take 10 mg by mouth 2 (Two) Times a Day.   • chlorthalidone (HYGROTON) 25 MG tablet Take 25 mg by mouth 2 (two) times a day.   • cholecalciferol (VITAMIN D3) 25 MCG  (1000 UT) tablet Take 1,000 Units by mouth Daily.   • Dulaglutide 1.5 MG/0.5ML solution pen-injector Inject  under the skin into the appropriate area as directed.   • fluticasone (Flonase) 50 MCG/ACT nasal spray Flonase 50 mcg/actuation nasal spray,suspension inhale 1 spray (50 mcg) in each nostril by intranasal route once daily   Suspended   • gabapentin (NEURONTIN) 800 MG tablet Take 800 mg by mouth 3 (Three) Times a Day.   • glipizide (GLUCOTROL XL) 5 MG ER tablet glipizide 5 mg oral tablet extended release 24hr take 1 tablet (5 mg) by oral route bid daily with food   Active   • hydrOXYzine (ATARAX) 25 MG tablet Take 25 mg by mouth Every 6 (Six) Hours As Needed for Itching.   • Hyoscyamine Sulfate SL 0.125 MG sublingual tablet 4 (Four) Times a Day.   • latanoprost (XALATAN) 0.005 % ophthalmic solution 1 drop.   • loratadine-pseudoephedrine (Claritin-D 12 Hour) 5-120 MG per 12 hr tablet Claritin-D 12 Hour 5-120 mg oral tablet extended release 12 hr take 1 tablet by oral route 2 times per day   Active   • losartan (COZAAR) 25 MG tablet Take 25 mg by mouth Daily.   • Magnesium Oxide 400 (240 Mg) MG tablet Take 400 mg by mouth Daily.   • omeprazole (priLOSEC) 20 MG capsule Take 20 mg by mouth As Needed.   • rOPINIRole (REQUIP) 0.25 MG tablet Take 0.25 mg by mouth every night at bedtime.   • [DISCONTINUED] aluminum-magnesium hydroxide-simethicone (MAALOX MAX) 400-400-40 MG/5ML suspension 15 mL Every 8 (Eight) Hours.   • [DISCONTINUED] Zoster Vac Recomb Adjuvanted (Shingrix) 50 MCG/0.5ML reconstituted suspension Shingrix (PF) 50 mcg/0.5 mL intramuscular suspension, kit     No current facility-administered medications on file prior to visit.          Review of Systems   Respiratory: Negative for cough, shortness of breath and wheezing.    Cardiovascular: Negative for chest pain, palpitations and leg swelling.   Gastrointestinal: Negative for nausea and vomiting.   Neurological: Negative for dizziness and syncope.     "    Objective     /90   Pulse 61   Ht 175.3 cm (69\")   Wt 97.1 kg (214 lb)   BMI 31.60 kg/m²       Physical Exam    General : Alert, awake, no acute distress  Neck : Supple, no carotid bruit, no jugular venous distention  CVS : Regular rate and rhythm, no murmur, rubs or gallops  Lungs: Clear to auscultation bilaterally, no crackles or rhonchi  Abdomen: Soft, nontender, bowel sounds heard in all 4 quadrants  Extremities: Warm, well-perfused, no pedal edema    Result Review :     The following data was reviewed by: Trip Mirza MD on 11/29/2021:    CMP    CMP 3/31/21 7/12/21 9/15/21   Glucose 156 (A) 166 (A) 153 (A)   BUN 27 (A) 29 (A) 29 (A)   Creatinine 1.70 (A) 1.92 (A) 1.80 (A)   eGFR  Am  42 (A) 45 (A)   Sodium 136 136 136   Potassium 4.5 4.2 4.5   Chloride 97 (A) 98 97 (A)   Calcium 9.9 10.0 9.7   Albumin  4.70 4.60   Total Bilirubin  0.4 0.5   Alkaline Phosphatase  87 71   AST (SGOT)  28 26   ALT (SGPT)  28 17   (A) Abnormal value            CBC    CBC 3/10/21 7/12/21 9/15/21   WBC 5.11 6.55 4.32   RBC 4.48 (A) 4.56 4.46   Hemoglobin 13.7 (A) 14.1 13.8   Hematocrit 40.5 (A) 41.6 41.0   MCV 90.4 91.2 91.9   MCH 30.6 30.9 30.9   MCHC 33.8 33.9 33.7   RDW 11.9 12.2 (A) 11.7 (A)   Platelets 172 196 175   (A) Abnormal value                     Data reviewed: Cardiology studies        Results for orders placed in visit on 07/12/21    Adult Transthoracic Echo Complete w/ Color, Spectral and Contrast if necessary per protocol    Interpretation Summary  · Calculated left ventricular 3D EF = 59% Left ventricular ejection fraction appears to be 56 - 60%.  · Estimated right ventricular systolic pressure from tricuspid regurgitation is normal (<35 mmHg).  · Left ventricular wall thickness is consistent with mild concentric hypertrophy.  · Left ventricular diastolic function is consistent with (grade I) impaired relaxation.  · Mild dilation of the aortic root is present.      SPECT stress test done on " 12/28/2020 showed    1.  No evidence of reversible ischemia or myocardial infarction.  A tiny apical perfusion defect with no reversibility noted, which is likely a  normal variant or due to artifact.    2.  Normal left ventricular systolic function with ejection fraction of 64% with normal wall motion.    3.  Good exercise tolerance.    4.  No chest pain at maximum exercise.    5.  No ischemic EKG changes noted.              Assessment and Plan        Diagnoses and all orders for this visit:    1. Essential hypertension (Primary)  Assessment & Plan:  Diastolic blood pressure on the higher side in the office today.  He reports having pain now.  Blood pressure well controlled during recent PCP visits.  Recommend to continue amlodipine at the current dose.      2. History of pulmonary embolism  Assessment & Plan:  History of recurrent DVT and pulmonary embolism, on long-term anticoagulation with Eliquis.  Hemoglobin stable.  Patient also follows up with pulmonology.      3. Chest pain, atypical  Assessment & Plan:  Longstanding problem.  Symptoms are rather atypical for angina.  Previous SPECT stress test done last year was negative for ischemia.  Symptoms are reasonably well controlled with the omeprazole.  In the past he did not want to have a cardiac catheterization done because of concerns for worsening kidney functions.  Continue medical management for now.              Follow Up     Return in about 9 months (around 8/29/2022) for Next scheduled follow up.    Patient was given instructions and counseling regarding his condition or for health maintenance advice. Please see specific information pulled into the AVS if appropriate.

## 2021-12-15 ENCOUNTER — TRANSCRIBE ORDERS (OUTPATIENT)
Dept: CASE MANAGEMENT | Facility: OTHER | Age: 76
End: 2021-12-15

## 2021-12-15 DIAGNOSIS — R60.0 BILATERAL EDEMA OF LOWER EXTREMITY: Primary | ICD-10-CM

## 2022-01-20 ENCOUNTER — OFFICE VISIT (OUTPATIENT)
Dept: PODIATRY | Facility: CLINIC | Age: 77
End: 2022-01-20

## 2022-01-20 VITALS
TEMPERATURE: 95.8 F | OXYGEN SATURATION: 100 % | DIASTOLIC BLOOD PRESSURE: 76 MMHG | SYSTOLIC BLOOD PRESSURE: 127 MMHG | WEIGHT: 217.8 LBS | HEIGHT: 69 IN | BODY MASS INDEX: 32.26 KG/M2 | HEART RATE: 71 BPM

## 2022-01-20 DIAGNOSIS — E11.8 DIABETIC FOOT: ICD-10-CM

## 2022-01-20 DIAGNOSIS — M79.671 FOOT PAIN, BILATERAL: Primary | ICD-10-CM

## 2022-01-20 DIAGNOSIS — B35.1 ONYCHOMYCOSIS: ICD-10-CM

## 2022-01-20 DIAGNOSIS — G62.9 NEUROPATHY: ICD-10-CM

## 2022-01-20 DIAGNOSIS — L60.0 ONYCHOCRYPTOSIS: ICD-10-CM

## 2022-01-20 DIAGNOSIS — E11.9 NON-INSULIN DEPENDENT TYPE 2 DIABETES MELLITUS: ICD-10-CM

## 2022-01-20 DIAGNOSIS — M79.672 FOOT PAIN, BILATERAL: Primary | ICD-10-CM

## 2022-01-20 PROCEDURE — 11721 DEBRIDE NAIL 6 OR MORE: CPT | Performed by: PODIATRIST

## 2022-01-20 PROCEDURE — G8404 LOW EXTEMITY NEUR EXAM DOCUM: HCPCS | Performed by: PODIATRIST

## 2022-01-20 NOTE — PROGRESS NOTES
[facility] - PODIATRY    Today's Date: 01/20/22    Patient Name: Joon Zhao  MRN: [id number]  CSN: [id number]  PCP: Brian Henson MD, Last PCP Visit: 8 October 2021  Referring Provider: No ref. provider found    SUBJECTIVE     Chief Complaint   Patient presents with   • Left Foot - Nail Problem   • Right Foot - Nail Problem     HPI: Joon Zhao, a 76 y.o.male, comes to clinic.    New, Established, New Problem:  established     Location:  Toenails    Duration:   Greater than five years    Onset:  Gradual    Nature:  sore with palpation.    Stable, worsening, improving:   Stable    Aggravating factors:  Pain with shoe gear and ambulation.    Previous Treatment:  debridement    Patient reported last blood glucose: 137  __________________________________    Patient reports the following medical changes since their last visit: Flu vaccination, COVID booster    No other pedal complaints at this time.    Patient denies any fevers, chills, nausea, vomiting, shortness of breathe, nor any other constitutional signs nor symptoms.         Past Medical History:   Diagnosis Date   • Anemia    • Arthritis    • Back pain     CHRONIC NECK AND BACK PAIN   • BPH (benign prostatic hyperplasia) 03/02/2015   • BPH with urinary obstruction 06/01/2018   • CKD (chronic kidney disease), stage III ([facility])    • Controlled type 2 diabetes mellitus with diabetic polyneuropathy ([facility]) 07/05/2017   • Coronary artery disease    • Diabetes mellitus ([facility])    • Dizziness 08/10/2017   • DVT (deep venous thrombosis) ([facility])    • ED (erectile dysfunction) of organic origin    • Foot pain, bilateral 03/12/2018   • Glaucoma (increased eye pressure)    • High cholesterol    • Hyperlipidemia    • Hypertension    • Ingrowing nail 07/05/2017   • Kidney failure     STAGE III   • Pain in both feet    • PE (pulmonary thromboembolism) ([facility])    • Polyneuropathy 07/05/2017   • Tinea unguium 07/05/2017     Past Surgical History:   Procedure  Laterality Date   • ADRENAL GLAND SURGERY     • COLONOSCOPY     • CYSTOSCOPY     • KIDNEY SURGERY      KIDNEY BIOSPY   • PROSTATE SURGERY  12/2007   • TURP / TRANSURETHRAL INCISION / DRAINAGE PROSTATE  06/2019     Family History   Problem Relation Age of Onset   • Hypertension Mother    • Heart disease Mother    • Stroke Mother    • Hypertension Sister    • Heart disease Sister    • Stroke Sister    • Diabetes Brother    • Diabetes Maternal Grandmother    • Diabetes Other    • Diabetes Son    • Cancer Neg Hx      Social History     Socioeconomic History   • Marital status:    Tobacco Use   • Smoking status: Never Smoker   • Smokeless tobacco: Never Used   Vaping Use   • Vaping Use: Never used   Substance and Sexual Activity   • Alcohol use: Yes     Alcohol/week: 1.0 standard drink     Types: 1 Standard drinks or equivalent per week     Comment: rare   • Drug use: Never   • Sexual activity: Not Currently     Partners: Female     Allergies   Allergen Reactions   • Atorvastatin Hives   • Ezetimibe Unknown - High Severity   • Furosemide Unknown - High Severity   • Simvastatin Hives   • Statins Unknown - High Severity   • Hydrochlorothiazide Hives     Current Outpatient Medications   Medication Sig Dispense Refill   • acetaminophen-codeine (TYLENOL #3) 300-30 MG per tablet Take 1 tablet by mouth Every 4 (Four) Hours As Needed for Moderate Pain .     • albuterol sulfate HFA (Ventolin HFA) 108 (90 Base) MCG/ACT inhaler Ventolin HFA 90 mcg/actuation inhalation HFA aerosol inhaler inhale 2 puffs (180 mcg) by inhalation route every 6 hours   Active     • amLODIPine (NORVASC) 5 MG tablet Take 5 mg by mouth Daily.     • apixaban (ELIQUIS) 5 MG tablet tablet Take 5 mg by mouth 2 (Two) Times a Day.     • ARTIFICIAL SALIVA MT Apply  to the mouth or throat 2 (Two) Times a Day As Needed.     • aspirin (aspirin) 81 MG EC tablet aspirin 81 mg oral tablet,delayed release (DR/EC) take 1 tablet (81 mg) by oral route once daily    Active     • azelastine (ASTELIN) 0.1 % nasal spray azelastine 137 mcg (0.1 %) nasal aerosol,spray spray 2 sprays in each nostril by intranasal route 2 times per day   Active     • baclofen (LIORESAL) 10 MG tablet Take 10 mg by mouth 2 (Two) Times a Day.     • chlorthalidone (HYGROTON) 25 MG tablet Take 25 mg by mouth 2 (two) times a day.     • cholecalciferol (VITAMIN D3) 25 MCG (1000 UT) tablet Take 1,000 Units by mouth Daily.     • Dulaglutide 1.5 MG/0.5ML solution pen-injector Inject  under the skin into the appropriate area as directed.     • fluticasone (Flonase) 50 MCG/ACT nasal spray Flonase 50 mcg/actuation nasal spray,suspension inhale 1 spray (50 mcg) in each nostril by intranasal route once daily   Suspended     • gabapentin (NEURONTIN) 800 MG tablet Take 800 mg by mouth 3 (Three) Times a Day.     • glipizide (GLUCOTROL XL) 5 MG ER tablet glipizide 5 mg oral tablet extended release 24hr take 1 tablet (5 mg) by oral route bid daily with food   Active     • hydrOXYzine (ATARAX) 25 MG tablet Take 25 mg by mouth Every 6 (Six) Hours As Needed for Itching.     • Hyoscyamine Sulfate SL 0.125 MG sublingual tablet 4 (Four) Times a Day.     • latanoprost (XALATAN) 0.005 % ophthalmic solution 1 drop.     • loratadine-pseudoephedrine (Claritin-D 12 Hour) 5-120 MG per 12 hr tablet Claritin-D 12 Hour 5-120 mg oral tablet extended release 12 hr take 1 tablet by oral route 2 times per day   Active     • losartan (COZAAR) 25 MG tablet Take 25 mg by mouth Daily.     • Magnesium Oxide 400 (240 Mg) MG tablet Take 400 mg by mouth Daily.     • omeprazole (priLOSEC) 20 MG capsule Take 20 mg by mouth As Needed.     • rOPINIRole (REQUIP) 0.25 MG tablet Take 0.25 mg by mouth every night at bedtime.       No current facility-administered medications for this visit.     Review of Systems   Constitutional: Negative.    Skin:        Painful toenails       OBJECTIVE     Vitals:    01/20/22 0722   BP: 127/76   Pulse: 71   Temp: 95.8 °F  (35.4 °C)   SpO2: 100%       Lab Results   Component Value Date    HGBA1C 7.11 (H) 09/15/2021       Lab Results   Component Value Date    GLUCOSE 153 (H) 09/15/2021    CALCIUM 9.7 09/15/2021     09/15/2021    K 4.5 09/15/2021    CO2 27.6 09/15/2021    CL 97 (L) 09/15/2021    BUN 29 (H) 09/15/2021    CREATININE 1.80 (H) 09/15/2021    EGFRIFAFRI 45 (L) 09/15/2021    BCR 16.1 09/15/2021    ANIONGAP 11.4 09/15/2021       Patient seen in no apparent distress.      PHYSICAL EXAM:     Foot/Ankle Exam:       General:   Appearance: elderly    Orientation: AAOx3    Affect: appropriate    Gait: unimpaired    Shoe Gear:  Casual shoes    VASCULAR      Right Foot Vascularity   Normal vascular exam    Dorsalis pedis:  2+  Posterior tibial:  2+  Skin Temperature: warm    Edema Grading:  None  CFT:  < 3 seconds  Pedal Hair Growth:  Present  Varicosities: none       Left Foot Vascularity   Normal vascular exam    Dorsalis pedis:  2+  Posterior tibial:  2+  Skin Temperature: warm    Edema Grading:  None  CFT:  < 3 seconds  Pedal Hair Growth:  Present  Varicosities: none        NEUROLOGIC     Right Foot Neurologic   Light touch sensation:  Absent  Vibratory sensation:  Absent  Hot/Cold sensation: absent    Protective Sensation using [place] Monofilament:  0     Left Foot Neurologic   Light touch sensation:  Absent  Vibratory sensation:  Absent  Hot/cold sensation: absent    Protective Sensation using [place] Monofilament:  0     MUSCULOSKELETAL      Right Foot Musculoskeletal   Arch:  Normal     Left Foot Musculoskeletal   Arch:  Normal     MUSCLE STRENGTH     Right Foot Muscle Strength   Normal strength    Foot dorsiflexion:  5  Foot plantar flexion:  5  Foot inversion:  5  Foot eversion:  5     Left Foot Muscle Strength   Foot dorsiflexion:  5  Foot plantar flexion:  5  Foot inversion:  5  Foot eversion:  5     RANGE OF MOTION      Right Foot Range of Motion   Foot and ankle ROM within normal limits       Left  Foot Range of Motion   Foot and ankle ROM within normal limits       DERMATOLOGIC     Right Foot Dermatologic   Skin: skin intact    Nails: onychomycosis, abnormally thick, subungual debris, dystrophic nails and ingrown toenail    Nails comment:  Toenails 1 through 4     Left Foot Dermatologic   Skin: skin intact    Nails: onychomycosis, abnormally thick, subungual debris, dystrophic nails and ingrown toenail    Nails comment:  Toenails 1 through 3      Diabetic Foot Exam Performed    ASSESSMENT/PLAN     Diagnoses and all orders for this visit:    1. Foot pain, bilateral (Primary)    2. Onychomycosis    3. Onychocryptosis    4. Diabetic foot (HCC)    5. Non-insulin dependent type 2 diabetes mellitus (HCC)    6. Neuropathy        Comprehensive lower extremity examination and evaluation was performed.    Discussed findings and treatment plan including risks, benefits, and treatment options with patient in detail. Patient agreed with treatment plan.    Toenails 1 through 4 on right and toenails 1 through 3 on the left were debrided in thickness and length and then smoothed with a Dremel Tool.  Tolerated the procedure well without complications.    Diabetic foot exam performed and documented this date, compliant with CQM required standards. Detail of findings as noted in physical exam.  Lower extremity Neurologic exam for diabetic patient performed and documented this date, compliant with PQRS required standards. Detail of findings as noted in physical exam.  Advised patient importance of good routine lower extremity hygiene. Advised patient importance of evaluating for intact skin and pain free nail borders.  Advised patient to use mirror to evaluate plantar/ soles of feet for better visualization. Advised patient monitor and phone office to be seen if any cracking to skin, open lesions, painful nail borders or if nails become elongated prior to next visit. Advised patient importance of daily cleansing of lower  extremities, followed by good skin cream to maintain normal hydration of skin. Also advised patient importance of close daily monitoring of blood sugar. Advised to regulate diet and medications to maintain control of blood sugar in optimal range. Contact primary care provider if difficulties maintaining blood sugar levels.  Advised Patient of presence of Diabetes Mellitus condition.  Advised Patient risk of progression and worsening or improvement, then return of condition.  Will monitor condition for any change in future. Treat with most appropriate treatment pending status of condition.  Counseled and advised patient extensively on nature and ramifications of diabetes. Standard instructions given to patient for good diabetic foot care and maintenance. Advised importance of careful monitoring to avoid break down and complications secondary to diabetes. Advised patient importance of strict maintenance of blood sugar control. Advised patient of possible ominous results from neglect of condition, i.e.: amputation/ loss of digits, feet and legs, or even death.  Patient states understands counseling, will monitor closely, continue good hygiene and routine diabetic foot care. Patient will contact office is questions or problems.      An After Visit Summary was printed and given to the patient at discharge, including (if requested) any available informative/educational handouts regarding diagnosis, treatment, or medications. All questions were answered to patient/family satisfaction. Should symptoms fail to improve or worsen they agree to call or return to clinic or to go to the Emergency Department. Discussed the importance of following up with any needed screening tests/labs/specialist appointments and any requested follow-up recommended by me today. Importance of maintaining follow-up discussed and patient accepts that missed appointments can delay diagnosis and potentially lead to worsening of conditions.    Return in  about 9 weeks (around 3/24/2022) for Toenail Care., or sooner if acute issues arise.    This document has been electronically signed by Carl Payne DPM on January 20, 2022 07:41 EST

## 2022-02-07 ENCOUNTER — HOSPITAL ENCOUNTER (OUTPATIENT)
Dept: CARDIOLOGY | Facility: HOSPITAL | Age: 77
Discharge: HOME | End: 2022-02-07
Admitting: INTERNAL MEDICINE

## 2022-02-07 DIAGNOSIS — R60.0 BILATERAL EDEMA OF LOWER EXTREMITY: ICD-10-CM

## 2022-02-07 LAB
BH CV LOW VAS LEFT POSTERIOR TIBIAL VESSEL: 1
BH CV LOW VAS RIGHT LESSER SAPH VESSEL: 1
BH CV LOW VAS RIGHT POPLITEAL SPONT: 1
BH CV LOW VAS RIGHT POSTERIOR TIBIAL VESSEL: 1
BH CV LOWER VASCULAR LEFT COMMON FEMORAL AUGMENT: NORMAL
BH CV LOWER VASCULAR LEFT COMMON FEMORAL COMPETENT: NORMAL
BH CV LOWER VASCULAR LEFT COMMON FEMORAL COMPRESS: NORMAL
BH CV LOWER VASCULAR LEFT COMMON FEMORAL PHASIC: NORMAL
BH CV LOWER VASCULAR LEFT COMMON FEMORAL SPONT: NORMAL
BH CV LOWER VASCULAR LEFT DISTAL FEMORAL COMPRESS: NORMAL
BH CV LOWER VASCULAR LEFT GREATER SAPH AK COMPRESS: NORMAL
BH CV LOWER VASCULAR LEFT LESSER SAPH COMPRESS: NORMAL
BH CV LOWER VASCULAR LEFT MID FEMORAL AUGMENT: NORMAL
BH CV LOWER VASCULAR LEFT MID FEMORAL COMPETENT: NORMAL
BH CV LOWER VASCULAR LEFT MID FEMORAL COMPRESS: NORMAL
BH CV LOWER VASCULAR LEFT MID FEMORAL PHASIC: NORMAL
BH CV LOWER VASCULAR LEFT MID FEMORAL SPONT: NORMAL
BH CV LOWER VASCULAR LEFT PERONEAL COMPRESS: NORMAL
BH CV LOWER VASCULAR LEFT POPLITEAL AUGMENT: NORMAL
BH CV LOWER VASCULAR LEFT POPLITEAL COMPETENT: NORMAL
BH CV LOWER VASCULAR LEFT POPLITEAL COMPRESS: NORMAL
BH CV LOWER VASCULAR LEFT POPLITEAL PHASIC: NORMAL
BH CV LOWER VASCULAR LEFT POPLITEAL SPONT: NORMAL
BH CV LOWER VASCULAR LEFT POSTERIOR TIBIAL COMPRESS: NORMAL
BH CV LOWER VASCULAR LEFT PROFUNDA FEMORAL COMPRESS: NORMAL
BH CV LOWER VASCULAR LEFT PROXIMAL FEMORAL COMPRESS: NORMAL
BH CV LOWER VASCULAR RIGHT COMMON FEMORAL AUGMENT: NORMAL
BH CV LOWER VASCULAR RIGHT COMMON FEMORAL COMPETENT: NORMAL
BH CV LOWER VASCULAR RIGHT COMMON FEMORAL COMPRESS: NORMAL
BH CV LOWER VASCULAR RIGHT COMMON FEMORAL PHASIC: NORMAL
BH CV LOWER VASCULAR RIGHT COMMON FEMORAL SPONT: NORMAL
BH CV LOWER VASCULAR RIGHT DISTAL FEMORAL COMPRESS: NORMAL
BH CV LOWER VASCULAR RIGHT GREATER SAPH AK COMPRESS: NORMAL
BH CV LOWER VASCULAR RIGHT LESSER SAPH COMPRESS: NORMAL
BH CV LOWER VASCULAR RIGHT MID FEMORAL AUGMENT: NORMAL
BH CV LOWER VASCULAR RIGHT MID FEMORAL COMPETENT: NORMAL
BH CV LOWER VASCULAR RIGHT MID FEMORAL COMPRESS: NORMAL
BH CV LOWER VASCULAR RIGHT MID FEMORAL PHASIC: NORMAL
BH CV LOWER VASCULAR RIGHT MID FEMORAL SPONT: NORMAL
BH CV LOWER VASCULAR RIGHT PERONEAL COMPRESS: NORMAL
BH CV LOWER VASCULAR RIGHT POPLITEAL AUGMENT: NORMAL
BH CV LOWER VASCULAR RIGHT POPLITEAL COMPETENT: NORMAL
BH CV LOWER VASCULAR RIGHT POPLITEAL COMPRESS: NORMAL
BH CV LOWER VASCULAR RIGHT POPLITEAL PHASIC: NORMAL
BH CV LOWER VASCULAR RIGHT POPLITEAL SPONT: NORMAL
BH CV LOWER VASCULAR RIGHT POSTERIOR TIBIAL COMPRESS: NORMAL
BH CV LOWER VASCULAR RIGHT PROXIMAL FEMORAL COMPRESS: NORMAL
MAXIMAL PREDICTED HEART RATE: 144 BPM
STRESS TARGET HR: 122 BPM

## 2022-02-07 PROCEDURE — 93970 EXTREMITY STUDY: CPT | Performed by: SURGERY

## 2022-02-07 PROCEDURE — 93970 EXTREMITY STUDY: CPT

## 2022-03-16 ENCOUNTER — TRANSCRIBE ORDERS (OUTPATIENT)
Dept: LAB | Facility: HOSPITAL | Age: 77
End: 2022-03-16

## 2022-03-16 ENCOUNTER — LAB (OUTPATIENT)
Dept: LAB | Facility: HOSPITAL | Age: 77
End: 2022-03-16

## 2022-03-16 DIAGNOSIS — E11.9 DIABETES MELLITUS WITHOUT COMPLICATION: ICD-10-CM

## 2022-03-16 DIAGNOSIS — E55.9 VITAMIN D DEFICIENCY, UNSPECIFIED: ICD-10-CM

## 2022-03-16 DIAGNOSIS — I10 ESSENTIAL HYPERTENSION, MALIGNANT: ICD-10-CM

## 2022-03-16 DIAGNOSIS — N18.31 STAGE 3A CHRONIC KIDNEY DISEASE: Primary | ICD-10-CM

## 2022-03-16 DIAGNOSIS — N18.31 STAGE 3A CHRONIC KIDNEY DISEASE: ICD-10-CM

## 2022-03-16 LAB
25(OH)D3 SERPL-MCNC: 45.4 NG/ML (ref 30–100)
ALBUMIN SERPL-MCNC: 4.8 G/DL (ref 3.5–5.2)
ALBUMIN UR-MCNC: <1.2 MG/DL
ALBUMIN/GLOB SERPL: 1.5 G/DL
ALP SERPL-CCNC: 72 U/L (ref 39–117)
ALT SERPL W P-5'-P-CCNC: 20 U/L (ref 1–41)
ANION GAP SERPL CALCULATED.3IONS-SCNC: 11.5 MMOL/L (ref 5–15)
AST SERPL-CCNC: 24 U/L (ref 1–40)
BASOPHILS # BLD MANUAL: 0.05 10*3/MM3 (ref 0–0.2)
BASOPHILS NFR BLD MANUAL: 1 % (ref 0–1.5)
BILIRUB SERPL-MCNC: 0.4 MG/DL (ref 0–1.2)
BILIRUB UR QL STRIP: NEGATIVE
BUN SERPL-MCNC: 28 MG/DL (ref 8–23)
BUN/CREAT SERPL: 13.7 (ref 7–25)
CALCIUM SPEC-SCNC: 10.3 MG/DL (ref 8.6–10.5)
CHLORIDE SERPL-SCNC: 97 MMOL/L (ref 98–107)
CLARITY UR: CLEAR
CO2 SERPL-SCNC: 27.5 MMOL/L (ref 22–29)
COLOR UR: YELLOW
CREAT SERPL-MCNC: 2.05 MG/DL (ref 0.76–1.27)
CREAT UR-MCNC: 128.1 MG/DL
DEPRECATED RDW RBC AUTO: 38.9 FL (ref 37–54)
EGFRCR SERPLBLD CKD-EPI 2021: 33 ML/MIN/1.73
EOSINOPHIL # BLD MANUAL: 0.05 10*3/MM3 (ref 0–0.4)
EOSINOPHIL NFR BLD MANUAL: 1 % (ref 0.3–6.2)
ERYTHROCYTE [DISTWIDTH] IN BLOOD BY AUTOMATED COUNT: 12.1 % (ref 12.3–15.4)
GLOBULIN UR ELPH-MCNC: 3.1 GM/DL
GLUCOSE SERPL-MCNC: 142 MG/DL (ref 65–99)
GLUCOSE UR STRIP-MCNC: NEGATIVE MG/DL
HBA1C MFR BLD: 8.4 % (ref 4.8–5.6)
HCT VFR BLD AUTO: 41.9 % (ref 37.5–51)
HGB BLD-MCNC: 14.2 G/DL (ref 13–17.7)
HGB UR QL STRIP.AUTO: NEGATIVE
KETONES UR QL STRIP: NEGATIVE
LEUKOCYTE ESTERASE UR QL STRIP.AUTO: NEGATIVE
LYMPHOCYTES # BLD MANUAL: 2.97 10*3/MM3 (ref 0.7–3.1)
LYMPHOCYTES NFR BLD MANUAL: 10.4 % (ref 5–12)
MCH RBC QN AUTO: 30.9 PG (ref 26.6–33)
MCHC RBC AUTO-ENTMCNC: 33.9 G/DL (ref 31.5–35.7)
MCV RBC AUTO: 91.1 FL (ref 79–97)
MONOCYTES # BLD: 0.53 10*3/MM3 (ref 0.1–0.9)
NEUTROPHILS # BLD AUTO: 1.49 10*3/MM3 (ref 1.7–7)
NEUTROPHILS NFR BLD MANUAL: 29.2 % (ref 42.7–76)
NITRITE UR QL STRIP: NEGATIVE
PH UR STRIP.AUTO: 5.5 [PH] (ref 5–8)
PLAT MORPH BLD: NORMAL
PLATELET # BLD AUTO: 193 10*3/MM3 (ref 140–450)
PMV BLD AUTO: 11.8 FL (ref 6–12)
POTASSIUM SERPL-SCNC: 4.7 MMOL/L (ref 3.5–5.2)
PROT ?TM UR-MCNC: 7.2 MG/DL
PROT SERPL-MCNC: 7.9 G/DL (ref 6–8.5)
PROT UR QL STRIP: NEGATIVE
PROT/CREAT UR: 0.06 MG/G{CREAT}
RBC # BLD AUTO: 4.6 10*6/MM3 (ref 4.14–5.8)
RBC MORPH BLD: NORMAL
SODIUM SERPL-SCNC: 136 MMOL/L (ref 136–145)
SP GR UR STRIP: 1.01 (ref 1–1.03)
UROBILINOGEN UR QL STRIP: NORMAL
VARIANT LYMPHS NFR BLD MANUAL: 58.3 % (ref 19.6–45.3)
WBC MORPH BLD: NORMAL
WBC NRBC COR # BLD: 5.1 10*3/MM3 (ref 3.4–10.8)

## 2022-03-16 PROCEDURE — 81003 URINALYSIS AUTO W/O SCOPE: CPT

## 2022-03-16 PROCEDURE — 36415 COLL VENOUS BLD VENIPUNCTURE: CPT

## 2022-03-16 PROCEDURE — 82570 ASSAY OF URINE CREATININE: CPT

## 2022-03-16 PROCEDURE — 80053 COMPREHEN METABOLIC PANEL: CPT

## 2022-03-16 PROCEDURE — 83036 HEMOGLOBIN GLYCOSYLATED A1C: CPT

## 2022-03-16 PROCEDURE — 85025 COMPLETE CBC W/AUTO DIFF WBC: CPT

## 2022-03-16 PROCEDURE — 82043 UR ALBUMIN QUANTITATIVE: CPT

## 2022-03-16 PROCEDURE — 82306 VITAMIN D 25 HYDROXY: CPT

## 2022-03-16 PROCEDURE — 85007 BL SMEAR W/DIFF WBC COUNT: CPT

## 2022-03-16 PROCEDURE — 84156 ASSAY OF PROTEIN URINE: CPT

## 2022-04-14 ENCOUNTER — OFFICE VISIT (OUTPATIENT)
Dept: PODIATRY | Facility: CLINIC | Age: 77
End: 2022-04-14

## 2022-04-14 VITALS
HEIGHT: 69 IN | SYSTOLIC BLOOD PRESSURE: 142 MMHG | WEIGHT: 216 LBS | TEMPERATURE: 96.9 F | BODY MASS INDEX: 31.99 KG/M2 | OXYGEN SATURATION: 98 % | HEART RATE: 80 BPM | DIASTOLIC BLOOD PRESSURE: 80 MMHG

## 2022-04-14 DIAGNOSIS — M79.672 FOOT PAIN, BILATERAL: ICD-10-CM

## 2022-04-14 DIAGNOSIS — E11.9 NON-INSULIN DEPENDENT TYPE 2 DIABETES MELLITUS: Primary | ICD-10-CM

## 2022-04-14 DIAGNOSIS — B35.1 ONYCHOMYCOSIS: ICD-10-CM

## 2022-04-14 DIAGNOSIS — M79.671 FOOT PAIN, BILATERAL: ICD-10-CM

## 2022-04-14 DIAGNOSIS — L60.0 ONYCHOCRYPTOSIS: ICD-10-CM

## 2022-04-14 DIAGNOSIS — G62.9 NEUROPATHY: ICD-10-CM

## 2022-04-14 DIAGNOSIS — E11.8 DIABETIC FOOT: ICD-10-CM

## 2022-04-14 PROCEDURE — G8404 LOW EXTEMITY NEUR EXAM DOCUM: HCPCS | Performed by: PODIATRIST

## 2022-04-14 PROCEDURE — 11721 DEBRIDE NAIL 6 OR MORE: CPT | Performed by: PODIATRIST

## 2022-04-14 NOTE — PROGRESS NOTES
[facility] - PODIATRY    Today's Date: 04/14/22    Patient Name: Joon Zhao  MRN: [id number]  CSN: [id number]  PCP: Brian Henson MD, Last PCP Visit: 13 April 2022  Referring Provider: No ref. provider found    SUBJECTIVE     Chief Complaint   Patient presents with   • Left Foot - Nail Problem   • Right Foot - Nail Problem     HPI: Joon Zhao, a 76 y.o.male, comes to clinic.    New, Established, New Problem:  established     Location:  Toenails    Duration:   Greater than five years    Onset:  Gradual    Nature:  sore with palpation.    Stable, worsening, improving:   Stable    Aggravating factors:  Pain with shoe gear and ambulation.    Previous Treatment:  debridement    Patient reported last blood glucose: 121  __________________________________    Patient reports the following medical changes since their last visit: Decrease in diabetes medications    Patient denies any fevers, chills, nausea, vomiting, shortness of breathe, nor any other constitutional signs nor symptoms.         Past Medical History:   Diagnosis Date   • Anemia    • Arthritis    • Back pain     CHRONIC NECK AND BACK PAIN   • BPH (benign prostatic hyperplasia) 03/02/2015   • BPH with urinary obstruction 06/01/2018   • CKD (chronic kidney disease), stage III ([facility])    • Controlled type 2 diabetes mellitus with diabetic polyneuropathy ([facility]) 07/05/2017   • Coronary artery disease    • Diabetes mellitus ([facility])    • Dizziness 08/10/2017   • DVT (deep venous thrombosis) ([facility])    • ED (erectile dysfunction) of organic origin    • Foot pain, bilateral 03/12/2018   • Glaucoma (increased eye pressure)    • High cholesterol    • Hyperlipidemia    • Hypertension    • Ingrowing nail 07/05/2017   • Kidney failure     STAGE III   • Pain in both feet    • PE (pulmonary thromboembolism) ([facility])    • Polyneuropathy 07/05/2017   • Tinea unguium 07/05/2017     Past Surgical History:   Procedure Laterality Date   • ADRENAL GLAND SURGERY      • COLONOSCOPY     • CYSTOSCOPY     • KIDNEY SURGERY      KIDNEY BIOSPY   • PROSTATE SURGERY  12/2007   • TURP / TRANSURETHRAL INCISION / DRAINAGE PROSTATE  06/2019     Family History   Problem Relation Age of Onset   • Hypertension Mother    • Heart disease Mother    • Stroke Mother    • Hypertension Sister    • Heart disease Sister    • Stroke Sister    • Diabetes Brother    • Diabetes Maternal Grandmother    • Diabetes Other    • Diabetes Son    • Cancer Neg Hx      Social History     Socioeconomic History   • Marital status:    Tobacco Use   • Smoking status: Never Smoker   • Smokeless tobacco: Never Used   Vaping Use   • Vaping Use: Never used   Substance and Sexual Activity   • Alcohol use: Yes     Alcohol/week: 1.0 standard drink     Types: 1 Standard drinks or equivalent per week     Comment: rare   • Drug use: Never   • Sexual activity: Not Currently     Partners: Female     Allergies   Allergen Reactions   • Atorvastatin Hives   • Ezetimibe Unknown - High Severity   • Furosemide Unknown - High Severity   • Simvastatin Hives   • Statins Unknown - High Severity   • Hydrochlorothiazide Hives     Current Outpatient Medications   Medication Sig Dispense Refill   • acetaminophen-codeine (TYLENOL #3) 300-30 MG per tablet Take 1 tablet by mouth Every 4 (Four) Hours As Needed for Moderate Pain .     • albuterol sulfate  (90 Base) MCG/ACT inhaler Ventolin HFA 90 mcg/actuation inhalation HFA aerosol inhaler inhale 2 puffs (180 mcg) by inhalation route every 6 hours   Active     • amLODIPine (NORVASC) 5 MG tablet Take 5 mg by mouth Daily.     • apixaban (ELIQUIS) 5 MG tablet tablet Take 5 mg by mouth 2 (Two) Times a Day.     • ARTIFICIAL SALIVA MT Apply  to the mouth or throat 2 (Two) Times a Day As Needed.     • aspirin 81 MG EC tablet aspirin 81 mg oral tablet,delayed release (DR/EC) take 1 tablet (81 mg) by oral route once daily   Active     • azelastine (ASTELIN) 0.1 % nasal spray azelastine 137  mcg (0.1 %) nasal aerosol,spray spray 2 sprays in each nostril by intranasal route 2 times per day   Active     • baclofen (LIORESAL) 10 MG tablet Take 10 mg by mouth 2 (Two) Times a Day.     • chlorthalidone (HYGROTON) 25 MG tablet Take 25 mg by mouth 2 (two) times a day.     • cholecalciferol (VITAMIN D3) 25 MCG (1000 UT) tablet Take 1,000 Units by mouth Daily.     • Dulaglutide 1.5 MG/0.5ML solution pen-injector Inject  under the skin into the appropriate area as directed.     • fluticasone (FLONASE) 50 MCG/ACT nasal spray Flonase 50 mcg/actuation nasal spray,suspension inhale 1 spray (50 mcg) in each nostril by intranasal route once daily   Suspended     • gabapentin (NEURONTIN) 800 MG tablet Take 800 mg by mouth 3 (Three) Times a Day.     • glipizide (GLUCOTROL XL) 5 MG ER tablet glipizide 5 mg oral tablet extended release 24hr take 1 tablet (5 mg) by oral route bid daily with food   Active     • hydrOXYzine (ATARAX) 25 MG tablet Take 25 mg by mouth Every 6 (Six) Hours As Needed for Itching.     • Hyoscyamine Sulfate SL 0.125 MG sublingual tablet 4 (Four) Times a Day.     • latanoprost (XALATAN) 0.005 % ophthalmic solution 1 drop.     • loratadine-pseudoephedrine (Claritin-D 12 Hour) 5-120 MG per 12 hr tablet Claritin-D 12 Hour 5-120 mg oral tablet extended release 12 hr take 1 tablet by oral route 2 times per day   Active     • losartan (COZAAR) 25 MG tablet Take 25 mg by mouth Daily.     • Magnesium Oxide 400 (240 Mg) MG tablet Take 400 mg by mouth Daily.     • omeprazole (priLOSEC) 20 MG capsule Take 20 mg by mouth As Needed.     • rOPINIRole (REQUIP) 0.25 MG tablet Take 0.25 mg by mouth every night at bedtime.       No current facility-administered medications for this visit.     Review of Systems   Constitutional: Negative.    Skin:        Painful toenails       OBJECTIVE     Vitals:    04/14/22 0735   BP: 142/80   Pulse: 80   Temp: 96.9 °F (36.1 °C)   SpO2: 98%       Lab Results   Component Value Date     HGBA1C 8.40 (H) 03/16/2022       Lab Results   Component Value Date    GLUCOSE 142 (H) 03/16/2022    CALCIUM 10.3 03/16/2022     03/16/2022    K 4.7 03/16/2022    CO2 27.5 03/16/2022    CL 97 (L) 03/16/2022    BUN 28 (H) 03/16/2022    CREATININE 2.05 (H) 03/16/2022    EGFRIFAFRI 45 (L) 09/15/2021    BCR 13.7 03/16/2022    ANIONGAP 11.5 03/16/2022       Patient seen in no apparent distress.      PHYSICAL EXAM:     Foot/Ankle Exam:       General:   Appearance: elderly    Orientation: AAOx3    Affect: appropriate    Gait: unimpaired    Shoe Gear:  Casual shoes    VASCULAR      Right Foot Vascularity   Normal vascular exam    Dorsalis pedis:  2+  Posterior tibial:  2+  Skin Temperature: warm    Edema Grading:  None  CFT:  < 3 seconds  Pedal Hair Growth:  Present  Varicosities: none       Left Foot Vascularity   Normal vascular exam    Dorsalis pedis:  2+  Posterior tibial:  2+  Skin Temperature: warm    Edema Grading:  None  CFT:  < 3 seconds  Pedal Hair Growth:  Present  Varicosities: none        NEUROLOGIC     Right Foot Neurologic   Light touch sensation:  Absent  Vibratory sensation:  Absent  Hot/Cold sensation: absent    Protective Sensation using [place] Monofilament:  0     Left Foot Neurologic   Light touch sensation:  Absent  Vibratory sensation:  Absent  Hot/cold sensation: absent    Protective Sensation using [place] Monofilament:  0     MUSCULOSKELETAL      Right Foot Musculoskeletal   Arch:  Normal     Left Foot Musculoskeletal   Arch:  Normal     MUSCLE STRENGTH     Right Foot Muscle Strength   Normal strength    Foot dorsiflexion:  5  Foot plantar flexion:  5  Foot inversion:  5  Foot eversion:  5     Left Foot Muscle Strength   Foot dorsiflexion:  5  Foot plantar flexion:  5  Foot inversion:  5  Foot eversion:  5     RANGE OF MOTION      Right Foot Range of Motion   Foot and ankle ROM within normal limits       Left Foot Range of Motion   Foot and ankle ROM within normal limits        DERMATOLOGIC     Right Foot Dermatologic   Skin: skin intact    Nails: onychomycosis, abnormally thick, subungual debris, dystrophic nails and ingrown toenail    Nails comment:  Toenails 1 through 4     Left Foot Dermatologic   Skin: skin intact    Nails: onychomycosis, abnormally thick, subungual debris, dystrophic nails and ingrown toenail    Nails comment:  Toenails 1 through 3      Diabetic Foot Exam Performed    ASSESSMENT/PLAN     Diagnoses and all orders for this visit:    1. Non-insulin dependent type 2 diabetes mellitus (HCC) (Primary)    2. Onychocryptosis    3. Foot pain, bilateral    4. Neuropathy    5. Diabetic foot (HCC)    6. Onychomycosis        Comprehensive lower extremity examination and evaluation was performed.    Discussed findings and treatment plan including risks, benefits, and treatment options with patient in detail. Patient agreed with treatment plan.    Toenails 1 through 4 on right and toenails 1 through 3 on the left were debrided in thickness and length and then smoothed with a Dremel Tool.  Tolerated the procedure well without complications.    Diabetic foot exam performed and documented this date, compliant with CQM required standards. Detail of findings as noted in physical exam.  Lower extremity Neurologic exam for diabetic patient performed and documented this date, compliant with PQRS required standards. Detail of findings as noted in physical exam.  Advised patient importance of good routine lower extremity hygiene. Advised patient importance of evaluating for intact skin and pain free nail borders.  Advised patient to use mirror to evaluate plantar/ soles of feet for better visualization. Advised patient monitor and phone office to be seen if any cracking to skin, open lesions, painful nail borders or if nails become elongated prior to next visit. Advised patient importance of daily cleansing of lower extremities, followed by good skin cream to maintain normal hydration  of skin. Also advised patient importance of close daily monitoring of blood sugar. Advised to regulate diet and medications to maintain control of blood sugar in optimal range. Contact primary care provider if difficulties maintaining blood sugar levels.  Advised Patient of presence of Diabetes Mellitus condition.  Advised Patient risk of progression and worsening or improvement, then return of condition.  Will monitor condition for any change in future. Treat with most appropriate treatment pending status of condition.  Counseled and advised patient extensively on nature and ramifications of diabetes. Standard instructions given to patient for good diabetic foot care and maintenance. Advised importance of careful monitoring to avoid break down and complications secondary to diabetes. Advised patient importance of strict maintenance of blood sugar control. Advised patient of possible ominous results from neglect of condition, i.e.: amputation/ loss of digits, feet and legs, or even death.  Patient states understands counseling, will monitor closely, continue good hygiene and routine diabetic foot care. Patient will contact office is questions or problems.      An After Visit Summary was printed and given to the patient at discharge, including (if requested) any available informative/educational handouts regarding diagnosis, treatment, or medications. All questions were answered to patient/family satisfaction. Should symptoms fail to improve or worsen they agree to call or return to clinic or to go to the Emergency Department. Discussed the importance of following up with any needed screening tests/labs/specialist appointments and any requested follow-up recommended by me today. Importance of maintaining follow-up discussed and patient accepts that missed appointments can delay diagnosis and potentially lead to worsening of conditions.    Return in about 9 weeks (around 6/16/2022) for Toenail Care., or sooner if  acute issues arise.    This document has been electronically signed by Carl Payne DPM on April 14, 2022 08:01 EDT

## 2022-06-16 ENCOUNTER — LAB (OUTPATIENT)
Dept: LAB | Facility: HOSPITAL | Age: 77
End: 2022-06-16

## 2022-06-16 ENCOUNTER — TRANSCRIBE ORDERS (OUTPATIENT)
Dept: ADMINISTRATIVE | Facility: HOSPITAL | Age: 77
End: 2022-06-16

## 2022-06-16 DIAGNOSIS — N18.30 STAGE 3 CHRONIC KIDNEY DISEASE, UNSPECIFIED WHETHER STAGE 3A OR 3B CKD: ICD-10-CM

## 2022-06-16 DIAGNOSIS — I10 ESSENTIAL HYPERTENSION, MALIGNANT: ICD-10-CM

## 2022-06-16 DIAGNOSIS — E55.9 VITAMIN D DEFICIENCY: ICD-10-CM

## 2022-06-16 DIAGNOSIS — E11.9 DIABETES MELLITUS WITHOUT COMPLICATION: ICD-10-CM

## 2022-06-16 DIAGNOSIS — N18.30 STAGE 3 CHRONIC KIDNEY DISEASE, UNSPECIFIED WHETHER STAGE 3A OR 3B CKD: Primary | ICD-10-CM

## 2022-06-16 LAB
25(OH)D3 SERPL-MCNC: 45.5 NG/ML (ref 30–100)
ALBUMIN SERPL-MCNC: 4.4 G/DL (ref 3.5–5.2)
ALBUMIN/GLOB SERPL: 1.6 G/DL
ALP SERPL-CCNC: 74 U/L (ref 39–117)
ALT SERPL W P-5'-P-CCNC: 33 U/L (ref 1–41)
ANION GAP SERPL CALCULATED.3IONS-SCNC: 11 MMOL/L (ref 5–15)
AST SERPL-CCNC: 31 U/L (ref 1–40)
BASOPHILS # BLD AUTO: 0.02 10*3/MM3 (ref 0–0.2)
BASOPHILS NFR BLD AUTO: 0.4 % (ref 0–1.5)
BILIRUB SERPL-MCNC: 0.7 MG/DL (ref 0–1.2)
BILIRUB UR QL STRIP: NEGATIVE
BUN SERPL-MCNC: 29 MG/DL (ref 8–23)
BUN/CREAT SERPL: 15.3 (ref 7–25)
CALCIUM SPEC-SCNC: 9.7 MG/DL (ref 8.6–10.5)
CHLORIDE SERPL-SCNC: 100 MMOL/L (ref 98–107)
CLARITY UR: CLEAR
CO2 SERPL-SCNC: 25 MMOL/L (ref 22–29)
COLOR UR: YELLOW
CREAT SERPL-MCNC: 1.9 MG/DL (ref 0.76–1.27)
DEPRECATED RDW RBC AUTO: 37.3 FL (ref 37–54)
EGFRCR SERPLBLD CKD-EPI 2021: 36.1 ML/MIN/1.73
EOSINOPHIL # BLD AUTO: 0.06 10*3/MM3 (ref 0–0.4)
EOSINOPHIL NFR BLD AUTO: 1.1 % (ref 0.3–6.2)
ERYTHROCYTE [DISTWIDTH] IN BLOOD BY AUTOMATED COUNT: 11.8 % (ref 12.3–15.4)
GLOBULIN UR ELPH-MCNC: 2.8 GM/DL
GLUCOSE SERPL-MCNC: 164 MG/DL (ref 65–99)
GLUCOSE UR STRIP-MCNC: NEGATIVE MG/DL
HBA1C MFR BLD: 8 % (ref 4.8–5.6)
HCT VFR BLD AUTO: 40.1 % (ref 37.5–51)
HGB BLD-MCNC: 13.7 G/DL (ref 13–17.7)
HGB UR QL STRIP.AUTO: NEGATIVE
IMM GRANULOCYTES # BLD AUTO: 0.01 10*3/MM3 (ref 0–0.05)
IMM GRANULOCYTES NFR BLD AUTO: 0.2 % (ref 0–0.5)
KETONES UR QL STRIP: NEGATIVE
LEUKOCYTE ESTERASE UR QL STRIP.AUTO: NEGATIVE
LYMPHOCYTES # BLD AUTO: 2.52 10*3/MM3 (ref 0.7–3.1)
LYMPHOCYTES NFR BLD AUTO: 47.5 % (ref 19.6–45.3)
MCH RBC QN AUTO: 30.4 PG (ref 26.6–33)
MCHC RBC AUTO-ENTMCNC: 34.2 G/DL (ref 31.5–35.7)
MCV RBC AUTO: 89.1 FL (ref 79–97)
MONOCYTES # BLD AUTO: 0.47 10*3/MM3 (ref 0.1–0.9)
MONOCYTES NFR BLD AUTO: 8.9 % (ref 5–12)
NEUTROPHILS NFR BLD AUTO: 2.23 10*3/MM3 (ref 1.7–7)
NEUTROPHILS NFR BLD AUTO: 41.9 % (ref 42.7–76)
NITRITE UR QL STRIP: NEGATIVE
NRBC BLD AUTO-RTO: 0 /100 WBC (ref 0–0.2)
PH UR STRIP.AUTO: 6.5 [PH] (ref 5–8)
PLATELET # BLD AUTO: 185 10*3/MM3 (ref 140–450)
PMV BLD AUTO: 11.9 FL (ref 6–12)
POTASSIUM SERPL-SCNC: 4.6 MMOL/L (ref 3.5–5.2)
PROT SERPL-MCNC: 7.2 G/DL (ref 6–8.5)
PROT UR QL STRIP: NEGATIVE
RBC # BLD AUTO: 4.5 10*6/MM3 (ref 4.14–5.8)
SODIUM SERPL-SCNC: 136 MMOL/L (ref 136–145)
SP GR UR STRIP: 1.01 (ref 1–1.03)
UROBILINOGEN UR QL STRIP: NORMAL
WBC NRBC COR # BLD: 5.31 10*3/MM3 (ref 3.4–10.8)

## 2022-06-16 PROCEDURE — 36415 COLL VENOUS BLD VENIPUNCTURE: CPT

## 2022-06-16 PROCEDURE — 81003 URINALYSIS AUTO W/O SCOPE: CPT

## 2022-06-16 PROCEDURE — 85025 COMPLETE CBC W/AUTO DIFF WBC: CPT

## 2022-06-16 PROCEDURE — 83036 HEMOGLOBIN GLYCOSYLATED A1C: CPT

## 2022-06-16 PROCEDURE — 80053 COMPREHEN METABOLIC PANEL: CPT

## 2022-06-16 PROCEDURE — 82306 VITAMIN D 25 HYDROXY: CPT

## 2022-07-08 ENCOUNTER — OFFICE VISIT (OUTPATIENT)
Dept: PODIATRY | Facility: CLINIC | Age: 77
End: 2022-07-08

## 2022-07-08 VITALS
HEART RATE: 71 BPM | HEIGHT: 69 IN | BODY MASS INDEX: 31.1 KG/M2 | SYSTOLIC BLOOD PRESSURE: 132 MMHG | TEMPERATURE: 97.3 F | WEIGHT: 210 LBS | DIASTOLIC BLOOD PRESSURE: 80 MMHG | OXYGEN SATURATION: 100 %

## 2022-07-08 DIAGNOSIS — E11.9 NON-INSULIN DEPENDENT TYPE 2 DIABETES MELLITUS: ICD-10-CM

## 2022-07-08 DIAGNOSIS — M79.672 FOOT PAIN, BILATERAL: ICD-10-CM

## 2022-07-08 DIAGNOSIS — G62.9 NEUROPATHY: ICD-10-CM

## 2022-07-08 DIAGNOSIS — E11.8 DIABETIC FOOT: Primary | ICD-10-CM

## 2022-07-08 DIAGNOSIS — L60.0 ONYCHOCRYPTOSIS: ICD-10-CM

## 2022-07-08 DIAGNOSIS — M79.671 FOOT PAIN, BILATERAL: ICD-10-CM

## 2022-07-08 DIAGNOSIS — B35.1 ONYCHOMYCOSIS: ICD-10-CM

## 2022-07-08 PROCEDURE — G8404 LOW EXTEMITY NEUR EXAM DOCUM: HCPCS | Performed by: PODIATRIST

## 2022-07-08 PROCEDURE — 11721 DEBRIDE NAIL 6 OR MORE: CPT | Performed by: PODIATRIST

## 2022-07-08 RX ORDER — FLUTICASONE PROPIONATE AND SALMETEROL XINAFOATE 115; 21 UG/1; UG/1
AEROSOL, METERED RESPIRATORY (INHALATION)
COMMUNITY
Start: 2022-06-06

## 2022-07-08 RX ORDER — IPRATROPIUM BROMIDE 21 UG/1
SPRAY, METERED NASAL
COMMUNITY
Start: 2022-06-03

## 2022-07-08 NOTE — PROGRESS NOTES
[facility] - PODIATRY    Today's Date: 07/08/22    Patient Name: Joon Zhao  MRN: [id number]  CSN: [id number]  PCP: Brian Henson MD, Last PCP Visit: 6/25/2022  Referring Provider: No ref. provider found    SUBJECTIVE     Chief Complaint   Patient presents with   • Left Foot - Nail Problem   • Right Foot - Nail Problem     HPI: Joon Zhao, a 76 y.o.male, comes to clinic.    New, Established, New Problem:  established     Location:  Toenails    Duration:   Greater than five years    Onset:  Gradual    Nature:  sore with palpation.    Stable, worsening, improving:   Stable    Aggravating factors:  Pain with shoe gear and ambulation.    Previous Treatment:  debridement    Patient reported last blood glucose: 134  __________________________________    Patient reports the following medical changes since their last visit: Currently getting allergy shots    Patient denies any fevers, chills, nausea, vomiting, shortness of breathe, nor any other constitutional signs nor symptoms.         Past Medical History:   Diagnosis Date   • Anemia    • Arthritis    • Back pain     CHRONIC NECK AND BACK PAIN   • BPH (benign prostatic hyperplasia) 03/02/2015   • BPH with urinary obstruction 06/01/2018   • CKD (chronic kidney disease), stage III ([facility])    • Controlled type 2 diabetes mellitus with diabetic polyneuropathy ([facility]) 07/05/2017   • Coronary artery disease    • Diabetes mellitus ([facility])    • Dizziness 08/10/2017   • DVT (deep venous thrombosis) ([facility])    • ED (erectile dysfunction) of organic origin    • Foot pain, bilateral 03/12/2018   • Glaucoma (increased eye pressure)    • High cholesterol    • Hyperlipidemia    • Hypertension    • Ingrowing nail 07/05/2017   • Kidney failure     STAGE III   • Pain in both feet    • PE (pulmonary thromboembolism) ([facility])    • Polyneuropathy 07/05/2017   • Tinea unguium 07/05/2017     Past Surgical History:   Procedure Laterality Date   • ADRENAL GLAND SURGERY      • COLONOSCOPY     • CYSTOSCOPY     • KIDNEY SURGERY      KIDNEY BIOSPY   • PROSTATE SURGERY  12/2007   • TURP / TRANSURETHRAL INCISION / DRAINAGE PROSTATE  06/2019     Family History   Problem Relation Age of Onset   • Hypertension Mother    • Heart disease Mother    • Stroke Mother    • Hypertension Sister    • Heart disease Sister    • Stroke Sister    • Diabetes Brother    • Diabetes Maternal Grandmother    • Diabetes Other    • Diabetes Son    • Cancer Neg Hx      Social History     Socioeconomic History   • Marital status:    Tobacco Use   • Smoking status: Never Smoker   • Smokeless tobacco: Never Used   Vaping Use   • Vaping Use: Never used   Substance and Sexual Activity   • Alcohol use: Yes     Alcohol/week: 1.0 standard drink     Types: 1 Standard drinks or equivalent per week     Comment: rare   • Drug use: Never   • Sexual activity: Not Currently     Partners: Female     Allergies   Allergen Reactions   • Atorvastatin Hives   • Ezetimibe Unknown - High Severity   • Furosemide Unknown - High Severity   • Simvastatin Hives   • Statins Unknown - High Severity   • Hydrochlorothiazide Hives     Current Outpatient Medications   Medication Sig Dispense Refill   • acetaminophen-codeine (TYLENOL #3) 300-30 MG per tablet Take 1 tablet by mouth Every 4 (Four) Hours As Needed for Moderate Pain .     • Advair -21 MCG/ACT inhaler      • albuterol sulfate  (90 Base) MCG/ACT inhaler Ventolin HFA 90 mcg/actuation inhalation HFA aerosol inhaler inhale 2 puffs (180 mcg) by inhalation route every 6 hours   Active     • apixaban (ELIQUIS) 5 MG tablet tablet Take 5 mg by mouth 2 (Two) Times a Day.     • ARTIFICIAL SALIVA MT Apply  to the mouth or throat 2 (Two) Times a Day As Needed.     • aspirin 81 MG EC tablet aspirin 81 mg oral tablet,delayed release (DR/EC) take 1 tablet (81 mg) by oral route once daily   Active     • azelastine (ASTELIN) 0.1 % nasal spray azelastine 137 mcg (0.1 %) nasal  aerosol,spray spray 2 sprays in each nostril by intranasal route 2 times per day   Active     • baclofen (LIORESAL) 10 MG tablet Take 10 mg by mouth 2 (Two) Times a Day.     • chlorthalidone (HYGROTON) 25 MG tablet Take 25 mg by mouth 2 (two) times a day.     • cholecalciferol (VITAMIN D3) 25 MCG (1000 UT) tablet Take 1,000 Units by mouth Daily.     • Dulaglutide 1.5 MG/0.5ML solution pen-injector Inject  under the skin into the appropriate area as directed.     • fluticasone (FLONASE) 50 MCG/ACT nasal spray Flonase 50 mcg/actuation nasal spray,suspension inhale 1 spray (50 mcg) in each nostril by intranasal route once daily   Suspended     • gabapentin (NEURONTIN) 800 MG tablet Take 800 mg by mouth 3 (Three) Times a Day.     • glipizide (GLUCOTROL XL) 5 MG ER tablet glipizide 5 mg oral tablet extended release 24hr take 1 tablet (5 mg) by oral route bid daily with food   Active     • hydrOXYzine (ATARAX) 25 MG tablet Take 25 mg by mouth Every 6 (Six) Hours As Needed for Itching.     • Hyoscyamine Sulfate SL 0.125 MG sublingual tablet 4 (Four) Times a Day.     • ipratropium (ATROVENT) 0.03 % nasal spray 2 sprays each nostril q 6 hrs     • latanoprost (XALATAN) 0.005 % ophthalmic solution 1 drop.     • loratadine-pseudoephedrine (Claritin-D 12 Hour) 5-120 MG per 12 hr tablet Claritin-D 12 Hour 5-120 mg oral tablet extended release 12 hr take 1 tablet by oral route 2 times per day   Active     • losartan (COZAAR) 25 MG tablet Take 25 mg by mouth Daily.     • Magnesium Oxide 400 (240 Mg) MG tablet Take 400 mg by mouth Daily.     • omeprazole (priLOSEC) 20 MG capsule Take 20 mg by mouth As Needed.     • rOPINIRole (REQUIP) 0.25 MG tablet Take 0.25 mg by mouth every night at bedtime.     • amLODIPine (NORVASC) 5 MG tablet Take 5 mg by mouth Daily.       No current facility-administered medications for this visit.     Review of Systems   Constitutional: Negative.    Skin:        Painful toenails       OBJECTIVE     Vitals:     07/08/22 0738   BP: 132/80   Pulse: 71   Temp: 97.3 °F (36.3 °C)   SpO2: 100%       Lab Results   Component Value Date    HGBA1C 8.00 (H) 06/16/2022       Lab Results   Component Value Date    GLUCOSE 164 (H) 06/16/2022    CALCIUM 9.7 06/16/2022     06/16/2022    K 4.6 06/16/2022    CO2 25.0 06/16/2022     06/16/2022    BUN 29 (H) 06/16/2022    CREATININE 1.90 (H) 06/16/2022    EGFRIFAFRI 45 (L) 09/15/2021    BCR 15.3 06/16/2022    ANIONGAP 11.0 06/16/2022       Patient seen in no apparent distress.      PHYSICAL EXAM:     Foot/Ankle Exam:       General:   Appearance: elderly    Orientation: AAOx3    Affect: appropriate    Gait: unimpaired    Shoe Gear:  Casual shoes    VASCULAR      Right Foot Vascularity   Normal vascular exam    Dorsalis pedis:  2+  Posterior tibial:  2+  Skin Temperature: warm    Edema Grading:  None  CFT:  < 3 seconds  Pedal Hair Growth:  Present  Varicosities: none       Left Foot Vascularity   Normal vascular exam    Dorsalis pedis:  2+  Posterior tibial:  2+  Skin Temperature: warm    Edema Grading:  None  CFT:  < 3 seconds  Pedal Hair Growth:  Present  Varicosities: none        NEUROLOGIC     Right Foot Neurologic   Light touch sensation:  Absent  Vibratory sensation:  Absent  Hot/Cold sensation: absent    Protective Sensation using [place] Monofilament:  0     Left Foot Neurologic   Light touch sensation:  Absent  Vibratory sensation:  Absent  Hot/cold sensation: absent    Protective Sensation using [place] Monofilament:  0     MUSCULOSKELETAL      Right Foot Musculoskeletal   Arch:  Normal     Left Foot Musculoskeletal   Arch:  Normal     MUSCLE STRENGTH     Right Foot Muscle Strength   Foot dorsiflexion:  4+  Foot plantar flexion:  4+  Foot inversion:  4+  Foot eversion:  4+     Left Foot Muscle Strength   Foot dorsiflexion:  4+  Foot plantar flexion:  4+  Foot inversion:  4+  Foot eversion:  4+     RANGE OF MOTION      Right Foot Range of Motion   Foot  and ankle ROM within normal limits       Left Foot Range of Motion   Foot and ankle ROM within normal limits       DERMATOLOGIC     Right Foot Dermatologic   Skin: skin intact    Nails: onychomycosis, abnormally thick, subungual debris, dystrophic nails and ingrown toenail    Nails comment:  Toenails 1 through 4     Left Foot Dermatologic   Skin: skin intact    Nails: onychomycosis, abnormally thick, subungual debris, dystrophic nails and ingrown toenail    Nails comment:  Toenails 1 through 3      Diabetic Foot Exam Performed    ASSESSMENT/PLAN     Diagnoses and all orders for this visit:    1. Diabetic foot (HCC) (Primary)    2. Foot pain, bilateral    3. Non-insulin dependent type 2 diabetes mellitus (HCC)    4. Onychomycosis    5. Neuropathy    6. Onychocryptosis        Comprehensive lower extremity examination and evaluation was performed.    Discussed findings and treatment plan including risks, benefits, and treatment options with patient in detail. Patient agreed with treatment plan.    Toenails 1 through 4 on right and toenails 1 through 3 on the left were debrided in thickness and length and then smoothed with a Dremel Tool.  Tolerated the procedure well without complications.    Diabetic foot exam performed and documented this date, compliant with CQM required standards. Detail of findings as noted in physical exam.  Lower extremity Neurologic exam for diabetic patient performed and documented this date, compliant with PQRS required standards. Detail of findings as noted in physical exam.  Advised patient importance of good routine lower extremity hygiene. Advised patient importance of evaluating for intact skin and pain free nail borders.  Advised patient to use mirror to evaluate plantar/ soles of feet for better visualization. Advised patient monitor and phone office to be seen if any cracking to skin, open lesions, painful nail borders or if nails become elongated prior to next visit. Advised patient  importance of daily cleansing of lower extremities, followed by good skin cream to maintain normal hydration of skin. Also advised patient importance of close daily monitoring of blood sugar. Advised to regulate diet and medications to maintain control of blood sugar in optimal range. Contact primary care provider if difficulties maintaining blood sugar levels.  Advised Patient of presence of Diabetes Mellitus condition.  Advised Patient risk of progression and worsening or improvement, then return of condition.  Will monitor condition for any change in future. Treat with most appropriate treatment pending status of condition.  Counseled and advised patient extensively on nature and ramifications of diabetes. Standard instructions given to patient for good diabetic foot care and maintenance. Advised importance of careful monitoring to avoid break down and complications secondary to diabetes. Advised patient importance of strict maintenance of blood sugar control. Advised patient of possible ominous results from neglect of condition, i.e.: amputation/ loss of digits, feet and legs, or even death.  Patient states understands counseling, will monitor closely, continue good hygiene and routine diabetic foot care. Patient will contact office is questions or problems.      An After Visit Summary was printed and given to the patient at discharge, including (if requested) any available informative/educational handouts regarding diagnosis, treatment, or medications. All questions were answered to patient/family satisfaction. Should symptoms fail to improve or worsen they agree to call or return to clinic or to go to the Emergency Department. Discussed the importance of following up with any needed screening tests/labs/specialist appointments and any requested follow-up recommended by me today. Importance of maintaining follow-up discussed and patient accepts that missed appointments can delay diagnosis and potentially lead  to worsening of conditions.    Return in about 9 weeks (around 9/9/2022) for Toenail Care., or sooner if acute issues arise.    This document has been electronically signed by Carl Payne DPM on July 8, 2022 07:44 EDT

## 2022-08-17 ENCOUNTER — OFFICE VISIT (OUTPATIENT)
Dept: PULMONOLOGY | Facility: CLINIC | Age: 77
End: 2022-08-17

## 2022-08-17 VITALS
SYSTOLIC BLOOD PRESSURE: 129 MMHG | HEIGHT: 69 IN | RESPIRATION RATE: 16 BRPM | OXYGEN SATURATION: 99 % | DIASTOLIC BLOOD PRESSURE: 85 MMHG | TEMPERATURE: 98 F | HEART RATE: 72 BPM | BODY MASS INDEX: 30.66 KG/M2 | WEIGHT: 207 LBS

## 2022-08-17 DIAGNOSIS — Z99.89 OSA ON CPAP: ICD-10-CM

## 2022-08-17 DIAGNOSIS — J30.9 ALLERGIC RHINITIS, UNSPECIFIED SEASONALITY, UNSPECIFIED TRIGGER: ICD-10-CM

## 2022-08-17 DIAGNOSIS — G47.33 OSA ON CPAP: ICD-10-CM

## 2022-08-17 DIAGNOSIS — Z86.711 HISTORY OF PULMONARY EMBOLISM: Primary | ICD-10-CM

## 2022-08-17 DIAGNOSIS — I10 ESSENTIAL HYPERTENSION: ICD-10-CM

## 2022-08-17 DIAGNOSIS — J43.2 CENTRILOBULAR EMPHYSEMA: ICD-10-CM

## 2022-08-17 DIAGNOSIS — J30.2 SEASONAL ALLERGIES: ICD-10-CM

## 2022-08-17 PROCEDURE — 99213 OFFICE O/P EST LOW 20 MIN: CPT | Performed by: NURSE PRACTITIONER

## 2022-08-17 RX ORDER — BISACODYL 10 MG/1
2 SUPPOSITORY RECTAL DAILY
COMMUNITY
End: 2022-08-29 | Stop reason: ALTCHOICE

## 2022-08-17 RX ORDER — UBIDECARENONE 100 MG
100 CAPSULE ORAL DAILY
COMMUNITY

## 2022-08-17 RX ORDER — CHOLECALCIFEROL (VITAMIN D3) 125 MCG
CAPSULE ORAL
COMMUNITY
Start: 2022-07-20 | End: 2022-08-17 | Stop reason: SDUPTHER

## 2022-08-17 RX ORDER — YOHIMBE BARK 500 MG
CAPSULE ORAL DAILY
COMMUNITY

## 2022-08-17 NOTE — PROGRESS NOTES
Primary Care Provider  Brian Henson MD     Referring Provider  No ref. provider found     Chief Complaint  Chronic Thromboembolic Disease  (1 year f/u), Shortness of Breath (Less than usual due to new inhaler ), and Hoarse (Loses voice sometimes )    Subjective          Joon Zhao presents to [facility] PULMONARY & CRITICAL CARE MEDICINE  History of Present Illness  Joon Zhao is a 76 y.o. male patient of Dr. Cazares for management of obstructive sleep apnea, hypertension, seasonal allergies, allergic rhinitis, dyspnea on exertion and chronic thrombolytic disease.    Patient states he is doing well since last visit.  He denies using any antibiotics or steroids for his lungs.  He denies any fevers or chills.  His shortness of breath is mild in severity, worse with exertion and improved with rest.  Patient states that he was recently started on Advair by the allergist and taken off albuterol.  Explained to patient that he could still use albuterol if needed for episodes of shortness of breath.  He also states that his primary care is feeling the majority of his medications and he is not needing any refills at this time.  Recently had a chest CT which showed centrilobular emphysema.  He continues to receive allergy injections and states that he was getting them 3 times a week, but has recently decreased to once a week.  He continues to take Eliquis as prescribed for history of DVTs.  He continues wear his CPAP machine at night and [facility]  He takes Claritin, Flonase and azelastine nasal spray for seasonal allergies and allergic rhinitis.  Patient is up-to-date with his COVID and pneumonia vaccines.  He will be due for a flu vaccine this fall.  He is able to perform his ADLs without difficulty.     His history of smoking is   Tobacco Use: Low Risk    • Smoking Tobacco Use: Never Smoker   • Smokeless Tobacco Use: Never Used   .    Review of Systems    Constitutional: Negative for chills, fatigue, fever, unexpected weight gain and unexpected weight loss.   HENT: Negative for congestion (Nasal), hearing loss, mouth sores, nosebleeds, postnasal drip, sore throat and trouble swallowing.    Eyes: Negative for blurred vision and visual disturbance.   Respiratory: Positive for shortness of breath. Negative for apnea, cough and wheezing.         Negative for Hemoptysis     Cardiovascular: Negative for chest pain, palpitations and leg swelling.   Gastrointestinal: Negative for abdominal pain, constipation, diarrhea, nausea, vomiting and GERD.        Negative for Jaundice  Negative for Bloating  Negative for Melena   Musculoskeletal: Negative for joint swelling and myalgias.        Negative for Joint pain  Negative for Joint stiffness   Skin: Negative for color change.        Negative for cyanosis   Neurological: Negative for syncope, weakness, numbness and headache.      Sleep: Negative for Excessive daytime sleepiness  Negative for morning headaches  Negative for Snoring    Family History   Problem Relation Age of Onset   • Hypertension Mother    • Heart disease Mother    • Stroke Mother    • Hypertension Sister    • Heart disease Sister    • Stroke Sister    • Diabetes Brother    • Diabetes Maternal Grandmother    • Diabetes Other    • Diabetes Son    • Cancer Neg Hx         Social History     Socioeconomic History   • Marital status:    Tobacco Use   • Smoking status: Never Smoker   • Smokeless tobacco: Never Used   Vaping Use   • Vaping Use: Never used   Substance and Sexual Activity   • Alcohol use: Not Currently     Comment: rare   • Drug use: Never   • Sexual activity: Not Currently     Partners: Female     Birth control/protection: None        Past Medical History:   Diagnosis Date   • Anemia    • Arthritis    • Back pain     CHRONIC NECK AND BACK PAIN   • BPH (benign prostatic hyperplasia) 03/02/2015   • BPH with urinary obstruction 06/01/2018   • CKD  (chronic kidney disease), stage III ([facility])    • Controlled type 2 diabetes mellitus with diabetic polyneuropathy ([facility]) 07/05/2017   • Coronary artery disease    • Diabetes mellitus ([facility])    • Dizziness 08/10/2017   • DVT (deep venous thrombosis) ([facility])    • ED (erectile dysfunction) of organic origin    • Foot pain, bilateral 03/12/2018   • Glaucoma (increased eye pressure)    • High cholesterol    • Hyperlipidemia    • Hypertension    • Ingrowing nail 07/05/2017   • Kidney failure     STAGE III   • Pain in both feet    • PE (pulmonary thromboembolism) ([facility])    • Polyneuropathy 07/05/2017   • Primary central sleep apnea    • Tinea unguium 07/05/2017        Immunization History   Administered Date(s) Administered   • COVID-19 (MODERNA) 1st, 2nd, 3rd Dose Only 03/16/2021, 04/13/2021   • COVID-19 (MODERNA) BOOSTER 12/02/2021   • Fluzone High-Dose 65+yrs 10/08/2021   • Hepb Hepatitis B Vaccine Heplisav-b 11/04/2015   • Influenza, Unspecified 09/15/2020   • Pneumococcal Conjugate 13-Valent (PCV13) 03/20/2020   • Pneumococcal Polysaccharide (PPSV23) 09/25/2017   • Shingrix 03/20/2020, 09/15/2020         Allergies   Allergen Reactions   • Atorvastatin Hives   • Ezetimibe Unknown - High Severity   • Furosemide Unknown - High Severity   • Simvastatin Hives   • Statins Unknown - High Severity   • Hydrochlorothiazide Hives          Current Outpatient Medications:   •  acetaminophen-codeine (TYLENOL #3) 300-30 MG per tablet, Take 1 tablet by mouth Every 4 (Four) Hours As Needed for Moderate Pain ., Disp: , Rfl:   •  Advair -21 MCG/ACT inhaler, , Disp: , Rfl:   •  amLODIPine (NORVASC) 5 MG tablet, Take 5 mg by mouth Daily., Disp: , Rfl:   •  apixaban (ELIQUIS) 5 MG tablet tablet, Take 5 mg by mouth 2 (Two) Times a Day., Disp: , Rfl:   •  ARTIFICIAL SALIVA MT, Apply  to the mouth or throat 2 (Two) Times a Day As Needed., Disp: , Rfl:   •  aspirin 81 MG EC tablet, aspirin 81 mg oral tablet,delayed release (DR/EC) take 1  tablet (81 mg) by oral route once daily   Active, Disp: , Rfl:   •  azelastine (ASTELIN) 0.1 % nasal spray, azelastine 137 mcg (0.1 %) nasal aerosol,spray spray 2 sprays in each nostril by intranasal route 2 times per day   Active, Disp: , Rfl:   •  baclofen (LIORESAL) 10 MG tablet, Take 10 mg by mouth 2 (Two) Times a Day., Disp: , Rfl:   •  chlorthalidone (HYGROTON) 25 MG tablet, Take 25 mg by mouth 2 (two) times a day., Disp: , Rfl:   •  cholecalciferol (VITAMIN D3) 25 MCG (1000 UT) tablet, Take 1,000 Units by mouth Daily., Disp: , Rfl:   •  coenzyme Q10 100 MG capsule, Take 100 mg by mouth Daily., Disp: , Rfl:   •  Corn Dextrin (EQL Fiber Supplement) powder, Take 2 teaspoon(s) by mouth Daily., Disp: , Rfl:   •  Dulaglutide 1.5 MG/0.5ML solution pen-injector, Inject  under the skin into the appropriate area as directed., Disp: , Rfl:   •  fluticasone (FLONASE) 50 MCG/ACT nasal spray, Flonase 50 mcg/actuation nasal spray,suspension inhale 1 spray (50 mcg) in each nostril by intranasal route once daily   Suspended, Disp: , Rfl:   •  gabapentin (NEURONTIN) 800 MG tablet, Take 800 mg by mouth 3 (Three) Times a Day., Disp: , Rfl:   •  glipizide (GLUCOTROL XL) 5 MG ER tablet, glipizide 5 mg oral tablet extended release 24hr take 1 tablet (5 mg) by oral route bid daily with food   Active, Disp: , Rfl:   •  hydrOXYzine (ATARAX) 25 MG tablet, Take 25 mg by mouth Every 6 (Six) Hours As Needed for Itching., Disp: , Rfl:   •  ipratropium (ATROVENT) 0.03 % nasal spray, 2 sprays each nostril q 6 hrs, Disp: , Rfl:   •  Lactobacillus (Acidophilus) 100 MG capsule, Take  by mouth Daily., Disp: , Rfl:   •  latanoprost (XALATAN) 0.005 % ophthalmic solution, 1 drop., Disp: , Rfl:   •  loratadine-pseudoephedrine (Claritin-D 12 Hour) 5-120 MG per 12 hr tablet, Claritin-D 12 Hour 5-120 mg oral tablet extended release 12 hr take 1 tablet by oral route 2 times per day   Active, Disp: , Rfl:   •  losartan (COZAAR) 25 MG tablet, Take 25 mg  by mouth Daily., Disp: , Rfl:   •  Magnesium Oxide 400 (240 Mg) MG tablet, Take 400 mg by mouth Daily., Disp: , Rfl:   •  omeprazole (priLOSEC) 20 MG capsule, Take 20 mg by mouth As Needed., Disp: , Rfl:   •  rOPINIRole (REQUIP) 0.25 MG tablet, Take 0.25 mg by mouth every night at bedtime., Disp: , Rfl:   •  albuterol sulfate  (90 Base) MCG/ACT inhaler, Ventolin HFA 90 mcg/actuation inhalation HFA aerosol inhaler inhale 2 puffs (180 mcg) by inhalation route every 6 hours   Active, Disp: , Rfl:   •  Hyoscyamine Sulfate SL 0.125 MG sublingual tablet, 4 (Four) Times a Day., Disp: , Rfl:   •  Vitamin D 125 MCG (5000 UT) capsule capsule, , Disp: , Rfl:      Objective   Physical Exam  Constitutional:       General: He is not in acute distress.     Appearance: Normal appearance. He is normal weight.   HENT:      Right Ear: Hearing normal.      Left Ear: Hearing normal.      Nose: No nasal tenderness or congestion.      Mouth/Throat:      Mouth: Mucous membranes are moist. No oral lesions.   Eyes:      Extraocular Movements: Extraocular movements intact.      Pupils: Pupils are equal, round, and reactive to light.   Neck:      Thyroid: No thyroid mass or thyromegaly.   Cardiovascular:      Rate and Rhythm: Normal rate and regular rhythm.      Pulses: Normal pulses.      Heart sounds: Normal heart sounds. No murmur heard.  Pulmonary:      Effort: Pulmonary effort is normal.      Breath sounds: Normal breath sounds. No wheezing, rhonchi or rales.   Chest:   Breasts:      Right: No axillary adenopathy.       Abdominal:      General: Bowel sounds are normal. There is no distension.      Palpations: Abdomen is soft.      Tenderness: There is no abdominal tenderness.   Musculoskeletal:      Cervical back: Neck supple.      Right lower leg: No edema.      Left lower leg: No edema.   Lymphadenopathy:      Cervical: No cervical adenopathy.      Upper Body:      Right upper body: No axillary adenopathy.   Skin:     General:  "Skin is warm and dry.      Findings: No lesion or rash.   Neurological:      General: No focal deficit present.      Mental Status: He is alert and oriented to person, place, and time.      Cranial Nerves: Cranial nerves are intact.   Psychiatric:         Mood and Affect: Affect normal. Mood is not anxious or depressed.         Vital Signs:   /85 (BP Location: Left arm, Patient Position: Sitting, Cuff Size: Adult)   Pulse 72   Temp 98 °F (36.7 °C) (Infrared)   Resp 16   Ht 175.3 cm (69\")   Wt 93.9 kg (207 lb)   SpO2 99% Comment: Room air  BMI 30.57 kg/m²        Result Review :     CMP    CMP 9/15/21 3/16/22 6/16/22   Glucose 153 (A) 142 (A) 164 (A)   BUN 29 (A) 28 (A) 29 (A)   Creatinine 1.80 (A) 2.05 (A) 1.90 (A)   eGFR African Am 45 (A)     Sodium 136 136 136   Potassium 4.5 4.7 4.6   Chloride 97 (A) 97 (A) 100   Calcium 9.7 10.3 9.7   Albumin 4.60 4.80 4.40   Total Bilirubin 0.5 0.4 0.7   Alkaline Phosphatase 71 72 74   AST (SGOT) 26 24 31   ALT (SGPT) 17 20 33   (A) Abnormal value            CBC w/diff    CBC w/Diff 9/15/21 3/16/22 6/16/22   WBC 4.32 5.10 5.31   RBC 4.46 4.60 4.50   Hemoglobin 13.8 14.2 13.7   Hematocrit 41.0 41.9 40.1   MCV 91.9 91.1 89.1   MCH 30.9 30.9 30.4   MCHC 33.7 33.9 34.2   RDW 11.7 (A) 12.1 (A) 11.8 (A)   Platelets 175 193 185   Neutrophil Rel %   41.9 (A)   Immature Granulocyte Rel %   0.2   Lymphocyte Rel %   47.5 (A)   Monocyte Rel %   8.9   Eosinophil Rel %   1.1   Basophil Rel %   0.4   (A) Abnormal value            Data reviewed: Radiologic studies Chest x-ray 7/12/2021, pulmonary function test 7/29/2020 and My last office note   Procedures        Assessment and Plan    Diagnoses and all orders for this visit:    1. History of pulmonary embolism (Primary)    2. GRAY on CPAP    3. Essential hypertension    4. Allergic rhinitis, unspecified seasonality, unspecified trigger    5. Seasonal allergies    6. Centrilobular emphysema (HCC)    7.  Continue Advair as " prescribed.  Rinse mouth out after each use.  8.  Continue Eliquis.  9.  Continue Flonase, Claritin and azelastine for seasonal allergies and allergic rhinitis.  10.  [facility].  11.  Follow-up with cardiology as scheduled  12.  Follow-up in 1 year, sooner if needed.        Follow Up   Return in about 1 year (around 8/17/2023) for Recheck with [facility].  Patient was given instructions and counseling regarding his condition or for health maintenance advice. Please see specific information pulled into the AVS if appropriate.

## 2022-08-17 NOTE — PATIENT INSTRUCTIONS
Sleep Apnea  Sleep apnea is a condition in which breathing pauses or becomes shallow during sleep. Episodes of sleep apnea usually last 10 seconds or longer, and they may occur as many as 20 times an hour. Sleep apnea disrupts your sleep and keeps your body from getting the rest that it needs. This condition can increase your risk of certain health problems, including:  Heart attack.  Stroke.  Obesity.  Diabetes.  Heart failure.  Irregular heartbeat.  What are the causes?  There are three kinds of sleep apnea:  Obstructive sleep apnea. This kind is caused by a blocked or collapsed airway.  Central sleep apnea. This kind happens when the part of the brain that controls breathing does not send the correct signals to the muscles that control breathing.  Mixed sleep apnea. This is a combination of obstructive and central sleep apnea.  The most common cause of this condition is a collapsed or blocked airway. An airway can collapse or become blocked if:  Your throat muscles are abnormally relaxed.  Your tongue and tonsils are larger than normal.  You are overweight.  Your airway is smaller than normal.  What increases the risk?  You are more likely to develop this condition if you:  Are overweight.  Smoke.  Have a smaller than normal airway.  Are elderly.  Are male.  Drink alcohol.  Take sedatives or tranquilizers.  Have a family history of sleep apnea.  What are the signs or symptoms?  Symptoms of this condition include:  Trouble staying asleep.  Daytime sleepiness and tiredness.  Irritability.  Loud snoring.  Morning headaches.  Trouble concentrating.  Forgetfulness.  Decreased interest in sex.  Unexplained sleepiness.  Mood swings.  Personality changes.  Feelings of depression.  Waking up often during the night to urinate.  Dry mouth.  Sore throat.  How is this diagnosed?  This condition may be diagnosed with:  A medical history.  A physical exam.  A series of tests that are done while you are sleeping (sleep study).  These tests are usually done in a sleep lab, but they may also be done at home.  How is this treated?  Treatment for this condition aims to restore normal breathing and to ease symptoms during sleep. It may involve managing health issues that can affect breathing, such as high blood pressure or obesity. Treatment may include:  Sleeping on your side.  Using a decongestant if you have nasal congestion.  Avoiding the use of depressants, including alcohol, sedatives, and narcotics.  Losing weight if you are overweight.  Making changes to your diet.  Quitting smoking.  Using a device to open your airway while you sleep, such as:  An oral appliance. This is a custom-made mouthpiece that shifts your lower jaw forward.  A continuous positive airway pressure (CPAP) device. This device blows air through a mask when you breathe out (exhale).  A nasal expiratory positive airway pressure (EPAP) device. This device has valves that you put into each nostril.  A bi-level positive airway pressure (BPAP) device. This device blows air through a mask when you breathe in (inhale) and breathe out (exhale).  Having surgery if other treatments do not work. During surgery, excess tissue is removed to create a wider airway.  It is important to get treatment for sleep apnea. Without treatment, this condition can lead to:  High blood pressure.  Coronary artery disease.  In men, an inability to achieve or maintain an erection (impotence).  Reduced thinking abilities.  Follow these instructions at home:  Lifestyle  Make any lifestyle changes that your health care provider recommends.  Eat a healthy, well-balanced diet.  Take steps to lose weight if you are overweight.  Avoid using depressants, including alcohol, sedatives, and narcotics.  Do not use any products that contain nicotine or tobacco, such as cigarettes, e-cigarettes, and chewing tobacco. If you need help quitting, ask your health care provider.  General instructions  Take  over-the-counter and prescription medicines only as told by your health care provider.  If you were given a device to open your airway while you sleep, use it only as told by your health care provider.  If you are having surgery, make sure to tell your health care provider you have sleep apnea. You may need to bring your device with you.  Keep all follow-up visits as told by your health care provider. This is important.  Contact a health care provider if:  The device that you received to open your airway during sleep is uncomfortable or does not seem to be working.  Your symptoms do not improve.  Your symptoms get worse.  Get help right away if:  You develop:  Chest pain.  Shortness of breath.  Discomfort in your back, arms, or stomach.  You have:  Trouble speaking.  Weakness on one side of your body.  Drooping in your face.  These symptoms may represent a serious problem that is an emergency. Do not wait to see if the symptoms will go away. Get medical help right away. Call your local emergency services (911 in the [place] Do not drive yourself to the hospital.  Summary  Sleep apnea is a condition in which breathing pauses or becomes shallow during sleep.  The most common cause is a collapsed or blocked airway.  The goal of treatment is to restore normal breathing and to ease symptoms during sleep.  This information is not intended to replace advice given to you by your health care provider. Make sure you discuss any questions you have with your health care provider.  Document Revised: 06/03/2020 Document Reviewed: 08/13/2019  [employer] Patient Education © 2021 [employer]

## 2022-08-29 ENCOUNTER — OFFICE VISIT (OUTPATIENT)
Dept: CARDIOLOGY | Facility: CLINIC | Age: 77
End: 2022-08-29

## 2022-08-29 VITALS
BODY MASS INDEX: 30.72 KG/M2 | HEART RATE: 80 BPM | DIASTOLIC BLOOD PRESSURE: 79 MMHG | SYSTOLIC BLOOD PRESSURE: 127 MMHG | HEIGHT: 69 IN | WEIGHT: 207.4 LBS

## 2022-08-29 DIAGNOSIS — R07.89 CHEST PAIN, ATYPICAL: ICD-10-CM

## 2022-08-29 DIAGNOSIS — I10 ESSENTIAL HYPERTENSION: Primary | ICD-10-CM

## 2022-08-29 PROCEDURE — 99213 OFFICE O/P EST LOW 20 MIN: CPT | Performed by: INTERNAL MEDICINE

## 2022-08-29 RX ORDER — AMLODIPINE BESYLATE 2.5 MG/1
2.5 TABLET ORAL DAILY
Qty: 90 TABLET | Refills: 3 | Status: SHIPPED | OUTPATIENT
Start: 2022-08-29

## 2022-08-31 NOTE — ASSESSMENT & PLAN NOTE
Blood pressure on the lower side in the office today.  He was recently noted to have orthostatic hypotension and dizziness and was off amlodipine for a while.  He started taking it again recently.  Because of low blood pressure and dizziness, will decrease the dose of amlodipine to 2.5 mg daily.  Continue chlorthalidone and losartan at the current dose.  Consider decreasing chlorthalidone dose if blood pressure remains low.

## 2022-08-31 NOTE — PROGRESS NOTES
CARDIOLOGY FOLLOW-UP PROGRESS NOTE        Chief Complaint  Hypertension, Coronary Artery Disease, and Hyperlipidemia    Subjective            Joon Zhao presents to [facility] CARDIOLOGY  History of Present Illness      Mr. Zhao is here for routine 9-month follow-up visit.  Recently he was experiencing significant dizzy spells.  He was noted to be orthostatic and amlodipine was discontinued at the nephrology office.  He felt fine and the dizziness improved.  He recently started having chest pain at rest.  He self restarted amlodipine because of chest pain.  Currently has dizzy spells.  No syncopal episodes.  His shortness of breath is stable.  He was recently diagnosed COPD/emphysema.       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 (2) Hypertension. (3) Chronic kidney disease, Stage 3. (4) Diabetes mellitus. (5) Negative for coronary artery disease.     Medical History:  Past Medical History:   Diagnosis Date   • Anemia    • Arthritis    • Back pain     CHRONIC NECK AND BACK PAIN   • BPH (benign prostatic hyperplasia) 03/02/2015   • BPH with urinary obstruction 06/01/2018   • CKD (chronic kidney disease), stage III ([facility])    • Controlled type 2 diabetes mellitus with diabetic polyneuropathy ([facility]) 07/05/2017   • Coronary artery disease    • Diabetes mellitus ([facility])    • Dizziness 08/10/2017   • DVT (deep venous thrombosis) ([facility])    • ED (erectile dysfunction) of organic origin    • Foot pain, bilateral 03/12/2018   • Glaucoma (increased eye pressure)    • High cholesterol    • Hyperlipidemia    • Hypertension    • Ingrowing nail 07/05/2017   • Kidney failure     STAGE III   • Pain in both feet    • PE (pulmonary thromboembolism) ([facility])    • Polyneuropathy 07/05/2017   • Primary central sleep apnea    • Tinea unguium 07/05/2017       Surgical History: has a past surgical history that includes Adrenal gland surgery; Kidney  surgery; Colonoscopy; Cystoscopy; Prostate surgery (12/2007); and TURP / transurethral incision / drainage prostate (06/2019).     Family History: family history includes Diabetes in his brother, maternal grandmother, son, and another family member; Heart disease in his mother and sister; Hypertension in his mother and sister; Stroke in his mother and sister.     Social History: reports that he has never smoked. He has never used smokeless tobacco. He reports previous alcohol use. He reports that he does not use drugs.    Allergies: Atorvastatin, Ezetimibe, Furosemide, Simvastatin, Statins, and Hydrochlorothiazide    Current Outpatient Medications on File Prior to Visit   Medication Sig   • acetaminophen-codeine (TYLENOL #3) 300-30 MG per tablet Take 1 tablet by mouth Every 4 (Four) Hours As Needed for Moderate Pain .   • Advair -21 MCG/ACT inhaler    • albuterol sulfate  (90 Base) MCG/ACT inhaler Ventolin HFA 90 mcg/actuation inhalation HFA aerosol inhaler inhale 2 puffs (180 mcg) by inhalation route every 6 hours   Active   • apixaban (ELIQUIS) 5 MG tablet tablet Take 5 mg by mouth 2 (Two) Times a Day.   • ARTIFICIAL SALIVA MT Apply  to the mouth or throat 2 (Two) Times a Day As Needed.   • aspirin 81 MG EC tablet aspirin 81 mg oral tablet,delayed release (DR/EC) take 1 tablet (81 mg) by oral route once daily   Active   • azelastine (ASTELIN) 0.1 % nasal spray azelastine 137 mcg (0.1 %) nasal aerosol,spray spray 2 sprays in each nostril by intranasal route 2 times per day   Active   • baclofen (LIORESAL) 10 MG tablet Take 10 mg by mouth 2 (Two) Times a Day.   • chlorthalidone (HYGROTON) 25 MG tablet Take 25 mg by mouth 2 (two) times a day.   • cholecalciferol (VITAMIN D3) 25 MCG (1000 UT) tablet Take 1,000 Units by mouth Daily.   • Dulaglutide 1.5 MG/0.5ML solution pen-injector Inject  under the skin into the appropriate area as directed.   • fluticasone (FLONASE) 50 MCG/ACT nasal spray Flonase 50  "mcg/actuation nasal spray,suspension inhale 1 spray (50 mcg) in each nostril by intranasal route once daily   Suspended   • gabapentin (NEURONTIN) 800 MG tablet Take 800 mg by mouth 3 (Three) Times a Day.   • glipizide (GLUCOTROL XL) 5 MG ER tablet glipizide 5 mg oral tablet extended release 24hr take 1 tablet (5 mg) by oral route bid daily with food   Active   • hydrOXYzine (ATARAX) 25 MG tablet Take 25 mg by mouth Every 6 (Six) Hours As Needed for Itching.   • Hyoscyamine Sulfate SL 0.125 MG sublingual tablet 4 (Four) Times a Day.   • ipratropium (ATROVENT) 0.03 % nasal spray 2 sprays each nostril q 6 hrs   • Lactobacillus (Acidophilus) 100 MG capsule Take  by mouth Daily.   • latanoprost (XALATAN) 0.005 % ophthalmic solution 1 drop.   • loratadine-pseudoephedrine (Claritin-D 12 Hour) 5-120 MG per 12 hr tablet Claritin-D 12 Hour 5-120 mg oral tablet extended release 12 hr take 1 tablet by oral route 2 times per day   Active   • losartan (COZAAR) 25 MG tablet Take 25 mg by mouth Daily.   • Magnesium Oxide 400 (240 Mg) MG tablet Take 400 mg by mouth Daily.   • omeprazole (priLOSEC) 20 MG capsule Take 20 mg by mouth As Needed.   • rOPINIRole (REQUIP) 0.25 MG tablet Take 0.25 mg by mouth every night at bedtime.   • coenzyme Q10 100 MG capsule Take 100 mg by mouth Daily.     No current facility-administered medications on file prior to visit.          Review of Systems   Respiratory: Positive for shortness of breath. Negative for cough and wheezing.    Cardiovascular: Negative for chest pain, palpitations and leg swelling.   Gastrointestinal: Negative for nausea and vomiting.   Neurological: Positive for dizziness. Negative for syncope.        Objective     /79   Pulse 80   Ht 175.3 cm (69\")   Wt 94.1 kg (207 lb 6.4 oz)   BMI 30.63 kg/m²       Physical Exam    General : Alert, awake, no acute distress  Neck : Supple, no carotid bruit, no jugular venous distention  CVS : Regular rate and rhythm, no murmur, " rubs or gallops  Lungs: Clear to auscultation bilaterally, no crackles or rhonchi  Abdomen: Soft, nontender, bowel sounds heard in all 4 quadrants  Extremities: Warm, well-perfused, Trace edema bilaterally    Result Review :     The following data was reviewed by: Trip Mirza MD on 08/29/2022:    CMP    CMP 9/15/21 3/16/22 6/16/22   Glucose 153 (A) 142 (A) 164 (A)   BUN 29 (A) 28 (A) 29 (A)   Creatinine 1.80 (A) 2.05 (A) 1.90 (A)   eGFR African Am 45 (A)     Sodium 136 136 136   Potassium 4.5 4.7 4.6   Chloride 97 (A) 97 (A) 100   Calcium 9.7 10.3 9.7   Albumin 4.60 4.80 4.40   Total Bilirubin 0.5 0.4 0.7   Alkaline Phosphatase 71 72 74   AST (SGOT) 26 24 31   ALT (SGPT) 17 20 33   (A) Abnormal value            CBC    CBC 9/15/21 3/16/22 6/16/22   WBC 4.32 5.10 5.31   RBC 4.46 4.60 4.50   Hemoglobin 13.8 14.2 13.7   Hematocrit 41.0 41.9 40.1   MCV 91.9 91.1 89.1   MCH 30.9 30.9 30.4   MCHC 33.7 33.9 34.2   RDW 11.7 (A) 12.1 (A) 11.8 (A)   Platelets 175 193 185   (A) Abnormal value                     Data reviewed: Cardiology studies        Results for orders placed in visit on 07/12/21    Adult Transthoracic Echo Complete w/ Color, Spectral and Contrast if necessary per protocol    Interpretation Summary  · Calculated left ventricular 3D EF = 59% Left ventricular ejection fraction appears to be 56 - 60%.  · Estimated right ventricular systolic pressure from tricuspid regurgitation is normal (<35 mmHg).  · Left ventricular wall thickness is consistent with mild concentric hypertrophy.  · Left ventricular diastolic function is consistent with (grade I) impaired relaxation.  · Mild dilation of the aortic root is present.                   Assessment and Plan        Diagnoses and all orders for this visit:    1. Essential hypertension (Primary)  Assessment & Plan:  Blood pressure on the lower side in the office today.  He was recently noted to have orthostatic hypotension and dizziness and was off amlodipine for  a while.  He started taking it again recently.  Because of low blood pressure and dizziness, will decrease the dose of amlodipine to 2.5 mg daily.  Continue chlorthalidone and losartan at the current dose.  Consider decreasing chlorthalidone dose if blood pressure remains low.    Orders:  -     amLODIPine (NORVASC) 2.5 MG tablet; Take 1 tablet by mouth Daily.  Dispense: 90 tablet; Refill: 3    2. Chest pain, atypical  Assessment & Plan:  Symptoms are stable.  Previous cardiac work-up unremarkable for ischemia.              Follow Up     Return in about 3 months (around 11/29/2022) for Next scheduled follow up, with Keke LINDSEY.    Patient was given instructions and counseling regarding his condition or for health maintenance advice. Please see specific information pulled into the AVS if appropriate.

## 2022-09-15 ENCOUNTER — TRANSCRIBE ORDERS (OUTPATIENT)
Dept: ADMINISTRATIVE | Facility: HOSPITAL | Age: 77
End: 2022-09-15

## 2022-09-15 ENCOUNTER — LAB (OUTPATIENT)
Dept: LAB | Facility: HOSPITAL | Age: 77
End: 2022-09-15

## 2022-09-15 DIAGNOSIS — I10 ESSENTIAL HYPERTENSION, MALIGNANT: ICD-10-CM

## 2022-09-15 DIAGNOSIS — N18.30 STAGE 3 CHRONIC KIDNEY DISEASE, UNSPECIFIED WHETHER STAGE 3A OR 3B CKD: ICD-10-CM

## 2022-09-15 DIAGNOSIS — E11.9 DIABETES MELLITUS WITHOUT COMPLICATION: ICD-10-CM

## 2022-09-15 DIAGNOSIS — N18.30 STAGE 3 CHRONIC KIDNEY DISEASE, UNSPECIFIED WHETHER STAGE 3A OR 3B CKD: Primary | ICD-10-CM

## 2022-09-15 LAB
ALBUMIN SERPL-MCNC: 4.6 G/DL (ref 3.5–5.2)
ALBUMIN/GLOB SERPL: 1.6 G/DL
ALP SERPL-CCNC: 82 U/L (ref 39–117)
ALT SERPL W P-5'-P-CCNC: 27 U/L (ref 1–41)
ANION GAP SERPL CALCULATED.3IONS-SCNC: 11.1 MMOL/L (ref 5–15)
AST SERPL-CCNC: 26 U/L (ref 1–40)
BASOPHILS # BLD AUTO: 0.03 10*3/MM3 (ref 0–0.2)
BASOPHILS NFR BLD AUTO: 0.5 % (ref 0–1.5)
BILIRUB SERPL-MCNC: 0.7 MG/DL (ref 0–1.2)
BILIRUB UR QL STRIP: NEGATIVE
BUN SERPL-MCNC: 26 MG/DL (ref 8–23)
BUN/CREAT SERPL: 15.7 (ref 7–25)
CALCIUM SPEC-SCNC: 10 MG/DL (ref 8.6–10.5)
CHLORIDE SERPL-SCNC: 97 MMOL/L (ref 98–107)
CLARITY UR: CLEAR
CO2 SERPL-SCNC: 28.9 MMOL/L (ref 22–29)
COLOR UR: YELLOW
CREAT SERPL-MCNC: 1.66 MG/DL (ref 0.76–1.27)
CREAT UR-MCNC: 112.6 MG/DL
DEPRECATED RDW RBC AUTO: 40.5 FL (ref 37–54)
EGFRCR SERPLBLD CKD-EPI 2021: 42.5 ML/MIN/1.73
EOSINOPHIL # BLD AUTO: 0.07 10*3/MM3 (ref 0–0.4)
EOSINOPHIL NFR BLD AUTO: 1.2 % (ref 0.3–6.2)
ERYTHROCYTE [DISTWIDTH] IN BLOOD BY AUTOMATED COUNT: 11.8 % (ref 12.3–15.4)
GLOBULIN UR ELPH-MCNC: 2.9 GM/DL
GLUCOSE SERPL-MCNC: 161 MG/DL (ref 65–99)
GLUCOSE UR STRIP-MCNC: NEGATIVE MG/DL
HBA1C MFR BLD: 8.2 % (ref 4.8–5.6)
HCT VFR BLD AUTO: 42.9 % (ref 37.5–51)
HGB BLD-MCNC: 14.6 G/DL (ref 13–17.7)
HGB UR QL STRIP.AUTO: NEGATIVE
IMM GRANULOCYTES # BLD AUTO: 0.03 10*3/MM3 (ref 0–0.05)
IMM GRANULOCYTES NFR BLD AUTO: 0.5 % (ref 0–0.5)
KETONES UR QL STRIP: NEGATIVE
LEUKOCYTE ESTERASE UR QL STRIP.AUTO: NEGATIVE
LYMPHOCYTES # BLD AUTO: 2.79 10*3/MM3 (ref 0.7–3.1)
LYMPHOCYTES NFR BLD AUTO: 46.2 % (ref 19.6–45.3)
MCH RBC QN AUTO: 31.7 PG (ref 26.6–33)
MCHC RBC AUTO-ENTMCNC: 34 G/DL (ref 31.5–35.7)
MCV RBC AUTO: 93.3 FL (ref 79–97)
MONOCYTES # BLD AUTO: 0.57 10*3/MM3 (ref 0.1–0.9)
MONOCYTES NFR BLD AUTO: 9.4 % (ref 5–12)
NEUTROPHILS NFR BLD AUTO: 2.55 10*3/MM3 (ref 1.7–7)
NEUTROPHILS NFR BLD AUTO: 42.2 % (ref 42.7–76)
NITRITE UR QL STRIP: NEGATIVE
NRBC BLD AUTO-RTO: 0 /100 WBC (ref 0–0.2)
PH UR STRIP.AUTO: 6.5 [PH] (ref 5–8)
PLATELET # BLD AUTO: 185 10*3/MM3 (ref 140–450)
PMV BLD AUTO: 11.7 FL (ref 6–12)
POTASSIUM SERPL-SCNC: 4.6 MMOL/L (ref 3.5–5.2)
PROT ?TM UR-MCNC: 6.1 MG/DL
PROT SERPL-MCNC: 7.5 G/DL (ref 6–8.5)
PROT UR QL STRIP: NEGATIVE
PROT/CREAT UR: 0.05 MG/G{CREAT}
RBC # BLD AUTO: 4.6 10*6/MM3 (ref 4.14–5.8)
SODIUM SERPL-SCNC: 137 MMOL/L (ref 136–145)
SP GR UR STRIP: 1.01 (ref 1–1.03)
UROBILINOGEN UR QL STRIP: NORMAL
WBC NRBC COR # BLD: 6.04 10*3/MM3 (ref 3.4–10.8)

## 2022-09-15 PROCEDURE — 83036 HEMOGLOBIN GLYCOSYLATED A1C: CPT

## 2022-09-15 PROCEDURE — 85025 COMPLETE CBC W/AUTO DIFF WBC: CPT

## 2022-09-15 PROCEDURE — 80053 COMPREHEN METABOLIC PANEL: CPT

## 2022-09-15 PROCEDURE — 82570 ASSAY OF URINE CREATININE: CPT

## 2022-09-15 PROCEDURE — 81003 URINALYSIS AUTO W/O SCOPE: CPT

## 2022-09-15 PROCEDURE — 36415 COLL VENOUS BLD VENIPUNCTURE: CPT

## 2022-09-15 PROCEDURE — 84156 ASSAY OF PROTEIN URINE: CPT

## 2022-09-30 ENCOUNTER — OFFICE VISIT (OUTPATIENT)
Dept: PODIATRY | Facility: CLINIC | Age: 77
End: 2022-09-30

## 2022-09-30 VITALS
SYSTOLIC BLOOD PRESSURE: 133 MMHG | HEIGHT: 69 IN | OXYGEN SATURATION: 97 % | HEART RATE: 78 BPM | BODY MASS INDEX: 31.1 KG/M2 | DIASTOLIC BLOOD PRESSURE: 79 MMHG | WEIGHT: 210 LBS | TEMPERATURE: 97.1 F

## 2022-09-30 DIAGNOSIS — E11.9 NON-INSULIN DEPENDENT TYPE 2 DIABETES MELLITUS: ICD-10-CM

## 2022-09-30 DIAGNOSIS — G62.9 NEUROPATHY: ICD-10-CM

## 2022-09-30 DIAGNOSIS — M79.671 FOOT PAIN, BILATERAL: ICD-10-CM

## 2022-09-30 DIAGNOSIS — L60.0 ONYCHOCRYPTOSIS: ICD-10-CM

## 2022-09-30 DIAGNOSIS — E11.8 DIABETIC FOOT: Primary | ICD-10-CM

## 2022-09-30 DIAGNOSIS — B35.1 ONYCHOMYCOSIS: ICD-10-CM

## 2022-09-30 DIAGNOSIS — M79.672 FOOT PAIN, BILATERAL: ICD-10-CM

## 2022-09-30 PROCEDURE — G8404 LOW EXTEMITY NEUR EXAM DOCUM: HCPCS | Performed by: PODIATRIST

## 2022-09-30 PROCEDURE — 11721 DEBRIDE NAIL 6 OR MORE: CPT | Performed by: PODIATRIST

## 2022-09-30 RX ORDER — LOSARTAN POTASSIUM 50 MG/1
50 TABLET ORAL
COMMUNITY
Start: 2022-09-22 | End: 2023-09-22

## 2022-09-30 RX ORDER — NALOXONE HYDROCHLORIDE 4 MG/.1ML
SPRAY NASAL
COMMUNITY
Start: 2022-09-19

## 2022-09-30 NOTE — PROGRESS NOTES
[facility] - PODIATRY    Today's Date: 09/30/22    Patient Name: Joon Zhao  MRN: [id number]  CSN: [id number]  PCP: Brian Henson MD, Last PCP Visit: 8/30/2022  Referring Provider: No ref. provider found    SUBJECTIVE     Chief Complaint   Patient presents with   • Left Foot - Nail Problem   • Right Foot - Nail Problem     HPI: Joon Zhao, a 76 y.o.male, comes to clinic.    New, Established, New Problem:  established     Location:  Toenails    Duration:   Greater than five years    Onset:  Gradual    Nature:  sore with palpation.    Stable, worsening, improving:   Stable    Aggravating factors:  Pain with shoe gear and ambulation.    Previous Treatment:  debridement    Patient reported last blood glucose: 170  __________________________________    Patient reports the following medical changes since their last visit: none.    Patient denies any fevers, chills, nausea, vomiting, shortness of breathe, nor any other constitutional signs nor symptoms.         Past Medical History:   Diagnosis Date   • Anemia    • Arthritis    • Back pain     CHRONIC NECK AND BACK PAIN   • BPH (benign prostatic hyperplasia) 03/02/2015   • BPH with urinary obstruction 06/01/2018   • CKD (chronic kidney disease), stage III ([facility])    • Controlled type 2 diabetes mellitus with diabetic polyneuropathy ([facility]) 07/05/2017   • Coronary artery disease    • Diabetes mellitus ([facility])    • Dizziness 08/10/2017   • DVT (deep venous thrombosis) ([facility])    • ED (erectile dysfunction) of organic origin    • Foot pain, bilateral 03/12/2018   • Glaucoma (increased eye pressure)    • High cholesterol    • Hyperlipidemia    • Hypertension    • Ingrowing nail 07/05/2017   • Kidney failure     STAGE III   • Pain in both feet    • PE (pulmonary thromboembolism) ([facility])    • Polyneuropathy 07/05/2017   • Primary central sleep apnea    • Tinea unguium 07/05/2017     Past Surgical History:   Procedure Laterality Date   • ADRENAL GLAND  SURGERY     • COLONOSCOPY     • CYSTOSCOPY     • KIDNEY SURGERY      KIDNEY BIOSPY   • PROSTATE SURGERY  12/2007   • TURP / TRANSURETHRAL INCISION / DRAINAGE PROSTATE  06/2019     Family History   Problem Relation Age of Onset   • Hypertension Mother    • Heart disease Mother    • Stroke Mother    • Hypertension Sister    • Heart disease Sister    • Stroke Sister    • Diabetes Brother    • Diabetes Maternal Grandmother    • Diabetes Other    • Diabetes Son    • Cancer Neg Hx      Social History     Socioeconomic History   • Marital status:    Tobacco Use   • Smoking status: Never Smoker   • Smokeless tobacco: Never Used   Vaping Use   • Vaping Use: Never used   Substance and Sexual Activity   • Alcohol use: Not Currently     Comment: rare   • Drug use: Never   • Sexual activity: Not Currently     Partners: Female     Birth control/protection: None     Allergies   Allergen Reactions   • Atorvastatin Hives   • Ezetimibe Unknown - High Severity   • Furosemide Unknown - High Severity   • Simvastatin Hives   • Statins Unknown - High Severity   • Hydrochlorothiazide Hives     Current Outpatient Medications   Medication Sig Dispense Refill   • acetaminophen-codeine (TYLENOL #3) 300-30 MG per tablet Take 1 tablet by mouth Every 4 (Four) Hours As Needed for Moderate Pain .     • Advair -21 MCG/ACT inhaler      • albuterol sulfate  (90 Base) MCG/ACT inhaler Ventolin HFA 90 mcg/actuation inhalation HFA aerosol inhaler inhale 2 puffs (180 mcg) by inhalation route every 6 hours   Active     • amLODIPine (NORVASC) 2.5 MG tablet Take 1 tablet by mouth Daily. 90 tablet 3   • apixaban (ELIQUIS) 5 MG tablet tablet Take 5 mg by mouth 2 (Two) Times a Day.     • ARTIFICIAL SALIVA MT Apply  to the mouth or throat 2 (Two) Times a Day As Needed.     • aspirin 81 MG EC tablet aspirin 81 mg oral tablet,delayed release (DR/EC) take 1 tablet (81 mg) by oral route once daily   Active     • azelastine (ASTELIN) 0.1 % nasal  spray azelastine 137 mcg (0.1 %) nasal aerosol,spray spray 2 sprays in each nostril by intranasal route 2 times per day   Active     • baclofen (LIORESAL) 10 MG tablet Take 10 mg by mouth 2 (Two) Times a Day.     • chlorthalidone (HYGROTON) 25 MG tablet Take 25 mg by mouth 2 (two) times a day.     • cholecalciferol (VITAMIN D3) 25 MCG (1000 UT) tablet Take 1,000 Units by mouth Daily.     • coenzyme Q10 100 MG capsule Take 100 mg by mouth Daily.     • Dulaglutide 1.5 MG/0.5ML solution pen-injector Inject  under the skin into the appropriate area as directed.     • fluticasone (FLONASE) 50 MCG/ACT nasal spray Flonase 50 mcg/actuation nasal spray,suspension inhale 1 spray (50 mcg) in each nostril by intranasal route once daily   Suspended     • gabapentin (NEURONTIN) 800 MG tablet Take 800 mg by mouth 3 (Three) Times a Day.     • glipizide (GLUCOTROL XL) 5 MG ER tablet glipizide 5 mg oral tablet extended release 24hr take 1 tablet (5 mg) by oral route bid daily with food   Active     • hydrOXYzine (ATARAX) 25 MG tablet Take 25 mg by mouth Every 6 (Six) Hours As Needed for Itching.     • Hyoscyamine Sulfate SL 0.125 MG sublingual tablet 4 (Four) Times a Day.     • ipratropium (ATROVENT) 0.03 % nasal spray 2 sprays each nostril q 6 hrs     • Lactobacillus (Acidophilus) 100 MG capsule Take  by mouth Daily.     • latanoprost (XALATAN) 0.005 % ophthalmic solution 1 drop.     • loratadine-pseudoephedrine (Claritin-D 12 Hour) 5-120 MG per 12 hr tablet Claritin-D 12 Hour 5-120 mg oral tablet extended release 12 hr take 1 tablet by oral route 2 times per day   Active     • losartan (COZAAR) 50 MG tablet Take 25 mg by mouth.     • Magnesium Oxide 400 (240 Mg) MG tablet Take 400 mg by mouth Daily.     • Narcan 4 MG/0.1ML nasal spray      • omeprazole (priLOSEC) 20 MG capsule Take 20 mg by mouth As Needed.     • rOPINIRole (REQUIP) 0.25 MG tablet Take 0.25 mg by mouth every night at bedtime.       No current facility-administered  medications for this visit.     Review of Systems   Constitutional: Negative.    Skin:        Painful toenails   All other systems reviewed and are negative.      OBJECTIVE     Vitals:    09/30/22 0739   BP: 133/79   Pulse: 78   Temp: 97.1 °F (36.2 °C)   SpO2: 97%       Lab Results   Component Value Date    HGBA1C 8.20 (H) 09/15/2022       Lab Results   Component Value Date    GLUCOSE 161 (H) 09/15/2022    CALCIUM 10.0 09/15/2022     09/15/2022    K 4.6 09/15/2022    CO2 28.9 09/15/2022    CL 97 (L) 09/15/2022    BUN 26 (H) 09/15/2022    CREATININE 1.66 (H) 09/15/2022    EGFRIFAFRI 45 (L) 09/15/2021    BCR 15.7 09/15/2022    ANIONGAP 11.1 09/15/2022       Patient seen in no apparent distress.      PHYSICAL EXAM:     Foot/Ankle Exam:       General:   Diabetic Foot Exam Performed    Appearance: elderly    Orientation: AAOx3    Affect: appropriate    Gait: unimpaired    Shoe Gear:  Casual shoes    VASCULAR      Right Foot Vascularity   Normal vascular exam    Dorsalis pedis:  2+  Posterior tibial:  2+  Skin Temperature: warm    Edema Grading:  None  CFT:  < 3 seconds  Pedal Hair Growth:  Present  Varicosities: none       Left Foot Vascularity   Normal vascular exam    Dorsalis pedis:  2+  Posterior tibial:  2+  Skin Temperature: warm    Edema Grading:  None  CFT:  < 3 seconds  Pedal Hair Growth:  Present  Varicosities: none        NEUROLOGIC     Right Foot Neurologic   Light touch sensation:  Absent  Vibratory sensation:  Absent  Hot/Cold sensation: absent    Protective Sensation using [place]ie Monofilament:  0     Left Foot Neurologic   Light touch sensation:  Absent  Vibratory sensation:  Absent  Hot/cold sensation: absent    Protective Sensation using [place]-Willie Monofilament:  0     MUSCULOSKELETAL      Right Foot Musculoskeletal   Arch:  Normal     Left Foot Musculoskeletal   Arch:  Normal     MUSCLE STRENGTH     Right Foot Muscle Strength   Foot dorsiflexion:  4  Foot plantar flexion:  4  Foot  inversion:  4  Foot eversion:  4     Left Foot Muscle Strength   Foot dorsiflexion:  4  Foot plantar flexion:  4  Foot inversion:  4  Foot eversion:  4     RANGE OF MOTION      Right Foot Range of Motion   Foot and ankle ROM within normal limits       Left Foot Range of Motion   Foot and ankle ROM within normal limits       DERMATOLOGIC     Right Foot Dermatologic   Skin: skin intact    Nails: onychomycosis, abnormally thick, subungual debris, dystrophic nails and ingrown toenail    Nails comment:  Toenails 1 through 4     Left Foot Dermatologic   Skin: skin intact    Nails: onychomycosis, abnormally thick, subungual debris, dystrophic nails and ingrown toenail    Nails comment:  Toenails 1 through 3      Diabetic Foot Exam Performed    ASSESSMENT/PLAN     Diagnoses and all orders for this visit:    1. Diabetic foot (HCC) (Primary)    2. Onychomycosis    3. Foot pain, bilateral    4. Neuropathy    5. Non-insulin dependent type 2 diabetes mellitus (HCC)    6. Onychocryptosis        Comprehensive lower extremity examination and evaluation was performed.    Discussed findings and treatment plan including risks, benefits, and treatment options with patient in detail. Patient agreed with treatment plan.    Toenails 1 through 4 on right and toenails 1 through 3 on the left were debrided in thickness and length and then smoothed with a Dremel Tool.  Tolerated the procedure well without complications.    Diabetic foot exam performed and documented this date, compliant with CQM required standards. Detail of findings as noted in physical exam.  Lower extremity Neurologic exam for diabetic patient performed and documented this date, compliant with PQRS required standards. Detail of findings as noted in physical exam.  Advised patient importance of good routine lower extremity hygiene. Advised patient importance of evaluating for intact skin and pain free nail borders.  Advised patient to use mirror to evaluate plantar/ soles  of feet for better visualization. Advised patient monitor and phone office to be seen if any cracking to skin, open lesions, painful nail borders or if nails become elongated prior to next visit. Advised patient importance of daily cleansing of lower extremities, followed by good skin cream to maintain normal hydration of skin. Also advised patient importance of close daily monitoring of blood sugar. Advised to regulate diet and medications to maintain control of blood sugar in optimal range. Contact primary care provider if difficulties maintaining blood sugar levels.  Advised Patient of presence of Diabetes Mellitus condition.  Advised Patient risk of progression and worsening or improvement, then return of condition.  Will monitor condition for any change in future. Treat with most appropriate treatment pending status of condition.  Counseled and advised patient extensively on nature and ramifications of diabetes. Standard instructions given to patient for good diabetic foot care and maintenance. Advised importance of careful monitoring to avoid break down and complications secondary to diabetes. Advised patient importance of strict maintenance of blood sugar control. Advised patient of possible ominous results from neglect of condition, i.e.: amputation/ loss of digits, feet and legs, or even death.  Patient states understands counseling, will monitor closely, continue good hygiene and routine diabetic foot care. Patient will contact office is questions or problems.      An After Visit Summary was printed and given to the patient at discharge, including (if requested) any available informative/educational handouts regarding diagnosis, treatment, or medications. All questions were answered to patient/family satisfaction. Should symptoms fail to improve or worsen they agree to call or return to clinic or to go to the Emergency Department. Discussed the importance of following up with any needed screening  tests/labs/specialist appointments and any requested follow-up recommended by me today. Importance of maintaining follow-up discussed and patient accepts that missed appointments can delay diagnosis and potentially lead to worsening of conditions.    Return in about 9 weeks (around 12/2/2022) for Toenail Care., or sooner if acute issues arise.    This document has been electronically signed by Carl Payne DPM on September 30, 2022 07:58 EDT

## 2022-11-04 ENCOUNTER — OFFICE VISIT (OUTPATIENT)
Dept: UROLOGY | Facility: CLINIC | Age: 77
End: 2022-11-04

## 2022-11-04 VITALS
WEIGHT: 207 LBS | DIASTOLIC BLOOD PRESSURE: 75 MMHG | BODY MASS INDEX: 30.66 KG/M2 | HEART RATE: 67 BPM | HEIGHT: 69 IN | TEMPERATURE: 98.6 F | SYSTOLIC BLOOD PRESSURE: 121 MMHG

## 2022-11-04 DIAGNOSIS — N40.0 BENIGN PROSTATIC HYPERPLASIA WITHOUT LOWER URINARY TRACT SYMPTOMS: Primary | ICD-10-CM

## 2022-11-04 LAB
BILIRUB BLD-MCNC: NEGATIVE MG/DL
CLARITY, POC: CLEAR
COLOR UR: YELLOW
EXPIRATION DATE: ABNORMAL
GLUCOSE UR STRIP-MCNC: ABNORMAL MG/DL
KETONES UR QL: NEGATIVE
LEUKOCYTE EST, POC: NEGATIVE
Lab: ABNORMAL
NITRITE UR-MCNC: NEGATIVE MG/ML
PH UR: 6 [PH] (ref 5–8)
PROT UR STRIP-MCNC: NEGATIVE MG/DL
RBC # UR STRIP: NEGATIVE /UL
SP GR UR: 1.02 (ref 1–1.03)
UROBILINOGEN UR QL: ABNORMAL

## 2022-11-04 PROCEDURE — 81003 URINALYSIS AUTO W/O SCOPE: CPT | Performed by: UROLOGY

## 2022-11-04 PROCEDURE — 99212 OFFICE O/P EST SF 10 MIN: CPT | Performed by: UROLOGY

## 2022-11-04 RX ORDER — CETIRIZINE HYDROCHLORIDE 10 MG/1
10 TABLET ORAL DAILY
COMMUNITY
Start: 2022-10-06

## 2022-11-04 RX ORDER — EMPAGLIFLOZIN 25 MG/1
TABLET, FILM COATED ORAL
COMMUNITY
Start: 2022-10-18 | End: 2022-12-29 | Stop reason: ALTCHOICE

## 2022-11-04 RX ORDER — LANCETS 28 GAUGE
EACH MISCELLANEOUS
COMMUNITY
Start: 2022-10-06

## 2022-11-04 NOTE — PROGRESS NOTES
"Chief Complaint  Benign Prostatic Hypertrophy    Subjective  No acute distress   {Problem List  Visit Diagnosis   Encounters  Notes  Medications  Labs  Result Review Imaging  Media :23}     Joon Zhao presents to [facility] UROLOGY  History of Present Illness    76-year-old -American male is being followed because of chronic disease which is BPH.  Patient is followed every year for NAOMIE and make sure is not getting obstructed with lower urinary tract.  Patient has no dysuria or gross hematuria.  He is not getting up at night but since he was started on Jardiance for diabetes mellitus he has lot of frequency.  His stream is slightly weaker than used to be.    Right now his main problem is diabetes mellitus with hyperglycemia and glycosuria giving him frequency    Objective No acute distress  Vital Signs:   /75   Pulse 67   Temp 98.6 °F (37 °C) (Infrared)   Ht 175.3 cm (69\")   Wt 93.9 kg (207 lb)   BMI 30.57 kg/m²     Allergies   Allergen Reactions   • Atorvastatin Hives   • Ezetimibe Unknown - High Severity   • Furosemide Unknown - High Severity   • Simvastatin Hives   • Statins Unknown - High Severity   • Hydrochlorothiazide Hives      Past medical history:  has a past medical history of Anemia, Arthritis, Back pain, BPH (benign prostatic hyperplasia) (03/02/2015), BPH with urinary obstruction (06/01/2018), CKD (chronic kidney disease), stage III ([facility]), Controlled type 2 diabetes mellitus with diabetic polyneuropathy ([facility]) (07/05/2017), Coronary artery disease, Diabetes mellitus ([facility]), Dizziness (08/10/2017), DVT (deep venous thrombosis) ([facility]), ED (erectile dysfunction) of organic origin, Erectile dysfunction (2007), Foot pain, bilateral (03/12/2018), Glaucoma (increased eye pressure), High cholesterol, Hyperlipidemia, Hypertension, Ingrowing nail (07/05/2017), Kidney failure, Pain in both feet, PE (pulmonary thromboembolism) ([facility]), Polyneuropathy (07/05/2017), " Primary central sleep apnea, and Tinea unguium (07/05/2017).   Past surgical history:  has a past surgical history that includes Adrenal gland surgery; Kidney surgery; Colonoscopy; Cystoscopy; Prostate surgery (12/2007); and TURP / transurethral incision / drainage prostate (06/2019).  Personal history: family history includes Diabetes in his brother, maternal grandmother, son, and another family member; Heart disease in his mother and sister; Hypertension in his mother and sister; Stroke in his mother and sister.  Social history:  reports that he has never smoked. He has never used smokeless tobacco. He reports that he does not currently use alcohol. He reports that he does not use drugs.    Review of Systems    Please see past medical and surgical history rest of the system is negative    Physical Exam  Constitutional:       General: He is not in acute distress.     Appearance: Normal appearance. He is obese. He is not ill-appearing or toxic-appearing.   HENT:      Head: Normocephalic and atraumatic.      Ears:      Comments: No loss of hearing  Cardiovascular:      Rate and Rhythm: Normal rate and regular rhythm.      Pulses: Normal pulses.      Heart sounds: Normal heart sounds. No murmur heard.  Pulmonary:      Effort: Pulmonary effort is normal.      Breath sounds: Normal breath sounds. No rhonchi or rales.   Abdominal:      Palpations: Abdomen is soft. There is no mass.      Tenderness: There is no abdominal tenderness. There is no right CVA tenderness or left CVA tenderness.      Hernia: No hernia is present.      Comments: Divarication of recti   Genitourinary:     Comments: Normal penis.    Right and left scrotum is normal.    Right and left testicle and epididymis is normal.    NAOMIE.  Prostate gland is well resected and benign  Musculoskeletal:         General: Normal range of motion.      Cervical back: Normal range of motion and neck supple. No rigidity or tenderness.      Right lower leg: No edema.       Left lower leg: No edema.   Lymphadenopathy:      Cervical: No cervical adenopathy.   Skin:     General: Skin is warm.      Coloration: Skin is not jaundiced.   Neurological:      General: No focal deficit present.      Mental Status: He is alert and oriented to person, place, and time.      Motor: No weakness.      Gait: Gait normal.   Psychiatric:         Mood and Affect: Mood normal.         Behavior: Behavior normal.         Thought Content: Thought content normal.         Judgment: Judgment normal.        Result Review :                 Assessment and Plan    Diagnoses and all orders for this visit:    1. Benign prostatic hyperplasia without lower urinary tract symptoms (Primary)  -     POC Urinalysis Dipstick, Automated         Brief Urine Lab Results  (Last result in the past 365 days)      Color   Clarity   Blood   Leuk Est   Nitrite   Protein   CREAT   Urine HCG        11/04/22 0955 Yellow   Clear   Negative   Negative   Negative   Negative                  Follow Up   No follow-ups on file.  Patient was given instructions and counseling regarding his condition or for health maintenance advice. Please see specific information pulled into the AVS if appropriate.     Isabelle Florian MD

## 2022-11-16 ENCOUNTER — OFFICE VISIT (OUTPATIENT)
Dept: NEUROSURGERY | Facility: CLINIC | Age: 77
End: 2022-11-16

## 2022-11-16 VITALS
DIASTOLIC BLOOD PRESSURE: 89 MMHG | HEIGHT: 69 IN | WEIGHT: 207 LBS | SYSTOLIC BLOOD PRESSURE: 155 MMHG | HEART RATE: 70 BPM | BODY MASS INDEX: 30.66 KG/M2

## 2022-11-16 DIAGNOSIS — M54.42 CHRONIC MIDLINE LOW BACK PAIN WITH LEFT-SIDED SCIATICA: ICD-10-CM

## 2022-11-16 DIAGNOSIS — M54.2 CERVICALGIA: ICD-10-CM

## 2022-11-16 DIAGNOSIS — G89.29 CHRONIC MIDLINE LOW BACK PAIN WITH LEFT-SIDED SCIATICA: ICD-10-CM

## 2022-11-16 DIAGNOSIS — M47.816 LUMBAR FACET ARTHROPATHY: ICD-10-CM

## 2022-11-16 DIAGNOSIS — M50.223 HERNIATED NUCLEUS PULPOSUS, C6-7 LEFT: ICD-10-CM

## 2022-11-16 DIAGNOSIS — M51.36 DDD (DEGENERATIVE DISC DISEASE), LUMBAR: Primary | ICD-10-CM

## 2022-11-16 DIAGNOSIS — M54.12 LEFT CERVICAL RADICULOPATHY: ICD-10-CM

## 2022-11-16 PROCEDURE — 99204 OFFICE O/P NEW MOD 45 MIN: CPT | Performed by: PHYSICIAN ASSISTANT

## 2022-11-16 NOTE — PROGRESS NOTES
"Chief Complaint  Back Pain and Leg Pain (Left to calf )    Subjective          Joon Zhao who is a 76 y.o. year old male who presents to [facility] for Evaluation of the Spine.     The patient complains of pain located in the Lumbar Spine.  Patients states the pain has been present for many years.  The pain came on acutely.  The pain scaled level is 4. The pain does not radiate.  The pain is Intermittent and described as sharp.  The pain is worse at no particular time of day. Patient states finding a good position to sit in makes the pain better.  Patient states Prolonged Sitting makes the pain worse.    He has cervical spine pain for the past 2 years. The pain came on gradually. He rates this 6/10 today. The pain extends into the left shoulder and down the left arm into the 2nd-4th digits of the left hand. The pain is constant and waxing and waning, it is describes as dull and electric shock-like. The pain is worse in the early morning. Patient states nothing makes the pain better. He reports it is worse after sleep.    Associated Symptoms Include: Numbness, Tingling and Weakness in the left arm. Denies loss of bowel or bladder control.  Conservative Interventions Include: Epidural Steroids that were effective. For both the cervical and lumbar spine. He has done physical therapy which was somewhat helpful, for the lower back. Muscle relaxers, Tylenol 3 and gabapentin are somewhat effective.    Was this the result of an injury or accident?: No    History of Previous Spinal Surgery?: No     reports that he has never smoked. He has never used smokeless tobacco.    Review of Systems   Musculoskeletal: Positive for back pain.        Objective   Vital Signs:   /89   Pulse 70   Ht 175.3 cm (69\")   Wt 93.9 kg (207 lb)   BMI 30.57 kg/m²       Physical Exam  Constitutional:       Appearance: Normal appearance. He is obese.   Neck:      Comments: Pain with " ROM  Pulmonary:      Effort: Pulmonary effort is normal.   Musculoskeletal:         General: Tenderness (right lumbar paraspinals, midline cervical spine, left cervical paraspinal, trapezius and rhomboid) present.      Comments: SLR negative bilaterally,  Tinel's test positive at bilateral wrists,  Corral's negative bilaterally,  Pain in the left shoulder with internal and external rotation   Neurological:      General: No focal deficit present.      Mental Status: He is alert and oriented to person, place, and time.      Sensory: No sensory deficit.      Motor: No weakness.      Deep Tendon Reflexes: Reflexes normal.   Psychiatric:         Mood and Affect: Mood normal.         Behavior: Behavior normal.        Neurologic Exam     Mental Status   Oriented to person, place, and time.        Result Review     I have personally reviewed the MRI of lumbar spine from 3/28/2022 which shows disc bulging and moderate to severe central canal stenosis at L3-L4, there is also moderate to severe central canal stenosis at L2-L3, there is moderate central canal stenosis at L4-L5.  There is mild to moderate bilateral foraminal stenosis at L2-L3 and L3-L4.    I personally reviewed the patient's MRI scan for cervical which shows severe left foraminal stenosis at C6-C7.     Assessment and Plan    Diagnoses and all orders for this visit:    1. DDD (degenerative disc disease), lumbar (Primary)  -     Ambulatory Referral to Physical Therapy Evaluate and treat; Heat (cervical traction), Electrotherapy; E-stim; Stretching (cervical), ROM, Strengthening    2. Lumbar facet arthropathy  -     Ambulatory Referral to Physical Therapy Evaluate and treat; Heat (cervical traction), Electrotherapy; E-stim; Stretching (cervical), ROM, Strengthening    3. Chronic midline low back pain with left-sided sciatica  -     Ambulatory Referral to Physical Therapy Evaluate and treat; Heat (cervical traction), Electrotherapy; E-stim; Stretching (cervical),  ROM, Strengthening    4. Herniated nucleus pulposus, C6-7 left  -     Ambulatory Referral to Physical Therapy Evaluate and treat; Heat (cervical traction), Electrotherapy; E-stim; Stretching (cervical), ROM, Strengthening    5. Cervicalgia  -     Ambulatory Referral to Physical Therapy Evaluate and treat; Heat (cervical traction), Electrotherapy; E-stim; Stretching (cervical), ROM, Strengthening    6. Left cervical radiculopathy  -     Ambulatory Referral to Physical Therapy Evaluate and treat; Heat (cervical traction), Electrotherapy; E-stim; Stretching (cervical), ROM, Strengthening    For the neck and left arm pain, he is getting good relief with cervical epidurals and would like to continue this.    We discussed that he does have some forminal stenosis on the left at C6-C7 which may explain his symptoms. Technically speaking, he would be a candidate for likely benefit from a surgical approach. He would like to defer for now.    I am going to refer him to PT for the cervical spine to see if this helps him more along with the injections. I will refer him to PTA in [place].    For the lower back, his pain is in the back itself. I do not expect a surgical approach to help. He is going to continue injection therapy, as it helps. I am going to include the lumbar spine on his PTA referral.    The patient was counseled on basic recommendations for the reduction and prevention of back, neck, or spine pain in association with spinal disorders, including: cessation/avoidance of nicotine use, maintenance of a healthy BMI and weight, focusing on building/maintaining core strength through core exercise, and avoidance of activities which worsen the pain. The patient will monitor for changes in symptoms and notify our clinic of these changes as needed.    He will follow-up here PRN.    Follow Up   No follow-ups on file.  Patient was given instructions and counseling regarding his condition or for health maintenance advice.  Please see specific information pulled into the AVS if appropriate.

## 2022-11-29 ENCOUNTER — OFFICE VISIT (OUTPATIENT)
Dept: CARDIOLOGY | Facility: CLINIC | Age: 77
End: 2022-11-29

## 2022-11-29 VITALS
HEART RATE: 66 BPM | BODY MASS INDEX: 30.66 KG/M2 | HEIGHT: 69 IN | DIASTOLIC BLOOD PRESSURE: 82 MMHG | WEIGHT: 207 LBS | SYSTOLIC BLOOD PRESSURE: 131 MMHG

## 2022-11-29 DIAGNOSIS — R42 DIZZINESS: ICD-10-CM

## 2022-11-29 DIAGNOSIS — I10 ESSENTIAL HYPERTENSION: Primary | ICD-10-CM

## 2022-11-29 DIAGNOSIS — Z86.711 HISTORY OF PULMONARY EMBOLISM: ICD-10-CM

## 2022-11-29 PROCEDURE — 99214 OFFICE O/P EST MOD 30 MIN: CPT | Performed by: FAMILY MEDICINE

## 2022-12-02 ENCOUNTER — LAB (OUTPATIENT)
Dept: LAB | Facility: HOSPITAL | Age: 77
End: 2022-12-02

## 2022-12-02 DIAGNOSIS — I10 ESSENTIAL HYPERTENSION: ICD-10-CM

## 2022-12-02 LAB
ALBUMIN SERPL-MCNC: 4.2 G/DL (ref 3.5–5.2)
ALBUMIN/GLOB SERPL: 1.4 G/DL
ALP SERPL-CCNC: 85 U/L (ref 39–117)
ALT SERPL W P-5'-P-CCNC: 22 U/L (ref 1–41)
ANION GAP SERPL CALCULATED.3IONS-SCNC: 11.3 MMOL/L (ref 5–15)
AST SERPL-CCNC: 29 U/L (ref 1–40)
BILIRUB SERPL-MCNC: 0.5 MG/DL (ref 0–1.2)
BUN SERPL-MCNC: 36 MG/DL (ref 8–23)
BUN/CREAT SERPL: 16.5 (ref 7–25)
CALCIUM SPEC-SCNC: 9.6 MG/DL (ref 8.6–10.5)
CHLORIDE SERPL-SCNC: 95 MMOL/L (ref 98–107)
CO2 SERPL-SCNC: 27.7 MMOL/L (ref 22–29)
CREAT SERPL-MCNC: 2.18 MG/DL (ref 0.76–1.27)
DEPRECATED RDW RBC AUTO: 38.6 FL (ref 37–54)
EGFRCR SERPLBLD CKD-EPI 2021: 30.4 ML/MIN/1.73
ERYTHROCYTE [DISTWIDTH] IN BLOOD BY AUTOMATED COUNT: 11.6 % (ref 12.3–15.4)
GLOBULIN UR ELPH-MCNC: 3 GM/DL
GLUCOSE SERPL-MCNC: 199 MG/DL (ref 65–99)
HCT VFR BLD AUTO: 43.7 % (ref 37.5–51)
HGB BLD-MCNC: 14.4 G/DL (ref 13–17.7)
MCH RBC QN AUTO: 30 PG (ref 26.6–33)
MCHC RBC AUTO-ENTMCNC: 33 G/DL (ref 31.5–35.7)
MCV RBC AUTO: 91 FL (ref 79–97)
PLATELET # BLD AUTO: 174 10*3/MM3 (ref 140–450)
PMV BLD AUTO: 12.4 FL (ref 6–12)
POTASSIUM SERPL-SCNC: 4.6 MMOL/L (ref 3.5–5.2)
PROT SERPL-MCNC: 7.2 G/DL (ref 6–8.5)
RBC # BLD AUTO: 4.8 10*6/MM3 (ref 4.14–5.8)
SODIUM SERPL-SCNC: 134 MMOL/L (ref 136–145)
WBC NRBC COR # BLD: 4.2 10*3/MM3 (ref 3.4–10.8)

## 2022-12-02 PROCEDURE — 36415 COLL VENOUS BLD VENIPUNCTURE: CPT

## 2022-12-02 PROCEDURE — 80053 COMPREHEN METABOLIC PANEL: CPT

## 2022-12-02 PROCEDURE — 85027 COMPLETE CBC AUTOMATED: CPT

## 2022-12-15 ENCOUNTER — TELEPHONE (OUTPATIENT)
Dept: CARDIOLOGY | Facility: CLINIC | Age: 77
End: 2022-12-15

## 2022-12-15 NOTE — TELEPHONE ENCOUNTER
Mr. Zhao takes  long-term Eliquis for DVT and PE,  and is managed by primary care provider.  Please contact PCP office to manage anticoagulation plan for procedure.   He may proceed with procedure, without any further cardiac testing.  He will be at moderate risk for perioperative cardiac events.

## 2022-12-15 NOTE — TELEPHONE ENCOUNTER
Procedure: Cervical and Lumbar Epidural Steroid Injection (URGENT)    Medication Directive: Eliquis 3 days prior-24 hr post; aspirin 6 days prior- 24 hr post    PMH: HTN, CAD, HLD    Last Seen: 22    Previous consent .

## 2022-12-16 ENCOUNTER — LAB (OUTPATIENT)
Dept: LAB | Facility: HOSPITAL | Age: 77
End: 2022-12-16

## 2022-12-16 ENCOUNTER — TRANSCRIBE ORDERS (OUTPATIENT)
Dept: ADMINISTRATIVE | Facility: HOSPITAL | Age: 77
End: 2022-12-16

## 2022-12-16 DIAGNOSIS — E11.9 DIABETES MELLITUS WITHOUT COMPLICATION: ICD-10-CM

## 2022-12-16 DIAGNOSIS — E83.42 HYPOMAGNESEMIA: ICD-10-CM

## 2022-12-16 DIAGNOSIS — I10 ESSENTIAL HYPERTENSION, MALIGNANT: ICD-10-CM

## 2022-12-16 DIAGNOSIS — N18.31 STAGE 3A CHRONIC KIDNEY DISEASE: Primary | ICD-10-CM

## 2022-12-16 DIAGNOSIS — N18.31 STAGE 3A CHRONIC KIDNEY DISEASE: ICD-10-CM

## 2022-12-16 LAB
ALBUMIN SERPL-MCNC: 4.6 G/DL (ref 3.5–5.2)
ALBUMIN UR-MCNC: <1.2 MG/DL
ALBUMIN/GLOB SERPL: 1.6 G/DL
ALP SERPL-CCNC: 80 U/L (ref 39–117)
ALT SERPL W P-5'-P-CCNC: 18 U/L (ref 1–41)
ANION GAP SERPL CALCULATED.3IONS-SCNC: 13 MMOL/L (ref 5–15)
AST SERPL-CCNC: 22 U/L (ref 1–40)
BASOPHILS # BLD AUTO: 0.03 10*3/MM3 (ref 0–0.2)
BASOPHILS NFR BLD AUTO: 0.6 % (ref 0–1.5)
BILIRUB SERPL-MCNC: 0.6 MG/DL (ref 0–1.2)
BILIRUB UR QL STRIP: NEGATIVE
BUN SERPL-MCNC: 34 MG/DL (ref 8–23)
BUN/CREAT SERPL: 15.2 (ref 7–25)
CALCIUM SPEC-SCNC: 9.5 MG/DL (ref 8.6–10.5)
CHLORIDE SERPL-SCNC: 94 MMOL/L (ref 98–107)
CLARITY UR: CLEAR
CO2 SERPL-SCNC: 27 MMOL/L (ref 22–29)
COLOR UR: YELLOW
CREAT SERPL-MCNC: 2.24 MG/DL (ref 0.76–1.27)
CREAT UR-MCNC: 57 MG/DL
CREAT UR-MCNC: 72.4 MG/DL
DEPRECATED RDW RBC AUTO: 39.2 FL (ref 37–54)
EGFRCR SERPLBLD CKD-EPI 2021: 29.5 ML/MIN/1.73
EOSINOPHIL # BLD AUTO: 0.04 10*3/MM3 (ref 0–0.4)
EOSINOPHIL NFR BLD AUTO: 0.7 % (ref 0.3–6.2)
ERYTHROCYTE [DISTWIDTH] IN BLOOD BY AUTOMATED COUNT: 11.7 % (ref 12.3–15.4)
GLOBULIN UR ELPH-MCNC: 2.9 GM/DL
GLUCOSE SERPL-MCNC: 203 MG/DL (ref 65–99)
GLUCOSE UR STRIP-MCNC: ABNORMAL MG/DL
HBA1C MFR BLD: 9 % (ref 4.8–5.6)
HCT VFR BLD AUTO: 46.2 % (ref 37.5–51)
HGB BLD-MCNC: 15.5 G/DL (ref 13–17.7)
HGB UR QL STRIP.AUTO: NEGATIVE
IMM GRANULOCYTES # BLD AUTO: 0.01 10*3/MM3 (ref 0–0.05)
IMM GRANULOCYTES NFR BLD AUTO: 0.2 % (ref 0–0.5)
KETONES UR QL STRIP: NEGATIVE
LEUKOCYTE ESTERASE UR QL STRIP.AUTO: NEGATIVE
LYMPHOCYTES # BLD AUTO: 2.79 10*3/MM3 (ref 0.7–3.1)
LYMPHOCYTES NFR BLD AUTO: 51.2 % (ref 19.6–45.3)
MAGNESIUM SERPL-MCNC: 2.4 MG/DL (ref 1.6–2.4)
MCH RBC QN AUTO: 30.9 PG (ref 26.6–33)
MCHC RBC AUTO-ENTMCNC: 33.5 G/DL (ref 31.5–35.7)
MCV RBC AUTO: 92 FL (ref 79–97)
MICROALBUMIN/CREAT UR: NORMAL MG/G{CREAT}
MONOCYTES # BLD AUTO: 0.55 10*3/MM3 (ref 0.1–0.9)
MONOCYTES NFR BLD AUTO: 10.1 % (ref 5–12)
NEUTROPHILS NFR BLD AUTO: 2.03 10*3/MM3 (ref 1.7–7)
NEUTROPHILS NFR BLD AUTO: 37.2 % (ref 42.7–76)
NITRITE UR QL STRIP: NEGATIVE
NRBC BLD AUTO-RTO: 0 /100 WBC (ref 0–0.2)
PH UR STRIP.AUTO: 6 [PH] (ref 5–8)
PLATELET # BLD AUTO: 170 10*3/MM3 (ref 140–450)
PMV BLD AUTO: 12.5 FL (ref 6–12)
POTASSIUM SERPL-SCNC: 4.6 MMOL/L (ref 3.5–5.2)
PROT ?TM UR-MCNC: <4 MG/DL
PROT SERPL-MCNC: 7.5 G/DL (ref 6–8.5)
PROT UR QL STRIP: NEGATIVE
PROT/CREAT UR: NORMAL MG/G{CREAT}
RBC # BLD AUTO: 5.02 10*6/MM3 (ref 4.14–5.8)
SODIUM SERPL-SCNC: 134 MMOL/L (ref 136–145)
SP GR UR STRIP: 1.02 (ref 1–1.03)
UROBILINOGEN UR QL STRIP: ABNORMAL
WBC NRBC COR # BLD: 5.45 10*3/MM3 (ref 3.4–10.8)

## 2022-12-16 PROCEDURE — 82043 UR ALBUMIN QUANTITATIVE: CPT

## 2022-12-16 PROCEDURE — 81003 URINALYSIS AUTO W/O SCOPE: CPT

## 2022-12-16 PROCEDURE — 80053 COMPREHEN METABOLIC PANEL: CPT

## 2022-12-16 PROCEDURE — 83735 ASSAY OF MAGNESIUM: CPT

## 2022-12-16 PROCEDURE — 84156 ASSAY OF PROTEIN URINE: CPT

## 2022-12-16 PROCEDURE — 82570 ASSAY OF URINE CREATININE: CPT

## 2022-12-16 PROCEDURE — 83036 HEMOGLOBIN GLYCOSYLATED A1C: CPT

## 2022-12-16 PROCEDURE — 36415 COLL VENOUS BLD VENIPUNCTURE: CPT

## 2022-12-16 PROCEDURE — 85025 COMPLETE CBC W/AUTO DIFF WBC: CPT

## 2022-12-27 ENCOUNTER — LAB (OUTPATIENT)
Dept: LAB | Facility: HOSPITAL | Age: 77
End: 2022-12-27

## 2022-12-27 ENCOUNTER — TRANSCRIBE ORDERS (OUTPATIENT)
Dept: ADMINISTRATIVE | Facility: HOSPITAL | Age: 77
End: 2022-12-27

## 2022-12-27 DIAGNOSIS — N17.9 ACUTE RENAL FAILURE, UNSPECIFIED ACUTE RENAL FAILURE TYPE: Primary | ICD-10-CM

## 2022-12-27 DIAGNOSIS — N18.30 STAGE 3 CHRONIC KIDNEY DISEASE, UNSPECIFIED WHETHER STAGE 3A OR 3B CKD: ICD-10-CM

## 2022-12-27 DIAGNOSIS — N17.9 ACUTE RENAL FAILURE, UNSPECIFIED ACUTE RENAL FAILURE TYPE: ICD-10-CM

## 2022-12-27 LAB
ANION GAP SERPL CALCULATED.3IONS-SCNC: 8.3 MMOL/L (ref 5–15)
BUN SERPL-MCNC: 32 MG/DL (ref 8–23)
BUN/CREAT SERPL: 19.2 (ref 7–25)
CALCIUM SPEC-SCNC: 9.5 MG/DL (ref 8.6–10.5)
CHLORIDE SERPL-SCNC: 95 MMOL/L (ref 98–107)
CO2 SERPL-SCNC: 26.7 MMOL/L (ref 22–29)
CREAT SERPL-MCNC: 1.67 MG/DL (ref 0.76–1.27)
EGFRCR SERPLBLD CKD-EPI 2021: 41.9 ML/MIN/1.73
GLUCOSE SERPL-MCNC: 274 MG/DL (ref 65–99)
POTASSIUM SERPL-SCNC: 5 MMOL/L (ref 3.5–5.2)
SODIUM SERPL-SCNC: 130 MMOL/L (ref 136–145)

## 2022-12-27 PROCEDURE — 80048 BASIC METABOLIC PNL TOTAL CA: CPT

## 2022-12-27 PROCEDURE — 36415 COLL VENOUS BLD VENIPUNCTURE: CPT

## 2022-12-28 PROBLEM — R42 DIZZINESS: Status: ACTIVE | Noted: 2022-12-28

## 2022-12-28 NOTE — ASSESSMENT & PLAN NOTE
He reports this symptom was brief and mild, and does not wish to pursue any work-up for it currently.  I did  him regarding possible etiologies, I would encourage him if it reoccurs that he should be checking his blood pressure as well as his blood sugars.  If this is reoccurring, and either orthostatic hypotension or hypoglycemia are contributing factors, then I would recommend carotid ultrasound.

## 2022-12-28 NOTE — ASSESSMENT & PLAN NOTE
He has had a history of recurrent DVT/PE, and is on long-term anticoagulation with Eliquis.  He denies any episodes of bleeding, however I will recheck a CBC to make sure that is not a possible etiology of his dizziness.

## 2022-12-28 NOTE — ASSESSMENT & PLAN NOTE
Blood pressure in office today is 131/82.  He can continue amlodipine, losartan, and chlorthalidone.    At prior visit he had complaints of orthostatic hypotension.  Encouraged him to check his blood pressure if he has further episodes of dizziness, and we can consider holding his amlodipine and seeing if this resolves it.

## 2022-12-29 ENCOUNTER — OFFICE VISIT (OUTPATIENT)
Dept: PODIATRY | Facility: CLINIC | Age: 77
End: 2022-12-29

## 2022-12-29 VITALS
HEART RATE: 64 BPM | BODY MASS INDEX: 31.25 KG/M2 | TEMPERATURE: 97.1 F | HEIGHT: 69 IN | OXYGEN SATURATION: 100 % | WEIGHT: 211 LBS | SYSTOLIC BLOOD PRESSURE: 136 MMHG | DIASTOLIC BLOOD PRESSURE: 81 MMHG

## 2022-12-29 DIAGNOSIS — E11.8 DIABETIC FOOT: Primary | ICD-10-CM

## 2022-12-29 DIAGNOSIS — M79.672 FOOT PAIN, BILATERAL: ICD-10-CM

## 2022-12-29 DIAGNOSIS — B35.1 ONYCHOMYCOSIS: ICD-10-CM

## 2022-12-29 DIAGNOSIS — M79.671 FOOT PAIN, BILATERAL: ICD-10-CM

## 2022-12-29 DIAGNOSIS — L60.0 ONYCHOCRYPTOSIS: ICD-10-CM

## 2022-12-29 DIAGNOSIS — Z79.4 TYPE 2 DIABETES MELLITUS WITH DIABETIC POLYNEUROPATHY, WITH LONG-TERM CURRENT USE OF INSULIN: ICD-10-CM

## 2022-12-29 DIAGNOSIS — G62.9 NEUROPATHY: ICD-10-CM

## 2022-12-29 DIAGNOSIS — E11.42 TYPE 2 DIABETES MELLITUS WITH DIABETIC POLYNEUROPATHY, WITH LONG-TERM CURRENT USE OF INSULIN: ICD-10-CM

## 2022-12-29 PROCEDURE — G8404 LOW EXTEMITY NEUR EXAM DOCUM: HCPCS | Performed by: PODIATRIST

## 2022-12-29 PROCEDURE — 11721 DEBRIDE NAIL 6 OR MORE: CPT | Performed by: PODIATRIST

## 2022-12-29 RX ORDER — INSULIN DEGLUDEC 200 U/ML
INJECTION, SOLUTION SUBCUTANEOUS DAILY
COMMUNITY

## 2022-12-29 RX ORDER — BLOOD-GLUCOSE METER
KIT MISCELLANEOUS
COMMUNITY
Start: 2022-12-27

## 2022-12-29 RX ORDER — ISOPROPYL ALCOHOL 70 ML/100ML
SWAB TOPICAL
COMMUNITY
Start: 2022-12-07

## 2022-12-29 NOTE — PROGRESS NOTES
[facility] - PODIATRY    Today's Date: 12/29/22    Patient Name: Joon Zhao  MRN: [id number]  CSN: [id number]  PCP: Brian Henson MD, Last PCP Visit: 12/28/2022  Referring Provider: No ref. provider found    SUBJECTIVE     Chief Complaint   Patient presents with   • Left Foot - Follow-up, Nail Problem   • Right Foot - Follow-up, Nail Problem     HPI: Joon Zhao, a 77 y.o.male, comes to clinic.    New, Established, New Problem:  established     Location:  Toenails    Duration:   Greater than five years    Onset:  Gradual    Nature:  sore with palpation.    Stable, worsening, improving:   Stable    Aggravating factors:  Pain with shoe gear and ambulation.    Previous Treatment:  debridement    Patient reported last blood glucose: 263.  __________________________________    Medical changes:  Now using insulin.    Patient denies any fevers, chills, nausea, vomiting, shortness of breathe, nor any other constitutional signs nor symptoms.         Past Medical History:   Diagnosis Date   • Anemia    • Arthritis    • Back pain     CHRONIC NECK AND BACK PAIN   • BPH (benign prostatic hyperplasia) 03/02/2015   • BPH with urinary obstruction 06/01/2018   • CKD (chronic kidney disease), stage III ([facility])    • Controlled type 2 diabetes mellitus with diabetic polyneuropathy ([facility]) 07/05/2017   • COPD (chronic obstructive pulmonary disease) ([facility])    • Diabetes mellitus ([facility])    • Dizziness 08/10/2017   • DVT (deep venous thrombosis) ([facility])    • ED (erectile dysfunction) of organic origin    • Erectile dysfunction 2007   • Foot pain, bilateral 03/12/2018   • Glaucoma (increased eye pressure)    • High cholesterol    • Hyperlipidemia    • Hypertension    • Ingrowing nail 07/05/2017   • Kidney failure     STAGE III   • Pain in both feet    • PE (pulmonary thromboembolism) ([facility])    • Polyneuropathy 07/05/2017   • Primary central sleep apnea    • Tinea unguium 07/05/2017     Past Surgical History:    Procedure Laterality Date   • ADRENAL GLAND SURGERY     • COLONOSCOPY     • CYSTOSCOPY     • KIDNEY SURGERY      KIDNEY BIOSPY   • PROSTATE SURGERY  12/2007   • TURP / TRANSURETHRAL INCISION / DRAINAGE PROSTATE  06/2019     Family History   Problem Relation Age of Onset   • Hypertension Mother    • Heart disease Mother    • Stroke Mother    • Hypertension Sister    • Heart disease Sister    • Stroke Sister    • Diabetes Brother    • Diabetes Maternal Grandmother    • Diabetes Other    • Diabetes Son    • Cancer Neg Hx      Social History     Socioeconomic History   • Marital status:    Tobacco Use   • Smoking status: Never   • Smokeless tobacco: Never   Vaping Use   • Vaping Use: Never used   Substance and Sexual Activity   • Alcohol use: Not Currently     Comment: rare   • Drug use: Never   • Sexual activity: Not Currently     Partners: Female     Birth control/protection: None     Allergies   Allergen Reactions   • Atorvastatin Hives   • Ezetimibe Unknown - High Severity   • Furosemide Unknown - High Severity   • Simvastatin Hives   • Statins Unknown - High Severity   • Hydrochlorothiazide Hives     Current Outpatient Medications   Medication Sig Dispense Refill   • acetaminophen-codeine (TYLENOL #3) 300-30 MG per tablet Take 1 tablet by mouth Every 4 (Four) Hours As Needed for Moderate Pain .     • Advair -21 MCG/ACT inhaler      • albuterol sulfate  (90 Base) MCG/ACT inhaler Ventolin HFA 90 mcg/actuation inhalation HFA aerosol inhaler inhale 2 puffs (180 mcg) by inhalation route every 6 hours   Active     • Alcohol Swabs (Easy Touch Alcohol Prep Medium) 70 % pads      • amLODIPine (NORVASC) 2.5 MG tablet Take 1 tablet by mouth Daily. 90 tablet 3   • apixaban (ELIQUIS) 5 MG tablet tablet Take 5 mg by mouth 2 (Two) Times a Day.     • ARTIFICIAL SALIVA MT Apply  to the mouth or throat 2 (Two) Times a Day As Needed.     • aspirin 81 MG EC tablet aspirin 81 mg oral tablet,delayed release  (DR/EC) take 1 tablet (81 mg) by oral route once daily   Active     • azelastine (ASTELIN) 0.1 % nasal spray azelastine 137 mcg (0.1 %) nasal aerosol,spray spray 2 sprays in each nostril by intranasal route 2 times per day   Active     • baclofen (LIORESAL) 10 MG tablet Take 10 mg by mouth 2 (Two) Times a Day.     • cetirizine (zyrTEC) 10 MG tablet Take 10 mg by mouth Daily.     • chlorthalidone (HYGROTON) 25 MG tablet Take 25 mg by mouth 2 (two) times a day.     • cholecalciferol (VITAMIN D3) 25 MCG (1000 UT) tablet Take 1,000 Units by mouth Daily.     • coenzyme Q10 100 MG capsule Take 100 mg by mouth Daily.     • fluticasone (FLONASE) 50 MCG/ACT nasal spray Flonase 50 mcg/actuation nasal spray,suspension inhale 1 spray (50 mcg) in each nostril by intranasal route once daily   Suspended     • FREESTYLE LITE test strip      • gabapentin (NEURONTIN) 800 MG tablet Take 800 mg by mouth 3 (Three) Times a Day.     • Insulin Degludec FlexTouch 200 UNIT/ML solution pen-injector Inject  under the skin into the appropriate area as directed Daily.     • ipratropium (ATROVENT) 0.03 % nasal spray 2 sprays each nostril q 6 hrs     • Lactobacillus (Acidophilus) 100 MG capsule Take  by mouth Daily.     • Lancets (freestyle) lancets      • latanoprost (XALATAN) 0.005 % ophthalmic solution 1 drop.     • losartan (COZAAR) 50 MG tablet Take 50 mg by mouth. TAKE 1/2 TABLET DAILY     • omeprazole (priLOSEC) 20 MG capsule Take 20 mg by mouth As Needed.     • rOPINIRole (REQUIP) 0.25 MG tablet Take 0.25 mg by mouth every night at bedtime.     • Narcan 4 MG/0.1ML nasal spray        No current facility-administered medications for this visit.     Review of Systems   Constitutional: Negative.    Skin:        Painful toenails   All other systems reviewed and are negative.      OBJECTIVE     Vitals:    12/29/22 0833   BP: 136/81   Pulse: 64   Temp: 97.1 °F (36.2 °C)   SpO2: 100%       Lab Results   Component Value Date    HGBA1C 9.00 (H)  12/16/2022       Lab Results   Component Value Date    GLUCOSE 274 (H) 12/27/2022    CALCIUM 9.5 12/27/2022     (L) 12/27/2022    K 5.0 12/27/2022    CO2 26.7 12/27/2022    CL 95 (L) 12/27/2022    BUN 32 (H) 12/27/2022    CREATININE 1.67 (H) 12/27/2022    EGFRIFAFRI 45 (L) 09/15/2021    BCR 19.2 12/27/2022    ANIONGAP 8.3 12/27/2022       Patient seen in no apparent distress.      PHYSICAL EXAM:     Foot/Ankle Exam:       General:   Diabetic Foot Exam Performed    Appearance: elderly    Orientation: AAOx3    Affect: appropriate    Gait: unimpaired    Shoe Gear:  Casual shoes    VASCULAR      Right Foot Vascularity   Normal vascular exam    Dorsalis pedis:  2+  Posterior tibial:  2+  Skin Temperature: warm    Edema Grading:  None  CFT:  < 3 seconds  Pedal Hair Growth:  Present  Varicosities: none       Left Foot Vascularity   Normal vascular exam    Dorsalis pedis:  2+  Posterior tibial:  2+  Skin Temperature: warm    Edema Grading:  None  CFT:  < 3 seconds  Pedal Hair Growth:  Present  Varicosities: none        NEUROLOGIC     Right Foot Neurologic   Light touch sensation:  Absent  Vibratory sensation:  Absent  Hot/Cold sensation: absent    Protective Sensation using [place] Monofilament:  0     Left Foot Neurologic   Light touch sensation:  Absent  Vibratory sensation:  Absent  Hot/cold sensation: absent    Protective Sensation using [place] Monofilament:  0     MUSCULOSKELETAL      Right Foot Musculoskeletal   Arch:  Normal     Left Foot Musculoskeletal   Arch:  Normal     MUSCLE STRENGTH     Right Foot Muscle Strength   Foot dorsiflexion:  4  Foot plantar flexion:  4  Foot inversion:  4  Foot eversion:  4     Left Foot Muscle Strength   Foot dorsiflexion:  4  Foot plantar flexion:  4  Foot inversion:  4  Foot eversion:  4     RANGE OF MOTION      Right Foot Range of Motion   Foot and ankle ROM within normal limits       Left Foot Range of Motion   Foot and ankle ROM within normal limits        DERMATOLOGIC     Right Foot Dermatologic   Skin: skin intact    Nails: onychomycosis, abnormally thick, subungual debris, dystrophic nails and ingrown toenail    Nails comment:  Toenails 1 through 4     Left Foot Dermatologic   Skin: skin intact    Nails: onychomycosis, abnormally thick, subungual debris, dystrophic nails and ingrown toenail    Nails comment:  Toenails 1 through 3      Diabetic Foot Exam Performed    ASSESSMENT/PLAN     Diagnoses and all orders for this visit:    1. Diabetic foot (HCC) (Primary)    2. Neuropathy    3. Onychomycosis    4. Foot pain, bilateral    5. Onychocryptosis    6. Type 2 diabetes mellitus with diabetic polyneuropathy, with long-term current use of insulin (HCC)        Comprehensive lower extremity examination and evaluation was performed.    Discussed findings and treatment plan including risks, benefits, and treatment options with patient in detail. Patient agreed with treatment plan.    Toenails 1 through 4 on right and toenails 1 through 3 on the left were debrided in thickness and length and then smoothed with a Dremel Tool.  Tolerated the procedure well without complications.    Diabetic foot exam performed and documented this date, compliant with CQM required standards. Detail of findings as noted in physical exam.  Lower extremity Neurologic exam for diabetic patient performed and documented this date, compliant with PQRS required standards. Detail of findings as noted in physical exam.  Advised patient importance of good routine lower extremity hygiene. Advised patient importance of evaluating for intact skin and pain free nail borders.  Advised patient to use mirror to evaluate plantar/ soles of feet for better visualization. Advised patient monitor and phone office to be seen if any cracking to skin, open lesions, painful nail borders or if nails become elongated prior to next visit. Advised patient importance of daily cleansing of lower extremities, followed by  good skin cream to maintain normal hydration of skin. Also advised patient importance of close daily monitoring of blood sugar. Advised to regulate diet and medications to maintain control of blood sugar in optimal range. Contact primary care provider if difficulties maintaining blood sugar levels.  Advised Patient of presence of Diabetes Mellitus condition.  Advised Patient risk of progression and worsening or improvement, then return of condition.  Will monitor condition for any change in future. Treat with most appropriate treatment pending status of condition.  Counseled and advised patient extensively on nature and ramifications of diabetes. Standard instructions given to patient for good diabetic foot care and maintenance. Advised importance of careful monitoring to avoid break down and complications secondary to diabetes. Advised patient importance of strict maintenance of blood sugar control. Advised patient of possible ominous results from neglect of condition, i.e.: amputation/ loss of digits, feet and legs, or even death.  Patient states understands counseling, will monitor closely, continue good hygiene and routine diabetic foot care. Patient will contact office is questions or problems.      An After Visit Summary was printed and given to the patient at discharge, including (if requested) any available informative/educational handouts regarding diagnosis, treatment, or medications. All questions were answered to patient/family satisfaction. Should symptoms fail to improve or worsen they agree to call or return to clinic or to go to the Emergency Department. Discussed the importance of following up with any needed screening tests/labs/specialist appointments and any requested follow-up recommended by me today. Importance of maintaining follow-up discussed and patient accepts that missed appointments can delay diagnosis and potentially lead to worsening of conditions.    Return in about 9 weeks (around  3/2/2023) for Toenail Care., or sooner if acute issues arise.    This document has been electronically signed by Carl Payne DPM on December 29, 2022 08:54 EST

## 2023-01-03 ENCOUNTER — TELEPHONE (OUTPATIENT)
Dept: NEUROSURGERY | Facility: CLINIC | Age: 78
End: 2023-01-03

## 2023-01-03 NOTE — TELEPHONE ENCOUNTER
Information shows to have been sent 11/16. Called [facility] they confirmed fax number and stated they didn't get information.     Information resent and pt informed.

## 2023-01-03 NOTE — TELEPHONE ENCOUNTER
Caller: ISIDORO FAIR    Relationship: SELF    Best call back number: [phone]    What was the call regarding: PT CALLED AND STATES IN November WHEN HE WAS IN HE WAS SUPPOSED TO BE REFERRED TO PHYSICAL THERAPY.  PT STATES HE WENT OVER THERE TODAY AND THEY STATE THEY HAVE NOT RECEIVED A REFERRAL FOR HIM TO BE SEEN      Do you require a callback:   PLEASE CALLPT  THANK YOU

## 2023-02-09 ENCOUNTER — OFFICE VISIT (OUTPATIENT)
Dept: PULMONOLOGY | Facility: CLINIC | Age: 78
End: 2023-02-09
Payer: MEDICARE

## 2023-02-09 VITALS
WEIGHT: 207 LBS | DIASTOLIC BLOOD PRESSURE: 73 MMHG | RESPIRATION RATE: 16 BRPM | SYSTOLIC BLOOD PRESSURE: 136 MMHG | OXYGEN SATURATION: 100 % | BODY MASS INDEX: 30.66 KG/M2 | HEIGHT: 69 IN | HEART RATE: 78 BPM | TEMPERATURE: 97.1 F

## 2023-02-09 DIAGNOSIS — R06.09 DYSPNEA ON EXERTION: ICD-10-CM

## 2023-02-09 DIAGNOSIS — J30.2 SEASONAL ALLERGIES: ICD-10-CM

## 2023-02-09 DIAGNOSIS — J43.2 CENTRILOBULAR EMPHYSEMA: ICD-10-CM

## 2023-02-09 DIAGNOSIS — Z86.711 HISTORY OF PULMONARY EMBOLISM: ICD-10-CM

## 2023-02-09 DIAGNOSIS — R05.3 CHRONIC COUGH: Primary | ICD-10-CM

## 2023-02-09 DIAGNOSIS — G47.33 OSA ON CPAP: ICD-10-CM

## 2023-02-09 DIAGNOSIS — Z99.89 OSA ON CPAP: ICD-10-CM

## 2023-02-09 DIAGNOSIS — J30.9 ALLERGIC RHINITIS, UNSPECIFIED SEASONALITY, UNSPECIFIED TRIGGER: ICD-10-CM

## 2023-02-09 PROCEDURE — 99214 OFFICE O/P EST MOD 30 MIN: CPT | Performed by: NURSE PRACTITIONER

## 2023-02-09 RX ORDER — TIOTROPIUM BROMIDE AND OLODATEROL 3.124; 2.736 UG/1; UG/1
2 SPRAY, METERED RESPIRATORY (INHALATION)
Qty: 3 EACH | Refills: 3 | Status: SHIPPED | OUTPATIENT
Start: 2023-02-09

## 2023-02-09 RX ORDER — INSULIN GLARGINE 100 [IU]/ML
INJECTION, SOLUTION SUBCUTANEOUS
COMMUNITY
Start: 2023-02-07

## 2023-02-09 RX ORDER — TIOTROPIUM BROMIDE AND OLODATEROL 3.124; 2.736 UG/1; UG/1
2 SPRAY, METERED RESPIRATORY (INHALATION)
Qty: 2 EACH | Refills: 0 | COMMUNITY
Start: 2023-02-09 | End: 2023-02-10

## 2023-02-09 RX ORDER — FLURBIPROFEN SODIUM 0.3 MG/ML
SOLUTION/ DROPS OPHTHALMIC
COMMUNITY
Start: 2022-12-30

## 2023-02-09 NOTE — PROGRESS NOTES
Primary Care Provider  Brian Henson MD     Referring Provider  No ref. provider found     Chief Complaint  Acute visit (Patient states he has been having a hoarse voice, like a whisper after using Advair (prescribed by [facility]ergy [facility].) And frequent clearing of throat, coughing up phlegm. Patient states it started last May 2022.)    Subjective          Joon Zhao presents to [facility] PULMONARY [facility]ARE MEDICINE  History of Present Illness  Joon Zhao is a 77 y.o. male patient of Dr. Cazares for management of obstructive sleep apnea, emphysema, seasonal allergies, allergic rhinitis, dyspnea on exertion and chronic thrombolytic disease.    Patient is here for an acute visit for increase in cough and mucus production.  He believes that he is possibly having an adverse reaction to his Advair.  Patient's Advair was originally ordered by his allergist.  I have strongly encourage patient to follow-up with allergist since we will change his inhaler.  Educated patient that the issues that he is having with hoarseness could be from the steroid and the Advair.  He is agreeable to changing inhalers at this time.  We will try him on Stiolto and see if this helps.  Patient states that he will speak with his allergist at their next office visit.  Patient states that he does not need to use his albuterol inhaler often.  He continues to receive allergy injections as scheduled and states that he has an appointment with his allergist in May 2023.  He continues to take Eliquis as prescribed for his history of DVTs.  He is also on Claritin, Flonase and azelastine nasal spray for seasonal allergies and allergic rhinitis.  Patient to continue wearing CPAP machine at night.  [facility]  Overall, patient has no additional concerns at this time.  He is able to perform his ADLs without difficulty.  He is up-to-date with his flu, pneumonia and COVID vaccines.     His history  of smoking is   Tobacco Use: Low Risk    • Smoking Tobacco Use: Never   • Smokeless Tobacco Use: Never   • Passive Exposure: Not on file   .    Review of Systems   Constitutional: Negative for chills, fatigue, fever, unexpected weight gain and unexpected weight loss.   HENT: Negative for congestion (Nasal), hearing loss, mouth sores, nosebleeds, postnasal drip, sore throat and trouble swallowing.    Eyes: Negative for blurred vision and visual disturbance.   Respiratory: Positive for cough and shortness of breath. Negative for apnea and wheezing.         Negative for Hemoptysis     Cardiovascular: Negative for chest pain, palpitations and leg swelling.   Gastrointestinal: Negative for abdominal pain, constipation, diarrhea, nausea, vomiting and GERD.        Negative for Jaundice  Negative for Bloating  Negative for Melena   Musculoskeletal: Negative for joint swelling and myalgias.        Negative for Joint pain  Negative for Joint stiffness   Skin: Negative for color change.        Negative for cyanosis   Neurological: Negative for syncope, weakness, numbness and headache.      Sleep: Negative for Excessive daytime sleepiness  Negative for morning headaches  Negative for Snoring    Family History   Problem Relation Age of Onset   • Hypertension Mother    • Heart disease Mother    • Stroke Mother    • Hypertension Sister    • Heart disease Sister    • Stroke Sister    • Diabetes Brother    • Diabetes Maternal Grandmother    • Diabetes Other    • Diabetes Son    • Cancer Neg Hx         Social History     Socioeconomic History   • Marital status:    Tobacco Use   • Smoking status: Never   • Smokeless tobacco: Never   Vaping Use   • Vaping Use: Never used   Substance and Sexual Activity   • Alcohol use: Not Currently     Comment: rare   • Drug use: Never   • Sexual activity: Not Currently     Partners: Female     Birth control/protection: None        Past Medical History:   Diagnosis Date   • Anemia    •  Arthritis    • Back pain     CHRONIC NECK AND BACK PAIN   • BPH (benign prostatic hyperplasia) 03/02/2015   • BPH with urinary obstruction 06/01/2018   • CKD (chronic kidney disease), stage III ([facility])    • Controlled type 2 diabetes mellitus with diabetic polyneuropathy ([facility]) 07/05/2017   • COPD (chronic obstructive pulmonary disease) ([facility])    • Diabetes mellitus ([facility])    • Dizziness 08/10/2017   • DVT (deep venous thrombosis) ([facility])    • ED (erectile dysfunction) of organic origin    • Erectile dysfunction 2007   • Foot pain, bilateral 03/12/2018   • Glaucoma (increased eye pressure)    • High cholesterol    • Hyperlipidemia    • Hypertension    • Ingrowing nail 07/05/2017   • Kidney failure     STAGE III   • Pain in both feet    • PE (pulmonary thromboembolism) ([facility])    • Polyneuropathy 07/05/2017   • Primary central sleep apnea    • Tinea unguium 07/05/2017        Immunization History   Administered Date(s) Administered   • COVID-19 (MODERNA) 1st, 2nd, 3rd Dose Only 03/16/2021, 04/13/2021   • COVID-19 (MODERNA) BOOSTER 12/02/2021   • FluMist 2-49yrs 09/16/2022   • Fluad Quad 65+ 09/16/2022   • Fluzone High-Dose 65+yrs 09/15/2020, 10/08/2021   • Hepb Hepatitis B Vaccine Heplisav-b 11/04/2015   • Influenza, Unspecified 09/15/2020, 10/08/2021   • Pneumococcal Conjugate 13-Valent (PCV13) 03/20/2020   • Pneumococcal Polysaccharide (PPSV23) 09/25/2017   • Shingrix 03/20/2020, 09/15/2020         Allergies   Allergen Reactions   • Atorvastatin Hives   • Ezetimibe Unknown - High Severity   • Furosemide Unknown - High Severity   • Simvastatin Hives   • Statins Unknown - High Severity   • Hydrochlorothiazide Hives          Current Outpatient Medications:   •  acetaminophen-codeine (TYLENOL #3) 300-30 MG per tablet, Take 1 tablet by mouth Every 4 (Four) Hours As Needed for Moderate Pain ., Disp: , Rfl:   •  Advair -21 MCG/ACT inhaler, , Disp: , Rfl:   •  albuterol sulfate  (90 Base) MCG/ACT inhaler, Ventolin HFA  90 mcg/actuation inhalation HFA aerosol inhaler inhale 2 puffs (180 mcg) by inhalation route every 6 hours   Active, Disp: , Rfl:   •  Alcohol Swabs (Easy Touch Alcohol Prep Medium) 70 % pads, , Disp: , Rfl:   •  amLODIPine (NORVASC) 2.5 MG tablet, Take 1 tablet by mouth Daily., Disp: 90 tablet, Rfl: 3  •  apixaban (ELIQUIS) 5 MG tablet tablet, Take 5 mg by mouth 2 (Two) Times a Day., Disp: , Rfl:   •  ARTIFICIAL SALIVA MT, Apply  to the mouth or throat 2 (Two) Times a Day As Needed., Disp: , Rfl:   •  aspirin 81 MG EC tablet, aspirin 81 mg oral tablet,delayed release (DR/EC) take 1 tablet (81 mg) by oral route once daily   Active, Disp: , Rfl:   •  azelastine (ASTELIN) 0.1 % nasal spray, azelastine 137 mcg (0.1 %) nasal aerosol,spray spray 2 sprays in each nostril by intranasal route 2 times per day   Active, Disp: , Rfl:   •  B-D UF III MINI PEN NEEDLES 31G X 5 MM misc, , Disp: , Rfl:   •  baclofen (LIORESAL) 10 MG tablet, Take 10 mg by mouth 2 (Two) Times a Day., Disp: , Rfl:   •  cetirizine (zyrTEC) 10 MG tablet, Take 10 mg by mouth Daily., Disp: , Rfl:   •  chlorthalidone (HYGROTON) 25 MG tablet, Take 25 mg by mouth 2 (two) times a day., Disp: , Rfl:   •  cholecalciferol (VITAMIN D3) 25 MCG (1000 UT) tablet, Take 1,000 Units by mouth Daily., Disp: , Rfl:   •  coenzyme Q10 100 MG capsule, Take 100 mg by mouth Daily., Disp: , Rfl:   •  fluticasone (FLONASE) 50 MCG/ACT nasal spray, Flonase 50 mcg/actuation nasal spray,suspension inhale 1 spray (50 mcg) in each nostril by intranasal route once daily   Suspended, Disp: , Rfl:   •  FREESTYLE LITE test strip, , Disp: , Rfl:   •  gabapentin (NEURONTIN) 800 MG tablet, Take 800 mg by mouth 3 (Three) Times a Day., Disp: , Rfl:   •  Insulin Degludec FlexTouch 200 UNIT/ML solution pen-injector, Inject  under the skin into the appropriate area as directed Daily., Disp: , Rfl:   •  ipratropium (ATROVENT) 0.03 % nasal spray, 2 sprays each nostril q 6 hrs, Disp: , Rfl:   •   Lactobacillus (Acidophilus) 100 MG capsule, Take  by mouth Daily., Disp: , Rfl:   •  Lancets (freestyle) lancets, , Disp: , Rfl:   •  Lantus SoloStar 100 UNIT/ML injection pen, , Disp: , Rfl:   •  latanoprost (XALATAN) 0.005 % ophthalmic solution, 1 drop., Disp: , Rfl:   •  losartan (COZAAR) 50 MG tablet, Take 50 mg by mouth. TAKE 1/2 TABLET DAILY, Disp: , Rfl:   •  Narcan 4 MG/0.1ML nasal spray, , Disp: , Rfl:   •  omeprazole (priLOSEC) 20 MG capsule, Take 20 mg by mouth As Needed., Disp: , Rfl:   •  rOPINIRole (REQUIP) 0.25 MG tablet, Take 0.25 mg by mouth every night at bedtime., Disp: , Rfl:   •  tiotropium bromide-olodaterol (Stiolto Respimat) 2.5-2.5 MCG/ACT aerosol solution inhaler, Inhale 2 puffs Daily., Disp: 3 each, Rfl: 3     Objective   Physical Exam  Constitutional:       General: He is not in acute distress.     Appearance: Normal appearance. He is normal weight.   HENT:      Right Ear: Hearing normal.      Left Ear: Hearing normal.      Nose: No nasal tenderness or congestion.      Mouth/Throat:      Mouth: Mucous membranes are moist. No oral lesions.   Eyes:      Extraocular Movements: Extraocular movements intact.      Pupils: Pupils are equal, round, and reactive to light.   Neck:      Thyroid: No thyroid mass or thyromegaly.   Cardiovascular:      Rate and Rhythm: Normal rate and regular rhythm.      Pulses: Normal pulses.      Heart sounds: Normal heart sounds. No murmur heard.  Pulmonary:      Effort: Pulmonary effort is normal.      Breath sounds: Normal breath sounds. No wheezing, rhonchi or rales.   Abdominal:      General: Bowel sounds are normal. There is no distension.      Palpations: Abdomen is soft.      Tenderness: There is no abdominal tenderness.   Musculoskeletal:      Cervical back: Neck supple.      Right lower leg: No edema.      Left lower leg: No edema.   Lymphadenopathy:      Cervical: No cervical adenopathy.      Upper Body:      Right upper body: No axillary adenopathy.  "  Skin:     General: Skin is warm and dry.      Findings: No lesion or rash.   Neurological:      General: No focal deficit present.      Mental Status: He is alert and oriented to person, place, and time.   Psychiatric:         Mood and Affect: Affect normal. Mood is not anxious or depressed.         Vital Signs:   /73 (BP Location: Right arm, Patient Position: Sitting, Cuff Size: Large Adult)   Pulse 78   Temp 97.1 °F (36.2 °C) (Temporal)   Resp 16   Ht 175.3 cm (69\")   Wt 93.9 kg (207 lb)   SpO2 100% Comment: room air  BMI 30.57 kg/m²        Result Review :     CMP    CMP 12/2/22 12/16/22 12/27/22   Glucose 199 (A) 203 (A) 274 (A)   BUN 36 (A) 34 (A) 32 (A)   Creatinine 2.18 (A) 2.24 (A) 1.67 (A)   eGFR 30.4 (A) 29.5 (A) 41.9 (A)   Sodium 134 (A) 134 (A) 130 (A)   Potassium 4.6 4.6 5.0   Chloride 95 (A) 94 (A) 95 (A)   Calcium 9.6 9.5 9.5   Total Protein 7.2 7.5    Albumin 4.20 4.60    Globulin 3.0 2.9    Total Bilirubin 0.5 0.6    Alkaline Phosphatase 85 80    AST (SGOT) 29 22    ALT (SGPT) 22 18    Albumin/Globulin Ratio 1.4 1.6    BUN/Creatinine Ratio 16.5 15.2 19.2   Anion Gap 11.3 13.0 8.3   (A) Abnormal value       Comments are available for some flowsheets but are not being displayed.           CBC w/diff    CBC w/Diff 9/15/22 12/2/22 12/16/22   WBC 6.04 4.20 5.45   RBC 4.60 4.80 5.02   Hemoglobin 14.6 14.4 15.5   Hematocrit 42.9 43.7 46.2   MCV 93.3 91.0 92.0   MCH 31.7 30.0 30.9   MCHC 34.0 33.0 33.5   RDW 11.8 (A) 11.6 (A) 11.7 (A)   Platelets 185 174 170   Neutrophil Rel % 42.2 (A)  37.2 (A)   Immature Granulocyte Rel % 0.5  0.2   Lymphocyte Rel % 46.2 (A)  51.2 (A)   Monocyte Rel % 9.4  10.1   Eosinophil Rel % 1.2  0.7   Basophil Rel % 0.5  0.6   (A) Abnormal value            Data reviewed: Radiologic studies Chest CT 1/21/2019, chest CT in media on 3/28/2022 showing centrilobular emphysema, chest x-ray 7/12/2021, pulmonary function test 7/29/2020 and My last office note   Procedures      "   Assessment and Plan    Diagnoses and all orders for this visit:    1. Chronic cough (Primary)  -     tiotropium bromide-olodaterol (Stiolto Respimat) 2.5-2.5 MCG/ACT aerosol solution inhaler; Inhale 2 puffs Daily.  Dispense: 3 each; Refill: 3    2. Centrilobular emphysema (HCC)  -     tiotropium bromide-olodaterol (Stiolto Respimat) 2.5-2.5 MCG/ACT aerosol solution inhaler; Inhale 2 puffs Daily.  Dispense: 3 each; Refill: 3    3. Seasonal allergies    4. Dyspnea on exertion    5. History of pulmonary embolism    6. Allergic rhinitis, unspecified seasonality, unspecified trigger    7. GRAY on CPAP    8.  Stop Advair and start Stiolto 2 puffs daily.  Demonstration on how to use inhaler provided in office.  9.  Continue albuterol as needed.  10.  Continue azelastine nasal spray, Flonase and Zyrtec for allergies allergic rhinitis.  11.  Continue Eliquis for history of pulmonary embolism.  12.  Encourage patient to follow-up with allergist as scheduled to discuss changing inhalers from Advair to Stiolto.  13.  Continue CPAP at current settings and [facility].  14.  Follow-up with Dr. Cazares as scheduled, sooner if needed.        Follow Up   No follow-ups on file.  Patient was given instructions and counseling regarding his condition or for health maintenance advice. Please see specific information pulled into the AVS if appropriate.

## 2023-02-09 NOTE — PATIENT INSTRUCTIONS
Sleep Apnea  Sleep apnea is a condition in which breathing pauses or becomes shallow during sleep. People with sleep apnea usually snore loudly. They may have times when they gasp and stop breathing for 10 seconds or more during sleep. This may happen many times during the night.  Sleep apnea disrupts your sleep and keeps your body from getting the rest that it needs. This condition can increase your risk of certain health problems, including:  Heart attack.  Stroke.  Obesity.  Type 2 diabetes.  Heart failure.  Irregular heartbeat.  High blood pressure.  The goal of treatment is to help you breathe normally again.  What are the causes?  The most common cause of sleep apnea is a collapsed or blocked airway.  There are three kinds of sleep apnea:  Obstructive sleep apnea. This kind is caused by a blocked or collapsed airway.  Central sleep apnea. This kind happens when the part of the brain that controls breathing does not send the correct signals to the muscles that control breathing.  Mixed sleep apnea. This is a combination of obstructive and central sleep apnea.  What increases the risk?  You are more likely to develop this condition if you:  Are overweight.  Smoke.  Have a smaller than normal airway.  Are older.  Are male.  Drink alcohol.  Take sedatives or tranquilizers.  Have a family history of sleep apnea.  Have a tongue or tonsils that are larger than normal.  What are the signs or symptoms?  Symptoms of this condition include:  Trouble staying asleep.  Loud snoring.  Morning headaches.  Waking up gasping.  Dry mouth or sore throat in the morning.  Daytime sleepiness and tiredness.  If you have daytime fatigue because of sleep apnea, you may be more likely to have:  Trouble concentrating.  Forgetfulness.  Irritability or mood swings.  Personality changes.  Feelings of depression.  Sexual dysfunction. This may include loss of interest if you are female, or erectile dysfunction if you are male.  How is this  diagnosed?  This condition may be diagnosed with:  A medical history.  A physical exam.  A series of tests that are done while you are sleeping (sleep study). These tests are usually done in a sleep lab, but they may also be done at home.  How is this treated?  Treatment for this condition aims to restore normal breathing and to ease symptoms during sleep. It may involve managing health issues that can affect breathing, such as high blood pressure or obesity. Treatment may include:  Sleeping on your side.  Using a decongestant if you have nasal congestion.  Avoiding the use of depressants, including alcohol, sedatives, and narcotics.  Losing weight if you are overweight.  Making changes to your diet.  Quitting smoking.  Using a device to open your airway while you sleep, such as:  An oral appliance. This is a custom-made mouthpiece that shifts your lower jaw forward.  A continuous positive airway pressure (CPAP) device. This device blows air through a mask when you breathe out (exhale).  A nasal expiratory positive airway pressure (EPAP) device. This device has valves that you put into each nostril.  A bi-level positive airway pressure (BIPAP) device. This device blows air through a mask when you breathe in (inhale) and breathe out (exhale).  Having surgery if other treatments do not work. During surgery, excess tissue is removed to create a wider airway.  Follow these instructions at home:  Lifestyle  Make any lifestyle changes that your health care provider recommends.  Eat a healthy, well-balanced diet.  Take steps to lose weight if you are overweight.  Avoid using depressants, including alcohol, sedatives, and narcotics.  Do not use any products that contain nicotine or tobacco. These products include cigarettes, chewing tobacco, and vaping devices, such as e-cigarettes. If you need help quitting, ask your health care provider.  General instructions  Take over-the-counter and prescription medicines only as told  by your health care provider.  If you were given a device to open your airway while you sleep, use it only as told by your health care provider.  If you are having surgery, make sure to tell your health care provider you have sleep apnea. You may need to bring your device with you.  Keep all follow-up visits. This is important.  Contact a health care provider if:  The device that you received to open your airway during sleep is uncomfortable or does not seem to be working.  Your symptoms do not improve.  Your symptoms get worse.  Get help right away if:  You develop:  Chest pain.  Shortness of breath.  Discomfort in your back, arms, or stomach.  You have:  Trouble speaking.  Weakness on one side of your body.  Drooping in your face.  These symptoms may represent a serious problem that is an emergency. Do not wait to see if the symptoms will go away. Get medical help right away. Call your local emergency services (911 in the [place] Do not drive yourself to the hospital.  Summary  Sleep apnea is a condition in which breathing pauses or becomes shallow during sleep.  The most common cause is a collapsed or blocked airway.  The goal of treatment is to restore normal breathing and to ease symptoms during sleep.  This information is not intended to replace advice given to you by your health care provider. Make sure you discuss any questions you have with your health care provider.  Document Revised: 07/27/2022 Document Reviewed: 11/26/2021  [employer]vier Patient Education © 2022 [employer]

## 2023-02-23 ENCOUNTER — LAB (OUTPATIENT)
Dept: LAB | Facility: HOSPITAL | Age: 78
End: 2023-02-23
Payer: MEDICARE

## 2023-02-23 ENCOUNTER — TRANSCRIBE ORDERS (OUTPATIENT)
Dept: ADMINISTRATIVE | Facility: HOSPITAL | Age: 78
End: 2023-02-23
Payer: MEDICARE

## 2023-02-23 DIAGNOSIS — N18.30 STAGE 3 CHRONIC KIDNEY DISEASE, UNSPECIFIED WHETHER STAGE 3A OR 3B CKD: ICD-10-CM

## 2023-02-23 DIAGNOSIS — I10 PRIMARY HYPERTENSION: ICD-10-CM

## 2023-02-23 DIAGNOSIS — N18.30 STAGE 3 CHRONIC KIDNEY DISEASE, UNSPECIFIED WHETHER STAGE 3A OR 3B CKD: Primary | ICD-10-CM

## 2023-02-23 LAB
25(OH)D3 SERPL-MCNC: 36.5 NG/ML (ref 30–100)
ALBUMIN UR-MCNC: <1.2 MG/DL
BASOPHILS # BLD AUTO: 0.02 10*3/MM3 (ref 0–0.2)
BASOPHILS NFR BLD AUTO: 0.2 % (ref 0–1.5)
BILIRUB UR QL STRIP: NEGATIVE
CLARITY UR: CLEAR
COLOR UR: YELLOW
CREAT UR-MCNC: 76.2 MG/DL
CREAT UR-MCNC: 86.3 MG/DL
DEPRECATED RDW RBC AUTO: 41.1 FL (ref 37–54)
EOSINOPHIL # BLD AUTO: 0.02 10*3/MM3 (ref 0–0.4)
EOSINOPHIL NFR BLD AUTO: 0.2 % (ref 0.3–6.2)
ERYTHROCYTE [DISTWIDTH] IN BLOOD BY AUTOMATED COUNT: 12.5 % (ref 12.3–15.4)
GLUCOSE UR STRIP-MCNC: NEGATIVE MG/DL
HCT VFR BLD AUTO: 41.4 % (ref 37.5–51)
HGB BLD-MCNC: 13.9 G/DL (ref 13–17.7)
HGB UR QL STRIP.AUTO: NEGATIVE
IMM GRANULOCYTES # BLD AUTO: 0.02 10*3/MM3 (ref 0–0.05)
IMM GRANULOCYTES NFR BLD AUTO: 0.2 % (ref 0–0.5)
KETONES UR QL STRIP: NEGATIVE
LEUKOCYTE ESTERASE UR QL STRIP.AUTO: NEGATIVE
LYMPHOCYTES # BLD AUTO: 3.38 10*3/MM3 (ref 0.7–3.1)
LYMPHOCYTES NFR BLD AUTO: 41.4 % (ref 19.6–45.3)
MAGNESIUM SERPL-MCNC: 2.1 MG/DL (ref 1.6–2.4)
MCH RBC QN AUTO: 30.5 PG (ref 26.6–33)
MCHC RBC AUTO-ENTMCNC: 33.6 G/DL (ref 31.5–35.7)
MCV RBC AUTO: 91 FL (ref 79–97)
MICROALBUMIN/CREAT UR: NORMAL MG/G{CREAT}
MONOCYTES # BLD AUTO: 0.84 10*3/MM3 (ref 0.1–0.9)
MONOCYTES NFR BLD AUTO: 10.3 % (ref 5–12)
NEUTROPHILS NFR BLD AUTO: 3.89 10*3/MM3 (ref 1.7–7)
NEUTROPHILS NFR BLD AUTO: 47.7 % (ref 42.7–76)
NITRITE UR QL STRIP: NEGATIVE
NRBC BLD AUTO-RTO: 0 /100 WBC (ref 0–0.2)
PH UR STRIP.AUTO: 6 [PH] (ref 5–8)
PLATELET # BLD AUTO: 189 10*3/MM3 (ref 140–450)
PMV BLD AUTO: 11.4 FL (ref 6–12)
PROT ?TM UR-MCNC: 4.1 MG/DL
PROT UR QL STRIP: NEGATIVE
PROT/CREAT UR: 0.05 MG/G{CREAT}
RBC # BLD AUTO: 4.55 10*6/MM3 (ref 4.14–5.8)
SP GR UR STRIP: 1.01 (ref 1–1.03)
UROBILINOGEN UR QL STRIP: NORMAL
WBC NRBC COR # BLD: 8.17 10*3/MM3 (ref 3.4–10.8)

## 2023-02-23 PROCEDURE — 84156 ASSAY OF PROTEIN URINE: CPT

## 2023-02-23 PROCEDURE — 83735 ASSAY OF MAGNESIUM: CPT

## 2023-02-23 PROCEDURE — 82570 ASSAY OF URINE CREATININE: CPT

## 2023-02-23 PROCEDURE — 82306 VITAMIN D 25 HYDROXY: CPT

## 2023-02-23 PROCEDURE — 81003 URINALYSIS AUTO W/O SCOPE: CPT

## 2023-02-23 PROCEDURE — 85025 COMPLETE CBC W/AUTO DIFF WBC: CPT

## 2023-02-23 PROCEDURE — 36415 COLL VENOUS BLD VENIPUNCTURE: CPT

## 2023-02-23 PROCEDURE — 82043 UR ALBUMIN QUANTITATIVE: CPT

## 2023-03-08 ENCOUNTER — LAB (OUTPATIENT)
Dept: LAB | Facility: HOSPITAL | Age: 78
End: 2023-03-08
Payer: MEDICARE

## 2023-03-08 ENCOUNTER — TRANSCRIBE ORDERS (OUTPATIENT)
Dept: ADMINISTRATIVE | Facility: HOSPITAL | Age: 78
End: 2023-03-08
Payer: MEDICARE

## 2023-03-08 ENCOUNTER — TRANSCRIBE ORDERS (OUTPATIENT)
Dept: LAB | Facility: HOSPITAL | Age: 78
End: 2023-03-08
Payer: MEDICARE

## 2023-03-08 DIAGNOSIS — E04.1 LEFT THYROID NODULE: Primary | ICD-10-CM

## 2023-03-08 DIAGNOSIS — E11.9 DIABETES MELLITUS WITHOUT COMPLICATION: ICD-10-CM

## 2023-03-08 DIAGNOSIS — N18.30 STAGE 3 CHRONIC KIDNEY DISEASE, UNSPECIFIED WHETHER STAGE 3A OR 3B CKD: ICD-10-CM

## 2023-03-08 DIAGNOSIS — E11.9 DIABETES MELLITUS WITHOUT COMPLICATION: Primary | ICD-10-CM

## 2023-03-08 LAB
ALBUMIN SERPL-MCNC: 4.4 G/DL (ref 3.5–5.2)
ALBUMIN UR-MCNC: <1.2 MG/DL
ALBUMIN/GLOB SERPL: 1.4 G/DL
ALP SERPL-CCNC: 79 U/L (ref 39–117)
ALT SERPL W P-5'-P-CCNC: 25 U/L (ref 1–41)
ANION GAP SERPL CALCULATED.3IONS-SCNC: 7.5 MMOL/L (ref 5–15)
AST SERPL-CCNC: 32 U/L (ref 1–40)
BACTERIA UR QL AUTO: NORMAL /HPF
BASOPHILS # BLD AUTO: 0.03 10*3/MM3 (ref 0–0.2)
BASOPHILS NFR BLD AUTO: 0.6 % (ref 0–1.5)
BILIRUB SERPL-MCNC: 0.4 MG/DL (ref 0–1.2)
BILIRUB UR QL STRIP: NEGATIVE
BUN SERPL-MCNC: 20 MG/DL (ref 8–23)
BUN/CREAT SERPL: 11.6 (ref 7–25)
CALCIUM SPEC-SCNC: 9.9 MG/DL (ref 8.6–10.5)
CHLORIDE SERPL-SCNC: 100 MMOL/L (ref 98–107)
CLARITY UR: CLEAR
CO2 SERPL-SCNC: 29.5 MMOL/L (ref 22–29)
COLOR UR: YELLOW
CREAT SERPL-MCNC: 1.73 MG/DL (ref 0.76–1.27)
CREAT UR-MCNC: 57.9 MG/DL
DEPRECATED RDW RBC AUTO: 41.7 FL (ref 37–54)
EGFRCR SERPLBLD CKD-EPI 2021: 40.2 ML/MIN/1.73
EOSINOPHIL # BLD AUTO: 0.05 10*3/MM3 (ref 0–0.4)
EOSINOPHIL NFR BLD AUTO: 0.9 % (ref 0.3–6.2)
ERYTHROCYTE [DISTWIDTH] IN BLOOD BY AUTOMATED COUNT: 12.6 % (ref 12.3–15.4)
GLOBULIN UR ELPH-MCNC: 3.1 GM/DL
GLUCOSE SERPL-MCNC: 90 MG/DL (ref 65–99)
GLUCOSE UR STRIP-MCNC: NEGATIVE MG/DL
HBA1C MFR BLD: 7.6 % (ref 4.8–5.6)
HCT VFR BLD AUTO: 41 % (ref 37.5–51)
HGB BLD-MCNC: 13.7 G/DL (ref 13–17.7)
HGB UR QL STRIP.AUTO: NEGATIVE
HYALINE CASTS UR QL AUTO: NORMAL /LPF
IMM GRANULOCYTES # BLD AUTO: 0.02 10*3/MM3 (ref 0–0.05)
IMM GRANULOCYTES NFR BLD AUTO: 0.4 % (ref 0–0.5)
KETONES UR QL STRIP: NEGATIVE
LEUKOCYTE ESTERASE UR QL STRIP.AUTO: NEGATIVE
LYMPHOCYTES # BLD AUTO: 2.6 10*3/MM3 (ref 0.7–3.1)
LYMPHOCYTES NFR BLD AUTO: 47.7 % (ref 19.6–45.3)
MCH RBC QN AUTO: 30.7 PG (ref 26.6–33)
MCHC RBC AUTO-ENTMCNC: 33.4 G/DL (ref 31.5–35.7)
MCV RBC AUTO: 91.9 FL (ref 79–97)
MONOCYTES # BLD AUTO: 0.48 10*3/MM3 (ref 0.1–0.9)
MONOCYTES NFR BLD AUTO: 8.8 % (ref 5–12)
NEUTROPHILS NFR BLD AUTO: 2.27 10*3/MM3 (ref 1.7–7)
NEUTROPHILS NFR BLD AUTO: 41.6 % (ref 42.7–76)
NITRITE UR QL STRIP: NEGATIVE
NRBC BLD AUTO-RTO: 0 /100 WBC (ref 0–0.2)
PH UR STRIP.AUTO: 6.5 [PH] (ref 5–8)
PLATELET # BLD AUTO: 179 10*3/MM3 (ref 140–450)
PMV BLD AUTO: 11.8 FL (ref 6–12)
POTASSIUM SERPL-SCNC: 4.4 MMOL/L (ref 3.5–5.2)
PROT ?TM UR-MCNC: <4 MG/DL
PROT SERPL-MCNC: 7.5 G/DL (ref 6–8.5)
PROT UR QL STRIP: NEGATIVE
PROT/CREAT UR: NORMAL MG/G{CREAT}
RBC # BLD AUTO: 4.46 10*6/MM3 (ref 4.14–5.8)
RBC # UR STRIP: NORMAL /HPF
REF LAB TEST METHOD: NORMAL
SODIUM SERPL-SCNC: 137 MMOL/L (ref 136–145)
SP GR UR STRIP: 1.01 (ref 1–1.03)
SQUAMOUS #/AREA URNS HPF: NORMAL /HPF
UROBILINOGEN UR QL STRIP: NORMAL
WBC # UR STRIP: NORMAL /HPF
WBC NRBC COR # BLD: 5.45 10*3/MM3 (ref 3.4–10.8)

## 2023-03-08 PROCEDURE — 36415 COLL VENOUS BLD VENIPUNCTURE: CPT

## 2023-03-08 PROCEDURE — 85025 COMPLETE CBC W/AUTO DIFF WBC: CPT

## 2023-03-08 PROCEDURE — 80053 COMPREHEN METABOLIC PANEL: CPT

## 2023-03-08 PROCEDURE — 81001 URINALYSIS AUTO W/SCOPE: CPT

## 2023-03-08 PROCEDURE — 82043 UR ALBUMIN QUANTITATIVE: CPT

## 2023-03-08 PROCEDURE — 83036 HEMOGLOBIN GLYCOSYLATED A1C: CPT

## 2023-03-08 PROCEDURE — 82570 ASSAY OF URINE CREATININE: CPT

## 2023-03-08 PROCEDURE — 84156 ASSAY OF PROTEIN URINE: CPT

## 2023-03-23 ENCOUNTER — OFFICE VISIT (OUTPATIENT)
Dept: PODIATRY | Facility: CLINIC | Age: 78
End: 2023-03-23
Payer: MEDICARE

## 2023-03-23 VITALS
SYSTOLIC BLOOD PRESSURE: 125 MMHG | OXYGEN SATURATION: 94 % | DIASTOLIC BLOOD PRESSURE: 78 MMHG | TEMPERATURE: 98.4 F | HEART RATE: 80 BPM

## 2023-03-23 DIAGNOSIS — E11.42 TYPE 2 DIABETES MELLITUS WITH DIABETIC POLYNEUROPATHY, WITH LONG-TERM CURRENT USE OF INSULIN: ICD-10-CM

## 2023-03-23 DIAGNOSIS — L60.0 ONYCHOCRYPTOSIS: ICD-10-CM

## 2023-03-23 DIAGNOSIS — G62.9 NEUROPATHY: ICD-10-CM

## 2023-03-23 DIAGNOSIS — E11.9 NON-INSULIN DEPENDENT TYPE 2 DIABETES MELLITUS: Primary | ICD-10-CM

## 2023-03-23 DIAGNOSIS — M79.671 FOOT PAIN, BILATERAL: ICD-10-CM

## 2023-03-23 DIAGNOSIS — B35.1 ONYCHOMYCOSIS: ICD-10-CM

## 2023-03-23 DIAGNOSIS — E11.8 DIABETIC FOOT: ICD-10-CM

## 2023-03-23 DIAGNOSIS — Z79.4 TYPE 2 DIABETES MELLITUS WITH DIABETIC POLYNEUROPATHY, WITH LONG-TERM CURRENT USE OF INSULIN: ICD-10-CM

## 2023-03-23 DIAGNOSIS — M79.672 FOOT PAIN, BILATERAL: ICD-10-CM

## 2023-03-23 PROCEDURE — 11721 DEBRIDE NAIL 6 OR MORE: CPT | Performed by: PODIATRIST

## 2023-03-23 PROCEDURE — 3078F DIAST BP <80 MM HG: CPT | Performed by: PODIATRIST

## 2023-03-23 PROCEDURE — 3074F SYST BP LT 130 MM HG: CPT | Performed by: PODIATRIST

## 2023-03-23 PROCEDURE — G8404 LOW EXTEMITY NEUR EXAM DOCUM: HCPCS | Performed by: PODIATRIST

## 2023-03-23 PROCEDURE — 1160F RVW MEDS BY RX/DR IN RCRD: CPT | Performed by: PODIATRIST

## 2023-03-23 PROCEDURE — 1159F MED LIST DOCD IN RCRD: CPT | Performed by: PODIATRIST

## 2023-03-23 RX ORDER — CYCLOBENZAPRINE HCL 10 MG
TABLET ORAL
COMMUNITY
Start: 2023-03-21

## 2023-03-23 NOTE — PROGRESS NOTES
[facility] - PODIATRY    Today's Date: 03/23/23    Patient Name: Joon Zhao  MRN: [id number]  CSN: [id number]  PCP: Brian Henson MD, Last PCP Visit: 3/1/2023  Referring Provider: No ref. provider found    SUBJECTIVE     Chief Complaint   Patient presents with   • Left Foot - Follow-up, Nail Problem   • Right Foot - Follow-up, Nail Problem     HPI: Joon Zhao, a 77 y.o.male, comes to clinic.    New, Established, New Problem:  established     Location:  Toenails    Duration:   Greater than five years    Onset:  Gradual    Nature:  sore with palpation.    Stable, worsening, improving:   Stable    Aggravating factors:  Pain with shoe gear and ambulation.    Previous Treatment:  debridement    Patient reported last blood glucose:  108.  __________________________________    Medical changes:  Started being followed by Dr. NUNN, endocrinology.    Patient denies any fevers, chills, nausea, vomiting, shortness of breathe, nor any other constitutional signs nor symptoms.         Past Medical History:   Diagnosis Date   • Anemia    • Arthritis    • Back pain     CHRONIC NECK AND BACK PAIN   • BPH (benign prostatic hyperplasia) 03/02/2015   • BPH with urinary obstruction 06/01/2018   • CKD (chronic kidney disease), stage III ([facility])    • Controlled type 2 diabetes mellitus with diabetic polyneuropathy ([facility]) 07/05/2017   • COPD (chronic obstructive pulmonary disease) ([facility])    • Diabetes mellitus ([facility])    • Dizziness 08/10/2017   • DVT (deep venous thrombosis) ([facility])    • ED (erectile dysfunction) of organic origin    • Erectile dysfunction 2007   • Foot pain, bilateral 03/12/2018   • Glaucoma (increased eye pressure)    • High cholesterol    • Hyperlipidemia    • Hypertension    • Ingrowing nail 07/05/2017   • Kidney failure     STAGE III   • Pain in both feet    • PE (pulmonary thromboembolism) ([facility])    • Polyneuropathy 07/05/2017   • Primary central sleep apnea    • Tinea unguium 07/05/2017      Past Surgical History:   Procedure Laterality Date   • ADRENAL GLAND SURGERY     • COLONOSCOPY     • CYSTOSCOPY     • KIDNEY SURGERY      KIDNEY BIOSPY   • PROSTATE SURGERY  12/2007   • TURP / TRANSURETHRAL INCISION / DRAINAGE PROSTATE  06/2019     Family History   Problem Relation Age of Onset   • Hypertension Mother    • Heart disease Mother    • Stroke Mother    • Hypertension Sister    • Heart disease Sister    • Stroke Sister    • Diabetes Brother    • Diabetes Maternal Grandmother    • Diabetes Other    • Diabetes Son    • Cancer Neg Hx      Social History     Socioeconomic History   • Marital status:    Tobacco Use   • Smoking status: Never   • Smokeless tobacco: Never   Vaping Use   • Vaping Use: Never used   Substance and Sexual Activity   • Alcohol use: Not Currently     Comment: rare   • Drug use: Never   • Sexual activity: Not Currently     Partners: Female     Birth control/protection: None     Allergies   Allergen Reactions   • Atorvastatin Hives   • Ezetimibe Unknown - High Severity   • Furosemide Unknown - High Severity   • Simvastatin Hives   • Statins Unknown - High Severity   • Hydrochlorothiazide Hives     Current Outpatient Medications   Medication Sig Dispense Refill   • acetaminophen-codeine (TYLENOL #3) 300-30 MG per tablet Take 1 tablet by mouth Every 4 (Four) Hours As Needed for Moderate Pain.     • albuterol sulfate  (90 Base) MCG/ACT inhaler Ventolin HFA 90 mcg/actuation inhalation HFA aerosol inhaler inhale 2 puffs (180 mcg) by inhalation route every 6 hours   Active     • Alcohol Swabs (Easy Touch Alcohol Prep Medium) 70 % pads      • amLODIPine (NORVASC) 2.5 MG tablet Take 1 tablet by mouth Daily. 90 tablet 3   • apixaban (ELIQUIS) 5 MG tablet tablet Take 1 tablet by mouth 2 (Two) Times a Day.     • ARTIFICIAL SALIVA MT Apply  to the mouth or throat 2 (Two) Times a Day As Needed.     • aspirin 81 MG EC tablet aspirin 81 mg oral tablet,delayed release (DR/EC) take 1  tablet (81 mg) by oral route once daily   Active     • B-D UF III MINI PEN NEEDLES 31G X 5 MM misc      • baclofen (LIORESAL) 10 MG tablet Take 1 tablet by mouth 2 (Two) Times a Day.     • cetirizine (zyrTEC) 10 MG tablet Take 1 tablet by mouth Daily.     • chlorthalidone (HYGROTON) 25 MG tablet Take 1 tablet by mouth 2 (two) times a day.     • cholecalciferol (VITAMIN D3) 25 MCG (1000 UT) tablet Take 1 tablet by mouth Daily.     • cyclobenzaprine (FLEXERIL) 10 MG tablet      • fluticasone (FLONASE) 50 MCG/ACT nasal spray Flonase 50 mcg/actuation nasal spray,suspension inhale 1 spray (50 mcg) in each nostril by intranasal route once daily   Suspended     • FREESTYLE LITE test strip      • gabapentin (NEURONTIN) 800 MG tablet Take 1 tablet by mouth 3 (Three) Times a Day.     • Insulin Degludec FlexTouch 200 UNIT/ML solution pen-injector Inject  under the skin into the appropriate area as directed Daily.     • ipratropium (ATROVENT) 0.03 % nasal spray 2 sprays each nostril q 6 hrs     • Lancets (freestyle) lancets      • Lantus SoloStar 100 UNIT/ML injection pen      • latanoprost (XALATAN) 0.005 % ophthalmic solution 1 drop.     • losartan (COZAAR) 50 MG tablet Take 1 tablet by mouth. TAKE 1/2 TABLET DAILY     • Narcan 4 MG/0.1ML nasal spray      • omeprazole (priLOSEC) 20 MG capsule Take 1 capsule by mouth As Needed.     • rOPINIRole (REQUIP) 0.25 MG tablet Take 1 tablet by mouth every night at bedtime.     • tiotropium bromide-olodaterol (Stiolto Respimat) 2.5-2.5 MCG/ACT aerosol solution inhaler Inhale 2 puffs Daily. 3 each 3   • Advair -21 MCG/ACT inhaler  (Patient not taking: Reported on 3/23/2023)     • azelastine (ASTELIN) 0.1 % nasal spray azelastine 137 mcg (0.1 %) nasal aerosol,spray spray 2 sprays in each nostril by intranasal route 2 times per day   Active (Patient not taking: Reported on 3/23/2023)     • coenzyme Q10 100 MG capsule Take 100 mg by mouth Daily. (Patient not taking: Reported on  3/23/2023)     • Lactobacillus (Acidophilus) 100 MG capsule Take  by mouth Daily. (Patient not taking: Reported on 3/23/2023)       No current facility-administered medications for this visit.     Review of Systems   Constitutional: Negative.    Skin:        Painful toenails   All other systems reviewed and are negative.      OBJECTIVE     Vitals:    03/23/23 0827   BP: 125/78   Pulse: 80   Temp: 98.4 °F (36.9 °C)   SpO2: 94%       Lab Results   Component Value Date    HGBA1C 7.60 (H) 03/08/2023       Lab Results   Component Value Date    GLUCOSE 90 03/08/2023    CALCIUM 9.9 03/08/2023     03/08/2023    K 4.4 03/08/2023    CO2 29.5 (H) 03/08/2023     03/08/2023    BUN 20 03/08/2023    CREATININE 1.73 (H) 03/08/2023    EGFRIFAFRI 45 (L) 09/15/2021    BCR 11.6 03/08/2023    ANIONGAP 7.5 03/08/2023       Patient seen in no apparent distress.      PHYSICAL EXAM:     Foot/Ankle Exam:       General:   Diabetic Foot Exam Performed    Appearance: elderly    Orientation: AAOx3    Affect: appropriate    Gait: unimpaired    Shoe Gear:  Casual shoes    VASCULAR      Right Foot Vascularity   Normal vascular exam    Dorsalis pedis:  2+  Posterior tibial:  2+  Skin Temperature: warm    Edema Grading:  None  CFT:  < 3 seconds  Pedal Hair Growth:  Present  Varicosities: none       Left Foot Vascularity   Normal vascular exam    Dorsalis pedis:  2+  Posterior tibial:  2+  Skin Temperature: warm    Edema Grading:  None  CFT:  < 3 seconds  Pedal Hair Growth:  Present  Varicosities: none        NEUROLOGIC     Right Foot Neurologic   Light touch sensation:  Absent  Vibratory sensation:  Absent  Hot/Cold sensation: absent    Protective Sensation using [place] Monofilament:  0     Left Foot Neurologic   Light touch sensation:  Absent  Vibratory sensation:  Absent  Hot/cold sensation: absent    Protective Sensation using [place] Monofilament:  0     MUSCULOSKELETAL      Right Foot Musculoskeletal   Arch:   Normal     Left Foot Musculoskeletal   Arch:  Normal     MUSCLE STRENGTH     Right Foot Muscle Strength   Foot dorsiflexion:  4  Foot plantar flexion:  4  Foot inversion:  4  Foot eversion:  4     Left Foot Muscle Strength   Foot dorsiflexion:  4  Foot plantar flexion:  4  Foot inversion:  4  Foot eversion:  4     RANGE OF MOTION      Right Foot Range of Motion   Foot and ankle ROM within normal limits       Left Foot Range of Motion   Foot and ankle ROM within normal limits       DERMATOLOGIC     Right Foot Dermatologic   Skin: skin intact    Nails: onychomycosis, abnormally thick, subungual debris, dystrophic nails and ingrown toenail    Nails comment:  Toenails 1 through 4     Left Foot Dermatologic   Skin: skin intact    Nails: onychomycosis, abnormally thick, subungual debris, dystrophic nails and ingrown toenail    Nails comment:  Toenails 1 through 3      Diabetic Foot Exam Performed    ASSESSMENT/PLAN     Diagnoses and all orders for this visit:    1. Non-insulin dependent type 2 diabetes mellitus (HCC) (Primary)    2. Foot pain, bilateral    3. Diabetic foot (HCC)    4. Neuropathy    5. Onychocryptosis    6. Onychomycosis    7. Type 2 diabetes mellitus with diabetic polyneuropathy, with long-term current use of insulin (HCC)        Comprehensive lower extremity examination and evaluation was performed.    Discussed findings and treatment plan including risks, benefits, and treatment options with patient in detail. Patient agreed with treatment plan.    Toenails 1 through 4 on right and toenails 1 through 3 on the left were debrided in thickness and length and then smoothed with a Dremel Tool.  Tolerated the procedure well without complications.    Diabetic foot exam performed and documented this date, compliant with CQM required standards. Detail of findings as noted in physical exam.  Lower extremity Neurologic exam for diabetic patient performed and documented this date, compliant with PQRS required  standards. Detail of findings as noted in physical exam.  Advised patient importance of good routine lower extremity hygiene. Advised patient importance of evaluating for intact skin and pain free nail borders.  Advised patient to use mirror to evaluate plantar/ soles of feet for better visualization. Advised patient monitor and phone office to be seen if any cracking to skin, open lesions, painful nail borders or if nails become elongated prior to next visit. Advised patient importance of daily cleansing of lower extremities, followed by good skin cream to maintain normal hydration of skin. Also advised patient importance of close daily monitoring of blood sugar. Advised to regulate diet and medications to maintain control of blood sugar in optimal range. Contact primary care provider if difficulties maintaining blood sugar levels.  Advised Patient of presence of Diabetes Mellitus condition.  Advised Patient risk of progression and worsening or improvement, then return of condition.  Will monitor condition for any change in future. Treat with most appropriate treatment pending status of condition.  Counseled and advised patient extensively on nature and ramifications of diabetes. Standard instructions given to patient for good diabetic foot care and maintenance. Advised importance of careful monitoring to avoid break down and complications secondary to diabetes. Advised patient importance of strict maintenance of blood sugar control. Advised patient of possible ominous results from neglect of condition, i.e.: amputation/ loss of digits, feet and legs, or even death.  Patient states understands counseling, will monitor closely, continue good hygiene and routine diabetic foot care. Patient will contact office is questions or problems.      An After Visit Summary was printed and given to the patient at discharge, including (if requested) any available informative/educational handouts regarding diagnosis, treatment, or  medications. All questions were answered to patient/family satisfaction. Should symptoms fail to improve or worsen they agree to call or return to clinic or to go to the Emergency Department. Discussed the importance of following up with any needed screening tests/labs/specialist appointments and any requested follow-up recommended by me today. Importance of maintaining follow-up discussed and patient accepts that missed appointments can delay diagnosis and potentially lead to worsening of conditions.    Return in about 9 weeks (around 5/25/2023) for Toenail Care., or sooner if acute issues arise.    This document has been electronically signed by Carl Payne DPM on March 23, 2023 08:49 EDT

## 2023-03-31 ENCOUNTER — HOSPITAL ENCOUNTER (OUTPATIENT)
Dept: ULTRASOUND IMAGING | Facility: HOSPITAL | Age: 78
Discharge: HOME | End: 2023-03-31
Admitting: INTERNAL MEDICINE
Payer: MEDICARE

## 2023-03-31 DIAGNOSIS — E04.1 LEFT THYROID NODULE: ICD-10-CM

## 2023-03-31 PROCEDURE — 76942 ECHO GUIDE FOR BIOPSY: CPT

## 2023-03-31 PROCEDURE — 88173 CYTOPATH EVAL FNA REPORT: CPT | Performed by: INTERNAL MEDICINE

## 2023-03-31 PROCEDURE — 0 LIDOCAINE 1 % SOLUTION: Performed by: INTERNAL MEDICINE

## 2023-03-31 RX ORDER — LIDOCAINE HYDROCHLORIDE 10 MG/ML
10 INJECTION, SOLUTION INFILTRATION; PERINEURAL ONCE
Status: COMPLETED | OUTPATIENT
Start: 2023-03-31 | End: 2023-03-31

## 2023-03-31 RX ADMIN — LIDOCAINE HYDROCHLORIDE 10 ML: 10 INJECTION, SOLUTION INFILTRATION; PERINEURAL at 13:35

## 2023-04-03 LAB
CYTO UR: NORMAL
LAB AP CASE REPORT: NORMAL
LAB AP CLINICAL INFORMATION: NORMAL
PATH REPORT.FINAL DX SPEC: NORMAL
PATH REPORT.GROSS SPEC: NORMAL

## 2023-06-01 ENCOUNTER — TRANSCRIBE ORDERS (OUTPATIENT)
Dept: ADMINISTRATIVE | Facility: HOSPITAL | Age: 78
End: 2023-06-01
Payer: MEDICARE

## 2023-06-01 ENCOUNTER — LAB (OUTPATIENT)
Dept: LAB | Facility: HOSPITAL | Age: 78
End: 2023-06-01
Payer: MEDICARE

## 2023-06-01 DIAGNOSIS — N18.30 STAGE 3 CHRONIC KIDNEY DISEASE, UNSPECIFIED WHETHER STAGE 3A OR 3B CKD: Primary | ICD-10-CM

## 2023-06-01 DIAGNOSIS — E11.9 DIABETES MELLITUS WITHOUT COMPLICATION: ICD-10-CM

## 2023-06-01 DIAGNOSIS — I10 ESSENTIAL HYPERTENSION, MALIGNANT: ICD-10-CM

## 2023-06-01 DIAGNOSIS — E03.9 PRIMARY HYPOTHYROIDISM: ICD-10-CM

## 2023-06-01 DIAGNOSIS — N18.30 STAGE 3 CHRONIC KIDNEY DISEASE, UNSPECIFIED WHETHER STAGE 3A OR 3B CKD: ICD-10-CM

## 2023-06-01 LAB
ALBUMIN SERPL-MCNC: 4.3 G/DL (ref 3.5–5.2)
ALBUMIN/GLOB SERPL: 1.4 G/DL
ALP SERPL-CCNC: 71 U/L (ref 39–117)
ALT SERPL W P-5'-P-CCNC: 24 U/L (ref 1–41)
ANION GAP SERPL CALCULATED.3IONS-SCNC: 10.3 MMOL/L (ref 5–15)
AST SERPL-CCNC: 30 U/L (ref 1–40)
BASOPHILS # BLD MANUAL: 0.05 10*3/MM3 (ref 0–0.2)
BASOPHILS NFR BLD MANUAL: 1.1 % (ref 0–1.5)
BILIRUB SERPL-MCNC: 0.4 MG/DL (ref 0–1.2)
BUN SERPL-MCNC: 29 MG/DL (ref 8–23)
BUN/CREAT SERPL: 16.8 (ref 7–25)
CALCIUM SPEC-SCNC: 9.9 MG/DL (ref 8.6–10.5)
CHLORIDE SERPL-SCNC: 101 MMOL/L (ref 98–107)
CO2 SERPL-SCNC: 25.7 MMOL/L (ref 22–29)
CREAT SERPL-MCNC: 1.73 MG/DL (ref 0.76–1.27)
DEPRECATED RDW RBC AUTO: 38.9 FL (ref 37–54)
EGFRCR SERPLBLD CKD-EPI 2021: 40.2 ML/MIN/1.73
ERYTHROCYTE [DISTWIDTH] IN BLOOD BY AUTOMATED COUNT: 11.9 % (ref 12.3–15.4)
GLOBULIN UR ELPH-MCNC: 3 GM/DL
GLUCOSE SERPL-MCNC: 120 MG/DL (ref 65–99)
HBA1C MFR BLD: 6.7 % (ref 4.8–5.6)
HCT VFR BLD AUTO: 42.9 % (ref 37.5–51)
HGB BLD-MCNC: 14.8 G/DL (ref 13–17.7)
LYMPHOCYTES # BLD MANUAL: 2.89 10*3/MM3 (ref 0.7–3.1)
LYMPHOCYTES NFR BLD MANUAL: 14.3 % (ref 5–12)
MCH RBC QN AUTO: 31 PG (ref 26.6–33)
MCHC RBC AUTO-ENTMCNC: 34.5 G/DL (ref 31.5–35.7)
MCV RBC AUTO: 89.9 FL (ref 79–97)
MONOCYTES # BLD: 0.68 10*3/MM3 (ref 0.1–0.9)
NEUTROPHILS # BLD AUTO: 1.16 10*3/MM3 (ref 1.7–7)
NEUTROPHILS NFR BLD MANUAL: 24.2 % (ref 42.7–76)
NRBC BLD AUTO-RTO: 0 /100 WBC (ref 0–0.2)
PLAT MORPH BLD: NORMAL
PLATELET # BLD AUTO: 189 10*3/MM3 (ref 140–450)
PMV BLD AUTO: 11.5 FL (ref 6–12)
POIKILOCYTOSIS BLD QL SMEAR: ABNORMAL
POTASSIUM SERPL-SCNC: 4.8 MMOL/L (ref 3.5–5.2)
PROT SERPL-MCNC: 7.3 G/DL (ref 6–8.5)
RBC # BLD AUTO: 4.77 10*6/MM3 (ref 4.14–5.8)
SODIUM SERPL-SCNC: 137 MMOL/L (ref 136–145)
TSH SERPL DL<=0.05 MIU/L-ACNC: 1.1 UIU/ML (ref 0.27–4.2)
VARIANT LYMPHS NFR BLD MANUAL: 60.4 % (ref 19.6–45.3)
WBC MORPH BLD: NORMAL
WBC NRBC COR # BLD: 4.79 10*3/MM3 (ref 3.4–10.8)

## 2023-06-01 PROCEDURE — 85025 COMPLETE CBC W/AUTO DIFF WBC: CPT

## 2023-06-01 PROCEDURE — 36415 COLL VENOUS BLD VENIPUNCTURE: CPT

## 2023-06-01 PROCEDURE — 80053 COMPREHEN METABOLIC PANEL: CPT

## 2023-06-01 PROCEDURE — 85007 BL SMEAR W/DIFF WBC COUNT: CPT

## 2023-06-01 PROCEDURE — 83036 HEMOGLOBIN GLYCOSYLATED A1C: CPT

## 2023-06-01 PROCEDURE — 84443 ASSAY THYROID STIM HORMONE: CPT

## 2023-06-15 ENCOUNTER — OFFICE VISIT (OUTPATIENT)
Dept: PODIATRY | Facility: CLINIC | Age: 78
End: 2023-06-15
Payer: MEDICARE

## 2023-06-15 VITALS
OXYGEN SATURATION: 97 % | WEIGHT: 207 LBS | HEIGHT: 69 IN | DIASTOLIC BLOOD PRESSURE: 92 MMHG | HEART RATE: 71 BPM | SYSTOLIC BLOOD PRESSURE: 148 MMHG | BODY MASS INDEX: 30.66 KG/M2 | TEMPERATURE: 97.5 F

## 2023-06-15 DIAGNOSIS — M79.672 FOOT PAIN, BILATERAL: ICD-10-CM

## 2023-06-15 DIAGNOSIS — Z79.4 TYPE 2 DIABETES MELLITUS WITH DIABETIC POLYNEUROPATHY, WITH LONG-TERM CURRENT USE OF INSULIN: ICD-10-CM

## 2023-06-15 DIAGNOSIS — M79.671 FOOT PAIN, BILATERAL: ICD-10-CM

## 2023-06-15 DIAGNOSIS — B35.1 ONYCHOMYCOSIS: ICD-10-CM

## 2023-06-15 DIAGNOSIS — G62.9 NEUROPATHY: ICD-10-CM

## 2023-06-15 DIAGNOSIS — E11.9 NON-INSULIN DEPENDENT TYPE 2 DIABETES MELLITUS: Primary | ICD-10-CM

## 2023-06-15 DIAGNOSIS — L60.0 ONYCHOCRYPTOSIS: ICD-10-CM

## 2023-06-15 DIAGNOSIS — E11.8 DIABETIC FOOT: ICD-10-CM

## 2023-06-15 DIAGNOSIS — E11.42 TYPE 2 DIABETES MELLITUS WITH DIABETIC POLYNEUROPATHY, WITH LONG-TERM CURRENT USE OF INSULIN: ICD-10-CM

## 2023-06-15 RX ORDER — MODAFINIL 100 MG/1
100 TABLET ORAL DAILY
COMMUNITY
Start: 2023-06-07

## 2023-06-15 NOTE — PROGRESS NOTES
[facility] - PODIATRY    Today's Date: 06/15/23    Patient Name: Joon Zhao  MRN: [id number]  CSN: [id number]  PCP: Brian Henson MD, Last PCP Visit: 3/1/2023  Referring Provider: No ref. provider found    SUBJECTIVE     Chief Complaint   Patient presents with    Left Foot - Follow-up, Nail Problem    Right Foot - Follow-up, Nail Problem     HPI: Joon Zhao, a 77 y.o.male, comes to clinic.    New, Established, New Problem:  established     Location:  Toenails    Duration:   Greater than five years    Onset:  Gradual    Nature:  sore with palpation.    Stable, worsening, improving:   Stable    Aggravating factors:  Pain with shoe gear and ambulation.    Previous Treatment:  debridement    Patient reported last blood glucose:  135  __________________________________    Medical changes:  new med prescribed by kidney Dr to help stay awake     Patient denies any fevers, chills, nausea, vomiting, shortness of breathe, nor any other constitutional signs nor symptoms.         Past Medical History:   Diagnosis Date    Anemia     Arthritis     Back pain     CHRONIC NECK AND BACK PAIN    BPH (benign prostatic hyperplasia) 03/02/2015    BPH with urinary obstruction 06/01/2018    CKD (chronic kidney disease), stage III     Controlled type 2 diabetes mellitus with diabetic polyneuropathy 07/05/2017    COPD (chronic obstructive pulmonary disease)     Diabetes mellitus     Dizziness 08/10/2017    DVT (deep venous thrombosis)     ED (erectile dysfunction) of organic origin     Erectile dysfunction 2007    Foot pain, bilateral 03/12/2018    Glaucoma (increased eye pressure)     High cholesterol     Hyperlipidemia     Hypertension     Ingrowing nail 07/05/2017    Kidney failure     STAGE III    Pain in both feet     PE (pulmonary thromboembolism)     Polyneuropathy 07/05/2017    Primary central sleep apnea     Tinea unguium 07/05/2017     Past Surgical History:   Procedure Laterality Date    ADRENAL  GLAND SURGERY      COLONOSCOPY      CYSTOSCOPY      KIDNEY SURGERY      KIDNEY BIOSPY    PROSTATE SURGERY  12/2007    TURP / TRANSURETHRAL INCISION / DRAINAGE PROSTATE  06/2019     Family History   Problem Relation Age of Onset    Hypertension Mother     Heart disease Mother     Stroke Mother     Hypertension Sister     Heart disease Sister     Stroke Sister     Diabetes Brother     Diabetes Maternal Grandmother     Diabetes Other     Diabetes Son     Cancer Neg Hx      Social History     Socioeconomic History    Marital status:    Tobacco Use    Smoking status: Never    Smokeless tobacco: Never   Vaping Use    Vaping Use: Never used   Substance and Sexual Activity    Alcohol use: Not Currently     Comment: rare    Drug use: Never    Sexual activity: Not Currently     Partners: Female     Birth control/protection: None     Allergies   Allergen Reactions    Atorvastatin Hives    Ezetimibe Unknown - High Severity    Furosemide Unknown - High Severity    Simvastatin Hives    Statins Unknown - High Severity    Hydrochlorothiazide Hives     Current Outpatient Medications   Medication Sig Dispense Refill    acetaminophen-codeine (TYLENOL #3) 300-30 MG per tablet Take 1 tablet by mouth Every 4 (Four) Hours As Needed for Moderate Pain.      albuterol sulfate  (90 Base) MCG/ACT inhaler Ventolin HFA 90 mcg/actuation inhalation HFA aerosol inhaler inhale 2 puffs (180 mcg) by inhalation route every 6 hours   Active      Alcohol Swabs (Easy Touch Alcohol Prep Medium) 70 % pads       amLODIPine (NORVASC) 2.5 MG tablet Take 1 tablet by mouth Daily. 90 tablet 3    apixaban (ELIQUIS) 5 MG tablet tablet Take 1 tablet by mouth 2 (Two) Times a Day.      ARTIFICIAL SALIVA MT Apply  to the mouth or throat 2 (Two) Times a Day As Needed.      aspirin 81 MG EC tablet aspirin 81 mg oral tablet,delayed release (DR/EC) take 1 tablet (81 mg) by oral route once daily   Active      B-D UF III MINI PEN NEEDLES 31G X 5 MM misc        baclofen (LIORESAL) 10 MG tablet Take 1 tablet by mouth 2 (Two) Times a Day.      cetirizine (zyrTEC) 10 MG tablet Take 1 tablet by mouth Daily.      chlorthalidone (HYGROTON) 25 MG tablet Take 1 tablet by mouth 2 (two) times a day.      cholecalciferol (VITAMIN D3) 25 MCG (1000 UT) tablet Take 1 tablet by mouth Daily.      coenzyme Q10 100 MG capsule Take 1 capsule by mouth Daily.      fluticasone (FLONASE) 50 MCG/ACT nasal spray Flonase 50 mcg/actuation nasal spray,suspension inhale 1 spray (50 mcg) in each nostril by intranasal route once daily   Suspended      FREESTYLE LITE test strip       gabapentin (NEURONTIN) 800 MG tablet Take 1 tablet by mouth 3 (Three) Times a Day.      Insulin Degludec FlexTouch 200 UNIT/ML solution pen-injector Inject  under the skin into the appropriate area as directed Daily.      ipratropium (ATROVENT) 0.03 % nasal spray 2 sprays each nostril q 6 hrs      Lactobacillus (Acidophilus) 100 MG capsule Take  by mouth Daily.      Lancets (freestyle) lancets       Lantus SoloStar 100 UNIT/ML injection pen       latanoprost (XALATAN) 0.005 % ophthalmic solution 1 drop.      losartan (COZAAR) 50 MG tablet TAKE 1/2 TABLET DAILY      modafinil (PROVIGIL) 100 MG tablet Take 1 tablet by mouth Daily.      omeprazole (priLOSEC) 20 MG capsule Take 1 capsule by mouth As Needed.      rOPINIRole (REQUIP) 0.25 MG tablet Take 1 tablet by mouth every night at bedtime.      tiotropium bromide-olodaterol (Stiolto Respimat) 2.5-2.5 MCG/ACT aerosol solution inhaler Inhale 2 puffs Daily. 3 each 3    Advair -21 MCG/ACT inhaler  (Patient not taking: Reported on 3/23/2023)      azelastine (ASTELIN) 0.1 % nasal spray azelastine 137 mcg (0.1 %) nasal aerosol,spray spray 2 sprays in each nostril by intranasal route 2 times per day   Active (Patient not taking: Reported on 3/23/2023)      cyclobenzaprine (FLEXERIL) 10 MG tablet  (Patient not taking: Reported on 6/15/2023)      Narcan 4 MG/0.1ML nasal spray   (Patient not taking: Reported on 6/15/2023)       No current facility-administered medications for this visit.     Review of Systems   Constitutional: Negative.    Skin:         Painful toenails   All other systems reviewed and are negative.    OBJECTIVE     Vitals:    06/15/23 0851   BP: 148/92   Pulse: 71   Temp: 97.5 °F (36.4 °C)   SpO2: 97%       Lab Results   Component Value Date    HGBA1C 6.70 (H) 06/01/2023       Lab Results   Component Value Date    GLUCOSE 120 (H) 06/01/2023    CALCIUM 9.9 06/01/2023     06/01/2023    K 4.8 06/01/2023    CO2 25.7 06/01/2023     06/01/2023    BUN 29 (H) 06/01/2023    CREATININE 1.73 (H) 06/01/2023    EGFRIFAFRI 45 (L) 09/15/2021    BCR 16.8 06/01/2023    ANIONGAP 10.3 06/01/2023       Patient seen in no apparent distress.      PHYSICAL EXAM:     Foot/Ankle Exam    GENERAL  Diabetic foot exam performed    Appearance:  elderly  Orientation:  AAOx3  Affect:  appropriate  Gait:  unimpaired  Assistance:  independent  Right shoe gear: casual shoe    VASCULAR     Right Foot Vascularity   Normal vascular exam    Dorsalis pedis:  2+  Posterior tibial:  2+  Skin temperature:  warm  Edema grading:  None  CFT:  < 3 seconds  Pedal hair growth:  Present  Varicosities:  none     Left Foot Vascularity   Normal vascular exam    Dorsalis pedis:  2+  Posterior tibial:  2+  Skin temperature:  warm  Edema grading:  None  CFT:  < 3 seconds  Pedal hair growth:  Present  Varicosities:  none     NEUROLOGIC     Right Foot Neurologic   Light touch sensation: absent  Vibratory sensation: absent  Hot/Cold sensation: absent  Protective Sensation using [place] Monofilament:   Sites tested: 10     Left Foot Neurologic   Light touch sensation: absent  Vibratory sensation: absent  Hot/Cold sensation:  absent  Protective Sensation using [place] Monofilament:   Sites tested: 10    MUSCULOSKELETAL     Right Foot Musculoskeletal   Arch:  Normal     Left Foot Musculoskeletal   Arch:   Normal    MUSCLE STRENGTH     Right Foot Muscle Strength   Foot dorsiflexion:  4  Foot plantar flexion:  4  Foot inversion:  4  Foot eversion:  4     Left Foot Muscle Strength   Foot dorsiflexion:  4  Foot plantar flexion:  4  Foot inversion:  4  Foot eversion:  4    RANGE OF MOTION     Right Foot Range of Motion   Foot and ankle ROM within normal limits       Left Foot Range of Motion   Foot and ankle ROM within normal limits      DERMATOLOGIC      Right Foot Dermatologic   Skin  Right foot skin is intact.   Nails  1.  Positive for elongated, onychomycosis, abnormal thickness, subungual debris and ingrown toenail.  2.  Positive for elongated, onychomycosis, abnormal thickness, subungual debris and ingrown toenail.  3.  Positive for elongated, onychomycosis, abnormal thickness, subungual debris and ingrown toenail.  4.  Positive for elongated, onychomycosis, abnormal thickness, subungual debris and ingrown toenail.  Nails comment:  Toenails 1 through 4     Left Foot Dermatologic   Skin  Left foot skin is intact.   Nails comment:  Toenails 1 through 3  Nails  1.  Positive for elongated, onychomycosis, abnormal thickness, subungual debris and ingrown toenail.  2.  Positive for elongated, onychomycosis, abnormal thickness, subungual debris and ingrown toenail.  3.  Positive for elongated, onychomycosis, abnormal thickness, subungual debris and ingrown toenail.    Diabetic Foot Exam Performed    ASSESSMENT/PLAN     Diagnoses and all orders for this visit:    1. Non-insulin dependent type 2 diabetes mellitus (Primary)    2. Neuropathy    3. Type 2 diabetes mellitus with diabetic polyneuropathy, with long-term current use of insulin    4. Foot pain, bilateral    5. Onychocryptosis    6. Diabetic foot    7. Onychomycosis        Comprehensive lower extremity examination and evaluation was performed.    Discussed findings and treatment plan including risks, benefits, and treatment options with patient in detail. Patient  agreed with treatment plan.    Toenails 1 through 4 on right and toenails 1 through 3 on the left were debrided in thickness and length and then smoothed with a Dremel Tool.  Tolerated the procedure well without complications.    Diabetic foot exam performed and documented this date, compliant with CQM required standards. Detail of findings as noted in physical exam.  Lower extremity Neurologic exam for diabetic patient performed and documented this date, compliant with PQRS required standards. Detail of findings as noted in physical exam.  Advised patient importance of good routine lower extremity hygiene. Advised patient importance of evaluating for intact skin and pain free nail borders.  Advised patient to use mirror to evaluate plantar/ soles of feet for better visualization. Advised patient monitor and phone office to be seen if any cracking to skin, open lesions, painful nail borders or if nails become elongated prior to next visit. Advised patient importance of daily cleansing of lower extremities, followed by good skin cream to maintain normal hydration of skin. Also advised patient importance of close daily monitoring of blood sugar. Advised to regulate diet and medications to maintain control of blood sugar in optimal range. Contact primary care provider if difficulties maintaining blood sugar levels.  Advised Patient of presence of Diabetes Mellitus condition.  Advised Patient risk of progression and worsening or improvement, then return of condition.  Will monitor condition for any change in future. Treat with most appropriate treatment pending status of condition.  Counseled and advised patient extensively on nature and ramifications of diabetes. Standard instructions given to patient for good diabetic foot care and maintenance. Advised importance of careful monitoring to avoid break down and complications secondary to diabetes. Advised patient importance of strict maintenance of blood sugar control.  Advised patient of possible ominous results from neglect of condition, i.e.: amputation/ loss of digits, feet and legs, or even death.  Patient states understands counseling, will monitor closely, continue good hygiene and routine diabetic foot care. Patient will contact office is questions or problems.      An After Visit Summary was printed and given to the patient at discharge, including (if requested) any available informative/educational handouts regarding diagnosis, treatment, or medications. All questions were answered to patient/family satisfaction. Should symptoms fail to improve or worsen they agree to call or return to clinic or to go to the Emergency Department. Discussed the importance of following up with any needed screening tests/labs/specialist appointments and any requested follow-up recommended by me today. Importance of maintaining follow-up discussed and patient accepts that missed appointments can delay diagnosis and potentially lead to worsening of conditions.    Return in about 9 weeks (around 8/17/2023) for Toenail Care., or sooner if acute issues arise.    This document has been electronically signed by Carl Payne DPM on Albertina 15, 2023 09:27 EDT

## 2023-08-07 ENCOUNTER — HOSPITAL ENCOUNTER (EMERGENCY)
Facility: HOSPITAL | Age: 78
Discharge: HOME | End: 2023-08-07
Attending: EMERGENCY MEDICINE | Admitting: EMERGENCY MEDICINE
Payer: MEDICARE

## 2023-08-07 ENCOUNTER — APPOINTMENT (OUTPATIENT)
Dept: GENERAL RADIOLOGY | Facility: HOSPITAL | Age: 78
End: 2023-08-07
Payer: MEDICARE

## 2023-08-07 ENCOUNTER — APPOINTMENT (OUTPATIENT)
Dept: CT IMAGING | Facility: HOSPITAL | Age: 78
End: 2023-08-07
Payer: MEDICARE

## 2023-08-07 VITALS
HEIGHT: 69 IN | DIASTOLIC BLOOD PRESSURE: 100 MMHG | RESPIRATION RATE: 20 BRPM | SYSTOLIC BLOOD PRESSURE: 154 MMHG | BODY MASS INDEX: 33.83 KG/M2 | HEART RATE: 78 BPM | WEIGHT: 228.4 LBS | OXYGEN SATURATION: 100 % | TEMPERATURE: 98 F

## 2023-08-07 DIAGNOSIS — K21.9 GASTROESOPHAGEAL REFLUX DISEASE, UNSPECIFIED WHETHER ESOPHAGITIS PRESENT: ICD-10-CM

## 2023-08-07 DIAGNOSIS — R07.9 CHEST PAIN, UNSPECIFIED TYPE: Primary | ICD-10-CM

## 2023-08-07 LAB
ALBUMIN SERPL-MCNC: 4.2 G/DL (ref 3.5–5.2)
ALBUMIN/GLOB SERPL: 1.3 G/DL
ALP SERPL-CCNC: 74 U/L (ref 39–117)
ALT SERPL W P-5'-P-CCNC: 22 U/L (ref 1–41)
ANION GAP SERPL CALCULATED.3IONS-SCNC: 11.9 MMOL/L (ref 5–15)
AST SERPL-CCNC: 24 U/L (ref 1–40)
BASOPHILS # BLD AUTO: 0.02 10*3/MM3 (ref 0–0.2)
BASOPHILS NFR BLD AUTO: 0.3 % (ref 0–1.5)
BILIRUB SERPL-MCNC: 0.5 MG/DL (ref 0–1.2)
BUN SERPL-MCNC: 26 MG/DL (ref 8–23)
BUN/CREAT SERPL: 14.8 (ref 7–25)
CALCIUM SPEC-SCNC: 9.4 MG/DL (ref 8.6–10.5)
CHLORIDE SERPL-SCNC: 97 MMOL/L (ref 98–107)
CO2 SERPL-SCNC: 26.1 MMOL/L (ref 22–29)
CREAT SERPL-MCNC: 1.76 MG/DL (ref 0.76–1.27)
DEPRECATED RDW RBC AUTO: 40.3 FL (ref 37–54)
EGFRCR SERPLBLD CKD-EPI 2021: 39.3 ML/MIN/1.73
EOSINOPHIL # BLD AUTO: 0.05 10*3/MM3 (ref 0–0.4)
EOSINOPHIL NFR BLD AUTO: 0.8 % (ref 0.3–6.2)
ERYTHROCYTE [DISTWIDTH] IN BLOOD BY AUTOMATED COUNT: 12.4 % (ref 12.3–15.4)
GEN 5 2HR TROPONIN T REFLEX: 38 NG/L
GLOBULIN UR ELPH-MCNC: 3.2 GM/DL
GLUCOSE SERPL-MCNC: 175 MG/DL (ref 65–99)
HCT VFR BLD AUTO: 41.3 % (ref 37.5–51)
HGB BLD-MCNC: 14.4 G/DL (ref 13–17.7)
HOLD SPECIMEN: NORMAL
HOLD SPECIMEN: NORMAL
IMM GRANULOCYTES # BLD AUTO: 0.01 10*3/MM3 (ref 0–0.05)
IMM GRANULOCYTES NFR BLD AUTO: 0.2 % (ref 0–0.5)
LIPASE SERPL-CCNC: 40 U/L (ref 13–60)
LYMPHOCYTES # BLD AUTO: 2.8 10*3/MM3 (ref 0.7–3.1)
LYMPHOCYTES NFR BLD AUTO: 46.2 % (ref 19.6–45.3)
MAGNESIUM SERPL-MCNC: 2.1 MG/DL (ref 1.6–2.4)
MCH RBC QN AUTO: 31.4 PG (ref 26.6–33)
MCHC RBC AUTO-ENTMCNC: 34.9 G/DL (ref 31.5–35.7)
MCV RBC AUTO: 90 FL (ref 79–97)
MONOCYTES # BLD AUTO: 0.49 10*3/MM3 (ref 0.1–0.9)
MONOCYTES NFR BLD AUTO: 8.1 % (ref 5–12)
NEUTROPHILS NFR BLD AUTO: 2.69 10*3/MM3 (ref 1.7–7)
NEUTROPHILS NFR BLD AUTO: 44.4 % (ref 42.7–76)
NRBC BLD AUTO-RTO: 0 /100 WBC (ref 0–0.2)
NT-PROBNP SERPL-MCNC: <36 PG/ML (ref 0–1800)
PLATELET # BLD AUTO: 191 10*3/MM3 (ref 140–450)
PMV BLD AUTO: 10.7 FL (ref 6–12)
POTASSIUM SERPL-SCNC: 4.4 MMOL/L (ref 3.5–5.2)
PROT SERPL-MCNC: 7.4 G/DL (ref 6–8.5)
QT INTERVAL: 365 MS
QT INTERVAL: 372 MS
RBC # BLD AUTO: 4.59 10*6/MM3 (ref 4.14–5.8)
SODIUM SERPL-SCNC: 135 MMOL/L (ref 136–145)
TROPONIN T DELTA: -3 NG/L
TROPONIN T SERPL HS-MCNC: 41 NG/L
WBC NRBC COR # BLD: 6.06 10*3/MM3 (ref 3.4–10.8)
WHOLE BLOOD HOLD COAG: NORMAL
WHOLE BLOOD HOLD SPECIMEN: NORMAL

## 2023-08-07 PROCEDURE — 83880 ASSAY OF NATRIURETIC PEPTIDE: CPT | Performed by: EMERGENCY MEDICINE

## 2023-08-07 PROCEDURE — 93005 ELECTROCARDIOGRAM TRACING: CPT | Performed by: EMERGENCY MEDICINE

## 2023-08-07 PROCEDURE — 80053 COMPREHEN METABOLIC PANEL: CPT | Performed by: EMERGENCY MEDICINE

## 2023-08-07 PROCEDURE — 99284 EMERGENCY DEPT VISIT MOD MDM: CPT | Performed by: INTERNAL MEDICINE

## 2023-08-07 PROCEDURE — 99285 EMERGENCY DEPT VISIT HI MDM: CPT

## 2023-08-07 PROCEDURE — 25510000001 IOPAMIDOL PER 1 ML: Performed by: EMERGENCY MEDICINE

## 2023-08-07 PROCEDURE — 71260 CT THORAX DX C+: CPT

## 2023-08-07 PROCEDURE — 84484 ASSAY OF TROPONIN QUANT: CPT | Performed by: EMERGENCY MEDICINE

## 2023-08-07 PROCEDURE — 83735 ASSAY OF MAGNESIUM: CPT | Performed by: EMERGENCY MEDICINE

## 2023-08-07 PROCEDURE — 93005 ELECTROCARDIOGRAM TRACING: CPT

## 2023-08-07 PROCEDURE — 71045 X-RAY EXAM CHEST 1 VIEW: CPT

## 2023-08-07 PROCEDURE — 85025 COMPLETE CBC W/AUTO DIFF WBC: CPT | Performed by: EMERGENCY MEDICINE

## 2023-08-07 PROCEDURE — 36415 COLL VENOUS BLD VENIPUNCTURE: CPT

## 2023-08-07 PROCEDURE — 83690 ASSAY OF LIPASE: CPT | Performed by: EMERGENCY MEDICINE

## 2023-08-07 RX ORDER — ASPIRIN 81 MG/1
324 TABLET, CHEWABLE ORAL ONCE
Status: DISCONTINUED | OUTPATIENT
Start: 2023-08-07 | End: 2023-08-07 | Stop reason: HOSPADM

## 2023-08-07 RX ORDER — SODIUM CHLORIDE 0.9 % (FLUSH) 0.9 %
10 SYRINGE (ML) INJECTION AS NEEDED
Status: DISCONTINUED | OUTPATIENT
Start: 2023-08-07 | End: 2023-08-07 | Stop reason: HOSPADM

## 2023-08-07 RX ADMIN — IOPAMIDOL 100 ML: 755 INJECTION, SOLUTION INTRAVENOUS at 08:19

## 2023-08-07 RX ADMIN — SODIUM CHLORIDE 500 ML: 9 INJECTION, SOLUTION INTRAVENOUS at 10:17

## 2023-08-07 NOTE — CONSULTS
"  [facility]   Cardiology Consult Note    Patient Name: Joon Zhao  : 1945  MRN: [id number]  Primary Care Physician:  Brian Henson MD  Referring Physician: No ref. provider found  Date of admission: 2023    Subjective   Subjective     Reason for Consult/ Chief Complaint: Chest pain    HPI:  Joon Zhao is a 77 y.o. male with past medical history significant for multiple pulmonary embolisms on Eliquis.  He states he has been getting chest pain over the last week.  It can occur just sitting still.  He has been taking Mylanta twice a day and notes that it does help but wears off in an hour.  Last night he was just sitting down watching TV and had the pain that comes up into his throat.  He states he walked 2 miles today and did the elliptical at the fitness center and had no chest pain at that time.  He started doing some \"twisting machines\" and started developing a little bit of chest pain at that time.  He went home and states while he was showering it started getting worse.  He does note for the last week that he has had pain in his throat if he tries to swallow water or take medications.  He notes no nausea or vomiting.  He has Prilosec at home but it is only as needed.    Review of Systems   All systems were reviewed and negative except for: Atypical chest pain, throat pain    Personal History     Past Medical History:   Diagnosis Date    Anemia     Arthritis     Back pain     CHRONIC NECK AND BACK PAIN    BPH (benign prostatic hyperplasia) 2015    BPH with urinary obstruction 2018    CKD (chronic kidney disease), stage III     Controlled type 2 diabetes mellitus with diabetic polyneuropathy 2017    COPD (chronic obstructive pulmonary disease)     Diabetes mellitus     Dizziness 08/10/2017    DVT (deep venous thrombosis)     ED (erectile dysfunction) of organic origin     Erectile dysfunction 2007    Foot pain, bilateral 2018    Glaucoma (increased eye " pressure)     High cholesterol     Hyperlipidemia     Hypertension     Ingrowing nail 07/05/2017    Kidney failure     STAGE III    Pain in both feet     PE (pulmonary thromboembolism)     Polyneuropathy 07/05/2017    Primary central sleep apnea     Sleep apnea     Tinea unguium 07/05/2017        Past medical history reviewed      Family History: family history includes Diabetes in his brother, maternal grandmother, son, and another family member; Heart disease in his mother and sister; Hypertension in his mother and sister; Stroke in his mother and sister. Otherwise pertinent FHx was reviewed and not pertinent to current issue.    Social History:  reports that he has never smoked. He has never used smokeless tobacco. He reports that he does not currently use alcohol. He reports that he does not use drugs.    Home Medications:  Acidophilus, Artificial Saliva, Easy Touch Alcohol Prep Medium, Insulin Degludec FlexTouch, Insulin Glargine, Insulin Pen Needle, acetaminophen-codeine, albuterol sulfate HFA, amLODIPine, apixaban, aspirin, baclofen, cetirizine, chlorthalidone, cholecalciferol, coenzyme Q10, freestyle, gabapentin, glucose blood, ipratropium, latanoprost, losartan, modafinil, naloxone, omeprazole, rOPINIRole, and tiotropium bromide-olodaterol    Allergies:  Allergies   Allergen Reactions    Atorvastatin Hives    Ezetimibe Unknown - High Severity    Furosemide Unknown - High Severity    Simvastatin Hives    Statins Unknown - High Severity    Hydrochlorothiazide Hives       Objective    Objective     Vitals:   Temp:  [98 øF (36.7 øC)] 98 øF (36.7 øC)  Heart Rate:  [76-86] 81  Resp:  [20] 20  BP: (117-133)/(83-95) 133/95      Physical Exam:   Constitutional: Awake, alert, No acute distress    Eyes: PERRLA, sclerae anicteric, no conjunctival injection   HENT: NCAT, mucous membranes moist   Neck: Supple, no thyromegaly, no lymphadenopathy, trachea midline   Respiratory: Clear to auscultation bilaterally, nonlabored  respirations    Cardiovascular: RRR, no murmurs, rubs, or gallops, palpable pedal pulses bilaterally   Gastrointestinal: Positive bowel sounds, soft, nontender, nondistended   Musculoskeletal: No bilateral ankle edema, no clubbing or cyanosis to extremities   Psychiatric: Appropriate affect, cooperative   Neurologic: Oriented x 3, strength symmetric in all extremities, Cranial Nerves grossly intact to confrontation, speech clear   Skin: No rashes     Result Review    Result Review:  I have personally reviewed the results from the time of this admission to 8/7/2023 11:03 EDT and agree with these findings:  [x]  Laboratory  []  Microbiology  [x]  Radiology  [x]  EKG/Telemetry   [x]  Cardiology/Vascular   []  Pathology  [x]  Old records  []  Other:  Most notable findings include:     CMP          3/8/2023    07:35 6/1/2023    06:42 8/7/2023    07:33   CMP   Glucose 90  120  175    BUN 20  29  26    Creatinine 1.73  1.73  1.76    EGFR 40.2  40.2  39.3    Sodium 137  137  135    Potassium 4.4  4.8  4.4    Chloride 100  101  97    Calcium 9.9  9.9  9.4    Total Protein 7.5  7.3  7.4    Albumin 4.4  4.3  4.2    Globulin 3.1  3.0  3.2    Total Bilirubin 0.4  0.4  0.5    Alkaline Phosphatase 79  71  74    AST (SGOT) 32  30  24    ALT (SGPT) 25  24  22    Albumin/Globulin Ratio 1.4  1.4  1.3    BUN/Creatinine Ratio 11.6  16.8  14.8    Anion Gap 7.5  10.3  11.9       CBC          3/8/2023    07:35 6/1/2023    06:42 8/7/2023    07:33   CBC   WBC 5.45  4.79  6.06    RBC 4.46  4.77  4.59    Hemoglobin 13.7  14.8  14.4    Hematocrit 41.0  42.9  41.3    MCV 91.9  89.9  90.0    MCH 30.7  31.0  31.4    MCHC 33.4  34.5  34.9    RDW 12.6  11.9  12.4    Platelets 179  189  191       Lab Results   Component Value Date    TROPONINT 38 (H) 08/07/2023         Assessment & Plan   Assessment / Plan     Brief Patient Summary:  Joon Zhao is a 77 y.o. male who presents with atypical chest pain.  It occurs while he is just sitting  watching TV but when he walked 2 miles this morning and then did the elliptical at the fitness center he had no chest pain at all during that time.  He does notice soreness in his throat when he tries to drink water or take pills.  I do think this is most consistent with gastroesophageal reflux disease.    Active Hospital Problems:  There are no active hospital problems to display for this patient.      Assessment:  1.  Atypical chest pain  2.  Recurrent DVT/pulmonary embolism    Plan:   1.  Continue the Eliquis.  2.  Patient had 2 sets of troponins with no rise or fall.  His beta natruretic peptide is normal.  He 2 EKG shows normal sinus rhythm with no ischemia.  Chest x-ray is normal.  CT PE protocol shows no pulmonary embolism but some postinflammatory changes.  3.  Patient's symptoms sound most consistent with gastroesophageal reflux disease.  I do not think there is any cardiac etiology for this pain.  I would tell the patient to take his Prilosec 40 mg every day as this really cannot be used as needed.  I do think the patient can be discharged from the emergency department from a cardiac standpoint.  Would set him up to see his primary cardiologist Dr. Mirza in clinic.  He is to return if symptoms worsen.  He has been worked up in the past apparently with left heart cath showing normal coronary arteries and stress test in the past.    Electronically signed by Rojas Kat MD, 08/07/23, 11:03 AM EDT.

## 2023-08-07 NOTE — ED PROVIDER NOTES
Time: 7:40 AM EDT  Date of encounter:  8/7/2023  Independent Historian/Clinical History and Information was obtained by:   Patient    History is limited by: N/A    Chief Complaint: Chest pain      History of Present Illness:  Patient is a 77 y.o. year old male who presents to the emergency department for evaluation of chest pain that began 1 week ago.  Patient states he had a cath done on 7/3/2023 with Dr. Hagen that showed no signs of stenosis or inflammation.  Patient states his chest pain does not radiate and he has not had shortness of breath but does have a history of blood clots.  Patient states he is taking Eliquis at this time.  Patient denies fever, cough, nausea, and vomiting.  Patient denies lower extremity edema.    HPI    Patient Care Team  Primary Care Provider: Brian Henson MD    Past Medical History:     Allergies   Allergen Reactions    Atorvastatin Hives    Ezetimibe Unknown - High Severity    Furosemide Unknown - High Severity    Simvastatin Hives    Statins Unknown - High Severity    Hydrochlorothiazide Hives     Past Medical History:   Diagnosis Date    Anemia     Arthritis     Back pain     CHRONIC NECK AND BACK PAIN    BPH (benign prostatic hyperplasia) 03/02/2015    BPH with urinary obstruction 06/01/2018    CKD (chronic kidney disease), stage III     Controlled type 2 diabetes mellitus with diabetic polyneuropathy 07/05/2017    COPD (chronic obstructive pulmonary disease)     Diabetes mellitus     Dizziness 08/10/2017    DVT (deep venous thrombosis)     ED (erectile dysfunction) of organic origin     Erectile dysfunction 2007    Foot pain, bilateral 03/12/2018    Glaucoma (increased eye pressure)     High cholesterol     Hyperlipidemia     Hypertension     Ingrowing nail 07/05/2017    Kidney failure     STAGE III    Pain in both feet     PE (pulmonary thromboembolism)     Polyneuropathy 07/05/2017    Primary central sleep apnea     Sleep apnea     Tinea unguium 07/05/2017     Past  Surgical History:   Procedure Laterality Date    ADRENAL GLAND SURGERY      COLONOSCOPY      CYSTOSCOPY      KIDNEY SURGERY      KIDNEY BIOSPY    PROSTATE SURGERY  12/2007    TURP / TRANSURETHRAL INCISION / DRAINAGE PROSTATE  06/2019     Family History   Problem Relation Age of Onset    Hypertension Mother     Heart disease Mother     Stroke Mother     Hypertension Sister     Heart disease Sister     Stroke Sister     Diabetes Brother     Diabetes Maternal Grandmother     Diabetes Other     Diabetes Son     Cancer Neg Hx        Home Medications:  Prior to Admission medications    Medication Sig Start Date End Date Taking? Authorizing Provider   acetaminophen-codeine (TYLENOL #3) 300-30 MG per tablet Take 1 tablet by mouth Every 4 (Four) Hours As Needed for Moderate Pain.   Yes Alfonso Espino MD   albuterol sulfate  (90 Base) MCG/ACT inhaler Ventolin HFA 90 mcg/actuation inhalation HFA aerosol inhaler inhale 2 puffs (180 mcg) by inhalation route every 6 hours   Active   Yes Emergency, Nurse KASIE oBlton   Alcohol Swabs (Easy Touch Alcohol Prep Medium) 70 % pads  12/7/22  Yes Alfonso Espino MD   amLODIPine (NORVASC) 5 MG tablet Take 1 tablet by mouth Daily. 6/21/23  Yes Keke Sandra APRN   apixaban (ELIQUIS) 5 MG tablet tablet Take 1 tablet by mouth 2 (Two) Times a Day.   Yes ProviderAlfonso MD   ARTIFICIAL SALIVA MT Apply  to the mouth or throat 2 (Two) Times a Day As Needed.   Yes Alfonso Espino MD   aspirin 81 MG EC tablet aspirin 81 mg oral tablet,delayed release (DR/EC) take 1 tablet (81 mg) by oral route once daily   Active   Yes Emergency, Nurse KASIE Bolton   B-D UF III MINI PEN NEEDLES 31G X 5 MM misc  12/30/22  Yes Alfonso Espino MD   baclofen (LIORESAL) 10 MG tablet Take 1 tablet by mouth 2 (Two) Times a Day. 6/7/21  Yes Emergency, Nurse KASIE Bolton   cetirizine (zyrTEC) 10 MG tablet Take 1 tablet by mouth Daily. 10/6/22  Yes Alfonso Espino MD   chlorthalidone  (HYGROTON) 25 MG tablet Take 1 tablet by mouth 2 (two) times a day. 5/11/21  Yes Emergency, Nurse KASIE Bolton   cholecalciferol (VITAMIN D3) 25 MCG (1000 UT) tablet Take 1 tablet by mouth Daily. 6/15/21  Yes ProviderAlfonso MD   coenzyme Q10 100 MG capsule Take 1 capsule by mouth Daily.   Yes Provider, MD Alfonso   FREESTYLE LITE test strip  12/27/22  Yes Provider, MD Alfonso   gabapentin (NEURONTIN) 800 MG tablet Take 1 tablet by mouth 3 (Three) Times a Day. 5/24/21  Yes Emergency, Nurse KASIE Bolton   Insulin Degludec FlexTouch 200 UNIT/ML solution pen-injector Inject  under the skin into the appropriate area as directed Daily.   Yes ProviderAlfonso MD   ipratropium (ATROVENT) 0.03 % nasal spray 2 sprays each nostril q 6 hrs 6/3/22  Yes Provider, MD Alfonso   Lactobacillus (Acidophilus) 100 MG capsule Take  by mouth Daily.   Yes Provider, MD Alfonso   Lancets (freestyle) lancets  10/6/22  Yes Provider, MD Alfonso   Lantus SoloStar 100 UNIT/ML injection pen  2/7/23  Yes Provider, MD Alfonso   latanoprost (XALATAN) 0.005 % ophthalmic solution 1 drop.   Yes Emergency, Nurse KASIE Bolton   losartan (COZAAR) 50 MG tablet TAKE 1/2 TABLET DAILY 9/22/22 9/22/23 Yes Provider, MD Alfonso   Narcan 4 MG/0.1ML nasal spray  9/19/22  Yes Provider, MD Alfonso   omeprazole (priLOSEC) 20 MG capsule Take 1 capsule by mouth As Needed. 6/29/21  Yes Provider, MD Alfonso   rOPINIRole (REQUIP) 0.25 MG tablet Take 1 tablet by mouth every night at bedtime. 5/11/21  Yes Emergency, Nurse KASIE Bolton   tiotropium bromide-olodaterol (Stiolto Respimat) 2.5-2.5 MCG/ACT aerosol solution inhaler Inhale 2 puffs Daily. 2/9/23  Yes Junie Ansari APRN   modafinil (PROVIGIL) 100 MG tablet Take 1 tablet by mouth Daily. 6/7/23   ProviderAlfonso MD        Social History:   Social History     Tobacco Use    Smoking status: Never    Smokeless tobacco: Never   Vaping Use    Vaping Use: Never used   Substance Use  "Topics    Alcohol use: Not Currently     Comment: rare    Drug use: Never         Review of Systems:  Review of Systems   Constitutional:  Negative for chills and fever.   HENT:  Negative for congestion, rhinorrhea and sore throat.    Eyes:  Negative for pain and visual disturbance.   Respiratory:  Negative for apnea, cough, chest tightness and shortness of breath.    Cardiovascular:  Positive for chest pain. Negative for palpitations.   Gastrointestinal:  Negative for abdominal pain, diarrhea, nausea and vomiting.   Genitourinary:  Negative for difficulty urinating and dysuria.   Musculoskeletal:  Negative for joint swelling and myalgias.   Skin:  Negative for color change.   Neurological:  Negative for seizures and headaches.   Psychiatric/Behavioral: Negative.     All other systems reviewed and are negative.     Physical Exam:  /95   Pulse 81   Temp 98 øF (36.7 øC) (Oral)   Resp 20   Ht 175.3 cm (69\")   Wt 104 kg (228 lb 6.3 oz)   SpO2 100%   BMI 33.73 kg/mý     Physical Exam  Vitals and nursing note reviewed.   Constitutional:       General: He is not in acute distress.     Appearance: Normal appearance. He is not toxic-appearing.   HENT:      Head: Normocephalic and atraumatic.      Jaw: There is normal jaw occlusion.   Eyes:      General: Lids are normal.      Extraocular Movements: Extraocular movements intact.      Conjunctiva/sclera: Conjunctivae normal.      Pupils: Pupils are equal, round, and reactive to light.   Cardiovascular:      Rate and Rhythm: Normal rate and regular rhythm.      Pulses: Normal pulses.      Heart sounds: Normal heart sounds.   Pulmonary:      Effort: Pulmonary effort is normal. No respiratory distress.      Breath sounds: Normal breath sounds. No wheezing or rhonchi.   Abdominal:      General: Abdomen is flat.      Palpations: Abdomen is soft.      Tenderness: There is no abdominal tenderness. There is no guarding or rebound.   Musculoskeletal:         General: Normal " range of motion.      Cervical back: Normal range of motion and neck supple.      Right lower leg: No edema.      Left lower leg: No edema.   Skin:     General: Skin is warm and dry.   Neurological:      Mental Status: He is alert and oriented to person, place, and time. Mental status is at baseline.   Psychiatric:         Mood and Affect: Mood normal.                Procedures:  Procedures      Medical Decision Making:      Comorbidities that affect care:    COPD, Diabetes, Hypertension    External Notes reviewed:    Previous Clinic Note: 7/12/2023 for blood pressure log      The following orders were placed and all results were independently analyzed by me:  Orders Placed This Encounter   Procedures    XR Chest 1 View    CT Chest With Contrast Diagnostic    [place] Draw    High Sensitivity Troponin T    Comprehensive Metabolic Panel    Lipase    BNP    Magnesium    CBC Auto Differential    High Sensitivity Troponin T 2Hr    NPO Diet NPO Type: Strict NPO    Undress & Gown    Continuous Pulse Oximetry    Inpatient Cardiology Consult    Oxygen Therapy- Nasal Cannula; Titrate 1-6 LPM Per SpO2; 90 - 95%    ECG 12 Lead ED Triage Standing Order; Chest Pain    ECG 12 Lead ED Triage Standing Order; Chest Pain    Insert Peripheral IV    CBC & Differential    Green Top (Gel)    Lavender Top    Gold Top - SST    Light Blue Top       Medications Given in the Emergency Department:  Medications   sodium chloride 0.9 % flush 10 mL (has no administration in time range)   aspirin chewable tablet 324 mg (0 mg Oral Hold 8/7/23 0844)   iopamidol (ISOVUE-370) 76 % injection 100 mL (100 mL Intravenous Given 8/7/23 0819)   sodium chloride 0.9 % bolus 500 mL (500 mL Intravenous New Bag 8/7/23 1017)        ED Course:         Labs:    Lab Results (last 24 hours)       Procedure Component Value Units Date/Time    High Sensitivity Troponin T [[id number]]  (Abnormal) Collected: 08/07/23 0733    Specimen: Blood Updated: 08/07/23 8565     HS  Troponin T 41 ng/L     Narrative:      High Sensitive Troponin T Reference Range:  <10.0 ng/L- Negative Female for AMI  <15.0 ng/L- Negative Male for AMI  >=10 - Abnormal Female indicating possible myocardial injury.  >=15 - Abnormal Male indicating possible myocardial injury.   Clinicians would have to utilize clinical acumen, EKG, Troponin, and serial changes to determine if it is an Acute Myocardial Infarction or myocardial injury due to an underlying chronic condition.         CBC & Differential [[id number]]  (Abnormal) Collected: 08/07/23 0733    Specimen: Blood Updated: 08/07/23 0741    Narrative:      The following orders were created for panel order CBC & Differential.  Procedure                               Abnormality         Status                     ---------                               -----------         ------                     CBC Auto Differential[[id number]]        Abnormal            Final result                 Please view results for these tests on the individual orders.    Comprehensive Metabolic Panel [[id number]]  (Abnormal) Collected: 08/07/23 0733    Specimen: Blood Updated: 08/07/23 0800     Glucose 175 mg/dL      BUN 26 mg/dL      Creatinine 1.76 mg/dL      Sodium 135 mmol/L      Potassium 4.4 mmol/L      Chloride 97 mmol/L      CO2 26.1 mmol/L      Calcium 9.4 mg/dL      Total Protein 7.4 g/dL      Albumin 4.2 g/dL      ALT (SGPT) 22 U/L      AST (SGOT) 24 U/L      Alkaline Phosphatase 74 U/L      Total Bilirubin 0.5 mg/dL      Globulin 3.2 gm/dL      A/G Ratio 1.3 g/dL      BUN/Creatinine Ratio 14.8     Anion Gap 11.9 mmol/L      eGFR 39.3 mL/min/1.73     Narrative:      GFR Normal >60  Chronic Kidney Disease <60  Kidney Failure <15    The GFR formula is only valid for adults with stable renal function between ages 18 and 70.    Lipase [[id number]]  (Normal) Collected: 08/07/23 0733    Specimen: Blood Updated: 08/07/23 0800     Lipase 40 U/L     BNP [[id number]]  (Normal) Collected:  08/07/23 0733    Specimen: Blood Updated: 08/07/23 0758     proBNP <36.0 pg/mL     Narrative:      Among patients with dyspnea, NT-proBNP is highly sensitive for the detection of acute congestive heart failure. In addition NT-proBNP of <300 pg/ml effectively rules out acute congestive heart failure with 99% negative predictive value.      Magnesium [[id number]]  (Normal) Collected: 08/07/23 0733    Specimen: Blood Updated: 08/07/23 0800     Magnesium 2.1 mg/dL     CBC Auto Differential [[id number]]  (Abnormal) Collected: 08/07/23 0733    Specimen: Blood Updated: 08/07/23 0741     WBC 6.06 10*3/mm3      RBC 4.59 10*6/mm3      Hemoglobin 14.4 g/dL      Hematocrit 41.3 %      MCV 90.0 fL      MCH 31.4 pg      MCHC 34.9 g/dL      RDW 12.4 %      RDW-SD 40.3 fl      MPV 10.7 fL      Platelets 191 10*3/mm3      Neutrophil % 44.4 %      Lymphocyte % 46.2 %      Monocyte % 8.1 %      Eosinophil % 0.8 %      Basophil % 0.3 %      Immature Grans % 0.2 %      Neutrophils, Absolute 2.69 10*3/mm3      Lymphocytes, Absolute 2.80 10*3/mm3      Monocytes, Absolute 0.49 10*3/mm3      Eosinophils, Absolute 0.05 10*3/mm3      Basophils, Absolute 0.02 10*3/mm3      Immature Grans, Absolute 0.01 10*3/mm3      nRBC 0.0 /100 WBC     High Sensitivity Troponin T 2Hr [[id number]]  (Abnormal) Collected: 08/07/23 0933    Specimen: Blood Updated: 08/07/23 0955     HS Troponin T 38 ng/L      Troponin T Delta -3 ng/L     Narrative:      High Sensitive Troponin T Reference Range:  <10.0 ng/L- Negative Female for AMI  <15.0 ng/L- Negative Male for AMI  >=10 - Abnormal Female indicating possible myocardial injury.  >=15 - Abnormal Male indicating possible myocardial injury.   Clinicians would have to utilize clinical acumen, EKG, Troponin, and serial changes to determine if it is an Acute Myocardial Infarction or myocardial injury due to an underlying chronic condition.                  Imaging:    CT Chest With Contrast Diagnostic    Result Date:  8/7/2023  PROCEDURE: CT CHEST W CONTRAST DIAGNOSTIC  COMPARISON:  None INDICATIONS: Chest wall pain, soa, nontraumatic, no prior imaging  TECHNIQUE: After obtaining the patient's consent, CT images were obtained with non-ionic intravenous contrast material.   PROTOCOL:   Pulmonary embolism imaging protocol performed    RADIATION:   DLP: 491.1mGy*cm   Automated exposure control was utilized to minimize radiation dose. CONTRAST: 100cc Isovue 370 I.V.  FINDINGS:  There is mild motion artifact.  Lung volumes are low.    Pulmonary arteries:  There is an excellent bolus for the evaluation of the pulmonary arterial system.  Some limitations due to motion and vascular crowding noted as mentioned above.  Given these limitations there is no evidence suspicious filling defects noted within the pulmonary arterial system.  The main pulmonary arteries are normal in caliber.  Mediastinum:  Heart size is within normal limits.  There is some vascular crowding secondary to elevated diaphragms, low lung volumes.  No suspicious pericardial effusion is noted.  The aorta and the origin of the great vessels is grossly unremarkable appearance.  Limited imaging of the base of the neck and the esophagus is unremarkable in appearance  Lungs:  Central airways appear patent.  Vascular crowding and ground-glass opacity compatible with atelectasis is accentuated by low lung volumes.  No suspicious nodule noted.  Some patchy ground-glass opacities noted peripherally in the left upper lobe.  No pleural effusion appreciated.  Upper abdomen:  Limited imaging of the upper abdomen is unremarkable.  Bones and soft tissues:  No acute osseous abnormality.        1. Given mild motion limitations no evidence of pulmonary embolus 2. Low lung volume exam with ground-glass opacity noted dependently favored represent atelectasis 3. There is some patchy areas of nodular ground-glass opacity in the periphery of the left upper lobe anteriorly.  Findings likely  post inflammatory or infectious in nature.     JAISON SEN MD       Electronically Signed and Approved By: JAISON SEN MD on 8/07/2023 at 8:46             XR Chest 1 View    Result Date: 8/7/2023  PROCEDURE: XR CHEST 1 VW  COMPARISON: [facility], CR, CHEST AP/PA 1 VIEW, 12/27/2020, 17:42.  [facility], CR, XR CHEST 1 VW, 7/12/2021, 1:42.  INDICATIONS: Chest Pain Triage Protocol  FINDINGS:  No new consolidations or pleural effusions are observed. The cardiac silhouette and mediastinum are unchanged. No definitive acute osseous abnormalities are seen on this single view.        1. No change from the previous study with no evidence for acute cardiopulmonary process.         COSMO BECERRA MD       Electronically Signed and Approved By: COSMO BECERRA MD on 8/07/2023 at 8:25                Differential Diagnosis and Discussion:    Chest Pain:  Based on the patient's signs and symptoms, I considered aortic dissection, myocardial infaction, pulmonary embolism, cardiac tamponade, pericarditis, pneumothorax, musculoskeletal chest pain and other differential diagnosis as an etiology of the patient's chest pain.     All labs were reviewed and interpreted by me.  All X-rays impressions were independently interpreted by me.  EKG was interpreted by supervising attending.  CT scan radiology impression was interpreted by me.    MDM     The patient's CBC that was reviewed and interpreted by me shows no abnormalities of critical concern. Of note, there is no anemia requiring a blood transfusion and the platelet count is acceptable.  Troponin went down on the second 1 from 41-38.  Patient's heart score was 5 so I consulted Dr. Kat regarding admission and further testing inpatient. Dr. Kat came down to consult on the patient and believes that patient's pain is GI related and can follow-up outpatient with him to do a stress test.  He instructed the patient to take Prilosec at home as he is  already prescribed.  Patient understands and agrees with plan of care.  Patient states he is can follow-up with his primary care provider in 2 days.  I instructed the patient to return to the ER if you develop chest pain or shortness of breath.      Patient Care Considerations:    None      Consultants/Shared Management Plan:    Consultant: I have discussed the case with   who agrees to consult on the patient.    Social Determinants of Health:    Patient is independent, reliable, and has access to care.       Disposition and Care Coordination:    Discharged: I considered escalation of care by admitting this patient to the hospital, however the patient has improved and is suitable and stable for discharge.    I have explained the patient's condition, diagnoses and treatment plan based on the information available to me at this time. I have answered questions and addressed any concerns. The patient has a good  understanding of the patient's diagnosis, condition, and treatment plan as can be expected at this point. The vital signs have been stable. The patient's condition is stable and appropriate for discharge from the emergency department.      The patient will pursue further outpatient evaluation with the primary care physician or other designated or consulting physician as outlined in the discharge instructions. They are agreeable to this plan of care and follow-up instructions have been explained in detail. The patient has received these instructions in written format and have expressed an understanding of the discharge instructions. The patient is aware that any significant change in condition or worsening of symptoms should prompt an immediate return to this or the closest emergency department or call to 911.  I have explained discharge medications and the need for follow up with the patient/caretakers. This was also printed in the discharge instructions. Patient was discharged with the following  medications and follow up:      Medication List      No changes were made to your prescriptions during this visit.      Brian Henson MD  [address] [id number] [phone]    Go in 2 days  Follow-up       Final diagnoses:   Chest pain, unspecified type   Gastroesophageal reflux disease, unspecified whether esophagitis present        ED Disposition       ED Disposition   Discharge    Condition   Stable    Comment   --               This medical record created using voice recognition software.             Rojas Gonsalez PA-C  08/07/23 1116

## 2023-08-17 ENCOUNTER — OFFICE VISIT (OUTPATIENT)
Dept: PULMONOLOGY | Facility: CLINIC | Age: 78
End: 2023-08-17
Payer: MEDICARE

## 2023-08-17 VITALS
HEIGHT: 69 IN | TEMPERATURE: 97.8 F | OXYGEN SATURATION: 98 % | HEART RATE: 80 BPM | SYSTOLIC BLOOD PRESSURE: 142 MMHG | RESPIRATION RATE: 16 BRPM | BODY MASS INDEX: 32.88 KG/M2 | WEIGHT: 222 LBS | DIASTOLIC BLOOD PRESSURE: 93 MMHG

## 2023-08-17 DIAGNOSIS — Z86.711 HISTORY OF PULMONARY EMBOLISM: ICD-10-CM

## 2023-08-17 DIAGNOSIS — R06.09 DYSPNEA ON EXERTION: ICD-10-CM

## 2023-08-17 DIAGNOSIS — J30.2 SEASONAL ALLERGIES: ICD-10-CM

## 2023-08-17 DIAGNOSIS — J30.9 ALLERGIC RHINITIS, UNSPECIFIED SEASONALITY, UNSPECIFIED TRIGGER: ICD-10-CM

## 2023-08-17 DIAGNOSIS — J43.9 PULMONARY EMPHYSEMA, UNSPECIFIED EMPHYSEMA TYPE: Primary | ICD-10-CM

## 2023-08-17 DIAGNOSIS — G47.33 OSA (OBSTRUCTIVE SLEEP APNEA): ICD-10-CM

## 2023-08-17 RX ORDER — DULAGLUTIDE 1.5 MG/.5ML
INJECTION, SOLUTION SUBCUTANEOUS
COMMUNITY

## 2023-08-17 RX ORDER — PREDNISONE 10 MG/1
TABLET ORAL
COMMUNITY
Start: 2023-07-03 | End: 2023-08-17

## 2023-08-17 RX ORDER — GLIPIZIDE 10 MG/1
TABLET, FILM COATED, EXTENDED RELEASE ORAL
COMMUNITY

## 2023-08-17 RX ORDER — COLESEVELAM HYDROCHLORIDE 3.75 G/1
POWDER, FOR SUSPENSION ORAL
COMMUNITY

## 2023-08-17 RX ORDER — FLUTICASONE PROPIONATE 50 MCG
SPRAY, SUSPENSION (ML) NASAL
COMMUNITY

## 2023-08-17 RX ORDER — FLUTICASONE PROPIONATE AND SALMETEROL XINAFOATE 115; 21 UG/1; UG/1
AEROSOL, METERED RESPIRATORY (INHALATION)
COMMUNITY

## 2023-08-17 RX ORDER — EPINEPHRINE 0.3 MG/.3ML
INJECTION SUBCUTANEOUS
COMMUNITY

## 2023-08-17 RX ORDER — CYCLOBENZAPRINE HCL 10 MG
TABLET ORAL
COMMUNITY

## 2023-08-17 NOTE — PROGRESS NOTES
Primary Care Provider  Brian Henson MD     Referring Provider  No ref. provider found     Chief Complaint  Shortness of Breath (When weather is humid. ) and Follow-up (1 year f/up )    Subjective          Joon Zhao presents to [facility]  History of Present Illness  Joon Zhao is a 77 y.o. male patient of Dr. Cazares for management of obstructive sleep apnea, emphysema, seasonal allergies, allergic rhinitis, dyspnea on exertion and chronic thrombolytic disease.     Patient states he is doing well since last visit.  He denies using any antibiotics or steroids for his lungs.  He denies any current fevers or chills.  His shortness of breath is mild in severity, worse with exertion and improved with rest.  He continues to take Stiolto as prescribed and states that his previous hoarseness from the Advair has resolved.  He takes Claritin, Flonase and azelastine nasal spray for seasonal allergies and allergic rhinitis.  He has an upcoming appointment with his allergist and will have a spirometry at that time.  He continues wear his CPAP machine at night and [facility]. patient states that he did present to the emergency room on 8/7/2023 for chest pain.  It was ruled that patient was having issues with acid reflux.  He did have a chest CT at that time.  This showed no evidence of pulmonary embolism, low lung volume exam with groundglass opacity noted dependently favored to represent atelectasis and some areas of patchy groundglass in the left upper lobe.  It states these findings are likely postinfectious or inflammatory in nature.  These findings were discussed with patient today.  I explained patient that this could contribute to some of his shortness of breath patient continues to try to stay as active as possible.  Patient states that he walks 1 to 2 miles every day and occasionally become short of breath.  He does not need to  use his albuterol inhaler very often.  Patient states that oftentimes he will stop and meditate and his breathing improves.     His history of smoking is   Tobacco Use: Low Risk     Smoking Tobacco Use: Never    Smokeless Tobacco Use: Never    Passive Exposure: Never   .    Review of Systems   Constitutional:  Negative for chills, fatigue, fever, unexpected weight gain and unexpected weight loss.   HENT:  Negative for congestion (Nasal), hearing loss, mouth sores, nosebleeds, postnasal drip, sore throat and trouble swallowing.    Respiratory:  Positive for shortness of breath. Negative for apnea, cough and wheezing.         Negative for Hemoptysis     Cardiovascular:  Negative for chest pain, palpitations and leg swelling.   Gastrointestinal:  Negative for constipation, diarrhea, nausea, vomiting and GERD.   Skin:  Negative for color change.        Negative for cyanosis   Neurological:  Negative for syncope, weakness, numbness and headache.    Sleep: Negative for Excessive daytime sleepiness  Negative for morning headaches  Negative for Snoring    Family History   Problem Relation Age of Onset    Hypertension Mother     Heart disease Mother     Stroke Mother     Hypertension Sister     Heart disease Sister     Stroke Sister     Diabetes Brother     Diabetes Maternal Grandmother     Diabetes Other     Diabetes Son     Cancer Neg Hx         Social History     Socioeconomic History    Marital status:    Tobacco Use    Smoking status: Never     Passive exposure: Never    Smokeless tobacco: Never   Vaping Use    Vaping Use: Never used   Substance and Sexual Activity    Alcohol use: Not Currently     Comment: rare    Drug use: Never    Sexual activity: Not Currently     Partners: Female     Birth control/protection: None        Past Medical History:   Diagnosis Date    Anemia     Arthritis     Back pain     CHRONIC NECK AND BACK PAIN    BPH (benign prostatic hyperplasia) 03/02/2015    BPH with urinary obstruction  06/01/2018    CKD (chronic kidney disease), stage III     Controlled type 2 diabetes mellitus with diabetic polyneuropathy 07/05/2017    COPD (chronic obstructive pulmonary disease)     Diabetes mellitus     Dizziness 08/10/2017    DVT (deep venous thrombosis)     ED (erectile dysfunction) of organic origin     Erectile dysfunction 2007    Foot pain, bilateral 03/12/2018    Glaucoma (increased eye pressure)     High cholesterol     Hyperlipidemia     Hypertension     Ingrowing nail 07/05/2017    Kidney failure     STAGE III    Pain in both feet     PE (pulmonary thromboembolism)     Polyneuropathy 07/05/2017    Primary central sleep apnea     Sleep apnea     Tinea unguium 07/05/2017        Immunization History   Administered Date(s) Administered    COVID-19 (MODERNA) 1st,2nd,3rd Dose Monovalent 03/16/2021, 04/13/2021    COVID-19 (MODERNA) Monovalent Original Booster 12/02/2021    FluMist 2-49yrs 09/16/2022    Fluad Quad 65+ 09/16/2022    Fluzone High-Dose 65+yrs 09/15/2020, 10/08/2021    Hepatitis B 2 Dose Vaccine Heplisav-B 11/04/2015    Influenza Inj MDCK Preserative Free 11/04/2015    Influenza LAIV (Nasal) 09/16/2022    Influenza Seasonal Injectable 10/20/2016    Influenza, Unspecified 09/15/2020, 10/08/2021    Pneumococcal Conjugate 13-Valent (PCV13) 03/20/2020    Pneumococcal Polysaccharide (PPSV23) 09/25/2017    Shingrix 03/20/2020, 09/15/2020         Allergies   Allergen Reactions    Atorvastatin Hives    Ezetimibe Unknown - High Severity    Furosemide Unknown - High Severity    Simvastatin Hives    Statins Unknown - High Severity    Hydrochlorothiazide Hives          Current Outpatient Medications:     acetaminophen-codeine (TYLENOL #3) 300-30 MG per tablet, Take 1 tablet by mouth Every 4 (Four) Hours As Needed for Moderate Pain., Disp: , Rfl:     albuterol sulfate  (90 Base) MCG/ACT inhaler, Ventolin HFA 90 mcg/actuation inhalation HFA aerosol inhaler inhale 2 puffs (180 mcg) by inhalation route  every 6 hours   Active, Disp: , Rfl:     Alcohol Swabs (Easy Touch Alcohol Prep Medium) 70 % pads, , Disp: , Rfl:     apixaban (ELIQUIS) 5 MG tablet tablet, Take 1 tablet by mouth 2 (Two) Times a Day., Disp: , Rfl:     ARTIFICIAL SALIVA MT, Apply  to the mouth or throat 2 (Two) Times a Day As Needed., Disp: , Rfl:     aspirin 81 MG EC tablet, aspirin 81 mg oral tablet,delayed release (DR/EC) take 1 tablet (81 mg) by oral route once daily   Active, Disp: , Rfl:     B-D UF III MINI PEN NEEDLES 31G X 5 MM misc, , Disp: , Rfl:     baclofen (LIORESAL) 10 MG tablet, Take 1 tablet by mouth 2 (Two) Times a Day., Disp: , Rfl:     cetirizine (zyrTEC) 10 MG tablet, Take 1 tablet by mouth Daily., Disp: , Rfl:     chlorthalidone (HYGROTON) 25 MG tablet, Take 1 tablet by mouth 2 (two) times a day., Disp: , Rfl:     coenzyme Q10 100 MG capsule, Take 1 capsule by mouth Daily., Disp: , Rfl:     fluticasone (FLONASE) 50 MCG/ACT nasal spray, 2 spray(s) in each nostril daily, Disp: , Rfl:     FREESTYLE LITE test strip, , Disp: , Rfl:     gabapentin (NEURONTIN) 800 MG tablet, Take 1 tablet by mouth 3 (Three) Times a Day., Disp: , Rfl:     Insulin Degludec FlexTouch 200 UNIT/ML solution pen-injector, Inject  under the skin into the appropriate area as directed Daily., Disp: , Rfl:     ipratropium (ATROVENT) 0.03 % nasal spray, 2 sprays each nostril q 6 hrs, Disp: , Rfl:     Lactobacillus (Acidophilus) 100 MG capsule, Take  by mouth Daily., Disp: , Rfl:     Lancets (freestyle) lancets, , Disp: , Rfl:     latanoprost (XALATAN) 0.005 % ophthalmic solution, 1 drop., Disp: , Rfl:     losartan (COZAAR) 50 MG tablet, TAKE 1/2 TABLET DAILY, Disp: , Rfl:     omeprazole (priLOSEC) 20 MG capsule, Take 1 capsule by mouth As Needed., Disp: , Rfl:     rOPINIRole (REQUIP) 0.25 MG tablet, Take 1 tablet by mouth every night at bedtime., Disp: , Rfl:     tiotropium bromide-olodaterol (Stiolto Respimat) 2.5-2.5 MCG/ACT aerosol solution inhaler, Inhale 2  puffs Daily., Disp: 3 each, Rfl: 3    Advair -21 MCG/ACT inhaler, Inhale 2 puffs twice a day by inhalation route. (Patient not taking: Reported on 8/17/2023), Disp: , Rfl:     amLODIPine (NORVASC) 5 MG tablet, Take 1 tablet by mouth Daily. (Patient not taking: Reported on 8/17/2023), Disp: 90 tablet, Rfl: 3    cholecalciferol (VITAMIN D3) 25 MCG (1000 UT) tablet, Take 1 tablet by mouth Daily. (Patient not taking: Reported on 8/17/2023), Disp: , Rfl:     colesevelam (WELCHOL) 3.75 g pack pack, , Disp: , Rfl:     cyclobenzaprine (FLEXERIL) 10 MG tablet, 1 q 8 prn (Patient not taking: Reported on 8/17/2023), Disp: , Rfl:     Dulaglutide (Trulicity) 1.5 MG/0.5ML solution pen-injector, , Disp: , Rfl:     EPINEPHrine (EPIPEN) 0.3 MG/0.3ML solution auto-injector injection, , Disp: , Rfl:     glipizide (GLUCOTROL XL) 10 MG 24 hr tablet, Take 1 tablet twice a day by oral route. (Patient not taking: Reported on 8/17/2023), Disp: , Rfl:     Lantus SoloStar 100 UNIT/ML injection pen, , Disp: , Rfl:     loratadine-pseudoephedrine (CLARITIN-D 24-hour)  MG per 24 hr tablet, , Disp: , Rfl:     modafinil (PROVIGIL) 100 MG tablet, Take 1 tablet by mouth Daily. (Patient not taking: Reported on 8/17/2023), Disp: , Rfl:     Narcan 4 MG/0.1ML nasal spray, , Disp: , Rfl:     verapamil SR (CALAN-SR) 180 MG CR tablet, , Disp: , Rfl:     vitamin D3 125 MCG (5000 UT) capsule capsule, , Disp: , Rfl:      Objective   Physical Exam  Constitutional:       General: He is not in acute distress.     Appearance: Normal appearance. He is normal weight.   HENT:      Right Ear: Hearing normal.      Left Ear: Hearing normal.      Nose: No nasal tenderness or congestion.      Mouth/Throat:      Mouth: Mucous membranes are moist. No oral lesions.   Eyes:      Extraocular Movements: Extraocular movements intact.      Pupils: Pupils are equal, round, and reactive to light.   Neck:      Thyroid: No thyroid mass or thyromegaly.   Cardiovascular:  "     Rate and Rhythm: Normal rate and regular rhythm.      Pulses: Normal pulses.      Heart sounds: Normal heart sounds. No murmur heard.  Pulmonary:      Effort: Pulmonary effort is normal.      Breath sounds: Normal breath sounds. No wheezing, rhonchi or rales.   Musculoskeletal:      Cervical back: Neck supple.      Right lower leg: No edema.      Left lower leg: No edema.   Lymphadenopathy:      Cervical: No cervical adenopathy.      Upper Body:      Right upper body: No axillary adenopathy.   Skin:     General: Skin is warm and dry.      Findings: No lesion or rash.   Neurological:      General: No focal deficit present.      Mental Status: He is alert and oriented to person, place, and time.   Psychiatric:         Mood and Affect: Affect normal. Mood is not anxious or depressed.       Vital Signs:   /93 (BP Location: Left arm, Patient Position: Sitting, Cuff Size: Large Adult)   Pulse 80   Temp 97.8 øF (36.6 øC) (Temporal)   Resp 16   Ht 175.3 cm (69\")   Wt 101 kg (222 lb)   SpO2 98% Comment: room air  BMI 32.78 kg/mý        Result Review :   The following data was reviewed by: ROSALIA Inman on 08/17/2023:  CMP          3/8/2023    07:35 6/1/2023    06:42 8/7/2023    07:33   CMP   Glucose 90  120  175    BUN 20  29  26    Creatinine 1.73  1.73  1.76    EGFR 40.2  40.2  39.3    Sodium 137  137  135    Potassium 4.4  4.8  4.4    Chloride 100  101  97    Calcium 9.9  9.9  9.4    Total Protein 7.5  7.3  7.4    Albumin 4.4  4.3  4.2    Globulin 3.1  3.0  3.2    Total Bilirubin 0.4  0.4  0.5    Alkaline Phosphatase 79  71  74    AST (SGOT) 32  30  24    ALT (SGPT) 25  24  22    Albumin/Globulin Ratio 1.4  1.4  1.3    BUN/Creatinine Ratio 11.6  16.8  14.8    Anion Gap 7.5  10.3  11.9      CBC w/diff          3/8/2023    07:35 6/1/2023    06:42 8/7/2023    07:33   CBC w/Diff   WBC 5.45  4.79  6.06    RBC 4.46  4.77  4.59    Hemoglobin 13.7  14.8  14.4    Hematocrit 41.0  42.9  41.3    MCV 91.9  " 89.9  90.0    MCH 30.7  31.0  31.4    MCHC 33.4  34.5  34.9    RDW 12.6  11.9  12.4    Platelets 179  189  191    Neutrophil Rel % 41.6   44.4    Immature Granulocyte Rel % 0.4   0.2    Lymphocyte Rel % 47.7   46.2    Monocyte Rel % 8.8   8.1    Eosinophil Rel % 0.9   0.8    Basophil Rel % 0.6   0.3      Data reviewed : Radiologic studies chest x-ray 8/7/2023, chest CT 8/7/2023 and my last office note    Procedures        Assessment and Plan    Diagnoses and all orders for this visit:    1. Pulmonary emphysema, unspecified emphysema type (Primary)    2. GRAY (obstructive sleep apnea)    3. Seasonal allergies    4. Allergic rhinitis, unspecified seasonality, unspecified trigger    5. History of pulmonary embolism    6. Dyspnea on exertion    7.  Continue Stiolto as prescribed.  8.  Continue albuterol as needed.  9.  Continue Eliquis for history of pulmonary embolism  10.  Continue CPAP at current settings.  [facility].  11.  Follow-up in 1 year, sooner if needed.        Follow Up   Return in about 1 year (around 8/17/2024) for Recheck 1 year with Kimmie.  Patient was given instructions and counseling regarding his condition or for health maintenance advice. Please see specific information pulled into the [facility] if appropriate.

## 2023-09-02 ENCOUNTER — LAB (OUTPATIENT)
Dept: LAB | Facility: HOSPITAL | Age: 78
End: 2023-09-02
Payer: MEDICARE

## 2023-09-02 ENCOUNTER — TRANSCRIBE ORDERS (OUTPATIENT)
Dept: ADMINISTRATIVE | Facility: HOSPITAL | Age: 78
End: 2023-09-02
Payer: MEDICARE

## 2023-09-02 DIAGNOSIS — N18.30 STAGE 3 CHRONIC KIDNEY DISEASE, UNSPECIFIED WHETHER STAGE 3A OR 3B CKD: ICD-10-CM

## 2023-09-02 DIAGNOSIS — I10 HTN (HYPERTENSION) WITH GOAL TO BE DETERMINED: Primary | ICD-10-CM

## 2023-09-02 DIAGNOSIS — I10 HTN (HYPERTENSION) WITH GOAL TO BE DETERMINED: ICD-10-CM

## 2023-09-02 LAB
ALBUMIN SERPL-MCNC: 4.1 G/DL (ref 3.5–5.2)
ALBUMIN/GLOB SERPL: 1.3 G/DL
ALP SERPL-CCNC: 71 U/L (ref 39–117)
ALT SERPL W P-5'-P-CCNC: 28 U/L (ref 1–41)
ANION GAP SERPL CALCULATED.3IONS-SCNC: 10.9 MMOL/L (ref 5–15)
AST SERPL-CCNC: 36 U/L (ref 1–40)
BASOPHILS # BLD MANUAL: 0.05 10*3/MM3 (ref 0–0.2)
BASOPHILS NFR BLD MANUAL: 1 % (ref 0–1.5)
BILIRUB SERPL-MCNC: 0.7 MG/DL (ref 0–1.2)
BUN SERPL-MCNC: 18 MG/DL (ref 8–23)
BUN/CREAT SERPL: 10.1 (ref 7–25)
CALCIUM SPEC-SCNC: 9.4 MG/DL (ref 8.6–10.5)
CHLORIDE SERPL-SCNC: 100 MMOL/L (ref 98–107)
CO2 SERPL-SCNC: 26.1 MMOL/L (ref 22–29)
CREAT SERPL-MCNC: 1.78 MG/DL (ref 0.76–1.27)
CREAT UR-MCNC: 86.3 MG/DL
DEPRECATED RDW RBC AUTO: 38.7 FL (ref 37–54)
EGFRCR SERPLBLD CKD-EPI 2021: 38.8 ML/MIN/1.73
EOSINOPHIL # BLD MANUAL: 0.11 10*3/MM3 (ref 0–0.4)
EOSINOPHIL NFR BLD MANUAL: 2 % (ref 0.3–6.2)
ERYTHROCYTE [DISTWIDTH] IN BLOOD BY AUTOMATED COUNT: 11.9 % (ref 12.3–15.4)
GLOBULIN UR ELPH-MCNC: 3.2 GM/DL
GLUCOSE SERPL-MCNC: 134 MG/DL (ref 65–99)
HCT VFR BLD AUTO: 39.4 % (ref 37.5–51)
HGB BLD-MCNC: 13.3 G/DL (ref 13–17.7)
LYMPHOCYTES # BLD MANUAL: 3.08 10*3/MM3 (ref 0.7–3.1)
LYMPHOCYTES NFR BLD MANUAL: 9.2 % (ref 5–12)
MCH RBC QN AUTO: 30.3 PG (ref 26.6–33)
MCHC RBC AUTO-ENTMCNC: 33.8 G/DL (ref 31.5–35.7)
MCV RBC AUTO: 89.7 FL (ref 79–97)
MONOCYTES # BLD: 0.49 10*3/MM3 (ref 0.1–0.9)
NEUTROPHILS # BLD AUTO: 1.57 10*3/MM3 (ref 1.7–7)
NEUTROPHILS NFR BLD MANUAL: 29.6 % (ref 42.7–76)
NRBC BLD AUTO-RTO: 0 /100 WBC (ref 0–0.2)
PLAT MORPH BLD: NORMAL
PLATELET # BLD AUTO: 177 10*3/MM3 (ref 140–450)
PMV BLD AUTO: 11.5 FL (ref 6–12)
POTASSIUM SERPL-SCNC: 4.2 MMOL/L (ref 3.5–5.2)
PROT ?TM UR-MCNC: 9.1 MG/DL
PROT SERPL-MCNC: 7.3 G/DL (ref 6–8.5)
PROT/CREAT UR: 0.11 MG/G{CREAT}
RBC # BLD AUTO: 4.39 10*6/MM3 (ref 4.14–5.8)
RBC MORPH BLD: NORMAL
SODIUM SERPL-SCNC: 137 MMOL/L (ref 136–145)
VARIANT LYMPHS NFR BLD MANUAL: 58.2 % (ref 19.6–45.3)
WBC MORPH BLD: NORMAL
WBC NRBC COR # BLD: 5.3 10*3/MM3 (ref 3.4–10.8)

## 2023-09-02 PROCEDURE — 82570 ASSAY OF URINE CREATININE: CPT

## 2023-09-02 PROCEDURE — 36415 COLL VENOUS BLD VENIPUNCTURE: CPT

## 2023-09-02 PROCEDURE — 84156 ASSAY OF PROTEIN URINE: CPT

## 2023-09-02 PROCEDURE — 85025 COMPLETE CBC W/AUTO DIFF WBC: CPT

## 2023-09-02 PROCEDURE — 80053 COMPREHEN METABOLIC PANEL: CPT

## 2023-09-02 PROCEDURE — 85007 BL SMEAR W/DIFF WBC COUNT: CPT

## 2023-09-26 ENCOUNTER — OFFICE VISIT (OUTPATIENT)
Dept: PODIATRY | Facility: CLINIC | Age: 78
End: 2023-09-26
Payer: MEDICARE

## 2023-09-26 VITALS
TEMPERATURE: 98.4 F | BODY MASS INDEX: 33.47 KG/M2 | DIASTOLIC BLOOD PRESSURE: 90 MMHG | SYSTOLIC BLOOD PRESSURE: 167 MMHG | WEIGHT: 226 LBS | HEIGHT: 69 IN | OXYGEN SATURATION: 95 % | HEART RATE: 62 BPM

## 2023-09-26 DIAGNOSIS — B35.1 ONYCHOMYCOSIS: ICD-10-CM

## 2023-09-26 DIAGNOSIS — E11.8 DIABETIC FOOT: ICD-10-CM

## 2023-09-26 DIAGNOSIS — M79.672 FOOT PAIN, BILATERAL: ICD-10-CM

## 2023-09-26 DIAGNOSIS — Z79.4 TYPE 2 DIABETES MELLITUS WITH DIABETIC POLYNEUROPATHY, WITH LONG-TERM CURRENT USE OF INSULIN: ICD-10-CM

## 2023-09-26 DIAGNOSIS — E11.9 NON-INSULIN DEPENDENT TYPE 2 DIABETES MELLITUS: Primary | ICD-10-CM

## 2023-09-26 DIAGNOSIS — E11.42 TYPE 2 DIABETES MELLITUS WITH DIABETIC POLYNEUROPATHY, WITH LONG-TERM CURRENT USE OF INSULIN: ICD-10-CM

## 2023-09-26 DIAGNOSIS — G62.9 NEUROPATHY: ICD-10-CM

## 2023-09-26 DIAGNOSIS — M79.671 FOOT PAIN, BILATERAL: ICD-10-CM

## 2023-09-26 DIAGNOSIS — L60.0 ONYCHOCRYPTOSIS: ICD-10-CM

## 2023-09-26 NOTE — PROGRESS NOTES
[facility] - PODIATRY    Today's Date: 09/26/23    Patient Name: Joon Zhao  MRN: [id number]  CSN: [id number]  PCP: Brian Henson MD, Last PCP Visit: 9/19/2023  Referring Provider: No ref. provider found    SUBJECTIVE     Chief Complaint   Patient presents with    Left Foot - Follow-up, Nail Problem    Right Foot - Follow-up, Nail Problem     HPI: Joon Zhao, a 77 y.o.male, comes to clinic.    New, Established, New Problem:  established     Location:  Toenails    Duration:   Greater than five years    Onset:  Gradual    Nature:  sore with palpation.    Stable, worsening, improving:   Stable    Aggravating factors:  Pain with shoe gear and ambulation.    Previous Treatment:  debridement    Patient reported last blood glucose:  176  __________________________________    Medical changes:  none    Patient denies any fevers, chills, nausea, vomiting, shortness of breathe, nor any other constitutional signs nor symptoms.         Past Medical History:   Diagnosis Date    Anemia     Arthritis     Back pain     CHRONIC NECK AND BACK PAIN    BPH (benign prostatic hyperplasia) 03/02/2015    BPH with urinary obstruction 06/01/2018    CKD (chronic kidney disease), stage III     Controlled type 2 diabetes mellitus with diabetic polyneuropathy 07/05/2017    COPD (chronic obstructive pulmonary disease)     Diabetes mellitus     Dizziness 08/10/2017    DVT (deep venous thrombosis)     ED (erectile dysfunction) of organic origin     Erectile dysfunction 2007    Foot pain, bilateral 03/12/2018    Glaucoma (increased eye pressure)     High cholesterol     Hyperlipidemia     Hypertension     Ingrowing nail 07/05/2017    Kidney failure     STAGE III    Pain in both feet     PE (pulmonary thromboembolism)     Polyneuropathy 07/05/2017    Primary central sleep apnea     Sleep apnea     Tinea unguium 07/05/2017     Past Surgical History:   Procedure Laterality Date    ADRENAL GLAND SURGERY      COLONOSCOPY       CYSTOSCOPY      KIDNEY SURGERY      KIDNEY BIOSPY    PROSTATE SURGERY  12/2007    TURP / TRANSURETHRAL INCISION / DRAINAGE PROSTATE  06/2019     Family History   Problem Relation Age of Onset    Hypertension Mother     Heart disease Mother     Stroke Mother     Hypertension Sister     Heart disease Sister     Stroke Sister     Diabetes Brother     Diabetes Maternal Grandmother     Diabetes Other     Diabetes Son     Cancer Neg Hx      Social History     Socioeconomic History    Marital status:    Tobacco Use    Smoking status: Never     Passive exposure: Never    Smokeless tobacco: Never   Vaping Use    Vaping Use: Never used   Substance and Sexual Activity    Alcohol use: Not Currently     Comment: rare    Drug use: Never    Sexual activity: Not Currently     Partners: Female     Birth control/protection: None     Allergies   Allergen Reactions    Atorvastatin Hives    Ezetimibe Unknown - High Severity    Furosemide Unknown - High Severity    Simvastatin Hives    Statins Unknown - High Severity and Other (See Comments)    Hydrochlorothiazide Hives     Current Outpatient Medications   Medication Sig Dispense Refill    acetaminophen-codeine (TYLENOL #3) 300-30 MG per tablet Take 1 tablet by mouth Every 4 (Four) Hours As Needed for Moderate Pain.      Advair -21 MCG/ACT inhaler       albuterol sulfate  (90 Base) MCG/ACT inhaler Ventolin HFA 90 mcg/actuation inhalation HFA aerosol inhaler inhale 2 puffs (180 mcg) by inhalation route every 6 hours   Active      Alcohol Swabs (Easy Touch Alcohol Prep Medium) 70 % pads       amLODIPine (NORVASC) 5 MG tablet Take 1 tablet by mouth Daily. 90 tablet 3    apixaban (ELIQUIS) 5 MG tablet tablet Take 1 tablet by mouth 2 (Two) Times a Day.      ARTIFICIAL SALIVA MT Apply  to the mouth or throat 2 (Two) Times a Day As Needed.      aspirin 81 MG EC tablet aspirin 81 mg oral tablet,delayed release (DR/EC) take 1 tablet (81 mg) by oral route once daily    Active      B-D UF III MINI PEN NEEDLES 31G X 5 MM misc       baclofen (LIORESAL) 10 MG tablet Take 1 tablet by mouth 2 (Two) Times a Day.      cetirizine (zyrTEC) 10 MG tablet Take 1 tablet by mouth Daily.      chlorthalidone (HYGROTON) 25 MG tablet Take 1 tablet by mouth 2 (two) times a day.      cholecalciferol (VITAMIN D3) 25 MCG (1000 UT) tablet Take 1 tablet by mouth Daily.      coenzyme Q10 100 MG capsule Take 1 capsule by mouth Daily.      colesevelam (WELCHOL) 3.75 g pack pack       cyclobenzaprine (FLEXERIL) 10 MG tablet       Dulaglutide (Trulicity) 1.5 MG/0.5ML solution pen-injector       EPINEPHrine (EPIPEN) 0.3 MG/0.3ML solution auto-injector injection       fluticasone (FLONASE) 50 MCG/ACT nasal spray 2 spray(s) in each nostril daily      FREESTYLE LITE test strip       gabapentin (NEURONTIN) 800 MG tablet Take 1 tablet by mouth 3 (Three) Times a Day.      glipizide (GLUCOTROL XL) 10 MG 24 hr tablet       Insulin Degludec FlexTouch 200 UNIT/ML solution pen-injector Inject  under the skin into the appropriate area as directed Daily.      ipratropium (ATROVENT) 0.03 % nasal spray 2 sprays each nostril q 6 hrs      Lactobacillus (Acidophilus) 100 MG capsule Take  by mouth Daily.      Lancets (freestyle) lancets       Lantus SoloStar 100 UNIT/ML injection pen       latanoprost (XALATAN) 0.005 % ophthalmic solution 1 drop.      loratadine-pseudoephedrine (CLARITIN-D 24-hour)  MG per 24 hr tablet       modafinil (PROVIGIL) 100 MG tablet Take 1 tablet by mouth Daily.      Narcan 4 MG/0.1ML nasal spray       omeprazole (priLOSEC) 20 MG capsule Take 1 capsule by mouth As Needed.      rOPINIRole (REQUIP) 0.25 MG tablet Take 1 tablet by mouth every night at bedtime.      tiotropium bromide-olodaterol (Stiolto Respimat) 2.5-2.5 MCG/ACT aerosol solution inhaler Inhale 2 puffs Daily. 3 each 3    vitamin D3 125 MCG (5000 UT) capsule capsule       losartan (COZAAR) 50 MG tablet TAKE 1/2 TABLET DAILY       verapamil SR (CALAN-SR) 180 MG CR tablet  (Patient not taking: Reported on 9/26/2023)       No current facility-administered medications for this visit.     Review of Systems   Constitutional: Negative.    Skin:         Painful toenails   All other systems reviewed and are negative.    OBJECTIVE     Vitals:    09/26/23 0838   BP: 167/90   Pulse: 62   Temp: 98.4 °F (36.9 °C)   SpO2: 95%       Lab Results   Component Value Date    HGBA1C 6.70 (H) 06/01/2023       Lab Results   Component Value Date    GLUCOSE 134 (H) 09/02/2023    CALCIUM 9.4 09/02/2023     09/02/2023    K 4.2 09/02/2023    CO2 26.1 09/02/2023     09/02/2023    BUN 18 09/02/2023    CREATININE 1.78 (H) 09/02/2023    EGFRIFAFRI 45 (L) 09/15/2021    BCR 10.1 09/02/2023    ANIONGAP 10.9 09/02/2023       Patient seen in no apparent distress.      PHYSICAL EXAM:     Foot/Ankle Exam    GENERAL  Diabetic foot exam performed    Appearance:  elderly  Orientation:  AAOx3  Affect:  appropriate  Gait:  unimpaired  Assistance:  independent  Right shoe gear: casual shoe  Left shoe gear: casual shoe    VASCULAR     Right Foot Vascularity   Normal vascular exam    Dorsalis pedis:  2+  Posterior tibial:  2+  Skin temperature:  warm  Edema grading:  None  CFT:  < 3 seconds  Pedal hair growth:  Present  Varicosities:  none     Left Foot Vascularity   Normal vascular exam    Dorsalis pedis:  2+  Posterior tibial:  2+  Skin temperature:  warm  Edema grading:  None  CFT:  < 3 seconds  Pedal hair growth:  Present  Varicosities:  none     NEUROLOGIC     Right Foot Neurologic   Light touch sensation: absent  Vibratory sensation: absent  Hot/Cold sensation: absent  Protective Sensation using [place] Monofilament:   Sites tested: 10     Left Foot Neurologic   Light touch sensation: absent  Vibratory sensation: absent  Hot/Cold sensation:  absent  Protective Sensation using [place] Monofilament:   Sites tested: 10    MUSCULOSKELETAL     Right Foot  Musculoskeletal   Arch:  Normal     Left Foot Musculoskeletal   Arch:  Normal    MUSCLE STRENGTH     Right Foot Muscle Strength   Foot dorsiflexion:  4  Foot plantar flexion:  4  Foot inversion:  4  Foot eversion:  4     Left Foot Muscle Strength   Foot dorsiflexion:  4  Foot plantar flexion:  4  Foot inversion:  4  Foot eversion:  4    RANGE OF MOTION     Right Foot Range of Motion   Foot and ankle ROM within normal limits       Left Foot Range of Motion   Foot and ankle ROM within normal limits      DERMATOLOGIC      Right Foot Dermatologic   Skin  Right foot skin is intact.   Nails  1.  Positive for elongated, onychomycosis, abnormal thickness, subungual debris and ingrown toenail.  2.  Positive for elongated, onychomycosis, abnormal thickness, subungual debris and ingrown toenail.  3.  Positive for elongated, onychomycosis, abnormal thickness, subungual debris and ingrown toenail.  4.  Positive for elongated, onychomycosis, abnormal thickness, subungual debris and ingrown toenail.  Nails comment:  Toenails 1 through 4     Left Foot Dermatologic   Skin  Left foot skin is intact.   Nails comment:  Toenails 1 through 3  Nails  1.  Positive for elongated, onychomycosis, abnormal thickness, subungual debris and ingrown toenail.  2.  Positive for elongated, onychomycosis, abnormal thickness, subungual debris and ingrown toenail.  3.  Positive for elongated, onychomycosis, abnormal thickness, subungual debris and ingrown toenail.    Diabetic Foot Exam Performed and Monofilament Test Performed      ASSESSMENT/PLAN     Diagnoses and all orders for this visit:    1. Non-insulin dependent type 2 diabetes mellitus (Primary)    2. Onychocryptosis    3. Neuropathy    4. Diabetic foot    5. Type 2 diabetes mellitus with diabetic polyneuropathy, with long-term current use of insulin    6. Onychomycosis    7. Foot pain, bilateral        Comprehensive lower extremity examination and evaluation was performed.    Discussed findings  and treatment plan including risks, benefits, and treatment options with patient in detail. Patient agreed with treatment plan.    Toenails 1 through 4 on right and toenails 1 through 3 on the left were debrided in thickness and length and then smoothed with a Dremel Tool.  Tolerated the procedure well without complications.    Diabetic foot exam performed and documented this date, compliant with CQM required standards. Detail of findings as noted in physical exam.  Lower extremity Neurologic exam for diabetic patient performed and documented this date, compliant with PQRS required standards. Detail of findings as noted in physical exam.  Advised patient importance of good routine lower extremity hygiene. Advised patient importance of evaluating for intact skin and pain free nail borders.  Advised patient to use mirror to evaluate plantar/ soles of feet for better visualization. Advised patient monitor and phone office to be seen if any cracking to skin, open lesions, painful nail borders or if nails become elongated prior to next visit. Advised patient importance of daily cleansing of lower extremities, followed by good skin cream to maintain normal hydration of skin. Also advised patient importance of close daily monitoring of blood sugar. Advised to regulate diet and medications to maintain control of blood sugar in optimal range. Contact primary care provider if difficulties maintaining blood sugar levels.  Advised Patient of presence of Diabetes Mellitus condition.  Advised Patient risk of progression and worsening or improvement, then return of condition.  Will monitor condition for any change in future. Treat with most appropriate treatment pending status of condition.  Counseled and advised patient extensively on nature and ramifications of diabetes. Standard instructions given to patient for good diabetic foot care and maintenance. Advised importance of careful monitoring to avoid break down and  complications secondary to diabetes. Advised patient importance of strict maintenance of blood sugar control. Advised patient of possible ominous results from neglect of condition, i.e.: amputation/ loss of digits, feet and legs, or even death.  Patient states understands counseling, will monitor closely, continue good hygiene and routine diabetic foot care. Patient will contact office is questions or problems.      An After Visit Summary was printed and given to the patient at discharge, including (if requested) any available informative/educational handouts regarding diagnosis, treatment, or medications. All questions were answered to patient/family satisfaction. Should symptoms fail to improve or worsen they agree to call or return to clinic or to go to the Emergency Department. Discussed the importance of following up with any needed screening tests/labs/specialist appointments and any requested follow-up recommended by me today. Importance of maintaining follow-up discussed and patient accepts that missed appointments can delay diagnosis and potentially lead to worsening of conditions.    Return in about 9 weeks (around 11/28/2023) for Toenail Care., or sooner if acute issues arise.    This document has been electronically signed by Carl Payne DPM on September 26, 2023 08:49 EDT

## 2023-10-24 ENCOUNTER — OFFICE VISIT (OUTPATIENT)
Dept: CARDIOLOGY | Facility: CLINIC | Age: 78
End: 2023-10-24
Payer: MEDICARE

## 2023-10-24 VITALS
BODY MASS INDEX: 33.62 KG/M2 | SYSTOLIC BLOOD PRESSURE: 143 MMHG | WEIGHT: 227 LBS | HEIGHT: 69 IN | DIASTOLIC BLOOD PRESSURE: 83 MMHG | HEART RATE: 65 BPM

## 2023-10-24 DIAGNOSIS — I10 ESSENTIAL HYPERTENSION: Primary | ICD-10-CM

## 2023-10-24 DIAGNOSIS — R07.89 OTHER CHEST PAIN: ICD-10-CM

## 2023-10-24 PROCEDURE — 3079F DIAST BP 80-89 MM HG: CPT | Performed by: INTERNAL MEDICINE

## 2023-10-24 PROCEDURE — 99213 OFFICE O/P EST LOW 20 MIN: CPT | Performed by: INTERNAL MEDICINE

## 2023-10-24 PROCEDURE — 3077F SYST BP >= 140 MM HG: CPT | Performed by: INTERNAL MEDICINE

## 2023-10-24 NOTE — PROGRESS NOTES
CARDIOLOGY FOLLOW-UP PROGRESS NOTE        Chief Complaint  Follow-up, Hypertension, and Chest Pain    Subjective            Joon Zhao presents to [facility] CARDIOLOGY  History of Present Illness      Mr Stapels is here for a 4-month follow-up visit.  During last office visit on 6/21/2023, loss of amlodipine was increased to 5 mg due to high blood pressure.  He brought a log of his home blood pressure which showed reasonably well-controlled blood pressure at home.    He continues to report episodes of chest pain.  These are mostly present at rest, starts with a burning sensation in the mid lower chest then radiates up.  At times he has a pain in the throat as well.  He had a visit to emergency room back in August.  Acute coronary syndrome was ruled out.  Per patient, Mylanta helps with symptoms but not always.  He is going to have further evaluation by gastroenterologist.      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 (2) Hypertension. (3) Chronic kidney disease, Stage 3. (4) Diabetes mellitus. (5) Negative for coronary artery disease.     Medical History:  Past Medical History:   Diagnosis Date    Anemia     Arthritis     Back pain     CHRONIC NECK AND BACK PAIN    BPH (benign prostatic hyperplasia) 03/02/2015    BPH with urinary obstruction 06/01/2018    CKD (chronic kidney disease), stage III     Controlled type 2 diabetes mellitus with diabetic polyneuropathy 07/05/2017    COPD (chronic obstructive pulmonary disease)     Diabetes mellitus     Dizziness 08/10/2017    DVT (deep venous thrombosis)     ED (erectile dysfunction) of organic origin     Erectile dysfunction 2007    Foot pain, bilateral 03/12/2018    Glaucoma (increased eye pressure)     High cholesterol     Hyperlipidemia     Hypertension     Ingrowing nail 07/05/2017    Kidney failure     STAGE III    Pain in both feet     PE (pulmonary thromboembolism)      Polyneuropathy 07/05/2017    Primary central sleep apnea     Sleep apnea     Tinea unguium 07/05/2017       Surgical History: has a past surgical history that includes Adrenal gland surgery; Kidney surgery; Colonoscopy; Cystoscopy; Prostate surgery (12/2007); and TURP / transurethral incision / drainage prostate (06/2019).     Family History: family history includes Diabetes in his brother, maternal grandmother, son, and another family member; Heart disease in his mother and sister; Hypertension in his mother and sister; Stroke in his mother and sister.     Social History: reports that he has never smoked. He has never been exposed to tobacco smoke. He has never used smokeless tobacco. He reports that he does not currently use alcohol. He reports that he does not use drugs.    Allergies: Atorvastatin, Ezetimibe, Furosemide, Simvastatin, Statins, and Hydrochlorothiazide    Current Outpatient Medications on File Prior to Visit   Medication Sig    acetaminophen-codeine (TYLENOL #3) 300-30 MG per tablet Take 1 tablet by mouth Every 4 (Four) Hours As Needed for Moderate Pain.    Advair -21 MCG/ACT inhaler     albuterol sulfate  (90 Base) MCG/ACT inhaler Ventolin HFA 90 mcg/actuation inhalation HFA aerosol inhaler inhale 2 puffs (180 mcg) by inhalation route every 6 hours   Active    Alcohol Swabs (Easy Touch Alcohol Prep Medium) 70 % pads     amLODIPine (NORVASC) 5 MG tablet Take 1 tablet by mouth Daily.    apixaban (ELIQUIS) 5 MG tablet tablet Take 1 tablet by mouth 2 (Two) Times a Day.    ARTIFICIAL SALIVA MT Apply  to the mouth or throat 2 (Two) Times a Day As Needed.    aspirin 81 MG EC tablet aspirin 81 mg oral tablet,delayed release (DR/EC) take 1 tablet (81 mg) by oral route once daily   Active    B-D UF III MINI PEN NEEDLES 31G X 5 MM misc     baclofen (LIORESAL) 10 MG tablet Take 1 tablet by mouth 2 (Two) Times a Day.    cetirizine (zyrTEC) 10 MG tablet Take 1 tablet by mouth Daily.     chlorthalidone (HYGROTON) 25 MG tablet Take 1 tablet by mouth 2 (two) times a day.    cholecalciferol (VITAMIN D3) 25 MCG (1000 UT) tablet Take 1 tablet by mouth Daily.    coenzyme Q10 100 MG capsule Take 1 capsule by mouth Daily.    colesevelam (WELCHOL) 3.75 g pack pack     cyclobenzaprine (FLEXERIL) 10 MG tablet     EPINEPHrine (EPIPEN) 0.3 MG/0.3ML solution auto-injector injection     fluticasone (FLONASE) 50 MCG/ACT nasal spray 2 spray(s) in each nostril daily    FREESTYLE LITE test strip     gabapentin (NEURONTIN) 800 MG tablet Take 1 tablet by mouth 3 (Three) Times a Day.    glipizide (GLUCOTROL XL) 10 MG 24 hr tablet     Insulin Degludec FlexTouch 200 UNIT/ML solution pen-injector Inject  under the skin into the appropriate area as directed Daily.    ipratropium (ATROVENT) 0.03 % nasal spray 2 sprays each nostril q 6 hrs    Lactobacillus (Acidophilus) 100 MG capsule Take  by mouth Daily.    Lancets (freestyle) lancets     Lantus SoloStar 100 UNIT/ML injection pen     latanoprost (XALATAN) 0.005 % ophthalmic solution 1 drop.    loratadine-pseudoephedrine (CLARITIN-D 24-hour)  MG per 24 hr tablet     modafinil (PROVIGIL) 100 MG tablet Take 1 tablet by mouth Daily.    Narcan 4 MG/0.1ML nasal spray     omeprazole (priLOSEC) 20 MG capsule Take 1 capsule by mouth As Needed.    rOPINIRole (REQUIP) 0.25 MG tablet Take 1 tablet by mouth every night at bedtime.    tiotropium bromide-olodaterol (Stiolto Respimat) 2.5-2.5 MCG/ACT aerosol solution inhaler Inhale 2 puffs Daily.    verapamil SR (CALAN-SR) 180 MG CR tablet     losartan (COZAAR) 50 MG tablet TAKE 1/2 TABLET DAILY    [DISCONTINUED] Dulaglutide (Trulicity) 1.5 MG/0.5ML solution pen-injector     [DISCONTINUED] vitamin D3 125 MCG (5000 UT) capsule capsule      No current facility-administered medications on file prior to visit.          Review of Systems   Respiratory:  Negative for cough, shortness of breath and wheezing.    Cardiovascular:  Positive for  "chest pain and leg swelling (Chronic). Negative for palpitations.   Gastrointestinal:  Positive for indigestion. Negative for nausea and vomiting.   Neurological:  Negative for dizziness and syncope.        Objective     /83   Pulse 65   Ht 175.3 cm (69\")   Wt 103 kg (227 lb)   BMI 33.52 kg/m²       Physical Exam    General : Alert, awake, no acute distress  Neck : Supple, no carotid bruit, no jugular venous distention  CVS : Regular rate and rhythm, no murmur, rubs or gallops  Lungs: Clear to auscultation bilaterally, no crackles or rhonchi  Abdomen: Soft, nontender, bowel sounds heard in all 4 quadrants  Extremities: Warm, well-perfused, 1+ pitting edema bilaterally      Result Review :     The following data was reviewed by: Trip Mirza MD on 10/24/2023:    CMP          6/1/2023    06:42 8/7/2023    07:33 9/2/2023    07:57   CMP   Glucose 120  175  134    BUN 29  26  18    Creatinine 1.73  1.76  1.78    EGFR 40.2  39.3  38.8    Sodium 137  135  137    Potassium 4.8  4.4  4.2    Chloride 101  97  100    Calcium 9.9  9.4  9.4    Total Protein 7.3  7.4  7.3    Albumin 4.3  4.2  4.1    Globulin 3.0  3.2  3.2    Total Bilirubin 0.4  0.5  0.7    Alkaline Phosphatase 71  74  71    AST (SGOT) 30  24  36    ALT (SGPT) 24  22  28    Albumin/Globulin Ratio 1.4  1.3  1.3    BUN/Creatinine Ratio 16.8  14.8  10.1    Anion Gap 10.3  11.9  10.9      CBC          6/1/2023    06:42 8/7/2023    07:33 9/2/2023    07:57   CBC   WBC 4.79  6.06  5.30    RBC 4.77  4.59  4.39    Hemoglobin 14.8  14.4  13.3    Hematocrit 42.9  41.3  39.4    MCV 89.9  90.0  89.7    MCH 31.0  31.4  30.3    MCHC 34.5  34.9  33.8    RDW 11.9  12.4  11.9    Platelets 189  191  177      TSH          6/1/2023    06:42   TSH   TSH 1.100             Data reviewed: Cardiology studies        Results for orders placed in visit on 07/12/21    Adult Transthoracic Echo Complete w/ Color, Spectral and Contrast if necessary per protocol    Interpretation " Summary  · Calculated left ventricular 3D EF = 59% Left ventricular ejection fraction appears to be 56 - 60%.  · Estimated right ventricular systolic pressure from tricuspid regurgitation is normal (<35 mmHg).  · Left ventricular wall thickness is consistent with mild concentric hypertrophy.  · Left ventricular diastolic function is consistent with (grade I) impaired relaxation.  · Mild dilation of the aortic root is present.                   Assessment and Plan        Diagnoses and all orders for this visit:    1. Essential hypertension (Primary)  Assessment & Plan:  Blood pressure reasonably well controlled in the office today and also per home blood pressure log over the past 1 month.  We will continue chlorthalidone, amlodipine, verapamil and losartan at the current dose      2. Other chest pain  Assessment & Plan:  Longstanding problem.  Symptoms somewhat atypical for angina.  Previously seen valuation included stress testing which were unremarkable.  Symptoms are more frequent at this time and he had a visit to emergency room few months back.  The option of cardiac catheterization to fully delineate the coronary anatomy discussed in detail with the patient.  He has chronic kidney disease with a creatinine of 1.7 at baseline, hence there is higher risk of worsening renal functions with the procedure.  He wants to discuss with his nephrologist before proceeding.  At this time, will recommend to continue aspirin along with verapamil.                Follow Up     Return in about 6 months (around 4/24/2024) for Next scheduled follow up, with Keke LINDSEY.    Patient was given instructions and counseling regarding his condition or for health maintenance advice. Please see specific information pulled into the AVS if appropriate.

## 2023-10-24 NOTE — ASSESSMENT & PLAN NOTE
Longstanding problem.  Symptoms somewhat atypical for angina.  Previously seen valuation included stress testing which were unremarkable.  Symptoms are more frequent at this time and he had a visit to emergency room few months back.  The option of cardiac catheterization to fully delineate the coronary anatomy discussed in detail with the patient.  He has chronic kidney disease with a creatinine of 1.7 at baseline, hence there is higher risk of worsening renal functions with the procedure.  He wants to discuss with his nephrologist before proceeding.  At this time, will recommend to continue aspirin along with verapamil.

## 2023-10-24 NOTE — H&P (VIEW-ONLY)
CARDIOLOGY FOLLOW-UP PROGRESS NOTE        Chief Complaint  Follow-up, Hypertension, and Chest Pain    Subjective            Joon Zhao presents to [facility] CARDIOLOGY  History of Present Illness      Mr Staples is here for a 4-month follow-up visit.  During last office visit on 6/21/2023, dose of amlodipine was increased to 5 mg due to high blood pressure.  He brought a log of his home blood pressure which showed reasonably well-controlled blood pressure at home.    He continues to report episodes of chest pain.  These are mostly present at rest, starts with a burning sensation in the mid lower chest then radiates up.  At times he has a pain in the throat as well.  He had a visit to emergency room back in August.  Acute coronary syndrome was ruled out.  Per patient, Mylanta helps with symptoms but not always.  He is going to have further evaluation by gastroenterologist.      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 (2) Hypertension. (3) Chronic kidney disease, Stage 3. (4) Diabetes mellitus. (5) Negative for coronary artery disease.     Medical History:  Past Medical History:   Diagnosis Date    Anemia     DENIES ANY CURRENT ISSUES    Arthritis     Back pain     CHRONIC NECK AND BACK PAIN    BPH (benign prostatic hyperplasia) 03/02/2015    BPH with urinary obstruction 06/01/2018    Chest pain     CKD (chronic kidney disease), stage III     Controlled type 2 diabetes mellitus with diabetic polyneuropathy 07/05/2017    Dizziness 08/10/2017    DVT (deep venous thrombosis)     Erectile dysfunction 2007    Foot pain, bilateral 03/12/2018    GERD (gastroesophageal reflux disease)     Glaucoma (increased eye pressure)     High cholesterol     Hyperlipidemia     Hypertension     Ingrowing nail 07/05/2017    Pain in both feet     PE (pulmonary thromboembolism)     Polyneuropathy 07/05/2017    Shortness of breath     Sleep  apnea     Tinea unguium 07/05/2017       Surgical History: has a past surgical history that includes Adrenal gland surgery; Kidney surgery (Left); Colonoscopy; Cystoscopy; Prostate surgery (12/2007); and TURP / transurethral incision / drainage prostate (06/2019).     Family History: family history includes Diabetes in his brother, maternal grandmother, son, and another family member; Heart disease in his mother and sister; Hypertension in his mother and sister; Stroke in his mother and sister.     Social History: reports that he has never smoked. He has never been exposed to tobacco smoke. He has never used smokeless tobacco. He reports current alcohol use. He reports that he does not use drugs.    Allergies: Ezetimibe, Statins, Atorvastatin, Furosemide, Hydrochlorothiazide, and Simvastatin    No current facility-administered medications on file prior to visit.     Current Outpatient Medications on File Prior to Visit   Medication Sig    acetaminophen-codeine (TYLENOL #3) 300-30 MG per tablet Take 1 tablet by mouth Every 4 (Four) Hours As Needed for Moderate Pain.    Advair -21 MCG/ACT inhaler Inhale 2 puffs 2 (Two) Times a Day.    albuterol sulfate  (90 Base) MCG/ACT inhaler Inhale 2 puffs Every 6 (Six) Hours As Needed.    Alcohol Swabs (Easy Touch Alcohol Prep Medium) 70 % pads     amLODIPine (NORVASC) 5 MG tablet Take 1 tablet by mouth Daily.    apixaban (ELIQUIS) 5 MG tablet tablet Take 1 tablet by mouth 2 (Two) Times a Day. REPORTS HAD INJECTION AT PAIN MANAGEMENT ON 11/13/23 AND THAT HIS LAST DOSE OF ELIQUIS WAS 11/9/23 PM DOSE    aspirin 81 MG EC tablet REPORTS THAT LAST DOSE OF ASA WAS 11/7/23 HAD STOPPED IN PREPARATION FOR INJECTION AT PAIN MANAGEMENT ON 11/13/23    B-D UF III MINI PEN NEEDLES 31G X 5 MM misc     baclofen (LIORESAL) 10 MG tablet Take 1 tablet by mouth 2 (Two) Times a Day.    cetirizine (zyrTEC) 10 MG tablet Take 1 tablet by mouth Daily.    chlorthalidone (HYGROTON) 25 MG  "tablet Take 1 tablet by mouth 2 (two) times a day.    cholecalciferol (VITAMIN D3) 25 MCG (1000 UT) tablet Take 1 tablet by mouth Daily.    coenzyme Q10 100 MG capsule Take 1 capsule by mouth Daily.    cyclobenzaprine (FLEXERIL) 10 MG tablet Take 1 tablet by mouth 2 (Two) Times a Day As Needed.    EPINEPHrine (EPIPEN) 0.3 MG/0.3ML solution auto-injector injection     fluticasone (FLONASE) 50 MCG/ACT nasal spray 2 spray(s) in each nostril daily    FREESTYLE LITE test strip     gabapentin (NEURONTIN) 800 MG tablet Take 1 tablet by mouth 3 (Three) Times a Day.    ipratropium (ATROVENT) 0.03 % nasal spray 2 sprays into the nostril(s) as directed by provider Every 6 (Six) Hours As Needed. 2 sprays each nostril q 6 hrs    Lactobacillus (Acidophilus) 100 MG capsule Take  by mouth Daily.    Lancets (freestyle) lancets     Lantus SoloStar 100 UNIT/ML injection pen Inject 35 Units under the skin into the appropriate area as directed 2 (Two) Times a Day.    latanoprost (XALATAN) 0.005 % ophthalmic solution Administer 1 drop to both eyes Every Night.    Narcan 4 MG/0.1ML nasal spray     omeprazole (priLOSEC) 20 MG capsule Take 1 capsule by mouth As Needed.    rOPINIRole (REQUIP) 0.25 MG tablet Take 1 tablet by mouth every night at bedtime.    tiotropium bromide-olodaterol (Stiolto Respimat) 2.5-2.5 MCG/ACT aerosol solution inhaler Inhale 2 puffs Daily.          Review of Systems   Respiratory:  Negative for cough, shortness of breath and wheezing.    Cardiovascular:  Positive for chest pain and leg swelling (Chronic). Negative for palpitations.   Gastrointestinal:  Positive for indigestion. Negative for nausea and vomiting.   Neurological:  Negative for dizziness and syncope.        Objective     /83   Pulse 65   Ht 175.3 cm (69\")   Wt 103 kg (227 lb)   BMI 33.52 kg/m²       Physical Exam    General : Alert, awake, no acute distress  Neck : Supple, no carotid bruit, no jugular venous distention  CVS : Regular rate and " rhythm, no murmur, rubs or gallops  Lungs: Clear to auscultation bilaterally, no crackles or rhonchi  Abdomen: Soft, nontender, bowel sounds heard in all 4 quadrants  Extremities: Warm, well-perfused, 1+ pitting edema bilaterally      Result Review :     The following data was reviewed by: Trip Mirza MD on 10/24/2023:    CMP          8/7/2023    07:33 9/2/2023    07:57 11/15/2023    09:38   CMP   Glucose 175  134  282    BUN 26  18  31    Creatinine 1.76  1.78  1.66    EGFR 39.3  38.8  42.2    Sodium 135  137  132    Potassium 4.4  4.2  4.2    Chloride 97  100  94    Calcium 9.4  9.4  9.7    Total Protein 7.4  7.3     Albumin 4.2  4.1     Globulin 3.2  3.2     Total Bilirubin 0.5  0.7     Alkaline Phosphatase 74  71     AST (SGOT) 24  36     ALT (SGPT) 22  28     Albumin/Globulin Ratio 1.3  1.3     BUN/Creatinine Ratio 14.8  10.1  18.7    Anion Gap 11.9  10.9  12.8      CBC          8/7/2023    07:33 9/2/2023    07:57 11/15/2023    09:38   CBC   WBC 6.06  5.30  9.51    RBC 4.59  4.39  4.53    Hemoglobin 14.4  13.3  14.2    Hematocrit 41.3  39.4  41.8    MCV 90.0  89.7  92.3    MCH 31.4  30.3  31.3    MCHC 34.9  33.8  34.0    RDW 12.4  11.9  12.1    Platelets 191  177  193      TSH          6/1/2023    06:42   TSH   TSH 1.100             Data reviewed: Cardiology studies        Results for orders placed in visit on 07/12/21    Adult Transthoracic Echo Complete w/ Color, Spectral and Contrast if necessary per protocol    Interpretation Summary  · Calculated left ventricular 3D EF = 59% Left ventricular ejection fraction appears to be 56 - 60%.  · Estimated right ventricular systolic pressure from tricuspid regurgitation is normal (<35 mmHg).  · Left ventricular wall thickness is consistent with mild concentric hypertrophy.  · Left ventricular diastolic function is consistent with (grade I) impaired relaxation.  · Mild dilation of the aortic root is present.                   Assessment and Plan        Diagnoses  and all orders for this visit:    1. Essential hypertension (Primary)  Assessment & Plan:  Blood pressure reasonably well controlled in the office today and also per home blood pressure log over the past 1 month.  We will continue chlorthalidone, amlodipine, verapamil and losartan at the current dose      2. Other chest pain  Assessment & Plan:  Longstanding problem.  Symptoms somewhat atypical for angina.  Previous evaluation included SPECT stress test which were unremarkable.  Symptoms are more frequent at this time and he had a visit to emergency room few months back.  The option of cardiac catheterization to fully delineate the coronary anatomy discussed in detail with the patient.  He has chronic kidney disease with a creatinine of 1.7 at baseline, hence there is higher risk of worsening renal functions with the procedure.  He wants to discuss with his nephrologist before proceeding.  At this time, will recommend to continue aspirin along with verapamil.                Follow Up     Return in about 6 months (around 4/24/2024) for Next scheduled follow up, with Keke LINDSEY.    Patient was given instructions and counseling regarding his condition or for health maintenance advice. Please see specific information pulled into the AVS if appropriate.

## 2023-10-24 NOTE — ASSESSMENT & PLAN NOTE
Blood pressure reasonably well controlled in the office today and also per home blood pressure log over the past 1 month.  We will continue chlorthalidone, amlodipine, verapamil and losartan at the current dose

## 2023-10-31 ENCOUNTER — TELEPHONE (OUTPATIENT)
Dept: CARDIOLOGY | Facility: CLINIC | Age: 78
End: 2023-10-31

## 2023-10-31 NOTE — TELEPHONE ENCOUNTER
Caller: Joon Zhao    Relationship: Self    Best call back number: [phone]     What is the best time to reach you: ANYTIME BEFORE 2PM    Who are you requesting to speak with (clinical staff, provider,  specific staff member): CLINICAL        What was the call regarding: PT WANTS TO GET A DATE SET FOR CATHERAZTION PROCEDURE AND MORE INFORMATION GUIDELINES.PT HAS DICUSSED THE POSSIBLE OF NEEDED PROCEDURE BEFORE WITH DR.VALAYAM PRUETT

## 2023-10-31 NOTE — TELEPHONE ENCOUNTER
Attempted to return phone call.   Will try again tomorrow.   Will plan for Nov 9 for cardiac catheterization with Dr. Mirza if able to reach patient and schedule.

## 2023-11-01 ENCOUNTER — PREP FOR SURGERY (OUTPATIENT)
Dept: OTHER | Facility: HOSPITAL | Age: 78
End: 2023-11-01
Payer: MEDICARE

## 2023-11-01 DIAGNOSIS — R07.9 CHEST PAIN, UNSPECIFIED TYPE: Primary | ICD-10-CM

## 2023-11-01 RX ORDER — NITROGLYCERIN 0.4 MG/1
0.4 TABLET SUBLINGUAL
OUTPATIENT
Start: 2023-11-01

## 2023-11-01 RX ORDER — SODIUM CHLORIDE 9 MG/ML
40 INJECTION, SOLUTION INTRAVENOUS AS NEEDED
OUTPATIENT
Start: 2023-11-01

## 2023-11-01 RX ORDER — SODIUM CHLORIDE 0.9 % (FLUSH) 0.9 %
10 SYRINGE (ML) INJECTION EVERY 12 HOURS SCHEDULED
OUTPATIENT
Start: 2023-11-01

## 2023-11-01 RX ORDER — SODIUM CHLORIDE 0.9 % (FLUSH) 0.9 %
10 SYRINGE (ML) INJECTION AS NEEDED
OUTPATIENT
Start: 2023-11-01

## 2023-11-01 NOTE — TELEPHONE ENCOUNTER
Discussed moving forward with cardiac catheterization with Matty Francesca.  He is agreeable to have procedure, discussed risk and benefits of procedure, need for skin understanding.  Procedure orders will be placed to schedule for 11/16 with Dr. Mirza..

## 2023-11-02 PROBLEM — R07.9 CHEST PAIN: Status: ACTIVE | Noted: 2023-11-01

## 2023-11-06 ENCOUNTER — TELEPHONE (OUTPATIENT)
Dept: CARDIOLOGY | Facility: CLINIC | Age: 78
End: 2023-11-06
Payer: MEDICARE

## 2023-11-06 ENCOUNTER — OFFICE VISIT (OUTPATIENT)
Dept: UROLOGY | Facility: CLINIC | Age: 78
End: 2023-11-06
Payer: MEDICARE

## 2023-11-06 VITALS
WEIGHT: 231.6 LBS | DIASTOLIC BLOOD PRESSURE: 88 MMHG | SYSTOLIC BLOOD PRESSURE: 147 MMHG | HEART RATE: 59 BPM | HEIGHT: 69 IN | BODY MASS INDEX: 34.3 KG/M2

## 2023-11-06 DIAGNOSIS — Z77.098 AGENT ORANGE EXPOSURE: ICD-10-CM

## 2023-11-06 DIAGNOSIS — N40.0 BENIGN PROSTATIC HYPERPLASIA WITHOUT LOWER URINARY TRACT SYMPTOMS: Primary | ICD-10-CM

## 2023-11-06 DIAGNOSIS — Z80.42 FAMILY HISTORY OF PROSTATE CANCER: ICD-10-CM

## 2023-11-06 LAB
BACTERIA UR QL AUTO: NORMAL /HPF
BILIRUB BLD-MCNC: NEGATIVE MG/DL
CLARITY, POC: CLEAR
COLOR UR: YELLOW
EPI CELLS #/AREA URNS HPF: NORMAL /[HPF]
EXPIRATION DATE: ABNORMAL
GLUCOSE UR STRIP-MCNC: NEGATIVE MG/DL
KETONES UR QL: NEGATIVE
LEUKOCYTE EST, POC: NEGATIVE
Lab: ABNORMAL
NITRITE UR-MCNC: NEGATIVE MG/ML
PH UR: 7 [PH] (ref 5–8)
PROT UR STRIP-MCNC: NEGATIVE MG/DL
RBC # UR STRIP: ABNORMAL /UL
RBC # UR STRIP: NORMAL /HPF
RENAL EPITHELIAL, POC: 0
SP GR UR: 1.02 (ref 1–1.03)
UNIDENT CRYS URNS QL MICRO: NORMAL /HPF
UROBILINOGEN UR QL: ABNORMAL
WBC # UR STRIP: NORMAL /HPF

## 2023-11-06 NOTE — TELEPHONE ENCOUNTER
I spoke to patient and gave an arrival time of 7:00 on 11/16/23 for [facility]. Patient was instructed to have a  for the day of the procedure and to arrive at the main entrance/registration area. Patient was educated about stent placement and informed that in the event of stent placement, the patient will be required to stay overnight for observation. Patient was instructed to hold Eliquis for 48 hours prior to procedure. Patient said he was already going to be holding Eliquis effective 11/09/23 but he will not continue until after the procedure. Patient was instructed to continue all other medications as usual. Patient was instructed to be NPO after midnight with sips of water as needed. Patient is agreeable with no other questions or concerns.

## 2023-11-06 NOTE — PROGRESS NOTES
"Chief Complaint  Benign Prostatic Hypertrophy    Subjective  no acute distress        Joon Zhao presents to [facility] UROLOGY  History of Present Illness    77-year-old -American male is here for evaluation of BPH which is a chronic obstructive disease from prostate gland and can give rise to detrusor hypertrophy and even renal failure.    Patient has been having no difficulty with urination.  He had UroLift prostate about 3 to 4 years ago.  He has no dysuria or gross hematuria.  Sometimes has tingling in the urethra which is transient.  Patient was exposed to agent orange in [place].    History of prostate cancer in his stepbrother.  Patient is diabetic and is on insulin.    Objective no acute distress    Vital Signs:   /88 (BP Location: Left arm, Patient Position: Sitting, Cuff Size: Adult)   Pulse 59   Ht 175.3 cm (69\")   Wt 105 kg (231 lb 9.6 oz)   BMI 34.20 kg/m²     Allergies   Allergen Reactions    Atorvastatin Hives    Ezetimibe Unknown - High Severity and Other (See Comments)    Furosemide Unknown - High Severity and Other (See Comments)    Simvastatin Hives    Statins Unknown - High Severity and Other (See Comments)    Hydrochlorothiazide Hives      Past medical history:  has a past medical history of Anemia, Arthritis, Back pain, BPH (benign prostatic hyperplasia) (03/02/2015), BPH with urinary obstruction (06/01/2018), CKD (chronic kidney disease), stage III, Controlled type 2 diabetes mellitus with diabetic polyneuropathy (07/05/2017), COPD (chronic obstructive pulmonary disease), Diabetes mellitus, Dizziness (08/10/2017), DVT (deep venous thrombosis), ED (erectile dysfunction) of organic origin, Erectile dysfunction (2007), Foot pain, bilateral (03/12/2018), Glaucoma (increased eye pressure), High cholesterol, Hyperlipidemia, Hypertension, Ingrowing nail (07/05/2017), Kidney failure, Pain in both feet, PE (pulmonary thromboembolism), Polyneuropathy " (07/05/2017), Primary central sleep apnea, Sleep apnea, and Tinea unguium (07/05/2017).   Past surgical history:  has a past surgical history that includes Adrenal gland surgery; Kidney surgery; Colonoscopy; Cystoscopy; Prostate surgery (12/2007); and TURP / transurethral incision / drainage prostate (06/2019).  Personal history: family history includes Diabetes in his brother, maternal grandmother, son, and another family member; Heart disease in his mother and sister; Hypertension in his mother and sister; Stroke in his mother and sister.  Social history:  reports that he has never smoked. He has never been exposed to tobacco smoke. He has never used smokeless tobacco. He reports that he does not currently use alcohol. He reports that he does not use drugs.    Review of Systems    Please see past medical and surgical history and rest of the system is negative    Physical Exam  Constitutional:       General: He is not in acute distress.     Appearance: Normal appearance. He is obese. He is not ill-appearing or toxic-appearing.   HENT:      Head: Normocephalic and atraumatic.      Ears:      Comments: No loss of hearing  Cardiovascular:      Rate and Rhythm: Normal rate and regular rhythm.      Pulses: Normal pulses.      Heart sounds: Normal heart sounds. No murmur heard.  Pulmonary:      Effort: Pulmonary effort is normal.      Breath sounds: Normal breath sounds. No rhonchi or rales.   Abdominal:      Palpations: Abdomen is soft. There is no mass.      Tenderness: There is no abdominal tenderness. There is no right CVA tenderness or left CVA tenderness.   Genitourinary:     Comments: Uncircumcised normal penis.    Right and left scrotum is normal.    Right left testicle and epididymis is normal.    NAOMIE.  Prostate gland is just about 20 g and benign  Musculoskeletal:         General: No swelling. Normal range of motion.      Cervical back: Normal range of motion and neck supple. No rigidity or tenderness.    Lymphadenopathy:      Cervical: No cervical adenopathy.   Skin:     General: Skin is warm.      Coloration: Skin is not jaundiced.   Neurological:      General: No focal deficit present.      Mental Status: He is alert. Mental status is at baseline.      Motor: No weakness.      Gait: Gait normal.   Psychiatric:         Mood and Affect: Mood normal.         Behavior: Behavior normal.         Thought Content: Thought content normal.         Judgment: Judgment normal.        Result Review :                 Assessment and Plan    Diagnoses and all orders for this visit:    1. Benign prostatic hyperplasia without lower urinary tract symptoms (Primary)  -     POC Urinalysis Dipstick, Automated  -     POC Urine Microscopic Only    2. Agent orange exposure  -     POC Urine Microscopic Only    3. Family history of prostate cancer  -     POC Urine Microscopic Only         Brief Urine Lab Results  (Last result in the past 365 days)        Color   Clarity   Blood   Leuk Est   Nitrite   Protein   CREAT   Urine HCG        11/06/23 0919 Yellow   Clear   Trace   Negative   Negative   Negative                    Follow Up   Return in about 1 year (around 11/6/2024).  Patient was given instructions and counseling regarding his condition or for health maintenance advice. Please see specific information pulled into the AVS if appropriate.     Isabelle Florian MD

## 2023-11-07 ENCOUNTER — TELEPHONE (OUTPATIENT)
Dept: GASTROENTEROLOGY | Facility: CLINIC | Age: 78
End: 2023-11-07
Payer: MEDICARE

## 2023-11-07 NOTE — TELEPHONE ENCOUNTER
----- Message from Joon Zhao sent at 11/6/2023  9:30 PM EST -----  Regarding: Appointment canceled  Contact: [phone]  Appointment canceled for Joon Zhao ([id number])  Visit Type: FOLLOW UP  Date        Time      Length    Provider                  Department  11/16/2023   8:15 AM  15 mins.  Bailey Terry           [facility]    Reason for Cancellation: Other

## 2023-11-07 NOTE — TELEPHONE ENCOUNTER
Attempted to contact patient to see if they wanted to reschedule at this time. Patient rescheduled.

## 2023-11-14 RX ORDER — LOSARTAN POTASSIUM 50 MG/1
25 TABLET ORAL DAILY
COMMUNITY

## 2023-11-14 NOTE — PRE-PROCEDURE INSTRUCTIONS
PATIENT INSTRUCTED TO BE:    - NPO AFTER MIDNIGHT EXCEPT CAN HAVE SIPS OF WATER WITH HIS MEDICATION PRIOR TO PROCEDURE    -  INSTRUCTED NO LOTIONS, JEWELRY, PIERCINGS, OR DEODORANT DAY OF THE PROCEDURE    - INSTRUCTED TO TAKE THE FOLLOWING MEDICATIONS THE DAY OF SURGERY:       TYLENOL #3 IF NEEDED, ADVAIR INHALER IF NEEDED AND BRING WITH YOU, , ALBUTEROL INHALER IF NEEDED, AMLODIPINE, BACLOFEN, CETIRIZINE, CHLORTHALIDONE, VITAMIN D3, COQ10, FLEXERIL IF NEEDED, EPIPEN IF NEEDED, FLONASE NASAL SPRAY, GABAPENTIN, ATROVENT NASALSPRAY IF NEEDED, ACIDOPHILUS, LANTUS, OMEPRAZOLE, STIOLTO INHALER AND BRING WITH YOU    - INSTRUCTED PT TO FOLLOW ANY INSTRUCTIONS GIVEN BY HIS CARDIOLOGIST.    - INFORMED PT THEY ATTEMPT RADIAL APPROACH FIRST IF UNABLE TO PERFORM CATH WITH THAT APPROACH THEY WILL DO A FEMORAL APPROACH.  ALSO, INFORMED PT THEY WILL BE ON BEDREST POSTOP.  IF THE SURGEON FEELS  AN INTERVENTION OR STENTS NEEDS TO BE PLACED, HE/SHE WILL STAY OVER NIGHT FOR OBSERVATION AND MONITORING.     - INFORMED THE PATIENT HE CAN HAVE TWO VISITORS WITH HIM THE DAY OF THE PROCEDURE    - INSTRUCTED PT TO PARK IN THE PARKING GARAGE, ENTER THE HOSPITAL THRU ENTRANCE A AND  CHECK IN AT THE MAIN REGISTRATION DESK, AFTER REGISTRATION PT WILL BE TAKEN THE THE PREOP AREA FOR HIS PROCEDURE.    -ARRIVAL TIME GIVEN BY CARDIOLOGIST OFFICE, INFORMED PT IF ARRIVAL TIME CHANGES OR ADJUSTMENTS NEED TO BE MADE IN THEIR ARRIVAL TIME, HE/SHE WOULD RECEIVE A CALL.      - PATIENT VERBALIZED UNDERSTANDING

## 2023-11-15 ENCOUNTER — LAB (OUTPATIENT)
Dept: LAB | Facility: HOSPITAL | Age: 78
End: 2023-11-15
Payer: MEDICARE

## 2023-11-15 LAB
ANION GAP SERPL CALCULATED.3IONS-SCNC: 12.8 MMOL/L (ref 5–15)
APTT PPP: 24.8 SECONDS (ref 24.2–34.2)
BASOPHILS # BLD AUTO: 0.02 10*3/MM3 (ref 0–0.2)
BASOPHILS NFR BLD AUTO: 0.2 % (ref 0–1.5)
BUN SERPL-MCNC: 31 MG/DL (ref 8–23)
BUN/CREAT SERPL: 18.7 (ref 7–25)
CALCIUM SPEC-SCNC: 9.7 MG/DL (ref 8.6–10.5)
CHLORIDE SERPL-SCNC: 94 MMOL/L (ref 98–107)
CO2 SERPL-SCNC: 25.2 MMOL/L (ref 22–29)
CREAT SERPL-MCNC: 1.66 MG/DL (ref 0.76–1.27)
DEPRECATED RDW RBC AUTO: 41.1 FL (ref 37–54)
EGFRCR SERPLBLD CKD-EPI 2021: 42.2 ML/MIN/1.73
EOSINOPHIL # BLD AUTO: 0.01 10*3/MM3 (ref 0–0.4)
EOSINOPHIL NFR BLD AUTO: 0.1 % (ref 0.3–6.2)
ERYTHROCYTE [DISTWIDTH] IN BLOOD BY AUTOMATED COUNT: 12.1 % (ref 12.3–15.4)
GLUCOSE SERPL-MCNC: 282 MG/DL (ref 65–99)
HCT VFR BLD AUTO: 41.8 % (ref 37.5–51)
HGB BLD-MCNC: 14.2 G/DL (ref 13–17.7)
IMM GRANULOCYTES # BLD AUTO: 0.04 10*3/MM3 (ref 0–0.05)
IMM GRANULOCYTES NFR BLD AUTO: 0.4 % (ref 0–0.5)
INR PPP: 1.06 (ref 0.86–1.15)
LYMPHOCYTES # BLD AUTO: 1.87 10*3/MM3 (ref 0.7–3.1)
LYMPHOCYTES NFR BLD AUTO: 19.7 % (ref 19.6–45.3)
MCH RBC QN AUTO: 31.3 PG (ref 26.6–33)
MCHC RBC AUTO-ENTMCNC: 34 G/DL (ref 31.5–35.7)
MCV RBC AUTO: 92.3 FL (ref 79–97)
MONOCYTES # BLD AUTO: 0.71 10*3/MM3 (ref 0.1–0.9)
MONOCYTES NFR BLD AUTO: 7.5 % (ref 5–12)
NEUTROPHILS NFR BLD AUTO: 6.86 10*3/MM3 (ref 1.7–7)
NEUTROPHILS NFR BLD AUTO: 72.1 % (ref 42.7–76)
NRBC BLD AUTO-RTO: 0 /100 WBC (ref 0–0.2)
PLATELET # BLD AUTO: 193 10*3/MM3 (ref 140–450)
PMV BLD AUTO: 11.4 FL (ref 6–12)
POTASSIUM SERPL-SCNC: 4.2 MMOL/L (ref 3.5–5.2)
PROTHROMBIN TIME: 14 SECONDS (ref 11.8–14.9)
RBC # BLD AUTO: 4.53 10*6/MM3 (ref 4.14–5.8)
SODIUM SERPL-SCNC: 132 MMOL/L (ref 136–145)
WBC NRBC COR # BLD: 9.51 10*3/MM3 (ref 3.4–10.8)

## 2023-11-15 PROCEDURE — 80048 BASIC METABOLIC PNL TOTAL CA: CPT | Performed by: FAMILY MEDICINE

## 2023-11-15 PROCEDURE — 85610 PROTHROMBIN TIME: CPT | Performed by: FAMILY MEDICINE

## 2023-11-15 PROCEDURE — 85025 COMPLETE CBC W/AUTO DIFF WBC: CPT | Performed by: FAMILY MEDICINE

## 2023-11-15 PROCEDURE — 85730 THROMBOPLASTIN TIME PARTIAL: CPT | Performed by: FAMILY MEDICINE

## 2023-11-15 NOTE — SIGNIFICANT NOTE
MESSAGE SENT TO KAY AT CARDIOLOGY OFFICE TO REVIEW BMP RESULTS.  DECREASED KIDNEY FUNCTION BUT HAS BEEN PT NORMAL.

## 2023-11-16 ENCOUNTER — HOSPITAL ENCOUNTER (OUTPATIENT)
Facility: HOSPITAL | Age: 78
Setting detail: HOSPITAL OUTPATIENT SURGERY
Discharge: HOME | End: 2023-11-16
Attending: INTERNAL MEDICINE | Admitting: INTERNAL MEDICINE
Payer: MEDICARE

## 2023-11-16 VITALS
SYSTOLIC BLOOD PRESSURE: 141 MMHG | BODY MASS INDEX: 34.07 KG/M2 | OXYGEN SATURATION: 97 % | HEART RATE: 63 BPM | HEIGHT: 69 IN | WEIGHT: 230 LBS | RESPIRATION RATE: 18 BRPM | DIASTOLIC BLOOD PRESSURE: 87 MMHG

## 2023-11-16 DIAGNOSIS — R07.9 CHEST PAIN, UNSPECIFIED TYPE: ICD-10-CM

## 2023-11-16 PROCEDURE — 25810000003 SODIUM CHLORIDE 0.9 % SOLUTION: Performed by: FAMILY MEDICINE

## 2023-11-16 PROCEDURE — C1769 GUIDE WIRE: HCPCS | Performed by: INTERNAL MEDICINE

## 2023-11-16 PROCEDURE — 99153 MOD SED SAME PHYS/QHP EA: CPT | Performed by: INTERNAL MEDICINE

## 2023-11-16 PROCEDURE — 25010000002 MIDAZOLAM PER 1MG: Performed by: INTERNAL MEDICINE

## 2023-11-16 PROCEDURE — C1894 INTRO/SHEATH, NON-LASER: HCPCS | Performed by: INTERNAL MEDICINE

## 2023-11-16 PROCEDURE — 99152 MOD SED SAME PHYS/QHP 5/>YRS: CPT | Performed by: INTERNAL MEDICINE

## 2023-11-16 PROCEDURE — 25010000002 NITROGLYCERIN 100-5 MCG/ML-% SOLUTION: Performed by: INTERNAL MEDICINE

## 2023-11-16 PROCEDURE — 25510000001 IOPAMIDOL PER 1 ML: Performed by: INTERNAL MEDICINE

## 2023-11-16 PROCEDURE — 25010000002 FENTANYL CITRATE (PF) 50 MCG/ML SOLUTION: Performed by: INTERNAL MEDICINE

## 2023-11-16 PROCEDURE — 93458 L HRT ARTERY/VENTRICLE ANGIO: CPT | Performed by: INTERNAL MEDICINE

## 2023-11-16 PROCEDURE — 25010000002 HEPARIN (PORCINE) PER 1000 UNITS: Performed by: INTERNAL MEDICINE

## 2023-11-16 PROCEDURE — 25810000003 SODIUM CHLORIDE 0.9 % SOLUTION: Performed by: INTERNAL MEDICINE

## 2023-11-16 RX ORDER — NITROGLYCERIN 0.4 MG/1
0.4 TABLET SUBLINGUAL
Status: CANCELLED | OUTPATIENT
Start: 2023-11-16

## 2023-11-16 RX ORDER — LIDOCAINE HYDROCHLORIDE 20 MG/ML
INJECTION, SOLUTION INFILTRATION; PERINEURAL
Status: DISCONTINUED | OUTPATIENT
Start: 2023-11-16 | End: 2023-11-16 | Stop reason: HOSPADM

## 2023-11-16 RX ORDER — MIDAZOLAM HYDROCHLORIDE 2 MG/2ML
INJECTION, SOLUTION INTRAMUSCULAR; INTRAVENOUS
Status: DISCONTINUED | OUTPATIENT
Start: 2023-11-16 | End: 2023-11-16 | Stop reason: HOSPADM

## 2023-11-16 RX ORDER — SODIUM CHLORIDE 9 MG/ML
40 INJECTION, SOLUTION INTRAVENOUS AS NEEDED
Status: DISCONTINUED | OUTPATIENT
Start: 2023-11-16 | End: 2023-11-16 | Stop reason: HOSPADM

## 2023-11-16 RX ORDER — FENTANYL CITRATE 50 UG/ML
INJECTION, SOLUTION INTRAMUSCULAR; INTRAVENOUS
Status: DISCONTINUED | OUTPATIENT
Start: 2023-11-16 | End: 2023-11-16 | Stop reason: HOSPADM

## 2023-11-16 RX ORDER — SODIUM CHLORIDE 0.9 % (FLUSH) 0.9 %
10 SYRINGE (ML) INJECTION EVERY 12 HOURS SCHEDULED
Status: DISCONTINUED | OUTPATIENT
Start: 2023-11-16 | End: 2023-11-16 | Stop reason: HOSPADM

## 2023-11-16 RX ORDER — SODIUM CHLORIDE 0.9 % (FLUSH) 0.9 %
10 SYRINGE (ML) INJECTION AS NEEDED
Status: DISCONTINUED | OUTPATIENT
Start: 2023-11-16 | End: 2023-11-16 | Stop reason: HOSPADM

## 2023-11-16 RX ORDER — SODIUM CHLORIDE 9 MG/ML
125 INJECTION, SOLUTION INTRAVENOUS CONTINUOUS
Status: ACTIVE | OUTPATIENT
Start: 2023-11-16 | End: 2023-11-16

## 2023-11-16 RX ORDER — HEPARIN SODIUM 1000 [USP'U]/ML
INJECTION, SOLUTION INTRAVENOUS; SUBCUTANEOUS
Status: DISCONTINUED | OUTPATIENT
Start: 2023-11-16 | End: 2023-11-16 | Stop reason: HOSPADM

## 2023-11-16 RX ORDER — NITROGLYCERIN 0.4 MG/1
0.4 TABLET SUBLINGUAL
Status: DISCONTINUED | OUTPATIENT
Start: 2023-11-16 | End: 2023-11-16 | Stop reason: HOSPADM

## 2023-11-16 RX ADMIN — SODIUM CHLORIDE 150 ML: 9 INJECTION, SOLUTION INTRAVENOUS at 06:51

## 2023-11-16 RX ADMIN — SODIUM CHLORIDE 125 ML/HR: 9 INJECTION, SOLUTION INTRAVENOUS at 12:38

## 2023-11-16 NOTE — Clinical Note
The DP pulses are non-palpable bilaterally. The PT pulses are detected w/ doppler bilaterally. The right radial pulse is +2.

## 2023-11-16 NOTE — INTERVAL H&P NOTE
H&P reviewed. The patient was examined and there are no changes to the H&P.      Labs done yesterday show creatinine 1.6, which is at his baseline.    The risks, benefits and alternatives to test were explained detail with the patient and he agreed to proceed.  He received preoperative IV fluid hydration.  We will continue hydration postprocedure as well.      Electronically signed by Trip Mirza MD, 11/16/23, 10:01 AM EST.

## 2023-11-16 NOTE — Clinical Note
Hemostasis started on the right femoral artery. Topical hemostasis patch was used in achieving hemostasis. Manual pressure applied to vessel. Manual pressure was held by Maryse Abdi. Manual pressure was held for 15 min. Hemostasis achieved successfully.

## 2023-11-30 ENCOUNTER — TRANSCRIBE ORDERS (OUTPATIENT)
Dept: ADMINISTRATIVE | Facility: HOSPITAL | Age: 78
End: 2023-11-30
Payer: MEDICARE

## 2023-11-30 ENCOUNTER — LAB (OUTPATIENT)
Dept: LAB | Facility: HOSPITAL | Age: 78
End: 2023-11-30
Payer: MEDICARE

## 2023-11-30 DIAGNOSIS — E55.9 VITAMIN D DEFICIENCY: ICD-10-CM

## 2023-11-30 DIAGNOSIS — N18.30 STAGE 3 CHRONIC KIDNEY DISEASE, UNSPECIFIED WHETHER STAGE 3A OR 3B CKD: Primary | ICD-10-CM

## 2023-11-30 DIAGNOSIS — N18.30 STAGE 3 CHRONIC KIDNEY DISEASE, UNSPECIFIED WHETHER STAGE 3A OR 3B CKD: ICD-10-CM

## 2023-11-30 DIAGNOSIS — E11.9 DIABETES MELLITUS WITHOUT COMPLICATION: ICD-10-CM

## 2023-11-30 LAB
25(OH)D3 SERPL-MCNC: 41.3 NG/ML (ref 30–100)
ALBUMIN SERPL-MCNC: 4.2 G/DL (ref 3.5–5.2)
ALBUMIN/GLOB SERPL: 1.4 G/DL
ALP SERPL-CCNC: 71 U/L (ref 39–117)
ALT SERPL W P-5'-P-CCNC: 19 U/L (ref 1–41)
ANION GAP SERPL CALCULATED.3IONS-SCNC: 8.4 MMOL/L (ref 5–15)
AST SERPL-CCNC: 22 U/L (ref 1–40)
BASOPHILS # BLD AUTO: 0.04 10*3/MM3 (ref 0–0.2)
BASOPHILS NFR BLD AUTO: 0.7 % (ref 0–1.5)
BILIRUB SERPL-MCNC: 0.5 MG/DL (ref 0–1.2)
BILIRUB UR QL STRIP: NEGATIVE
BUN SERPL-MCNC: 24 MG/DL (ref 8–23)
BUN/CREAT SERPL: 12.4 (ref 7–25)
CALCIUM SPEC-SCNC: 9.6 MG/DL (ref 8.6–10.5)
CHLORIDE SERPL-SCNC: 97 MMOL/L (ref 98–107)
CLARITY UR: CLEAR
CO2 SERPL-SCNC: 27.6 MMOL/L (ref 22–29)
COLOR UR: YELLOW
CREAT SERPL-MCNC: 1.93 MG/DL (ref 0.76–1.27)
DEPRECATED RDW RBC AUTO: 40.3 FL (ref 37–54)
EGFRCR SERPLBLD CKD-EPI 2021: 35 ML/MIN/1.73
EOSINOPHIL # BLD AUTO: 0.06 10*3/MM3 (ref 0–0.4)
EOSINOPHIL NFR BLD AUTO: 1.1 % (ref 0.3–6.2)
ERYTHROCYTE [DISTWIDTH] IN BLOOD BY AUTOMATED COUNT: 11.8 % (ref 12.3–15.4)
FOLATE SERPL-MCNC: 8.75 NG/ML (ref 4.78–24.2)
GLOBULIN UR ELPH-MCNC: 3 GM/DL
GLUCOSE SERPL-MCNC: 130 MG/DL (ref 65–99)
GLUCOSE UR STRIP-MCNC: NEGATIVE MG/DL
HBA1C MFR BLD: 7.5 % (ref 4.8–5.6)
HCT VFR BLD AUTO: 41.4 % (ref 37.5–51)
HGB BLD-MCNC: 14 G/DL (ref 13–17.7)
HGB UR QL STRIP.AUTO: NEGATIVE
IMM GRANULOCYTES # BLD AUTO: 0.01 10*3/MM3 (ref 0–0.05)
IMM GRANULOCYTES NFR BLD AUTO: 0.2 % (ref 0–0.5)
KETONES UR QL STRIP: NEGATIVE
LEUKOCYTE ESTERASE UR QL STRIP.AUTO: NEGATIVE
LYMPHOCYTES # BLD AUTO: 2.84 10*3/MM3 (ref 0.7–3.1)
LYMPHOCYTES NFR BLD AUTO: 51.5 % (ref 19.6–45.3)
MAGNESIUM SERPL-MCNC: 1.8 MG/DL (ref 1.6–2.4)
MCH RBC QN AUTO: 31.2 PG (ref 26.6–33)
MCHC RBC AUTO-ENTMCNC: 33.8 G/DL (ref 31.5–35.7)
MCV RBC AUTO: 92.2 FL (ref 79–97)
MONOCYTES # BLD AUTO: 0.57 10*3/MM3 (ref 0.1–0.9)
MONOCYTES NFR BLD AUTO: 10.3 % (ref 5–12)
NEUTROPHILS NFR BLD AUTO: 1.99 10*3/MM3 (ref 1.7–7)
NEUTROPHILS NFR BLD AUTO: 36.2 % (ref 42.7–76)
NITRITE UR QL STRIP: NEGATIVE
NRBC BLD AUTO-RTO: 0 /100 WBC (ref 0–0.2)
PH UR STRIP.AUTO: 6.5 [PH] (ref 5–8)
PLATELET # BLD AUTO: 187 10*3/MM3 (ref 140–450)
PMV BLD AUTO: 11.8 FL (ref 6–12)
POTASSIUM SERPL-SCNC: 4.3 MMOL/L (ref 3.5–5.2)
PROT SERPL-MCNC: 7.2 G/DL (ref 6–8.5)
PROT UR QL STRIP: NEGATIVE
RBC # BLD AUTO: 4.49 10*6/MM3 (ref 4.14–5.8)
SODIUM SERPL-SCNC: 133 MMOL/L (ref 136–145)
SP GR UR STRIP: 1.01 (ref 1–1.03)
UROBILINOGEN UR QL STRIP: NORMAL
VIT B12 BLD-MCNC: 541 PG/ML (ref 211–946)
WBC NRBC COR # BLD AUTO: 5.51 10*3/MM3 (ref 3.4–10.8)

## 2023-11-30 PROCEDURE — 82607 VITAMIN B-12: CPT

## 2023-11-30 PROCEDURE — 85025 COMPLETE CBC W/AUTO DIFF WBC: CPT

## 2023-11-30 PROCEDURE — 82306 VITAMIN D 25 HYDROXY: CPT

## 2023-11-30 PROCEDURE — 80053 COMPREHEN METABOLIC PANEL: CPT

## 2023-11-30 PROCEDURE — 82746 ASSAY OF FOLIC ACID SERUM: CPT

## 2023-11-30 PROCEDURE — 81003 URINALYSIS AUTO W/O SCOPE: CPT

## 2023-11-30 PROCEDURE — 36415 COLL VENOUS BLD VENIPUNCTURE: CPT

## 2023-11-30 PROCEDURE — 83036 HEMOGLOBIN GLYCOSYLATED A1C: CPT

## 2023-11-30 PROCEDURE — 83735 ASSAY OF MAGNESIUM: CPT

## 2023-12-01 ENCOUNTER — HOSPITAL ENCOUNTER (EMERGENCY)
Facility: HOSPITAL | Age: 78
Discharge: HOME | End: 2023-12-01
Attending: EMERGENCY MEDICINE
Payer: MEDICARE

## 2023-12-01 ENCOUNTER — APPOINTMENT (OUTPATIENT)
Facility: HOSPITAL | Age: 78
End: 2023-12-01
Payer: MEDICARE

## 2023-12-01 VITALS
BODY MASS INDEX: 33.97 KG/M2 | SYSTOLIC BLOOD PRESSURE: 146 MMHG | TEMPERATURE: 97.8 F | HEART RATE: 78 BPM | RESPIRATION RATE: 16 BRPM | OXYGEN SATURATION: 100 % | DIASTOLIC BLOOD PRESSURE: 86 MMHG | HEIGHT: 69 IN

## 2023-12-01 DIAGNOSIS — M79.604 PAIN OF RIGHT LOWER EXTREMITY: Primary | ICD-10-CM

## 2023-12-01 LAB
ALBUMIN SERPL-MCNC: 4 G/DL (ref 3.5–5.2)
ALBUMIN/GLOB SERPL: 1.1 G/DL
ALP SERPL-CCNC: 79 U/L (ref 39–117)
ALT SERPL W P-5'-P-CCNC: 17 U/L (ref 1–41)
ANION GAP SERPL CALCULATED.3IONS-SCNC: 11.3 MMOL/L (ref 5–15)
AST SERPL-CCNC: 20 U/L (ref 1–40)
BASOPHILS # BLD AUTO: 0.03 10*3/MM3 (ref 0–0.2)
BASOPHILS NFR BLD AUTO: 0.5 % (ref 0–1.5)
BH CV LOW VAS RIGHT LESSER SAPH VESSEL: 1
BH CV LOW VAS RIGHT POPLITEAL SPONT: 1
BH CV LOWER VASCULAR RIGHT COMMON FEMORAL AUGMENT: NORMAL
BH CV LOWER VASCULAR RIGHT COMMON FEMORAL COMPETENT: NORMAL
BH CV LOWER VASCULAR RIGHT COMMON FEMORAL COMPRESS: NORMAL
BH CV LOWER VASCULAR RIGHT COMMON FEMORAL PHASIC: NORMAL
BH CV LOWER VASCULAR RIGHT COMMON FEMORAL SPONT: NORMAL
BH CV LOWER VASCULAR RIGHT DISTAL FEMORAL COMPRESS: NORMAL
BH CV LOWER VASCULAR RIGHT GASTRONEMIUS COMPRESS: NORMAL
BH CV LOWER VASCULAR RIGHT GREATER SAPH AK COMPRESS: NORMAL
BH CV LOWER VASCULAR RIGHT GREATER SAPH BK COMPRESS: NORMAL
BH CV LOWER VASCULAR RIGHT LESSER SAPH COMPRESS: NORMAL
BH CV LOWER VASCULAR RIGHT LESSER SAPH THROMBUS: NORMAL
BH CV LOWER VASCULAR RIGHT MID FEMORAL AUGMENT: NORMAL
BH CV LOWER VASCULAR RIGHT MID FEMORAL COMPETENT: NORMAL
BH CV LOWER VASCULAR RIGHT MID FEMORAL COMPRESS: NORMAL
BH CV LOWER VASCULAR RIGHT MID FEMORAL PHASIC: NORMAL
BH CV LOWER VASCULAR RIGHT MID FEMORAL SPONT: NORMAL
BH CV LOWER VASCULAR RIGHT PERONEAL COMPRESS: NORMAL
BH CV LOWER VASCULAR RIGHT POPLITEAL AUGMENT: NORMAL
BH CV LOWER VASCULAR RIGHT POPLITEAL COMPETENT: NORMAL
BH CV LOWER VASCULAR RIGHT POPLITEAL COMPRESS: NORMAL
BH CV LOWER VASCULAR RIGHT POPLITEAL PHASIC: NORMAL
BH CV LOWER VASCULAR RIGHT POPLITEAL SPONT: NORMAL
BH CV LOWER VASCULAR RIGHT POPLITEAL THROMBUS: NORMAL
BH CV LOWER VASCULAR RIGHT POSTERIOR TIBIAL COMPRESS: NORMAL
BH CV LOWER VASCULAR RIGHT PROXIMAL FEMORAL COMPRESS: NORMAL
BH CV LOWER VASCULAR RIGHT SAPHENOFEMORAL JUNCTION COMPRESS: NORMAL
BH CV VAS PRELIMINARY FINDINGS SCRIPTING: 1
BILIRUB SERPL-MCNC: 0.5 MG/DL (ref 0–1.2)
BUN SERPL-MCNC: 24 MG/DL (ref 8–23)
BUN/CREAT SERPL: 13.4 (ref 7–25)
CALCIUM SPEC-SCNC: 9.7 MG/DL (ref 8.6–10.5)
CHLORIDE SERPL-SCNC: 94 MMOL/L (ref 98–107)
CO2 SERPL-SCNC: 25.7 MMOL/L (ref 22–29)
CREAT SERPL-MCNC: 1.79 MG/DL (ref 0.76–1.27)
DEPRECATED RDW RBC AUTO: 37.2 FL (ref 37–54)
EGFRCR SERPLBLD CKD-EPI 2021: 38.3 ML/MIN/1.73
EOSINOPHIL # BLD AUTO: 0.03 10*3/MM3 (ref 0–0.4)
EOSINOPHIL NFR BLD AUTO: 0.5 % (ref 0.3–6.2)
ERYTHROCYTE [DISTWIDTH] IN BLOOD BY AUTOMATED COUNT: 11.7 % (ref 12.3–15.4)
GLOBULIN UR ELPH-MCNC: 3.5 GM/DL
GLUCOSE SERPL-MCNC: 259 MG/DL (ref 65–99)
HCT VFR BLD AUTO: 39 % (ref 37.5–51)
HGB BLD-MCNC: 13.4 G/DL (ref 13–17.7)
IMM GRANULOCYTES # BLD AUTO: 0.01 10*3/MM3 (ref 0–0.05)
IMM GRANULOCYTES NFR BLD AUTO: 0.2 % (ref 0–0.5)
LYMPHOCYTES # BLD AUTO: 1.77 10*3/MM3 (ref 0.7–3.1)
LYMPHOCYTES NFR BLD AUTO: 31.9 % (ref 19.6–45.3)
MCH RBC QN AUTO: 30.5 PG (ref 26.6–33)
MCHC RBC AUTO-ENTMCNC: 34.4 G/DL (ref 31.5–35.7)
MCV RBC AUTO: 88.8 FL (ref 79–97)
MONOCYTES # BLD AUTO: 0.56 10*3/MM3 (ref 0.1–0.9)
MONOCYTES NFR BLD AUTO: 10.1 % (ref 5–12)
NEUTROPHILS NFR BLD AUTO: 3.14 10*3/MM3 (ref 1.7–7)
NEUTROPHILS NFR BLD AUTO: 56.8 % (ref 42.7–76)
NRBC BLD AUTO-RTO: 0 /100 WBC (ref 0–0.2)
PLATELET # BLD AUTO: 183 10*3/MM3 (ref 140–450)
PMV BLD AUTO: 11.1 FL (ref 6–12)
POTASSIUM SERPL-SCNC: 4.4 MMOL/L (ref 3.5–5.2)
PROT SERPL-MCNC: 7.5 G/DL (ref 6–8.5)
RBC # BLD AUTO: 4.39 10*6/MM3 (ref 4.14–5.8)
SODIUM SERPL-SCNC: 131 MMOL/L (ref 136–145)
WBC NRBC COR # BLD AUTO: 5.54 10*3/MM3 (ref 3.4–10.8)

## 2023-12-01 PROCEDURE — 93971 EXTREMITY STUDY: CPT

## 2023-12-01 PROCEDURE — 93971 EXTREMITY STUDY: CPT | Performed by: SURGERY

## 2023-12-01 PROCEDURE — 99284 EMERGENCY DEPT VISIT MOD MDM: CPT

## 2023-12-01 PROCEDURE — 80053 COMPREHEN METABOLIC PANEL: CPT | Performed by: EMERGENCY MEDICINE

## 2023-12-01 PROCEDURE — 85025 COMPLETE CBC W/AUTO DIFF WBC: CPT | Performed by: EMERGENCY MEDICINE

## 2023-12-01 PROCEDURE — 36415 COLL VENOUS BLD VENIPUNCTURE: CPT

## 2023-12-01 NOTE — ED PROVIDER NOTES
Time: 3:40 PM EST  Date of encounter:  12/1/2023  Independent Historian/Clinical History and Information was obtained by:   Patient and Family    History is limited by: N/A    Chief Complaint: Leg pain      History of Present Illness:  Patient is a 78 y.o. year old male who presents to the emergency department for evaluation of right leg pain.  Patient reports he had a heart cath performed with serial access in the right leg at that time.  He reports discomfort in the right lower extremity since the time of the cath.    HPI    Patient Care Team  Primary Care Provider: Brian Henson MD    Past Medical History:     Allergies   Allergen Reactions    Ezetimibe Unknown - High Severity and Other (See Comments)    Statins Other (See Comments) and Unknown - High Severity     UNSURE OF REACTION    Atorvastatin Hives    Furosemide Other (See Comments)     ENLARGEMENT OF BREAST    Hydrochlorothiazide Hives    Simvastatin Hives     Past Medical History:   Diagnosis Date    Anemia     DENIES ANY CURRENT ISSUES    Arthritis     Back pain     CHRONIC NECK AND BACK PAIN    BPH (benign prostatic hyperplasia) 03/02/2015    BPH with urinary obstruction 06/01/2018    Chest pain     CKD (chronic kidney disease), stage III     Controlled type 2 diabetes mellitus with diabetic polyneuropathy 07/05/2017    Dizziness 08/10/2017    DVT (deep venous thrombosis)     Erectile dysfunction 2007    Foot pain, bilateral 03/12/2018    GERD (gastroesophageal reflux disease)     Glaucoma (increased eye pressure)     High cholesterol     Hyperlipidemia     Hypertension     Ingrowing nail 07/05/2017    Pain in both feet     PE (pulmonary thromboembolism)     Polyneuropathy 07/05/2017    Shortness of breath     Sleep apnea     Tinea unguium 07/05/2017     Past Surgical History:   Procedure Laterality Date    ADRENAL GLAND SURGERY      CARDIAC CATHETERIZATION N/A 11/16/2023    Procedure: Left Heart Cath with possible angioplasty;  Surgeon: Yuki  Trip PATE MD;  [facility]: [facility];  Service: Cardiovascular;  Laterality: N/A;    COLONOSCOPY      CYSTOSCOPY      KIDNEY SURGERY Left     KIDNEY BIOSPY    PROSTATE SURGERY  12/2007    TURP / TRANSURETHRAL INCISION / DRAINAGE PROSTATE  06/2019     Family History   Problem Relation Age of Onset    Hypertension Mother     Heart disease Mother     Stroke Mother     Hypertension Sister     Heart disease Sister     Stroke Sister     Diabetes Brother     Diabetes Maternal Grandmother     Diabetes Other     Diabetes Son     Cancer Neg Hx        Home Medications:  Prior to Admission medications    Medication Sig Start Date End Date Taking? Authorizing Provider   acetaminophen-codeine (TYLENOL #3) 300-30 MG per tablet Take 1 tablet by mouth Every 4 (Four) Hours As Needed for Moderate Pain.    Alfonso Espino MD   Advair -21 MCG/ACT inhaler Inhale 2 puffs 2 (Two) Times a Day.    Alfonso Espino MD   albuterol sulfate  (90 Base) MCG/ACT inhaler Inhale 2 puffs Every 6 (Six) Hours As Needed.    Emergency, Nurse KASIE Bolton   Alcohol Swabs (Easy Touch Alcohol Prep Medium) 70 % pads  12/7/22   Alfonso Espino MD   amLODIPine (NORVASC) 5 MG tablet Take 1 tablet by mouth Daily. 6/21/23   Keke Sandra APRN   apixaban (ELIQUIS) 5 MG tablet tablet Take 1 tablet by mouth 2 (Two) Times a Day. REPORTS HAD INJECTION AT PAIN MANAGEMENT ON 11/13/23 AND THAT HIS LAST DOSE OF ELIQUIS WAS 11/9/23 PM DOSE    ProviderAlfonso MD   aspirin 81 MG EC tablet REPORTS THAT LAST DOSE OF ASA WAS 11/7/23 HAD STOPPED IN PREPARATION FOR INJECTION AT PAIN MANAGEMENT ON 11/13/23    Emergency, Nurse Che RN   B-D UF III MINI PEN NEEDLES 31G X 5 MM misc  12/30/22   Alfonso Espino MD   baclofen (LIORESAL) 10 MG tablet Take 1 tablet by mouth 2 (Two) Times a Day. 6/7/21   EmergencyNurse Che RN   cetirizine (zyrTEC) 10 MG tablet Take 1 tablet by mouth Daily. 10/6/22   Alfonso Espino MD    chlorthalidone (HYGROTON) 25 MG tablet Take 1 tablet by mouth 2 (two) times a day. 5/11/21   Emergency, Nurse KASIE Bolton   cholecalciferol (VITAMIN D3) 25 MCG (1000 UT) tablet Take 1 tablet by mouth Daily. 6/15/21   Alfonso Espino MD   coenzyme Q10 100 MG capsule Take 1 capsule by mouth Daily.    Alfonso Espino MD   cyclobenzaprine (FLEXERIL) 10 MG tablet Take 1 tablet by mouth 2 (Two) Times a Day As Needed.    Alfonso Espino MD   EPINEPHrine (EPIPEN) 0.3 MG/0.3ML solution auto-injector injection     Alfonso Espino MD   fluticasone (FLONASE) 50 MCG/ACT nasal spray 2 spray(s) in each nostril daily    Provider, Historical, MD   FREESTYLE LITE test strip  12/27/22   Alfonso Espino MD   gabapentin (NEURONTIN) 800 MG tablet Take 1 tablet by mouth 3 (Three) Times a Day. 5/24/21   Emergency, Nurse Epic, RN   ipratropium (ATROVENT) 0.03 % nasal spray 2 sprays into the nostril(s) as directed by provider Every 6 (Six) Hours As Needed. 2 sprays each nostril q 6 hrs 6/3/22   Alfonso Espino MD   Lactobacillus (Acidophilus) 100 MG capsule Take  by mouth Daily.    Alfonso Espino MD   Lancets (freestyle) lancets  10/6/22   Alfonso Espino MD   Lantus SoloStar 100 UNIT/ML injection pen Inject 35 Units under the skin into the appropriate area as directed 2 (Two) Times a Day. 2/7/23   Alfonso Espino MD   latanoprost (XALATAN) 0.005 % ophthalmic solution Administer 1 drop to both eyes Every Night.    Emergency, Nurse KASIE Bolton   losartan (COZAAR) 50 MG tablet Take 0.5 tablets by mouth Daily.    Alfonso Espino MD   Narcan 4 MG/0.1ML nasal spray  9/19/22   Alfonso Espino MD   omeprazole (priLOSEC) 20 MG capsule Take 1 capsule by mouth As Needed. 6/29/21   Alfonso Espino MD   rOPINIRole (REQUIP) 0.25 MG tablet Take 1 tablet by mouth every night at bedtime. 5/11/21   Emergency, Nurse KASIE Bolton   tiotropium bromide-olodaterol (Stiolto Respimat) 2.5-2.5 MCG/ACT  "aerosol solution inhaler Inhale 2 puffs Daily. 2/9/23   Junie Ansari APRLILI        Social History:   Social History     Tobacco Use    Smoking status: Never     Passive exposure: Never    Smokeless tobacco: Never   Vaping Use    Vaping Use: Never used   Substance Use Topics    Alcohol use: Yes     Comment: rare    Drug use: Never         Review of Systems:  Review of Systems   Constitutional:  Negative for chills and fever.   HENT:  Negative for congestion, rhinorrhea and sore throat.    Eyes:  Negative for pain and visual disturbance.   Respiratory:  Negative for apnea, cough, chest tightness and shortness of breath.    Cardiovascular:  Negative for chest pain and palpitations.   Gastrointestinal:  Negative for abdominal pain, diarrhea, nausea and vomiting.   Genitourinary:  Negative for difficulty urinating and dysuria.   Musculoskeletal:  Negative for joint swelling and myalgias.   Skin:  Negative for color change.   Neurological:  Negative for seizures and headaches.   Psychiatric/Behavioral: Negative.     All other systems reviewed and are negative.       Physical Exam:  /86 (BP Location: Left arm, Patient Position: Sitting)   Pulse 78   Temp 97.8 °F (36.6 °C) (Oral)   Resp 16   Ht 175.3 cm (69\")   SpO2 100%   BMI 33.97 kg/m²     Physical Exam  Vitals and nursing note reviewed.   Constitutional:       General: He is not in acute distress.     Appearance: Normal appearance. He is not toxic-appearing.   HENT:      Head: Normocephalic and atraumatic.      Jaw: There is normal jaw occlusion.   Eyes:      General: Lids are normal.      Extraocular Movements: Extraocular movements intact.      Conjunctiva/sclera: Conjunctivae normal.      Pupils: Pupils are equal, round, and reactive to light.   Cardiovascular:      Rate and Rhythm: Normal rate and regular rhythm.      Pulses: Normal pulses.      Heart sounds: Normal heart sounds.   Pulmonary:      Effort: Pulmonary effort is normal. No respiratory " distress.      Breath sounds: Normal breath sounds. No wheezing or rhonchi.   Abdominal:      General: Abdomen is flat.      Palpations: Abdomen is soft.      Tenderness: There is no abdominal tenderness. There is no guarding or rebound.   Musculoskeletal:         General: Normal range of motion.      Cervical back: Normal range of motion and neck supple.      Right lower leg: No edema.      Left lower leg: No edema.   Skin:     General: Skin is warm and dry.   Neurological:      Mental Status: He is alert and oriented to person, place, and time. Mental status is at baseline.   Psychiatric:         Mood and Affect: Mood normal.                  Procedures:  Procedures      Medical Decision Making:      Comorbidities that affect care:    DVT, Hypertension    External Notes reviewed:    Previous Clinic Note: Cardiology office visit for heart cath planning      The following orders were placed and all results were independently analyzed by me:  Orders Placed This Encounter   Procedures    Comprehensive Metabolic Panel    CBC Auto Differential    CBC & Differential       Medications Given in the Emergency Department:  Medications - No data to display     ED Course:         Labs:    Lab Results (last 24 hours)       Procedure Component Value Units Date/Time    CBC & Differential [[id number]]  (Abnormal) Collected: 12/01/23 1322    Specimen: Blood Updated: 12/01/23 1331    Narrative:      The following orders were created for panel order CBC & Differential.  Procedure                               Abnormality         Status                     ---------                               -----------         ------                     CBC Auto Differential[[id number]]        Abnormal            Final result                 Please view results for these tests on the individual orders.    Comprehensive Metabolic Panel [[id number]]  (Abnormal) Collected: 12/01/23 1322    Specimen: Blood Updated: 12/01/23 1351     Glucose 259 mg/dL       BUN 24 mg/dL      Creatinine 1.79 mg/dL      Sodium 131 mmol/L      Potassium 4.4 mmol/L      Chloride 94 mmol/L      CO2 25.7 mmol/L      Calcium 9.7 mg/dL      Total Protein 7.5 g/dL      Albumin 4.0 g/dL      ALT (SGPT) 17 U/L      AST (SGOT) 20 U/L      Alkaline Phosphatase 79 U/L      Total Bilirubin 0.5 mg/dL      Globulin 3.5 gm/dL      A/G Ratio 1.1 g/dL      BUN/Creatinine Ratio 13.4     Anion Gap 11.3 mmol/L      eGFR 38.3 mL/min/1.73     Narrative:      GFR Normal >60  Chronic Kidney Disease <60  Kidney Failure <15    The GFR formula is only valid for adults with stable renal function between ages 18 and 70.    CBC Auto Differential [[id number]]  (Abnormal) Collected: 12/01/23 1322    Specimen: Blood Updated: 12/01/23 1331     WBC 5.54 10*3/mm3      RBC 4.39 10*6/mm3      Hemoglobin 13.4 g/dL      Hematocrit 39.0 %      MCV 88.8 fL      MCH 30.5 pg      MCHC 34.4 g/dL      RDW 11.7 %      RDW-SD 37.2 fl      MPV 11.1 fL      Platelets 183 10*3/mm3      Neutrophil % 56.8 %      Lymphocyte % 31.9 %      Monocyte % 10.1 %      Eosinophil % 0.5 %      Basophil % 0.5 %      Immature Grans % 0.2 %      Neutrophils, Absolute 3.14 10*3/mm3      Lymphocytes, Absolute 1.77 10*3/mm3      Monocytes, Absolute 0.56 10*3/mm3      Eosinophils, Absolute 0.03 10*3/mm3      Basophils, Absolute 0.03 10*3/mm3      Immature Grans, Absolute 0.01 10*3/mm3      nRBC 0.0 /100 WBC              Imaging:    Duplex Venous Lower Extremity - Right CAR    Result Date: 12/1/2023    Chronic right lower extremity deep vein thrombosis noted in the popliteal.   Chronic right lower extremity superficial thrombophlebitis noted in the small saphenous.   All other right sided veins appeared normal.        Differential Diagnosis and Discussion:    Extremity Pain: Differential diagnosis includes but is not limited to soft tissue sprain, tendonitis, tendon injury, dislocation, fracture, deep vein thrombosis, arterial insufficiency,  osteoarthritis, bursitis, and ligamentous damage.    All labs were reviewed and interpreted by me.  Ultrasound impression was interpreted by me.     MDM  Number of Diagnoses or Management Options  Pain of right lower extremity  Diagnosis management comments: In summary this is a 78-year-old male patient presents to the emergency department for evaluation of right lower extremity pain status post heart cath 2 weeks ago.  Clinically the leg is well-appearing, no significant swelling or discoloration noted.  Doppler ultrasound was performed and shows only chronic DVTs no acute occlusive DVTs and no femoral artery aneurysm.  CBC independently reviewed by me and shows no critical abnormalities.  CMP independently reviewed by me and shows no critical abnormalities.  Patient reassured.  Very strict return to ER and follow-up instructions have been provided to the patient.                   Patient Care Considerations:    CT EXTREMITY: I considered ordering an extremity CT, however no signs of vascular compromise      Consultants/Shared Management Plan:    None    Social Determinants of Health:    Patient is independent, reliable, and has access to care.       Disposition and Care Coordination:    Discharged: The patient is suitable and stable for discharge with no need for consideration of observation or admission.    I have explained the patient´s condition, diagnoses and treatment plan based on the information available to me at this time. I have answered questions and addressed any concerns. The patient has a good  understanding of the patient´s diagnosis, condition, and treatment plan as can be expected at this point. The vital signs have been stable. The patient´s condition is stable and appropriate for discharge from the emergency department.      The patient will pursue further outpatient evaluation with the primary care physician or other designated or consulting physician as outlined in the discharge instructions.  They are agreeable to this plan of care and follow-up instructions have been explained in detail. The patient has received these instructions in written format and have expressed an understanding of the discharge instructions. The patient is aware that any significant change in condition or worsening of symptoms should prompt an immediate return to this or the closest emergency department or call to 911.  I have explained discharge medications and the need for follow up with the patient/caretakers. This was also printed in the discharge instructions. Patient was discharged with the following medications and follow up:      Medication List      No changes were made to your prescriptions during this visit.      Brian Henson MD  [address] [address]  [phone]    In 1 week         Final diagnoses:   Pain of right lower extremity        ED Disposition       ED Disposition   Discharge    Condition   Stable    Comment   --               This medical record created using voice recognition software.             Ramo Handy MD  12/01/23 [id number]

## 2023-12-01 NOTE — ED TRIAGE NOTES
Pt reports generalized pain in the right leg from the groin to the ankle. Pt denies any areas that look abnormally swollen. Pt right leg is tender to touch but no areas of discoloration or swelling were noted. Pt reports cardiac cath that entered through his right groin on the 16th of November. Pt denies any bleeding from the site and the pressure dressing has been removed by patient already. Pt states he is having some numbness and tingling in the right foot but states that is normal for him due to his diabetes.

## 2023-12-07 ENCOUNTER — TRANSCRIBE ORDERS (OUTPATIENT)
Dept: ADMINISTRATIVE | Facility: HOSPITAL | Age: 78
End: 2023-12-07
Payer: MEDICARE

## 2023-12-07 ENCOUNTER — LAB (OUTPATIENT)
Dept: LAB | Facility: HOSPITAL | Age: 78
End: 2023-12-07
Payer: MEDICARE

## 2023-12-07 DIAGNOSIS — I10 ESSENTIAL HYPERTENSION, MALIGNANT: ICD-10-CM

## 2023-12-07 DIAGNOSIS — E78.1 HIGH TRIGLYCERIDES: Primary | ICD-10-CM

## 2023-12-07 DIAGNOSIS — E78.1 HIGH TRIGLYCERIDES: ICD-10-CM

## 2023-12-07 LAB
HBA1C MFR BLD: 7.7 % (ref 4.8–5.6)
TSH SERPL DL<=0.05 MIU/L-ACNC: 0.89 UIU/ML (ref 0.27–4.2)

## 2023-12-07 PROCEDURE — 36415 COLL VENOUS BLD VENIPUNCTURE: CPT

## 2023-12-07 PROCEDURE — 83036 HEMOGLOBIN GLYCOSYLATED A1C: CPT

## 2023-12-07 PROCEDURE — 84443 ASSAY THYROID STIM HORMONE: CPT

## 2023-12-08 ENCOUNTER — TELEPHONE (OUTPATIENT)
Dept: GASTROENTEROLOGY | Facility: CLINIC | Age: 78
End: 2023-12-08
Payer: MEDICARE

## 2023-12-08 ENCOUNTER — OFFICE VISIT (OUTPATIENT)
Dept: GASTROENTEROLOGY | Facility: CLINIC | Age: 78
End: 2023-12-08
Payer: MEDICARE

## 2023-12-08 VITALS
HEIGHT: 69 IN | BODY MASS INDEX: 33.86 KG/M2 | WEIGHT: 228.6 LBS | SYSTOLIC BLOOD PRESSURE: 142 MMHG | HEART RATE: 69 BPM | DIASTOLIC BLOOD PRESSURE: 94 MMHG

## 2023-12-08 DIAGNOSIS — K21.9 GASTROESOPHAGEAL REFLUX DISEASE, UNSPECIFIED WHETHER ESOPHAGITIS PRESENT: ICD-10-CM

## 2023-12-08 DIAGNOSIS — R07.89 ATYPICAL CHEST PAIN: Primary | ICD-10-CM

## 2023-12-08 DIAGNOSIS — R14.0 BLOATING: ICD-10-CM

## 2023-12-08 RX ORDER — PANTOPRAZOLE SODIUM 40 MG/1
40 TABLET, DELAYED RELEASE ORAL DAILY
Qty: 90 TABLET | Refills: 1 | Status: SHIPPED | OUTPATIENT
Start: 2023-12-08 | End: 2024-03-07

## 2023-12-08 NOTE — PROGRESS NOTES
Chief Complaint  No chief complaint on file.    Joon Zhao is a 78 y.o. male who presents to [facility] [place] for GERD.    History of present Illness  Patient presents to the office for GERD. Over the last few years he has been experiencing worsening reflux symptoms. Burning can radiate up his chest and into his teeth. He is undergoing further evaluation with cardiology as well. Symptoms occur sporadically throughout the day, not necessarily after meals. Currently taking Prilosec 20mg daily. Denies nausea, vomiting, epigastric pain, and dysphagia. Denies change in bowel habits, melena, and hematochezia. Denies unintentional weight loss.     EGD 10/16/2020 by Dr. Moreau - small hiatal hernia, normal stomach and duodenum. Stomach biopsies are normal.     Colonoscopy 7/6/2020 by Dr. Moreau - normal mucosa throughout colon with diverticula throughout. Random colon biopsies normal.     Past Medical History:   Diagnosis Date    Anemia     DENIES ANY CURRENT ISSUES    Arthritis     Back pain     CHRONIC NECK AND BACK PAIN    BPH (benign prostatic hyperplasia) 03/02/2015    BPH with urinary obstruction 06/01/2018    Chest pain     CKD (chronic kidney disease), stage III     Controlled type 2 diabetes mellitus with diabetic polyneuropathy 07/05/2017    Dizziness 08/10/2017    DVT (deep venous thrombosis)     Erectile dysfunction 2007    Foot pain, bilateral 03/12/2018    GERD (gastroesophageal reflux disease)     Glaucoma (increased eye pressure)     High cholesterol     Hyperlipidemia     Hypertension     Ingrowing nail 07/05/2017    Pain in both feet     PE (pulmonary thromboembolism)     Polyneuropathy 07/05/2017    Shortness of breath     Sleep apnea     Tinea unguium 07/05/2017       Past Surgical History:   Procedure Laterality Date    ADRENAL GLAND SURGERY      CARDIAC CATHETERIZATION N/A 11/16/2023    Procedure: Left Heart Cath with possible angioplasty;  Surgeon: Yuki  Trip PATE MD;  Location: [facility] INVASIVE LOCATION;  Service: Cardiovascular;  Laterality: N/A;    COLONOSCOPY      CYSTOSCOPY      KIDNEY SURGERY Left     KIDNEY BIOSPY    PROSTATE SURGERY  12/2007    TURP / TRANSURETHRAL INCISION / DRAINAGE PROSTATE  06/2019    UPPER GASTROINTESTINAL ENDOSCOPY           Current Outpatient Medications:     acetaminophen-codeine (TYLENOL #3) 300-30 MG per tablet, Take 1 tablet by mouth Every 4 (Four) Hours As Needed for Moderate Pain., Disp: , Rfl:     Advair -21 MCG/ACT inhaler, Inhale 2 puffs 2 (Two) Times a Day., Disp: , Rfl:     albuterol sulfate  (90 Base) MCG/ACT inhaler, Inhale 2 puffs Every 6 (Six) Hours As Needed., Disp: , Rfl:     Alcohol Swabs (Easy Touch Alcohol Prep Medium) 70 % pads, , Disp: , Rfl:     amLODIPine (NORVASC) 5 MG tablet, Take 1 tablet by mouth Daily., Disp: 90 tablet, Rfl: 3    apixaban (ELIQUIS) 5 MG tablet tablet, Take 1 tablet by mouth 2 (Two) Times a Day. REPORTS HAD INJECTION AT PAIN MANAGEMENT ON 11/13/23 AND THAT HIS LAST DOSE OF ELIQUIS WAS 11/9/23 PM DOSE, Disp: , Rfl:     aspirin 81 MG EC tablet, REPORTS THAT LAST DOSE OF ASA WAS 11/7/23 HAD STOPPED IN PREPARATION FOR INJECTION AT PAIN MANAGEMENT ON 11/13/23, Disp: , Rfl:     B-D UF III MINI PEN NEEDLES 31G X 5 MM misc, , Disp: , Rfl:     baclofen (LIORESAL) 10 MG tablet, Take 1 tablet by mouth 2 (Two) Times a Day., Disp: , Rfl:     cetirizine (zyrTEC) 10 MG tablet, Take 1 tablet by mouth Daily., Disp: , Rfl:     chlorthalidone (HYGROTON) 25 MG tablet, Take 1 tablet by mouth 2 (two) times a day., Disp: , Rfl:     cholecalciferol (VITAMIN D3) 25 MCG (1000 UT) tablet, Take 1 tablet by mouth Daily., Disp: , Rfl:     coenzyme Q10 100 MG capsule, Take 1 capsule by mouth Daily., Disp: , Rfl:     cyclobenzaprine (FLEXERIL) 10 MG tablet, Take 1 tablet by mouth 2 (Two) Times a Day As Needed., Disp: , Rfl:     EPINEPHrine (EPIPEN) 0.3 MG/0.3ML solution auto-injector injection, , Disp: ,  Rfl:     fluticasone (FLONASE) 50 MCG/ACT nasal spray, 2 spray(s) in each nostril daily, Disp: , Rfl:     FREESTYLE LITE test strip, , Disp: , Rfl:     gabapentin (NEURONTIN) 800 MG tablet, Take 1 tablet by mouth 3 (Three) Times a Day., Disp: , Rfl:     ipratropium (ATROVENT) 0.03 % nasal spray, 2 sprays into the nostril(s) as directed by provider Every 6 (Six) Hours As Needed. 2 sprays each nostril q 6 hrs, Disp: , Rfl:     Lactobacillus (Acidophilus) 100 MG capsule, Take  by mouth Daily., Disp: , Rfl:     Lancets (freestyle) lancets, , Disp: , Rfl:     Lantus SoloStar 100 UNIT/ML injection pen, Inject 35 Units under the skin into the appropriate area as directed 2 (Two) Times a Day., Disp: , Rfl:     latanoprost (XALATAN) 0.005 % ophthalmic solution, Administer 1 drop to both eyes Every Night., Disp: , Rfl:     losartan (COZAAR) 50 MG tablet, Take 0.5 tablets by mouth Daily., Disp: , Rfl:     Narcan 4 MG/0.1ML nasal spray, , Disp: , Rfl:     omeprazole (priLOSEC) 20 MG capsule, Take 1 capsule by mouth As Needed., Disp: , Rfl:     rOPINIRole (REQUIP) 0.25 MG tablet, Take 1 tablet by mouth every night at bedtime., Disp: , Rfl:     tiotropium bromide-olodaterol (Stiolto Respimat) 2.5-2.5 MCG/ACT aerosol solution inhaler, Inhale 2 puffs Daily., Disp: 3 each, Rfl: 3    pantoprazole (Protonix) 40 MG EC tablet, Take 1 tablet by mouth Daily for 90 days., Disp: 90 tablet, Rfl: 1     Allergies   Allergen Reactions    Ezetimibe Unknown - High Severity and Other (See Comments)    Statins Other (See Comments) and Unknown - High Severity     UNSURE OF REACTION    Atorvastatin Hives    Furosemide Other (See Comments)     ENLARGEMENT OF BREAST    Hydrochlorothiazide Hives    Simvastatin Hives    Colesevelam Other (See Comments)     Other Reaction(s): Generalized aches and pains    Doxazosin Other (See Comments)     Other Reaction(s): Unknown      pt states shortnes of breath    Hydrochlorothiazide W-Triamterene Rash     HCTZ  "      Family History   Problem Relation Age of Onset    Hypertension Mother     Heart disease Mother     Stroke Mother     Hypertension Sister     Heart disease Sister     Stroke Sister     Diabetes Brother     Diabetes Maternal Grandmother     Diabetes Son     Diabetes Other     Cancer Neg Hx     Colon cancer Neg Hx         Social History     Social History Narrative    Not on file       Objective       Vital Signs:   /94 (BP Location: Left arm, Patient Position: Sitting, Cuff Size: Adult)   Pulse 69   Ht 175.3 cm (69.02\")   Wt 104 kg (228 lb 9.6 oz)   BMI 33.74 kg/m²       Physical Exam  Constitutional:       Appearance: Normal appearance.   HENT:      Head: Normocephalic.   Cardiovascular:      Rate and Rhythm: Normal rate and regular rhythm.      Heart sounds: Normal heart sounds.   Pulmonary:      Effort: Pulmonary effort is normal.      Breath sounds: Normal breath sounds.   Abdominal:      General: Bowel sounds are normal.      Palpations: Abdomen is soft.   Skin:     General: Skin is warm and dry.   Neurological:      Mental Status: He is alert and oriented to person, place, and time. Mental status is at baseline.   Psychiatric:         Mood and Affect: Mood normal.         Behavior: Behavior normal.         Thought Content: Thought content normal.         Judgment: Judgment normal.         Result Review :       CBC w/diff          11/15/2023    09:38 11/30/2023    06:14 12/1/2023    13:22   CBC w/Diff   WBC 9.51  5.51  5.54    RBC 4.53  4.49  4.39    Hemoglobin 14.2  14.0  13.4    Hematocrit 41.8  41.4  39.0    MCV 92.3  92.2  88.8    MCH 31.3  31.2  30.5    MCHC 34.0  33.8  34.4    RDW 12.1  11.8  11.7    Platelets 193  187  183    Neutrophil Rel % 72.1  36.2  56.8    Immature Granulocyte Rel % 0.4  0.2  0.2    Lymphocyte Rel % 19.7  51.5  31.9    Monocyte Rel % 7.5  10.3  10.1    Eosinophil Rel % 0.1  1.1  0.5    Basophil Rel % 0.2  0.7  0.5      CMP          11/15/2023    09:38 11/30/2023    " 06:14 12/1/2023    13:22   CMP   Glucose 282  130  259    BUN 31  24  24    Creatinine 1.66  1.93  1.79    EGFR 42.2  35.0  38.3    Sodium 132  133  131    Potassium 4.2  4.3  4.4    Chloride 94  97  94    Calcium 9.7  9.6  9.7    Total Protein  7.2  7.5    Albumin  4.2  4.0    Globulin  3.0  3.5    Total Bilirubin  0.5  0.5    Alkaline Phosphatase  71  79    AST (SGOT)  22  20    ALT (SGPT)  19  17    Albumin/Globulin Ratio  1.4  1.1    BUN/Creatinine Ratio 18.7  12.4  13.4    Anion Gap 12.8  8.4  11.3              Assessment and Plan    Diagnoses and all orders for this visit:    1. Atypical chest pain (Primary)  -     Case Request; Standing  -     Follow Anesthesia Guidelines / Protocol; Standing  -     Obtain Informed Consent; Standing  -     Verify NPO; Standing    2. Gastroesophageal reflux disease, unspecified whether esophagitis present  -     Case Request; Standing  -     Follow Anesthesia Guidelines / Protocol; Standing  -     Obtain Informed Consent; Standing  -     Verify NPO; Standing    3. Bloating  -     Case Request; Standing  -     Follow Anesthesia Guidelines / Protocol; Standing  -     Obtain Informed Consent; Standing  -     Verify NPO; Standing    Other orders  -     pantoprazole (Protonix) 40 MG EC tablet; Take 1 tablet by mouth Daily for 90 days.  Dispense: 90 tablet; Refill: 1    Continue cardiac work up.   Switched to Protonix 40mg daily.   Cardiac and blood thinner (Eliquis) clearance from Dr. Mirza. Pulmonary clearance from Dr. Cazares  EGD Surgical Risk and Benefits: Possible risk/complications, benefits, and alternatives to surgical or invasive procedure have been explained to patient and/or legal guardian. Risks include bleeding, infection, and perforation. Patient has been evaluated and can tolerate anesthesia and/or sedation. Risk, benefits, and alternatives to anesthesia and sedation have been explained to patient and/or legal guardian.     Follow Up   No follow-ups on  file.  Patient was given instructions and counseling regarding his condition or for health maintenance advice. Please see specific information pulled into the AVS if appropriate.

## 2023-12-08 NOTE — TELEPHONE ENCOUNTER
12/8/2023    Dear Dr Dunn,     Patient: Joon Zhao   YOB: 1945        This patient is waiting to have a Colonoscopy and/or Esophagogastroduodenoscopy which I will perform at [facility] on 1.2.2024 .     Our records indicate this patient is currently taking Eliquis. This procedure requires the patient to suspend their anticoagulant medication prior to surgery.     Please respond to this request noting your recommendations. You may contact our office at [phone] Option 2  with any questions. I appreciate your prompt response in this matter.     Please return this form to our office no later than two weeks prior to the procedure date listed above. Please return form to [phone].     ____ I approve my patient to stop taking their Anticoagulant Therapy medication 2 days prior to the scheduled procedure.    ____ I do NOT approve my patient to stop taking their Anticoagulant Therapy medication at this time.     ____I approve my patient to from cardiology standpoint     ___ I do NOT  approve my patient to from cardiology standpoint at this time     Please specify clearance expiration date:_____________________________    Approving physician name (please print):     _____________________________________________    Approving physician signature:     ________________________________    Date:________________        Sincerely,  [facility]   Gastroenterology -

## 2023-12-08 NOTE — TELEPHONE ENCOUNTER
2023    Dear Dr. Cazares,      Patient Name: Joon Zhao  : 1945      This patient is waiting to have a Colonoscopy and/or Esophagogastroduodenoscopy which I will perform at [facility] on 2024. Please respond to this request noting your recommendations regarding clearance from a Pulmonary  standpoint.  You may contact our office at [phone] Option 2 with any questions. I appreciate your prompt response in this matter. Please return this form to our office as soon as possible to [phone].    ____ I approve my patient from a Pulmonary  standpoint    ____ I do NOT approve my patient from a Pulmonary  standpoint at this time      Please specify clearance expiration date:____________________________________      Approving physician name (please print): _____________________________________________      Approving physician signature: ________________________________ Date:________________  Sincerely,  [facility] - Gastroenterology   Dr. Moreau          Please fax approval or denial to our office as soon as possible.

## 2023-12-11 NOTE — TELEPHONE ENCOUNTER
He may proceed with upcoming colonoscopy and/or EGD at moderate risk, he may hold Eliquis for 2 days prior to procedure.

## 2023-12-11 NOTE — TELEPHONE ENCOUNTER
Procedure: Colonoscopy and/or EGD    Medication Directive: Eliquis    PMH: HTN, CP    Last Seen: 10/24/23    LHC: 11/16/23    Normal epicardial coronary and anterior with minimal nonobstructive lesions noted in diagonal and obtuse marginal branches.  Normal left ventricular end-diastolic pressure.  There are no culprit lesions identified to explain patient's symptoms.  If symptoms continue to proceed, noncardiac etiology needs to be pursued

## 2023-12-19 ENCOUNTER — OFFICE VISIT (OUTPATIENT)
Dept: PODIATRY | Facility: CLINIC | Age: 78
End: 2023-12-19
Payer: MEDICARE

## 2023-12-19 VITALS
TEMPERATURE: 98.4 F | DIASTOLIC BLOOD PRESSURE: 84 MMHG | HEART RATE: 66 BPM | WEIGHT: 227 LBS | SYSTOLIC BLOOD PRESSURE: 153 MMHG | OXYGEN SATURATION: 92 % | BODY MASS INDEX: 33.51 KG/M2

## 2023-12-19 DIAGNOSIS — M79.672 FOOT PAIN, BILATERAL: ICD-10-CM

## 2023-12-19 DIAGNOSIS — Z79.4 TYPE 2 DIABETES MELLITUS WITH DIABETIC POLYNEUROPATHY, WITH LONG-TERM CURRENT USE OF INSULIN: ICD-10-CM

## 2023-12-19 DIAGNOSIS — M79.671 FOOT PAIN, BILATERAL: ICD-10-CM

## 2023-12-19 DIAGNOSIS — B35.1 ONYCHOMYCOSIS: ICD-10-CM

## 2023-12-19 DIAGNOSIS — E11.8 DIABETIC FOOT: ICD-10-CM

## 2023-12-19 DIAGNOSIS — L60.0 ONYCHOCRYPTOSIS: ICD-10-CM

## 2023-12-19 DIAGNOSIS — E11.9 NON-INSULIN DEPENDENT TYPE 2 DIABETES MELLITUS: Primary | ICD-10-CM

## 2023-12-19 DIAGNOSIS — E11.42 TYPE 2 DIABETES MELLITUS WITH DIABETIC POLYNEUROPATHY, WITH LONG-TERM CURRENT USE OF INSULIN: ICD-10-CM

## 2023-12-19 DIAGNOSIS — G62.9 NEUROPATHY: ICD-10-CM

## 2023-12-19 RX ORDER — INSULIN LISPRO 100 [IU]/ML
INJECTION, SOLUTION INTRAVENOUS; SUBCUTANEOUS
COMMUNITY

## 2023-12-19 NOTE — PROGRESS NOTES
[facility] - PODIATRY    Today's Date: 12/19/23    Patient Name: Joon Zhao  MRN: [id number]  CSN: [id number]  PCP: Brian Henson MD, Last PCP Visit: 11 December 2023  Referring Provider: No ref. provider found    SUBJECTIVE     Chief Complaint   Patient presents with    Left Foot - Follow-up, Nail Problem    Right Foot - Follow-up, Nail Problem     HPI: Joon Zhao, a 78 y.o.male, comes to clinic.    New, Established, New Problem:  established     Location:  Toenails    Duration:   Greater than five years    Onset:  Gradual    Nature:  sore with palpation.    Stable, worsening, improving:   Stable    Aggravating factors:  Pain with shoe gear and ambulation.    Previous Treatment:  debridement    Patient reported last blood glucose:  157  __________________________________    Medical changes: No changes    Patient denies any fevers, chills, nausea, vomiting, shortness of breathe, nor any other constitutional signs nor symptoms.         Past Medical History:   Diagnosis Date    Anemia     DENIES ANY CURRENT ISSUES    Arthritis     Back pain     CHRONIC NECK AND BACK PAIN    BPH (benign prostatic hyperplasia) 03/02/2015    BPH with urinary obstruction 06/01/2018    Chest pain     CKD (chronic kidney disease), stage III     Controlled type 2 diabetes mellitus with diabetic polyneuropathy 07/05/2017    Dizziness 08/10/2017    DVT (deep venous thrombosis)     Erectile dysfunction 2007    Foot pain, bilateral 03/12/2018    GERD (gastroesophageal reflux disease)     Glaucoma (increased eye pressure)     High cholesterol     Hyperlipidemia     Hypertension     Ingrowing nail 07/05/2017    Pain in both feet     PE (pulmonary thromboembolism)     Polyneuropathy 07/05/2017    Shortness of breath     Sleep apnea     Tinea unguium 07/05/2017     Past Surgical History:   Procedure Laterality Date    ADRENAL GLAND SURGERY      CARDIAC CATHETERIZATION N/A 11/16/2023    Procedure: Left Heart Cath with  possible angioplasty;  Surgeon: Trip Mirza MD;  Location: [facility] INVASIVE LOCATION;  Service: Cardiovascular;  Laterality: N/A;    COLONOSCOPY      CYSTOSCOPY      KIDNEY SURGERY Left     KIDNEY BIOSPY    PROSTATE SURGERY  12/2007    TURP / TRANSURETHRAL INCISION / DRAINAGE PROSTATE  06/2019    UPPER GASTROINTESTINAL ENDOSCOPY       Family History   Problem Relation Age of Onset    Hypertension Mother     Heart disease Mother     Stroke Mother     Hypertension Sister     Heart disease Sister     Stroke Sister     Diabetes Brother     Diabetes Maternal Grandmother     Diabetes Son     Diabetes Other     Cancer Neg Hx     Colon cancer Neg Hx      Social History     Socioeconomic History    Marital status:    Tobacco Use    Smoking status: Never     Passive exposure: Never    Smokeless tobacco: Never   Vaping Use    Vaping Use: Never used   Substance and Sexual Activity    Alcohol use: Yes     Comment: rare    Drug use: Never    Sexual activity: Defer     Allergies   Allergen Reactions    Ezetimibe Unknown - High Severity and Other (See Comments)    Statins Other (See Comments) and Unknown - High Severity     UNSURE OF REACTION    Atorvastatin Hives    Furosemide Other (See Comments)     ENLARGEMENT OF BREAST    Hydrochlorothiazide Hives    Simvastatin Hives    Colesevelam Other (See Comments)     Other Reaction(s): Generalized aches and pains    Doxazosin Other (See Comments)     Other Reaction(s): Unknown      pt states shortnes of breath    Hydrochlorothiazide W-Triamterene Rash     HCTZ     Current Outpatient Medications   Medication Sig Dispense Refill    acetaminophen-codeine (TYLENOL #3) 300-30 MG per tablet Take 1 tablet by mouth Every 4 (Four) Hours As Needed for Moderate Pain.      Advair -21 MCG/ACT inhaler Inhale 2 puffs 2 (Two) Times a Day.      albuterol sulfate  (90 Base) MCG/ACT inhaler Inhale 2 puffs Every 6 (Six) Hours As Needed.      Alcohol Swabs (Easy Touch Alcohol  Prep Medium) 70 % pads       amLODIPine (NORVASC) 5 MG tablet Take 1 tablet by mouth Daily. 90 tablet 3    apixaban (ELIQUIS) 5 MG tablet tablet Take 1 tablet by mouth 2 (Two) Times a Day. REPORTS HAD INJECTION AT PAIN MANAGEMENT ON 11/13/23 AND THAT HIS LAST DOSE OF ELIQUIS WAS 11/9/23 PM DOSE      aspirin 81 MG EC tablet REPORTS THAT LAST DOSE OF ASA WAS 11/7/23 HAD STOPPED IN PREPARATION FOR INJECTION AT PAIN MANAGEMENT ON 11/13/23      B-D UF III MINI PEN NEEDLES 31G X 5 MM misc       baclofen (LIORESAL) 10 MG tablet Take 1 tablet by mouth 2 (Two) Times a Day.      cetirizine (zyrTEC) 10 MG tablet Take 1 tablet by mouth Daily.      chlorthalidone (HYGROTON) 25 MG tablet Take 1 tablet by mouth 2 (two) times a day.      cholecalciferol (VITAMIN D3) 25 MCG (1000 UT) tablet Take 1 tablet by mouth Daily.      coenzyme Q10 100 MG capsule Take 1 capsule by mouth Daily.      cyclobenzaprine (FLEXERIL) 10 MG tablet Take 1 tablet by mouth 2 (Two) Times a Day As Needed.      EPINEPHrine (EPIPEN) 0.3 MG/0.3ML solution auto-injector injection       fluticasone (FLONASE) 50 MCG/ACT nasal spray 2 spray(s) in each nostril daily      FREESTYLE LITE test strip       gabapentin (NEURONTIN) 800 MG tablet Take 1 tablet by mouth 3 (Three) Times a Day.      HumaLOG KwikPen 100 UNIT/ML solution pen-injector       ipratropium (ATROVENT) 0.03 % nasal spray 2 sprays into the nostril(s) as directed by provider Every 6 (Six) Hours As Needed. 2 sprays each nostril q 6 hrs      Lactobacillus (Acidophilus) 100 MG capsule Take  by mouth Daily.      Lancets (freestyle) lancets       Lantus SoloStar 100 UNIT/ML injection pen Inject 35 Units under the skin into the appropriate area as directed 2 (Two) Times a Day.      latanoprost (XALATAN) 0.005 % ophthalmic solution Administer 1 drop to both eyes Every Night.      losartan (COZAAR) 50 MG tablet Take 0.5 tablets by mouth Daily.      Narcan 4 MG/0.1ML nasal spray       pantoprazole (Protonix) 40  MG EC tablet Take 1 tablet by mouth Daily for 90 days. 90 tablet 1    rOPINIRole (REQUIP) 0.25 MG tablet Take 1 tablet by mouth every night at bedtime.      tiotropium bromide-olodaterol (Stiolto Respimat) 2.5-2.5 MCG/ACT aerosol solution inhaler Inhale 2 puffs Daily. 3 each 3    omeprazole (priLOSEC) 20 MG capsule Take 1 capsule by mouth As Needed. (Patient not taking: Reported on 12/19/2023)       No current facility-administered medications for this visit.     Review of Systems   Constitutional: Negative.    Skin:         Painful toenails   All other systems reviewed and are negative.      OBJECTIVE     Vitals:    12/19/23 0737   BP: 153/84   Pulse: 66   Temp: 98.4 °F (36.9 °C)   SpO2: 92%       Lab Results   Component Value Date    HGBA1C 7.70 (H) 12/07/2023       Lab Results   Component Value Date    GLUCOSE 259 (H) 12/01/2023    CALCIUM 9.7 12/01/2023     (L) 12/01/2023    K 4.4 12/01/2023    CO2 25.7 12/01/2023    CL 94 (L) 12/01/2023    BUN 24 (H) 12/01/2023    CREATININE 1.79 (H) 12/01/2023    EGFRIFAFRI 45 (L) 09/15/2021    BCR 13.4 12/01/2023    ANIONGAP 11.3 12/01/2023       Patient seen in no apparent distress.      PHYSICAL EXAM:     Foot/Ankle Exam    GENERAL  Diabetic foot exam performed    Appearance:  elderly  Orientation:  AAOx3  Affect:  appropriate  Gait:  unimpaired  Assistance:  independent  Right shoe gear: casual shoe  Left shoe gear: casual shoe    VASCULAR     Right Foot Vascularity   Normal vascular exam    Dorsalis pedis:  2+  Posterior tibial:  2+  Skin temperature:  warm  Edema grading:  None  CFT:  < 3 seconds  Pedal hair growth:  Present  Varicosities:  none     Left Foot Vascularity   Normal vascular exam    Dorsalis pedis:  2+  Posterior tibial:  2+  Skin temperature:  warm  Edema grading:  None  CFT:  < 3 seconds  Pedal hair growth:  Present  Varicosities:  none     NEUROLOGIC     Right Foot Neurologic   Light touch sensation: absent  Vibratory sensation: absent  Hot/Cold  sensation: absent  Protective Sensation using [place] Monofilament:   Sites tested: 10     Left Foot Neurologic   Light touch sensation: absent  Vibratory sensation: absent  Hot/Cold sensation:  absent  Protective Sensation using [place] Monofilament:   Sites tested: 10    MUSCULOSKELETAL     Right Foot Musculoskeletal   Arch:  Normal     Left Foot Musculoskeletal   Arch:  Normal    MUSCLE STRENGTH     Right Foot Muscle Strength   Foot dorsiflexion:  4  Foot plantar flexion:  4  Foot inversion:  4  Foot eversion:  4     Left Foot Muscle Strength   Foot dorsiflexion:  4  Foot plantar flexion:  4  Foot inversion:  4  Foot eversion:  4    RANGE OF MOTION     Right Foot Range of Motion   Foot and ankle ROM within normal limits       Left Foot Range of Motion   Foot and ankle ROM within normal limits      DERMATOLOGIC      Right Foot Dermatologic   Skin  Right foot skin is intact.   Nails  1.  Positive for elongated, onychomycosis, abnormal thickness, subungual debris and ingrown toenail.  2.  Positive for elongated, onychomycosis, abnormal thickness, subungual debris and ingrown toenail.  3.  Positive for elongated, onychomycosis, abnormal thickness, subungual debris and ingrown toenail.  4.  Positive for elongated, onychomycosis, abnormal thickness, subungual debris and ingrown toenail.  Nails comment:  Toenails 1 through 4     Left Foot Dermatologic   Skin  Left foot skin is intact.   Nails comment:  Toenails 1 through 3  Nails  1.  Positive for elongated, onychomycosis, abnormal thickness, subungual debris and ingrown toenail.  2.  Positive for elongated, onychomycosis, abnormal thickness, subungual debris and ingrown toenail.  3.  Positive for elongated, onychomycosis, abnormal thickness, subungual debris and ingrown toenail.      Diabetic Foot Exam Performed and Monofilament Test Performed      ASSESSMENT/PLAN     Diagnoses and all orders for this visit:    1. Non-insulin dependent type 2 diabetes  mellitus (Primary)    2. Onychocryptosis    3. Neuropathy    4. Diabetic foot    5. Type 2 diabetes mellitus with diabetic polyneuropathy, with long-term current use of insulin    6. Onychomycosis    7. Foot pain, bilateral        Comprehensive lower extremity examination and evaluation was performed.    Discussed findings and treatment plan including risks, benefits, and treatment options with patient in detail. Patient agreed with treatment plan.    Toenails 1 through 4 on right and toenails 1 through 3 on the left were debrided in thickness and length and then smoothed with a Dremel Tool.  Tolerated the procedure well without complications.    Diabetic foot exam performed and documented this date, compliant with CQM required standards. Detail of findings as noted in physical exam.  Lower extremity Neurologic exam for diabetic patient performed and documented this date, compliant with PQRS required standards. Detail of findings as noted in physical exam.  Advised patient importance of good routine lower extremity hygiene. Advised patient importance of evaluating for intact skin and pain free nail borders.  Advised patient to use mirror to evaluate plantar/ soles of feet for better visualization. Advised patient monitor and phone office to be seen if any cracking to skin, open lesions, painful nail borders or if nails become elongated prior to next visit. Advised patient importance of daily cleansing of lower extremities, followed by good skin cream to maintain normal hydration of skin. Also advised patient importance of close daily monitoring of blood sugar. Advised to regulate diet and medications to maintain control of blood sugar in optimal range. Contact primary care provider if difficulties maintaining blood sugar levels.  Advised Patient of presence of Diabetes Mellitus condition.  Advised Patient risk of progression and worsening or improvement, then return of condition.  Will monitor condition for any  change in future. Treat with most appropriate treatment pending status of condition.  Counseled and advised patient extensively on nature and ramifications of diabetes. Standard instructions given to patient for good diabetic foot care and maintenance. Advised importance of careful monitoring to avoid break down and complications secondary to diabetes. Advised patient importance of strict maintenance of blood sugar control. Advised patient of possible ominous results from neglect of condition, i.e.: amputation/ loss of digits, feet and legs, or even death.  Patient states understands counseling, will monitor closely, continue good hygiene and routine diabetic foot care. Patient will contact office is questions or problems.      An After Visit Summary was printed and given to the patient at discharge, including (if requested) any available informative/educational handouts regarding diagnosis, treatment, or medications. All questions were answered to patient/family satisfaction. Should symptoms fail to improve or worsen they agree to call or return to clinic or to go to the Emergency Department. Discussed the importance of following up with any needed screening tests/labs/specialist appointments and any requested follow-up recommended by me today. Importance of maintaining follow-up discussed and patient accepts that missed appointments can delay diagnosis and potentially lead to worsening of conditions.    Return in about 9 weeks (around 2/20/2024) for Toenail Care., or sooner if acute issues arise.    This document has been electronically signed by Carl Payne DPM on December 19, 2023 07:49 EST

## 2023-12-22 NOTE — PRE-PROCEDURE INSTRUCTIONS
"Instructed on date and arrival time of 0830. Come to entrance \"C\".  Must have  over age 18 to drive home.  May have two visitors; however, children under 12 must stay in waiting room.  Discussed diet/NPO.  May take medications as usual except for blood thinners, diabetic medications, or weight loss medications.  Bring list of medications to hospital.  Verbalized understanding of instructions given.  Instructed to call for questions or concerns.  Hold Eliquis for 2 days prior to procedure.  Cardiac, blood thinner and pulmonary clearances noted in chart.  "

## 2023-12-29 ENCOUNTER — ANESTHESIA EVENT (OUTPATIENT)
Dept: GASTROENTEROLOGY | Facility: HOSPITAL | Age: 78
End: 2023-12-29
Payer: MEDICARE

## 2023-12-29 NOTE — ANESTHESIA PREPROCEDURE EVALUATION
Anesthesia Evaluation     Nursing notes reviewed   NPO Solid Status: > 8 hours  NPO Liquid Status: > 2 hours           Airway   Mallampati: II  TM distance: >3 FB  Neck ROM: full  No difficulty expected and Large neck circumference  Dental - normal exam     Pulmonary    (+) pulmonary embolism, COPD (emphysema),shortness of breath, sleep apnea on CPAP, decreased breath sounds  Cardiovascular - normal exam    ECG reviewed  PT is on anticoagulation therapy    (+) hypertension 2 medications or greater, DVT, hyperlipidemia  Angina: ATYPICAL CHEST PAIN--HERE TODAY FOR EGD FOR EVAL.      Neuro/Psych  (+) dizziness/light headedness, numbness (NEUROPATHY)  GI/Hepatic/Renal/Endo    (+) obesity, GERD poorly controlled, renal disease (stage 3 CKD)- CRI, diabetes mellitus type 2    Musculoskeletal     (+) back pain (FOLLOWED BY PAIN MANAGEMENT)  Abdominal   (+) obese   Substance History      OB/GYN          Other   arthritis,     ROS/Med Hx Other: Cardiac Clearance from Dr. Mirza    CARDIAC CATH 11/16/2023  Conclusions:  1.Normal epicardial coronary and anterior with minimal nonobstructive lesions noted in diagonal and obtuse marginal branches.  2.Normal left ventricular end-diastolic pressure.  3.There are no culprit lesions identified to explain patient's symptoms.  If symptoms continue to proceed, noncardiac etiology needs to be pursued      ECHO 07/2021  ·Calculated left ventricular 3D EF = 59% Left ventricular ejection fraction appears to be 56 - 60%.  ·Estimated right ventricular systolic pressure from tricuspid regurgitation is normal (<35 mmHg).  ·Left ventricular wall thickness is consistent with mild concentric hypertrophy.  ·Left ventricular diastolic function is consistent with (grade I) impaired relaxation.  ·Mild dilation of the aortic root is present.    Last dose of Eliquis-12/29/23                    Anesthesia Plan    ASA 3     general   total IV anesthesia  intravenous induction     Anesthetic plan, risks,  benefits, and alternatives have been provided, discussed and informed consent has been obtained with: patient.    Plan discussed with CRNA.    CODE STATUS:

## 2024-01-02 ENCOUNTER — HOSPITAL ENCOUNTER (OUTPATIENT)
Facility: HOSPITAL | Age: 79
Setting detail: HOSPITAL OUTPATIENT SURGERY
Discharge: HOME | End: 2024-01-02
Attending: INTERNAL MEDICINE | Admitting: INTERNAL MEDICINE
Payer: MEDICARE

## 2024-01-02 ENCOUNTER — ANESTHESIA (OUTPATIENT)
Dept: GASTROENTEROLOGY | Facility: HOSPITAL | Age: 79
End: 2024-01-02
Payer: MEDICARE

## 2024-01-02 VITALS
SYSTOLIC BLOOD PRESSURE: 127 MMHG | DIASTOLIC BLOOD PRESSURE: 88 MMHG | HEART RATE: 64 BPM | RESPIRATION RATE: 15 BRPM | OXYGEN SATURATION: 98 % | BODY MASS INDEX: 33.39 KG/M2 | WEIGHT: 226.19 LBS | TEMPERATURE: 97.1 F

## 2024-01-02 DIAGNOSIS — R14.0 BLOATING: ICD-10-CM

## 2024-01-02 DIAGNOSIS — R07.89 ATYPICAL CHEST PAIN: ICD-10-CM

## 2024-01-02 DIAGNOSIS — K21.9 GASTROESOPHAGEAL REFLUX DISEASE, UNSPECIFIED WHETHER ESOPHAGITIS PRESENT: ICD-10-CM

## 2024-01-02 LAB — GLUCOSE BLDC GLUCOMTR-MCNC: 130 MG/DL (ref 70–99)

## 2024-01-02 PROCEDURE — 25010000002 PROPOFOL 10 MG/ML EMULSION: Performed by: NURSE ANESTHETIST, CERTIFIED REGISTERED

## 2024-01-02 PROCEDURE — 25810000003 SODIUM CHLORIDE 0.9 % SOLUTION: Performed by: NURSE ANESTHETIST, CERTIFIED REGISTERED

## 2024-01-02 PROCEDURE — 82948 REAGENT STRIP/BLOOD GLUCOSE: CPT

## 2024-01-02 PROCEDURE — 88305 TISSUE EXAM BY PATHOLOGIST: CPT | Performed by: INTERNAL MEDICINE

## 2024-01-02 RX ORDER — LIDOCAINE HYDROCHLORIDE 20 MG/ML
INJECTION, SOLUTION EPIDURAL; INFILTRATION; INTRACAUDAL; PERINEURAL AS NEEDED
Status: DISCONTINUED | OUTPATIENT
Start: 2024-01-02 | End: 2024-01-02 | Stop reason: SURG

## 2024-01-02 RX ORDER — SODIUM CHLORIDE, SODIUM LACTATE, POTASSIUM CHLORIDE, CALCIUM CHLORIDE 600; 310; 30; 20 MG/100ML; MG/100ML; MG/100ML; MG/100ML
INJECTION, SOLUTION INTRAVENOUS CONTINUOUS PRN
Status: DISCONTINUED | OUTPATIENT
Start: 2024-01-02 | End: 2024-01-02

## 2024-01-02 RX ORDER — PROPOFOL 10 MG/ML
VIAL (ML) INTRAVENOUS AS NEEDED
Status: DISCONTINUED | OUTPATIENT
Start: 2024-01-02 | End: 2024-01-02 | Stop reason: SURG

## 2024-01-02 RX ORDER — SODIUM CHLORIDE 9 MG/ML
9 INJECTION, SOLUTION INTRAVENOUS CONTINUOUS
Status: DISCONTINUED | OUTPATIENT
Start: 2024-01-02 | End: 2024-01-02 | Stop reason: HOSPADM

## 2024-01-02 RX ADMIN — PROPOFOL 175 MCG/KG/MIN: 10 INJECTION, EMULSION INTRAVENOUS at 11:10

## 2024-01-02 RX ADMIN — LIDOCAINE HYDROCHLORIDE 50 MG: 20 INJECTION, SOLUTION EPIDURAL; INFILTRATION; INTRACAUDAL; PERINEURAL at 11:10

## 2024-01-02 RX ADMIN — SODIUM CHLORIDE 9 ML: 9 INJECTION, SOLUTION INTRAVENOUS at 08:48

## 2024-01-02 RX ADMIN — PROPOFOL 50 MG: 10 INJECTION, EMULSION INTRAVENOUS at 11:10

## 2024-01-02 NOTE — H&P
Pre Procedure History & Physical    Chief Complaint:   Epigastric pain GERD atypical chest pain    Subjective     HPI:   As above    Past Medical History:   Past Medical History:   Diagnosis Date    Anemia     DENIES ANY CURRENT ISSUES    Arthritis     Back pain     CHRONIC NECK AND BACK PAIN    BPH (benign prostatic hyperplasia) 03/02/2015    BPH with urinary obstruction 06/01/2018    Chest pain     CKD (chronic kidney disease), stage III     Controlled type 2 diabetes mellitus with diabetic polyneuropathy 07/05/2017    Dizziness 08/10/2017    DVT (deep venous thrombosis)     Erectile dysfunction 2007    Foot pain, bilateral 03/12/2018    GERD (gastroesophageal reflux disease)     Glaucoma (increased eye pressure)     High cholesterol     Hyperlipidemia     Hypertension     Ingrowing nail 07/05/2017    Pain in both feet     PE (pulmonary thromboembolism)     Polyneuropathy 07/05/2017    Shortness of breath     Sleep apnea     Tinea unguium 07/05/2017       Past Surgical History:  Past Surgical History:   Procedure Laterality Date    ADRENAL GLAND SURGERY      CARDIAC CATHETERIZATION N/A 11/16/2023    Procedure: Left Heart Cath with possible angioplasty;  Surgeon: Trip Mirza MD;  Location: [facility] CATH INVASIVE LOCATION;  Service: Cardiovascular;  Laterality: N/A;    COLONOSCOPY      CYSTOSCOPY      KIDNEY SURGERY Left     KIDNEY BIOSPY    PROSTATE SURGERY  12/2007    THYROID BIOPSY      TURP / TRANSURETHRAL INCISION / DRAINAGE PROSTATE  06/2019    UPPER GASTROINTESTINAL ENDOSCOPY         Family History:  Family History   Problem Relation Age of Onset    Hypertension Mother     Heart disease Mother     Stroke Mother     Hypertension Sister     Heart disease Sister     Stroke Sister     Diabetes Brother     Diabetes Son     Diabetes Maternal Grandmother     Diabetes Other     Cancer Neg Hx     Colon cancer Neg Hx     Malig Hyperthermia Neg Hx        Social History:   reports that he has never smoked. He has  never been exposed to tobacco smoke. He has never used smokeless tobacco. He reports current alcohol use. He reports that he does not use drugs.    Medications:   Medications Prior to Admission   Medication Sig Dispense Refill Last Dose    acetaminophen-codeine (TYLENOL #3) 300-30 MG per tablet Take 1 tablet by mouth Every 4 (Four) Hours As Needed for Moderate Pain.   1/1/2024    Advair -21 MCG/ACT inhaler Inhale 2 puffs 2 (Two) Times a Day.   1/2/2024    albuterol sulfate  (90 Base) MCG/ACT inhaler Inhale 2 puffs Every 6 (Six) Hours As Needed.   Past Week    Alcohol Swabs (Easy Touch Alcohol Prep Medium) 70 % pads    1/2/2024    amLODIPine (NORVASC) 5 MG tablet Take 1 tablet by mouth Daily. 90 tablet 3 1/2/2024    aspirin 81 MG EC tablet REPORTS THAT LAST DOSE OF ASA WAS 11/7/23 HAD STOPPED IN PREPARATION FOR INJECTION AT PAIN MANAGEMENT ON 11/13/23   Past Week    B-D UF III MINI PEN NEEDLES 31G X 5 MM misc    1/2/2024    baclofen (LIORESAL) 10 MG tablet Take 1 tablet by mouth 2 (Two) Times a Day.   1/1/2024    cetirizine (zyrTEC) 10 MG tablet Take 1 tablet by mouth Daily.   1/2/2024    chlorthalidone (HYGROTON) 25 MG tablet Take 1 tablet by mouth 2 (two) times a day.   1/1/2024    cholecalciferol (VITAMIN D3) 25 MCG (1000 UT) tablet Take 1 tablet by mouth Daily.   1/1/2024    coenzyme Q10 100 MG capsule Take 1 capsule by mouth Daily.   1/1/2024    fluticasone (FLONASE) 50 MCG/ACT nasal spray 2 spray(s) in each nostril daily   1/1/2024    FREESTYLE LITE test strip    1/2/2024    gabapentin (NEURONTIN) 800 MG tablet Take 1 tablet by mouth 3 (Three) Times a Day.   Past Week    HumaLOG KwikPen 100 UNIT/ML solution pen-injector    1/1/2024    ipratropium (ATROVENT) 0.03 % nasal spray 2 sprays into the nostril(s) as directed by provider Every 6 (Six) Hours As Needed. 2 sprays each nostril q 6 hrs   Past Month    Lactobacillus (Acidophilus) 100 MG capsule Take  by mouth Daily.   Past Week    Lancets  (freestyle) lancets    1/2/2024    Lantus SoloStar 100 UNIT/ML injection pen Inject 35 Units under the skin into the appropriate area as directed 2 (Two) Times a Day.   1/2/2024    latanoprost (XALATAN) 0.005 % ophthalmic solution Administer 1 drop to both eyes Every Night.   1/1/2024    losartan (COZAAR) 50 MG tablet Take 0.5 tablets by mouth Daily.   1/1/2024    pantoprazole (Protonix) 40 MG EC tablet Take 1 tablet by mouth Daily for 90 days. 90 tablet 1 1/2/2024    rOPINIRole (REQUIP) 0.25 MG tablet Take 1 tablet by mouth every night at bedtime.   1/1/2024    apixaban (ELIQUIS) 5 MG tablet tablet Take 1 tablet by mouth 2 (Two) Times a Day. REPORTS HAD INJECTION AT PAIN MANAGEMENT ON 11/13/23 AND THAT HIS LAST DOSE OF ELIQUIS WAS 11/9/23 PM DOSE   12/29/2023    EPINEPHrine (EPIPEN) 0.3 MG/0.3ML solution auto-injector injection        Narcan 4 MG/0.1ML nasal spray        tiotropium bromide-olodaterol (Stiolto Respimat) 2.5-2.5 MCG/ACT aerosol solution inhaler Inhale 2 puffs Daily. 3 each 3 Unknown       Allergies:  Ezetimibe, Statins, Atorvastatin, Furosemide, Hydrochlorothiazide, Simvastatin, Colesevelam, Doxazosin, and Hydrochlorothiazide w-triamterene        Objective     Blood pressure 134/85, pulse 63, temperature 97.5 °F (36.4 °C), temperature source Temporal, resp. rate 16, weight 103 kg (226 lb 3.1 oz), SpO2 96%.    Physical Exam   Constitutional: Pt is oriented to person, place, and time and well-developed, well-nourished, and in no distress.   Mouth/Throat: Oropharynx is clear and moist.   Neck: Normal range of motion.   Cardiovascular: Normal rate, regular rhythm and normal heart sounds.    Pulmonary/Chest: Effort normal and breath sounds normal.   Abdominal: Soft. Nontender  Skin: Skin is warm and dry.   Psychiatric: Mood, memory, affect and judgment normal.     Assessment & Plan     Diagnosis:  GERD    Anticipated Surgical Procedure:  EGD    The risks, benefits, and alternatives of this procedure have  been discussed with the patient or the responsible party- the patient understands and agrees to proceed.

## 2024-01-02 NOTE — ANESTHESIA POSTPROCEDURE EVALUATION
Patient: Joon Zhao    Procedure Summary       Date: 01/02/24 Room / Location: [facility] ENDOSCOPY 1 / [facility] ENDOSCOPY    Anesthesia Start: 1101 Anesthesia Stop: 1127    Procedure: ESOPHAGOGASTRODUODENOSCOPY WITH BIOPSIES Diagnosis:       Atypical chest pain      Gastroesophageal reflux disease, unspecified whether esophagitis present      Bloating      (Atypical chest pain [R07.89])      (Gastroesophageal reflux disease, unspecified whether esophagitis present [K21.9])      (Bloating [R14.0])    Surgeons: Hosea Moreau MD Provider: Navid Alberto CRNA    Anesthesia Type: general ASA Status: 3            Anesthesia Type: general    Vitals  Vitals Value Taken Time   /88 01/02/24 1141   Temp 36.2 °C (97.1 °F) 01/02/24 1141   Pulse 62 01/02/24 1143   Resp 15 01/02/24 1141   SpO2 97 % 01/02/24 1143   Vitals shown include unfiled device data.        Post Anesthesia Care and Evaluation    Post-procedure mental status: acceptable.  Pain management: satisfactory to patient    Airway patency: patent  Anesthetic complications: No anesthetic complications    Cardiovascular status: acceptable  Respiratory status: acceptable    Comments: Per chart review

## 2024-01-04 ENCOUNTER — TELEPHONE (OUTPATIENT)
Dept: GASTROENTEROLOGY | Facility: CLINIC | Age: 79
End: 2024-01-04
Payer: MEDICARE

## 2024-01-04 NOTE — TELEPHONE ENCOUNTER
----- Message from ROSALIA Eid sent at 1/3/2024  3:23 PM EST -----  I have reviewed the patient's most recent EGD and pathology report.  Esophageal biopsies are normal.  Biopsies negative for intestinal metaplasia, dysplasia, and malignancy.  Continue present medication.  Recommend continuing cardiac workup and following with PCP for further evaluation.  Please arrange follow-up appointment if having any persistent GI complaints.

## 2024-02-01 ENCOUNTER — LAB (OUTPATIENT)
Dept: LAB | Facility: HOSPITAL | Age: 79
End: 2024-02-01
Payer: MEDICARE

## 2024-02-01 ENCOUNTER — TRANSCRIBE ORDERS (OUTPATIENT)
Dept: ADMINISTRATIVE | Facility: HOSPITAL | Age: 79
End: 2024-02-01
Payer: MEDICARE

## 2024-02-01 DIAGNOSIS — E11.9 DIABETES MELLITUS WITHOUT COMPLICATION: ICD-10-CM

## 2024-02-01 DIAGNOSIS — I10 HYPERTENSION, UNSPECIFIED TYPE: ICD-10-CM

## 2024-02-01 DIAGNOSIS — N18.30 STAGE 3 CHRONIC KIDNEY DISEASE, UNSPECIFIED WHETHER STAGE 3A OR 3B CKD: ICD-10-CM

## 2024-02-01 DIAGNOSIS — D64.9 ANEMIA, UNSPECIFIED TYPE: ICD-10-CM

## 2024-02-01 DIAGNOSIS — D64.9 ANEMIA, UNSPECIFIED TYPE: Primary | ICD-10-CM

## 2024-02-01 LAB
ALBUMIN SERPL-MCNC: 4.4 G/DL (ref 3.5–5.2)
ALBUMIN UR-MCNC: <1.2 MG/DL
ALBUMIN/GLOB SERPL: 1.3 G/DL
ALP SERPL-CCNC: 74 U/L (ref 39–117)
ALT SERPL W P-5'-P-CCNC: 21 U/L (ref 1–41)
ANION GAP SERPL CALCULATED.3IONS-SCNC: 11.8 MMOL/L (ref 5–15)
AST SERPL-CCNC: 23 U/L (ref 1–40)
BASOPHILS # BLD AUTO: 0.03 10*3/MM3 (ref 0–0.2)
BASOPHILS NFR BLD AUTO: 0.6 % (ref 0–1.5)
BILIRUB SERPL-MCNC: 0.6 MG/DL (ref 0–1.2)
BILIRUB UR QL STRIP: NEGATIVE
BUN SERPL-MCNC: 23 MG/DL (ref 8–23)
BUN/CREAT SERPL: 12.2 (ref 7–25)
CALCIUM SPEC-SCNC: 9.8 MG/DL (ref 8.6–10.5)
CHLORIDE SERPL-SCNC: 99 MMOL/L (ref 98–107)
CLARITY UR: CLEAR
CO2 SERPL-SCNC: 27.2 MMOL/L (ref 22–29)
COLOR UR: YELLOW
CREAT SERPL-MCNC: 1.89 MG/DL (ref 0.76–1.27)
CREAT UR-MCNC: 56.5 MG/DL
CREAT UR-MCNC: 61.1 MG/DL
DEPRECATED RDW RBC AUTO: 41.9 FL (ref 37–54)
EGFRCR SERPLBLD CKD-EPI 2021: 35.9 ML/MIN/1.73
EOSINOPHIL # BLD AUTO: 0.07 10*3/MM3 (ref 0–0.4)
EOSINOPHIL NFR BLD AUTO: 1.4 % (ref 0.3–6.2)
ERYTHROCYTE [DISTWIDTH] IN BLOOD BY AUTOMATED COUNT: 12.3 % (ref 12.3–15.4)
GLOBULIN UR ELPH-MCNC: 3.3 GM/DL
GLUCOSE SERPL-MCNC: 99 MG/DL (ref 65–99)
GLUCOSE UR STRIP-MCNC: NEGATIVE MG/DL
HCT VFR BLD AUTO: 40.7 % (ref 37.5–51)
HGB BLD-MCNC: 13.5 G/DL (ref 13–17.7)
HGB UR QL STRIP.AUTO: NEGATIVE
IMM GRANULOCYTES # BLD AUTO: 0.03 10*3/MM3 (ref 0–0.05)
IMM GRANULOCYTES NFR BLD AUTO: 0.6 % (ref 0–0.5)
IRON 24H UR-MRATE: 91 MCG/DL (ref 59–158)
IRON SATN MFR SERPL: 23 % (ref 20–50)
KETONES UR QL STRIP: NEGATIVE
LEUKOCYTE ESTERASE UR QL STRIP.AUTO: NEGATIVE
LYMPHOCYTES # BLD AUTO: 2.41 10*3/MM3 (ref 0.7–3.1)
LYMPHOCYTES NFR BLD AUTO: 47.5 % (ref 19.6–45.3)
MCH RBC QN AUTO: 30.6 PG (ref 26.6–33)
MCHC RBC AUTO-ENTMCNC: 33.2 G/DL (ref 31.5–35.7)
MCV RBC AUTO: 92.3 FL (ref 79–97)
MICROALBUMIN/CREAT UR: NORMAL MG/G{CREAT}
MONOCYTES # BLD AUTO: 0.53 10*3/MM3 (ref 0.1–0.9)
MONOCYTES NFR BLD AUTO: 10.5 % (ref 5–12)
NEUTROPHILS NFR BLD AUTO: 2 10*3/MM3 (ref 1.7–7)
NEUTROPHILS NFR BLD AUTO: 39.4 % (ref 42.7–76)
NITRITE UR QL STRIP: NEGATIVE
NRBC BLD AUTO-RTO: 0 /100 WBC (ref 0–0.2)
PH UR STRIP.AUTO: 7 [PH] (ref 5–8)
PLATELET # BLD AUTO: 184 10*3/MM3 (ref 140–450)
PMV BLD AUTO: 11.5 FL (ref 6–12)
POTASSIUM SERPL-SCNC: 4.4 MMOL/L (ref 3.5–5.2)
PROT ?TM UR-MCNC: 4.6 MG/DL
PROT SERPL-MCNC: 7.7 G/DL (ref 6–8.5)
PROT UR QL STRIP: NEGATIVE
PROT/CREAT UR: 0.08 MG/G{CREAT}
RBC # BLD AUTO: 4.41 10*6/MM3 (ref 4.14–5.8)
SODIUM SERPL-SCNC: 138 MMOL/L (ref 136–145)
SP GR UR STRIP: 1.01 (ref 1–1.03)
TIBC SERPL-MCNC: 389 MCG/DL (ref 298–536)
TRANSFERRIN SERPL-MCNC: 261 MG/DL (ref 200–360)
UROBILINOGEN UR QL STRIP: NORMAL
WBC NRBC COR # BLD AUTO: 5.07 10*3/MM3 (ref 3.4–10.8)

## 2024-02-01 PROCEDURE — 84156 ASSAY OF PROTEIN URINE: CPT

## 2024-02-01 PROCEDURE — 83540 ASSAY OF IRON: CPT

## 2024-02-01 PROCEDURE — 85025 COMPLETE CBC W/AUTO DIFF WBC: CPT

## 2024-02-01 PROCEDURE — 80053 COMPREHEN METABOLIC PANEL: CPT

## 2024-02-01 PROCEDURE — 82043 UR ALBUMIN QUANTITATIVE: CPT

## 2024-02-01 PROCEDURE — 82570 ASSAY OF URINE CREATININE: CPT

## 2024-02-01 PROCEDURE — 81003 URINALYSIS AUTO W/O SCOPE: CPT

## 2024-02-01 PROCEDURE — 84466 ASSAY OF TRANSFERRIN: CPT

## 2024-02-01 PROCEDURE — 36415 COLL VENOUS BLD VENIPUNCTURE: CPT

## 2024-02-29 ENCOUNTER — APPOINTMENT (OUTPATIENT)
Dept: GENERAL RADIOLOGY | Facility: HOSPITAL | Age: 79
End: 2024-02-29
Payer: MEDICARE

## 2024-02-29 ENCOUNTER — HOSPITAL ENCOUNTER (EMERGENCY)
Facility: HOSPITAL | Age: 79
Discharge: HOME | End: 2024-02-29
Attending: EMERGENCY MEDICINE | Admitting: EMERGENCY MEDICINE
Payer: MEDICARE

## 2024-02-29 VITALS
BODY MASS INDEX: 36.21 KG/M2 | TEMPERATURE: 97.9 F | DIASTOLIC BLOOD PRESSURE: 76 MMHG | OXYGEN SATURATION: 97 % | HEIGHT: 66 IN | RESPIRATION RATE: 16 BRPM | SYSTOLIC BLOOD PRESSURE: 114 MMHG | WEIGHT: 225.31 LBS | HEART RATE: 61 BPM

## 2024-02-29 DIAGNOSIS — N17.9 AKI (ACUTE KIDNEY INJURY): Primary | ICD-10-CM

## 2024-02-29 DIAGNOSIS — M10.9 ACUTE GOUT OF RIGHT KNEE, UNSPECIFIED CAUSE: ICD-10-CM

## 2024-02-29 LAB
ALBUMIN SERPL-MCNC: 4.3 G/DL (ref 3.5–5.2)
ALBUMIN/GLOB SERPL: 1.2 G/DL
ALP SERPL-CCNC: 73 U/L (ref 39–117)
ALT SERPL W P-5'-P-CCNC: 17 U/L (ref 1–41)
ANION GAP SERPL CALCULATED.3IONS-SCNC: 10.2 MMOL/L (ref 5–15)
AST SERPL-CCNC: 19 U/L (ref 1–40)
BASOPHILS # BLD AUTO: 0.04 10*3/MM3 (ref 0–0.2)
BASOPHILS NFR BLD AUTO: 0.8 % (ref 0–1.5)
BILIRUB SERPL-MCNC: 0.5 MG/DL (ref 0–1.2)
BUN SERPL-MCNC: 60 MG/DL (ref 8–23)
BUN/CREAT SERPL: 22.7 (ref 7–25)
CALCIUM SPEC-SCNC: 10.2 MG/DL (ref 8.6–10.5)
CHLORIDE SERPL-SCNC: 95 MMOL/L (ref 98–107)
CO2 SERPL-SCNC: 31.8 MMOL/L (ref 22–29)
CREAT SERPL-MCNC: 2.64 MG/DL (ref 0.76–1.27)
DEPRECATED RDW RBC AUTO: 41.8 FL (ref 37–54)
EGFRCR SERPLBLD CKD-EPI 2021: 24 ML/MIN/1.73
EOSINOPHIL # BLD AUTO: 0.05 10*3/MM3 (ref 0–0.4)
EOSINOPHIL NFR BLD AUTO: 1 % (ref 0.3–6.2)
ERYTHROCYTE [DISTWIDTH] IN BLOOD BY AUTOMATED COUNT: 12.2 % (ref 12.3–15.4)
GLOBULIN UR ELPH-MCNC: 3.7 GM/DL
GLUCOSE SERPL-MCNC: 151 MG/DL (ref 65–99)
HCT VFR BLD AUTO: 42.3 % (ref 37.5–51)
HGB BLD-MCNC: 13.9 G/DL (ref 13–17.7)
IMM GRANULOCYTES # BLD AUTO: 0.02 10*3/MM3 (ref 0–0.05)
IMM GRANULOCYTES NFR BLD AUTO: 0.4 % (ref 0–0.5)
LYMPHOCYTES # BLD AUTO: 2.29 10*3/MM3 (ref 0.7–3.1)
LYMPHOCYTES NFR BLD AUTO: 47.7 % (ref 19.6–45.3)
MCH RBC QN AUTO: 30.7 PG (ref 26.6–33)
MCHC RBC AUTO-ENTMCNC: 32.9 G/DL (ref 31.5–35.7)
MCV RBC AUTO: 93.4 FL (ref 79–97)
MONOCYTES # BLD AUTO: 0.61 10*3/MM3 (ref 0.1–0.9)
MONOCYTES NFR BLD AUTO: 12.7 % (ref 5–12)
NEUTROPHILS NFR BLD AUTO: 1.79 10*3/MM3 (ref 1.7–7)
NEUTROPHILS NFR BLD AUTO: 37.4 % (ref 42.7–76)
NRBC BLD AUTO-RTO: 0 /100 WBC (ref 0–0.2)
PLATELET # BLD AUTO: 201 10*3/MM3 (ref 140–450)
PMV BLD AUTO: 11.4 FL (ref 6–12)
POTASSIUM SERPL-SCNC: 4 MMOL/L (ref 3.5–5.2)
PROT SERPL-MCNC: 8 G/DL (ref 6–8.5)
RBC # BLD AUTO: 4.53 10*6/MM3 (ref 4.14–5.8)
SODIUM SERPL-SCNC: 137 MMOL/L (ref 136–145)
URATE SERPL-MCNC: 12.1 MG/DL (ref 3.4–7)
WBC NRBC COR # BLD AUTO: 4.8 10*3/MM3 (ref 3.4–10.8)

## 2024-02-29 PROCEDURE — 85025 COMPLETE CBC W/AUTO DIFF WBC: CPT | Performed by: EMERGENCY MEDICINE

## 2024-02-29 PROCEDURE — 80053 COMPREHEN METABOLIC PANEL: CPT | Performed by: EMERGENCY MEDICINE

## 2024-02-29 PROCEDURE — 96360 HYDRATION IV INFUSION INIT: CPT

## 2024-02-29 PROCEDURE — 99283 EMERGENCY DEPT VISIT LOW MDM: CPT

## 2024-02-29 PROCEDURE — 84550 ASSAY OF BLOOD/URIC ACID: CPT | Performed by: EMERGENCY MEDICINE

## 2024-02-29 PROCEDURE — 25810000003 SODIUM CHLORIDE 0.9 % SOLUTION: Performed by: EMERGENCY MEDICINE

## 2024-02-29 PROCEDURE — 73562 X-RAY EXAM OF KNEE 3: CPT

## 2024-02-29 RX ORDER — COLCHICINE 0.6 MG/1
0.3 TABLET ORAL ONCE
Status: COMPLETED | OUTPATIENT
Start: 2024-02-29 | End: 2024-02-29

## 2024-02-29 RX ADMIN — SODIUM CHLORIDE 1000 ML: 9 INJECTION, SOLUTION INTRAVENOUS at 10:06

## 2024-02-29 RX ADMIN — COLCHICINE 0.3 MG: 0.6 TABLET ORAL at 11:25

## 2024-02-29 NOTE — ED PROVIDER NOTES
Time: 8:08 AM EST  Date of encounter:  2/29/2024  Independent Historian/Clinical History and Information was obtained by:   Patient    History is limited by: N/A    Chief Complaint: Right knee pain      History of Present Illness:  Patient is a 78 y.o. year old male who presents to the emergency department for evaluation of right knee pain. Pt states he began to have right medial knee pain 3 days ago and it has been worsening since. Pt rates the pain a 9/10 in severity and describes it as a burning sensation. The pain does not radiate anywhere. It is worse with weight bearing. Pt is in pain management for back pain and states he has an epidural procedure upcoming next week and has been off his Eliquis and Aspirin in preparation for that. However, pain started prior to stopping these medications. He has been taking his regular medications and using heat and ice for the pain. Denies fever.     HPI    Patient Care Team  Primary Care Provider: Brian Henson MD    Past Medical History:     Allergies   Allergen Reactions    Ezetimibe Unknown - High Severity and Other (See Comments)    Statins Other (See Comments) and Unknown - High Severity     UNSURE OF REACTION    Atorvastatin Hives    Hydrochlorothiazide Hives    Simvastatin Hives    Colesevelam Other (See Comments)     Other Reaction(s): Generalized aches and pains    Doxazosin Other (See Comments)     Other Reaction(s): Unknown      pt states shortnes of breath    Hydrochlorothiazide W-Triamterene Rash     HCTZ     Past Medical History:   Diagnosis Date    Anemia     DENIES ANY CURRENT ISSUES    Arthritis     Back pain     CHRONIC NECK AND BACK PAIN    BPH (benign prostatic hyperplasia) 03/02/2015    BPH with urinary obstruction 06/01/2018    Chest pain     CKD (chronic kidney disease), stage III     Controlled type 2 diabetes mellitus with diabetic polyneuropathy 07/05/2017    Dizziness 08/10/2017    DVT (deep venous thrombosis)     Erectile dysfunction 2007     Foot pain, bilateral 03/12/2018    GERD (gastroesophageal reflux disease)     Glaucoma (increased eye pressure)     High cholesterol     Hyperlipidemia     Hypertension     Ingrowing nail 07/05/2017    Pain in both feet     PE (pulmonary thromboembolism)     Polyneuropathy 07/05/2017    Shortness of breath     Sleep apnea     Tinea unguium 07/05/2017     Past Surgical History:   Procedure Laterality Date    ADRENAL GLAND SURGERY      CARDIAC CATHETERIZATION N/A 11/16/2023    Procedure: Left Heart Cath with possible angioplasty;  Surgeon: Trip Mirza MD;  Location: [facility] CATH INVASIVE LOC[facility];  Service: Cardiovascular;  Laterality: N/A;    COLONOSCOPY      CYSTOSCOPY      ENDOSCOPY N/A 1/2/2024    Procedure: ESOPHAGOGASTRODUODENOSCOPY WITH BIOPSIES;  Surgeon: Hosea Moreau MD;  Location: [facility] ENDOSCOPY;  Service: Gastroenterology;  Laterality: N/A;  HIATAL HERNIA    KIDNEY SURGERY Left     KIDNEY BIOSPY    PROSTATE SURGERY  12/2007    THYROID BIOPSY      TURP / TRANSURETHRAL INCISION / DRAINAGE PROSTATE  06/2019    UPPER GASTROINTESTINAL ENDOSCOPY       Family History   Problem Relation Age of Onset    Hypertension Mother     Heart disease Mother     Stroke Mother     Hypertension Sister     Heart disease Sister     Stroke Sister     Diabetes Brother     Diabetes Son     Diabetes Maternal Grandmother     Diabetes Other     Cancer Neg Hx     Colon cancer Neg Hx     Malig Hyperthermia Neg Hx        Home Medications:  Prior to Admission medications    Medication Sig Start Date End Date Taking? Authorizing Provider   acetaminophen-codeine (TYLENOL #3) 300-30 MG per tablet Take 1 tablet by mouth Every 4 (Four) Hours As Needed for Moderate Pain.    Provider, MD Alfonso   Advair -21 MCG/ACT inhaler Inhale 2 puffs 2 (Two) Times a Day.    Provider, MD Alfonso   albuterol sulfate  (90 Base) MCG/ACT inhaler Inhale 2 puffs Every 6 (Six) Hours As Needed.    Emergency, Nurse Che, RN    Alcohol Swabs (Easy Touch Alcohol Prep Medium) 70 % pads  12/7/22   Alfonso Espino MD   amLODIPine (NORVASC) 5 MG tablet Take 1 tablet by mouth Daily. 6/21/23   Keke Sandra APRN   apixaban (ELIQUIS) 5 MG tablet tablet Take 1 tablet by mouth 2 (Two) Times a Day. REPORTS HAD INJECTION AT PAIN MANAGEMENT ON 11/13/23 AND THAT HIS LAST DOSE OF ELIQUIS WAS 11/9/23 PM DOSE    Alfonso Espino MD   aspirin 81 MG EC tablet REPORTS THAT LAST DOSE OF ASA WAS 11/7/23 HAD STOPPED IN PREPARATION FOR INJECTION AT PAIN MANAGEMENT ON 11/13/23    Emergency, Nurse KASIE Bolton   B-D UF III MINI PEN NEEDLES 31G X 5 MM misc  12/30/22   Alfonso Espino MD   baclofen (LIORESAL) 10 MG tablet Take 1 tablet by mouth 2 (Two) Times a Day. 6/7/21   Emergency, Nurse Che RN   cetirizine (zyrTEC) 10 MG tablet Take 1 tablet by mouth Daily. 10/6/22   Alfonso Espino MD   chlorthalidone (HYGROTON) 25 MG tablet Take 1 tablet by mouth 2 (two) times a day. 5/11/21   Emergency, Nurse Che RN   cholecalciferol (VITAMIN D3) 25 MCG (1000 UT) tablet Take 1 tablet by mouth Daily. 6/15/21   Alfonso Espino MD   coenzyme Q10 100 MG capsule Take 1 capsule by mouth Daily.    Alfonso Espino MD   EPINEPHrine (EPIPEN) 0.3 MG/0.3ML solution auto-injector injection     Alfonso Espino MD   fluticasone (FLONASE) 50 MCG/ACT nasal spray 2 spray(s) in each nostril daily    Alfonso Espino MD   FREESTYLE LITE test strip  12/27/22   Alfonso Espino MD   gabapentin (NEURONTIN) 800 MG tablet Take 1 tablet by mouth 3 (Three) Times a Day. 5/24/21   Emergency, Nurse Che RN   HumaLOG KwikPen 100 UNIT/ML solution pen-injector     Alfonso Espino MD   ipratropium (ATROVENT) 0.03 % nasal spray 2 sprays into the nostril(s) as directed by provider Every 6 (Six) Hours As Needed. 2 sprays each nostril q 6 hrs 6/3/22   Provider, Historical, MD   Lactobacillus (Acidophilus) 100 MG capsule Take  by mouth Daily.     "ProviderAlfonso MD   Lancets (freestyle) lancets  10/6/22   ProviderAlfonso MD   Lantus SoloStar 100 UNIT/ML injection pen Inject 35 Units under the skin into the appropriate area as directed 2 (Two) Times a Day. 2/7/23   Alfonso Espino MD   latanoprost (XALATAN) 0.005 % ophthalmic solution Administer 1 drop to both eyes Every Night.    Emergency, Nurse Che RN   losartan (COZAAR) 50 MG tablet Take 0.5 tablets by mouth Daily.    ProviderAlfonso MD   Narcan 4 MG/0.1ML nasal spray  9/19/22   Alfonso Espino MD   pantoprazole (Protonix) 40 MG EC tablet Take 1 tablet by mouth Daily for 90 days. 12/8/23 3/7/24  Bailey Terry APRN   rOPINIRole (REQUIP) 0.25 MG tablet Take 1 tablet by mouth every night at bedtime. 5/11/21   Emergency, Nurse Che RN   tiotropium bromide-olodaterol (Stiolto Respimat) 2.5-2.5 MCG/ACT aerosol solution inhaler Inhale 2 puffs Daily. 2/9/23   Junie Ansari APRN        Social History:   Social History     Tobacco Use    Smoking status: Never     Passive exposure: Never    Smokeless tobacco: Never   Vaping Use    Vaping Use: Never used   Substance Use Topics    Alcohol use: Yes     Comment: rare    Drug use: Never         Review of Systems:  Review of Systems   Constitutional:  Negative for fever.   Musculoskeletal:  Positive for arthralgias (right knee).        Physical Exam:  /84   Pulse 61   Temp 97.9 °F (36.6 °C) (Oral)   Resp 16   Ht 167.6 cm (66\")   Wt 102 kg (225 lb 5 oz)   SpO2 99%   BMI 36.37 kg/m²     Physical Exam  Constitutional:       General: He is not in acute distress.     Appearance: Normal appearance.   HENT:      Head: Normocephalic and atraumatic.   Cardiovascular:      Rate and Rhythm: Normal rate and regular rhythm.      Pulses: Normal pulses.      Heart sounds: No murmur heard.  Pulmonary:      Effort: Pulmonary effort is normal.      Breath sounds: Normal breath sounds. No wheezing, rhonchi or rales.   Musculoskeletal: "      Comments: Tenderness to palpation over the right medial knee with increased warmth. No edema.  No tenderness in right calf or right thigh.       Skin:     General: Skin is warm and dry.   Neurological:      Mental Status: He is alert.   Psychiatric:         Mood and Affect: Mood normal.         Behavior: Behavior normal.                  Procedures:  Procedures      Medical Decision Making:      Comorbidities that affect care:    Atrial Fibrillation, Chronic Kidney Disease, Diabetes    External Notes reviewed:  Reviewed nephrology note from 2/15/2024        The following orders were placed and all results were independently analyzed by me:  Orders Placed This Encounter   Procedures    XR Knee 3 View Right    Comprehensive Metabolic Panel    Uric Acid    CBC Auto Differential    Inpatient Nephrology Consult    CBC & Differential       Medications Given in the Emergency Department:  Medications   sodium chloride 0.9 % bolus 1,000 mL (1,000 mL Intravenous New Bag 2/29/24 1006)   colchicine tablet 0.3 mg (has no administration in time range)        ED Course:    ED Course as of 02/29/24 1053   Thu Feb 29, 2024   0932 Spoke with Dr. Hagen he requested a liter of fluids.  Will need repeat blood work in 1 week. [MA]      ED Course User Index  [MA] Weston Huerta MD       Labs:    Lab Results (last 24 hours)       Procedure Component Value Units Date/Time    CBC & Differential [[id number]]  (Abnormal) Collected: 02/29/24 0826    Specimen: Blood Updated: 02/29/24 0830    Narrative:      The following orders were created for panel order CBC & Differential.  Procedure                               Abnormality         Status                     ---------                               -----------         ------                     CBC Auto Differential[[id number]]        Abnormal            Final result                 Please view results for these tests on the individual orders.    Comprehensive Metabolic Panel  [[id number]]  (Abnormal) Collected: 02/29/24 0826    Specimen: Blood Updated: 02/29/24 0855     Glucose 151 mg/dL      BUN 60 mg/dL      Creatinine 2.64 mg/dL      Sodium 137 mmol/L      Potassium 4.0 mmol/L      Chloride 95 mmol/L      CO2 31.8 mmol/L      Calcium 10.2 mg/dL      Total Protein 8.0 g/dL      Albumin 4.3 g/dL      ALT (SGPT) 17 U/L      AST (SGOT) 19 U/L      Alkaline Phosphatase 73 U/L      Total Bilirubin 0.5 mg/dL      Globulin 3.7 gm/dL      A/G Ratio 1.2 g/dL      BUN/Creatinine Ratio 22.7     Anion Gap 10.2 mmol/L      eGFR 24.0 mL/min/1.73     Narrative:      GFR Normal >60  Chronic Kidney Disease <60  Kidney Failure <15    The GFR formula is only valid for adults with stable renal function between ages 18 and 70.    Uric Acid [[id number]]  (Abnormal) Collected: 02/29/24 0826    Specimen: Blood Updated: 02/29/24 0855     Uric Acid 12.1 mg/dL     CBC Auto Differential [[id number]]  (Abnormal) Collected: 02/29/24 0826    Specimen: Blood Updated: 02/29/24 0830     WBC 4.80 10*3/mm3      RBC 4.53 10*6/mm3      Hemoglobin 13.9 g/dL      Hematocrit 42.3 %      MCV 93.4 fL      MCH 30.7 pg      MCHC 32.9 g/dL      RDW 12.2 %      RDW-SD 41.8 fl      MPV 11.4 fL      Platelets 201 10*3/mm3      Neutrophil % 37.4 %      Lymphocyte % 47.7 %      Monocyte % 12.7 %      Eosinophil % 1.0 %      Basophil % 0.8 %      Immature Grans % 0.4 %      Neutrophils, Absolute 1.79 10*3/mm3      Lymphocytes, Absolute 2.29 10*3/mm3      Monocytes, Absolute 0.61 10*3/mm3      Eosinophils, Absolute 0.05 10*3/mm3      Basophils, Absolute 0.04 10*3/mm3      Immature Grans, Absolute 0.02 10*3/mm3      nRBC 0.0 /100 WBC              Imaging:    XR Knee 3 View Right    Result Date: 2/29/2024  PROCEDURE: XR KNEE 3 VW RIGHT  COMPARISON: None  INDICATIONS: RIGHT KNEE PAIN/TENDERNESS/NO KNOWN INJURY  FINDINGS:  No fracture or malalignment is identified.  Moderate enthesopathic changes are noted along the superior and inferior poles  of the patella as well as the tibial tuberosity.  A small to moderate knee joint effusion is noted.  No loose body is seen.        1. No acute fracture or malalignment 2. Small knee joint effusion 3. Enthesopathic changes, as described above      Antonio Kendall M.D.       Electronically Signed and Approved By: Antonio Kendall M.D. on 2/29/2024 at 8:43                Differential Diagnosis and Discussion:    Orthopedic Injuries: Differential diagnosis includes but is not limited to fractures, soft tissue injuries, dislocations, contusions, ligamentous injuries, tendon injuries, nerve injuries, compartment syndrome, bursitis, and vascular injuries.    All labs were reviewed and interpreted by me.  All X-rays impressions were independently interpreted by me.    MDM     Amount and/or Complexity of Data Reviewed  Decide to obtain previous medical records or to obtain history from someone other than the patient: yes       Patient is a 78-year-old male who presents with complaints of right knee pain.  Has a history of CKD, diabetes who presents with right knee pain.  Has been ongoing for the last couple days.  Has been off his Eliquis for the last couple days but has had this pain ongoing for longer than that.  On exam has some warmth to the right knee.  Does not appear to be a septic joint.  Labs show increased uric acid as well as an DUNIA.  Appears to have gout to the right knee.  Also has an DUNIA compared to where he was several weeks ago.  He is followed by nephrology.  I did speak with them who recommended fluids and then repeat labs in about 1 week.  She if new or worsening symptoms return to the emergency room.  Was given a small dose of colchicine based on his renal function.  Let the patient follow-up as outpatient.          Patient Care Considerations:          Consultants/Shared Management Plan:    Consultant: I have discussed the case with Dr. Hagen who states give fluids and have recheck labs next  week.    Social Determinants of Health:    Patient is independent, reliable, and has access to care.       Disposition and Care Coordination:    Discharged: The patient is suitable and stable for discharge with no need for consideration of admission.    I have explained the patient´s condition, diagnoses and treatment plan based on the information available to me at this time. I have answered questions and addressed any concerns. The patient has a good  understanding of the patient´s diagnosis, condition, and treatment plan as can be expected at this point. The vital signs have been stable. The patient´s condition is stable and appropriate for discharge from the emergency department.      The patient will pursue further outpatient evaluation with the primary care physician or other designated or consulting physician as outlined in the discharge instructions. They are agreeable to this plan of care and follow-up instructions have been explained in detail. The patient has received these instructions in written format and have expressed an understanding of the discharge instructions. The patient is aware that any significant change in condition or worsening of symptoms should prompt an immediate return to this or the closest emergency department or call to 911.      Final diagnoses:   DUNIA (acute kidney injury)   Acute gout of right knee, unspecified cause        ED Disposition       ED Disposition   Discharge    Condition   Stable    Comment   --               This medical record created using voice recognition software.             Weston Huerta MD  02/29/24 [id number]

## 2024-03-12 ENCOUNTER — OFFICE VISIT (OUTPATIENT)
Dept: PODIATRY | Facility: CLINIC | Age: 79
End: 2024-03-12
Payer: MEDICARE

## 2024-03-12 ENCOUNTER — LAB (OUTPATIENT)
Dept: LAB | Facility: HOSPITAL | Age: 79
End: 2024-03-12
Payer: MEDICARE

## 2024-03-12 ENCOUNTER — TRANSCRIBE ORDERS (OUTPATIENT)
Dept: ADMINISTRATIVE | Facility: HOSPITAL | Age: 79
End: 2024-03-12
Payer: MEDICARE

## 2024-03-12 VITALS
HEART RATE: 67 BPM | SYSTOLIC BLOOD PRESSURE: 113 MMHG | BODY MASS INDEX: 35.84 KG/M2 | HEIGHT: 66 IN | DIASTOLIC BLOOD PRESSURE: 60 MMHG | WEIGHT: 223 LBS | OXYGEN SATURATION: 95 % | TEMPERATURE: 96.7 F

## 2024-03-12 DIAGNOSIS — E11.8 DIABETIC FOOT: ICD-10-CM

## 2024-03-12 DIAGNOSIS — E11.9 NON-INSULIN DEPENDENT TYPE 2 DIABETES MELLITUS: Primary | ICD-10-CM

## 2024-03-12 DIAGNOSIS — Z79.4 TYPE 2 DIABETES MELLITUS WITH DIABETIC POLYNEUROPATHY, WITH LONG-TERM CURRENT USE OF INSULIN: ICD-10-CM

## 2024-03-12 DIAGNOSIS — M79.671 FOOT PAIN, BILATERAL: ICD-10-CM

## 2024-03-12 DIAGNOSIS — B35.1 ONYCHOMYCOSIS: ICD-10-CM

## 2024-03-12 DIAGNOSIS — G62.9 NEUROPATHY: ICD-10-CM

## 2024-03-12 DIAGNOSIS — E11.9 TYPE 2 DIABETES MELLITUS WITHOUT COMPLICATION, UNSPECIFIED WHETHER LONG TERM INSULIN USE: Primary | ICD-10-CM

## 2024-03-12 DIAGNOSIS — E11.9 TYPE 2 DIABETES MELLITUS WITHOUT COMPLICATION, UNSPECIFIED WHETHER LONG TERM INSULIN USE: ICD-10-CM

## 2024-03-12 DIAGNOSIS — E11.42 TYPE 2 DIABETES MELLITUS WITH DIABETIC POLYNEUROPATHY, WITH LONG-TERM CURRENT USE OF INSULIN: ICD-10-CM

## 2024-03-12 DIAGNOSIS — M79.672 FOOT PAIN, BILATERAL: ICD-10-CM

## 2024-03-12 DIAGNOSIS — L60.0 ONYCHOCRYPTOSIS: ICD-10-CM

## 2024-03-12 LAB — HBA1C MFR BLD: 7.1 % (ref 4.8–5.6)

## 2024-03-12 PROCEDURE — 3078F DIAST BP <80 MM HG: CPT | Performed by: PODIATRIST

## 2024-03-12 PROCEDURE — 11721 DEBRIDE NAIL 6 OR MORE: CPT | Performed by: PODIATRIST

## 2024-03-12 PROCEDURE — 99213 OFFICE O/P EST LOW 20 MIN: CPT | Performed by: PODIATRIST

## 2024-03-12 PROCEDURE — 3074F SYST BP LT 130 MM HG: CPT | Performed by: PODIATRIST

## 2024-03-12 PROCEDURE — G8404 LOW EXTEMITY NEUR EXAM DOCUM: HCPCS | Performed by: PODIATRIST

## 2024-03-12 PROCEDURE — 83036 HEMOGLOBIN GLYCOSYLATED A1C: CPT

## 2024-03-12 PROCEDURE — 1160F RVW MEDS BY RX/DR IN RCRD: CPT | Performed by: PODIATRIST

## 2024-03-12 PROCEDURE — 1159F MED LIST DOCD IN RCRD: CPT | Performed by: PODIATRIST

## 2024-03-12 PROCEDURE — 36415 COLL VENOUS BLD VENIPUNCTURE: CPT

## 2024-03-12 NOTE — PROGRESS NOTES
[facility] - PODIATRY    Today's Date: 03/12/24    Patient Name: Joon Zhao  MRN: [id number]  CSN: [id number]  PCP: Brian Henson MD, Last PCP Visit: 3/5/2024  Referring Provider: No ref. provider found    SUBJECTIVE     Chief Complaint   Patient presents with    Left Foot - Follow-up, Nail Problem    Right Foot - Follow-up, Nail Problem     HPI: Joon Zhao, a 78 y.o.male, comes to clinic.    New, Established, New Problem:  established     Location:  Toenails    Duration:   Greater than five years    Onset:  Gradual    Nature:  sore with palpation.    Stable, worsening, improving:   Stable    Aggravating factors:  Pain with shoe gear and ambulation.    Previous Treatment:  debridement    Patient reported last blood glucose:  264  __________________________________    Medical changes: ER for right knee gout new diagnosis 2/29/24    Patient denies any fevers, chills, nausea, vomiting, shortness of breathe, nor any other constitutional signs nor symptoms.         Past Medical History:   Diagnosis Date    Anemia     DENIES ANY CURRENT ISSUES    Arthritis     Back pain     CHRONIC NECK AND BACK PAIN    BPH (benign prostatic hyperplasia) 03/02/2015    BPH with urinary obstruction 06/01/2018    Chest pain     CKD (chronic kidney disease), stage III     Controlled type 2 diabetes mellitus with diabetic polyneuropathy 07/05/2017    Dizziness 08/10/2017    DVT (deep venous thrombosis)     Erectile dysfunction 2007    Foot pain, bilateral 03/12/2018    GERD (gastroesophageal reflux disease)     Glaucoma (increased eye pressure)     High cholesterol     Hyperlipidemia     Hypertension     Ingrowing nail 07/05/2017    Pain in both feet     PE (pulmonary thromboembolism)     Polyneuropathy 07/05/2017    Shortness of breath     Sleep apnea     Tinea unguium 07/05/2017     Past Surgical History:   Procedure Laterality Date    ADRENAL GLAND SURGERY      CARDIAC CATHETERIZATION N/A 11/16/2023     Procedure: Left Heart Cath with possible angioplasty;  Surgeon: Trip Mirza MD;  Location: formerly [facility] CATH INVASIVE LOCATION;  Service: Cardiovascular;  Laterality: N/A;    COLONOSCOPY      CYSTOSCOPY      ENDOSCOPY N/A 1/2/2024    Procedure: ESOPHAGOGASTRODUODENOSCOPY WITH BIOPSIES;  Surgeon: Hosea Moreau MD;  Location: formerly [facility] ENDOSCOPY;  Service: Gastroenterology;  Laterality: N/A;  HIATAL HERNIA    KIDNEY SURGERY Left     KIDNEY BIOSPY    PROSTATE SURGERY  12/2007    THYROID BIOPSY      TURP / TRANSURETHRAL INCISION / DRAINAGE PROSTATE  06/2019    UPPER GASTROINTESTINAL ENDOSCOPY       Family History   Problem Relation Age of Onset    Hypertension Mother     Heart disease Mother     Stroke Mother     Hypertension Sister     Heart disease Sister     Stroke Sister     Diabetes Brother     Diabetes Son     Diabetes Maternal Grandmother     Diabetes Other     Cancer Neg Hx     Colon cancer Neg Hx     Malig Hyperthermia Neg Hx      Social History     Socioeconomic History    Marital status:    Tobacco Use    Smoking status: Never     Passive exposure: Never    Smokeless tobacco: Never   Vaping Use    Vaping status: Never Used   Substance and Sexual Activity    Alcohol use: Yes     Comment: rare    Drug use: Never    Sexual activity: Defer     Allergies   Allergen Reactions    Ezetimibe Unknown - High Severity and Other (See Comments)    Statins Other (See Comments) and Unknown - High Severity     UNSURE OF REACTION    Atorvastatin Hives    Hydrochlorothiazide Hives    Simvastatin Hives    Colesevelam Other (See Comments)     Other Reaction(s): Generalized aches and pains    Doxazosin Other (See Comments)     Other Reaction(s): Unknown      pt states shortnes of breath    Hydrochlorothiazide W-Triamterene Rash     HCTZ     Current Outpatient Medications   Medication Sig Dispense Refill    acetaminophen-codeine (TYLENOL #3) 300-30 MG per tablet Take 1 tablet by mouth Every 4 (Four) Hours As Needed for  Moderate Pain.      Advair -21 MCG/ACT inhaler Inhale 2 puffs 2 (Two) Times a Day.      albuterol sulfate  (90 Base) MCG/ACT inhaler Inhale 2 puffs Every 6 (Six) Hours As Needed.      Alcohol Swabs (Easy Touch Alcohol Prep Medium) 70 % pads       amLODIPine (NORVASC) 5 MG tablet Take 1 tablet by mouth Daily. 90 tablet 3    apixaban (ELIQUIS) 5 MG tablet tablet Take 1 tablet by mouth 2 (Two) Times a Day. REPORTS HAD INJECTION AT PAIN MANAGEMENT ON 11/13/23 AND THAT HIS LAST DOSE OF ELIQUIS WAS 11/9/23 PM DOSE      aspirin 81 MG EC tablet REPORTS THAT LAST DOSE OF ASA WAS 11/7/23 HAD STOPPED IN PREPARATION FOR INJECTION AT PAIN MANAGEMENT ON 11/13/23      B-D UF III MINI PEN NEEDLES 31G X 5 MM misc       baclofen (LIORESAL) 10 MG tablet Take 1 tablet by mouth 2 (Two) Times a Day.      cetirizine (zyrTEC) 10 MG tablet Take 1 tablet by mouth Daily.      chlorthalidone (HYGROTON) 25 MG tablet Take 1 tablet by mouth 2 (two) times a day.      cholecalciferol (VITAMIN D3) 25 MCG (1000 UT) tablet Take 1 tablet by mouth Daily.      coenzyme Q10 100 MG capsule Take 1 capsule by mouth Daily.      Diclofenac Sodium (VOLTAREN) 1 % gel gel Apply 4 g topically to the appropriate area as directed 4 (Four) Times a Day As Needed (pain). 100 g 1    EPINEPHrine (EPIPEN) 0.3 MG/0.3ML solution auto-injector injection       fluticasone (FLONASE) 50 MCG/ACT nasal spray 2 spray(s) in each nostril daily      FREESTYLE LITE test strip       gabapentin (NEURONTIN) 800 MG tablet Take 1 tablet by mouth 3 (Three) Times a Day.      HumaLOG KwikPen 100 UNIT/ML solution pen-injector       ipratropium (ATROVENT) 0.03 % nasal spray 2 sprays into the nostril(s) as directed by provider Every 6 (Six) Hours As Needed. 2 sprays each nostril q 6 hrs      Lactobacillus (Acidophilus) 100 MG capsule Take  by mouth Daily.      Lancets (freestyle) lancets       Lantus SoloStar 100 UNIT/ML injection pen Inject 35 Units under the skin into the  appropriate area as directed 2 (Two) Times a Day.      latanoprost (XALATAN) 0.005 % ophthalmic solution Administer 1 drop to both eyes Every Night.      losartan (COZAAR) 50 MG tablet Take 0.5 tablets by mouth Daily.      Narcan 4 MG/0.1ML nasal spray       rOPINIRole (REQUIP) 0.25 MG tablet Take 1 tablet by mouth every night at bedtime.      tiotropium bromide-olodaterol (Stiolto Respimat) 2.5-2.5 MCG/ACT aerosol solution inhaler Inhale 2 puffs Daily. 3 each 3     No current facility-administered medications for this visit.     Review of Systems   Constitutional: Negative.    Skin:         Painful toenails   All other systems reviewed and are negative.      OBJECTIVE     Vitals:    03/12/24 0732   BP: 113/60   Pulse: 67   Temp: 96.7 °F (35.9 °C)   SpO2: 95%       Lab Results   Component Value Date    HGBA1C 7.70 (H) 12/07/2023       Lab Results   Component Value Date    GLUCOSE 151 (H) 02/29/2024    CALCIUM 10.2 02/29/2024     02/29/2024    K 4.0 02/29/2024    CO2 31.8 (H) 02/29/2024    CL 95 (L) 02/29/2024    BUN 60 (H) 02/29/2024    CREATININE 2.64 (H) 02/29/2024    EGFRIFAFRI 45 (L) 09/15/2021    BCR 22.7 02/29/2024    ANIONGAP 10.2 02/29/2024       Patient seen in no apparent distress.      PHYSICAL EXAM:     Foot/Ankle Exam    GENERAL  Appearance:  elderly  Orientation:  AAOx3  Affect:  appropriate  Gait:  unimpaired  Assistance:  independent  Right shoe gear: casual shoe  Left shoe gear: casual shoe    VASCULAR     Right Foot Vascularity   Normal vascular exam    Dorsalis pedis:  2+  Posterior tibial:  2+  Skin temperature:  warm  Edema grading:  None  CFT:  < 3 seconds  Pedal hair growth:  Present  Varicosities:  none     Left Foot Vascularity   Normal vascular exam    Dorsalis pedis:  2+  Posterior tibial:  2+  Skin temperature:  warm  Edema grading:  None  CFT:  < 3 seconds  Pedal hair growth:  Present  Varicosities:  none     NEUROLOGIC     Right Foot Neurologic   Light touch sensation:  absent  Vibratory sensation: absent  Hot/Cold sensation: absent  Protective Sensation using [place] Monofilament:   Sites tested: 10     Left Foot Neurologic   Light touch sensation: absent  Vibratory sensation: absent  Hot/Cold sensation:  absent  Protective Sensation using [place] Monofilament:   Sites tested: 10    MUSCULOSKELETAL     Right Foot Musculoskeletal   Arch:  Normal     Left Foot Musculoskeletal   Arch:  Normal    MUSCLE STRENGTH     Right Foot Muscle Strength   Foot dorsiflexion:  4  Foot plantar flexion:  4  Foot inversion:  4  Foot eversion:  4     Left Foot Muscle Strength   Foot dorsiflexion:  4  Foot plantar flexion:  4  Foot inversion:  4  Foot eversion:  4    RANGE OF MOTION     Right Foot Range of Motion   Foot and ankle ROM within normal limits       Left Foot Range of Motion   Foot and ankle ROM within normal limits      DERMATOLOGIC      Right Foot Dermatologic   Skin  Right foot skin is intact.   Nails  1.  Positive for elongated, onychomycosis, abnormal thickness, subungual debris and ingrown toenail.  2.  Positive for elongated, onychomycosis, abnormal thickness, subungual debris and ingrown toenail.  3.  Positive for elongated, onychomycosis, abnormal thickness, subungual debris and ingrown toenail.  4.  Positive for elongated, onychomycosis, abnormal thickness, subungual debris and ingrown toenail.  Nails comment:  Toenails 1 through 4     Left Foot Dermatologic   Skin  Left foot skin is intact.   Nails comment:  Toenails 1 through 3  Nails  1.  Positive for elongated, onychomycosis, abnormal thickness, subungual debris and ingrown toenail.  2.  Positive for elongated, onychomycosis, abnormal thickness, subungual debris and ingrown toenail.  3.  Positive for elongated, onychomycosis, abnormal thickness, subungual debris and ingrown toenail.      Diabetic Foot Exam Performed and Monofilament Test Performed    I have reexamined the patient the results are consistent  with the previously documented exam.    ASSESSMENT/PLAN     Diagnoses and all orders for this visit:    1. Non-insulin dependent type 2 diabetes mellitus (Primary)    2. Onychocryptosis    3. Neuropathy    4. Diabetic foot    5. Foot pain, bilateral    6. Onychomycosis    7. Type 2 diabetes mellitus with diabetic polyneuropathy, with long-term current use of insulin        Comprehensive lower extremity examination and evaluation was performed.    Discussed findings and treatment plan including risks, benefits, and treatment options with patient in detail. Patient agreed with treatment plan.    Toenails 1 through 4 on right and toenails 1 through 3 on the left were debrided in thickness and length and then smoothed with a Dremel Tool.  Tolerated the procedure well without complications.    Diabetic foot exam performed and documented this date, compliant with CQM required standards. Detail of findings as noted in physical exam.  Lower extremity Neurologic exam for diabetic patient performed and documented this date, compliant with PQRS required standards. Detail of findings as noted in physical exam.  Advised patient importance of good routine lower extremity hygiene. Advised patient importance of evaluating for intact skin and pain free nail borders.  Advised patient to use mirror to evaluate plantar/ soles of feet for better visualization. Advised patient monitor and phone office to be seen if any cracking to skin, open lesions, painful nail borders or if nails become elongated prior to next visit. Advised patient importance of daily cleansing of lower extremities, followed by good skin cream to maintain normal hydration of skin. Also advised patient importance of close daily monitoring of blood sugar. Advised to regulate diet and medications to maintain control of blood sugar in optimal range. Contact primary care provider if difficulties maintaining blood sugar levels.  Advised Patient of presence of Diabetes  Mellitus condition.  Advised Patient risk of progression and worsening or improvement, then return of condition.  Will monitor condition for any change in future. Treat with most appropriate treatment pending status of condition.  Counseled and advised patient extensively on nature and ramifications of diabetes. Standard instructions given to patient for good diabetic foot care and maintenance. Advised importance of careful monitoring to avoid break down and complications secondary to diabetes. Advised patient importance of strict maintenance of blood sugar control. Advised patient of possible ominous results from neglect of condition, i.e.: amputation/ loss of digits, feet and legs, or even death.  Patient states understands counseling, will monitor closely, continue good hygiene and routine diabetic foot care. Patient will contact office is questions or problems.      An After Visit Summary was printed and given to the patient at discharge, including (if requested) any available informative/educational handouts regarding diagnosis, treatment, or medications. All questions were answered to patient/family satisfaction. Should symptoms fail to improve or worsen they agree to call or return to clinic or to go to the Emergency Department. Discussed the importance of following up with any needed screening tests/labs/specialist appointments and any requested follow-up recommended by me today. Importance of maintaining follow-up discussed and patient accepts that missed appointments can delay diagnosis and potentially lead to worsening of conditions.    Return in about 9 weeks (around 5/14/2024) for Toenail Care., or sooner if acute issues arise.    This document has been electronically signed by Carl Payne DPM on March 12, 2024 07:41 EDT

## 2024-03-22 ENCOUNTER — LAB (OUTPATIENT)
Dept: LAB | Facility: HOSPITAL | Age: 79
End: 2024-03-22
Payer: MEDICARE

## 2024-03-22 ENCOUNTER — TRANSCRIBE ORDERS (OUTPATIENT)
Dept: ADMINISTRATIVE | Facility: HOSPITAL | Age: 79
End: 2024-03-22
Payer: MEDICARE

## 2024-03-22 DIAGNOSIS — I10 HYPERTENSION, UNSPECIFIED TYPE: ICD-10-CM

## 2024-03-22 DIAGNOSIS — E11.9 DIABETES MELLITUS WITHOUT COMPLICATION: ICD-10-CM

## 2024-03-22 DIAGNOSIS — E11.9 DIABETES MELLITUS WITHOUT COMPLICATION: Primary | ICD-10-CM

## 2024-03-22 LAB
ALBUMIN SERPL-MCNC: 4.2 G/DL (ref 3.5–5.2)
ALBUMIN/GLOB SERPL: 1.2 G/DL
ALP SERPL-CCNC: 83 U/L (ref 39–117)
ALT SERPL W P-5'-P-CCNC: 21 U/L (ref 1–41)
ANION GAP SERPL CALCULATED.3IONS-SCNC: 14.5 MMOL/L (ref 5–15)
AST SERPL-CCNC: 28 U/L (ref 1–40)
BILIRUB SERPL-MCNC: 0.5 MG/DL (ref 0–1.2)
BUN SERPL-MCNC: 75 MG/DL (ref 8–23)
BUN/CREAT SERPL: 24.4 (ref 7–25)
CALCIUM SPEC-SCNC: 9.9 MG/DL (ref 8.6–10.5)
CHLORIDE SERPL-SCNC: 91 MMOL/L (ref 98–107)
CO2 SERPL-SCNC: 29.5 MMOL/L (ref 22–29)
CREAT SERPL-MCNC: 3.08 MG/DL (ref 0.76–1.27)
EGFRCR SERPLBLD CKD-EPI 2021: 20 ML/MIN/1.73
GLOBULIN UR ELPH-MCNC: 3.4 GM/DL
GLUCOSE SERPL-MCNC: 198 MG/DL (ref 65–99)
HBA1C MFR BLD: 7.3 % (ref 4.8–5.6)
POTASSIUM SERPL-SCNC: 3.9 MMOL/L (ref 3.5–5.2)
PROT SERPL-MCNC: 7.6 G/DL (ref 6–8.5)
SODIUM SERPL-SCNC: 135 MMOL/L (ref 136–145)

## 2024-03-22 PROCEDURE — 36415 COLL VENOUS BLD VENIPUNCTURE: CPT

## 2024-03-22 PROCEDURE — 80053 COMPREHEN METABOLIC PANEL: CPT

## 2024-03-22 PROCEDURE — 83036 HEMOGLOBIN GLYCOSYLATED A1C: CPT

## 2024-04-01 ENCOUNTER — LAB (OUTPATIENT)
Dept: LAB | Facility: HOSPITAL | Age: 79
End: 2024-04-01
Payer: MEDICARE

## 2024-04-01 ENCOUNTER — TRANSCRIBE ORDERS (OUTPATIENT)
Dept: LAB | Facility: HOSPITAL | Age: 79
End: 2024-04-01
Payer: MEDICARE

## 2024-04-01 DIAGNOSIS — N18.30 STAGE 3 CHRONIC KIDNEY DISEASE, UNSPECIFIED WHETHER STAGE 3A OR 3B CKD: ICD-10-CM

## 2024-04-01 DIAGNOSIS — I10 ESSENTIAL HYPERTENSION, MALIGNANT: ICD-10-CM

## 2024-04-01 DIAGNOSIS — N18.30 STAGE 3 CHRONIC KIDNEY DISEASE, UNSPECIFIED WHETHER STAGE 3A OR 3B CKD: Primary | ICD-10-CM

## 2024-04-01 LAB
ALBUMIN SERPL-MCNC: 4.1 G/DL (ref 3.5–5.2)
ALBUMIN/GLOB SERPL: 1.2 G/DL
ALP SERPL-CCNC: 63 U/L (ref 39–117)
ALT SERPL W P-5'-P-CCNC: 18 U/L (ref 1–41)
ANION GAP SERPL CALCULATED.3IONS-SCNC: 12.1 MMOL/L (ref 5–15)
AST SERPL-CCNC: 26 U/L (ref 1–40)
BILIRUB SERPL-MCNC: 0.3 MG/DL (ref 0–1.2)
BUN SERPL-MCNC: 45 MG/DL (ref 8–23)
BUN/CREAT SERPL: 20.3 (ref 7–25)
CALCIUM SPEC-SCNC: 10.1 MG/DL (ref 8.6–10.5)
CHLORIDE SERPL-SCNC: 97 MMOL/L (ref 98–107)
CO2 SERPL-SCNC: 25.9 MMOL/L (ref 22–29)
CREAT SERPL-MCNC: 2.22 MG/DL (ref 0.76–1.27)
EGFRCR SERPLBLD CKD-EPI 2021: 29.6 ML/MIN/1.73
GLOBULIN UR ELPH-MCNC: 3.3 GM/DL
GLUCOSE SERPL-MCNC: 162 MG/DL (ref 65–99)
POTASSIUM SERPL-SCNC: 4.6 MMOL/L (ref 3.5–5.2)
PROT SERPL-MCNC: 7.4 G/DL (ref 6–8.5)
SODIUM SERPL-SCNC: 135 MMOL/L (ref 136–145)

## 2024-04-01 PROCEDURE — 36415 COLL VENOUS BLD VENIPUNCTURE: CPT

## 2024-04-01 PROCEDURE — 80053 COMPREHEN METABOLIC PANEL: CPT

## 2024-04-24 ENCOUNTER — OFFICE VISIT (OUTPATIENT)
Dept: CARDIOLOGY | Facility: CLINIC | Age: 79
End: 2024-04-24
Payer: MEDICARE

## 2024-04-24 VITALS
HEART RATE: 62 BPM | SYSTOLIC BLOOD PRESSURE: 131 MMHG | DIASTOLIC BLOOD PRESSURE: 91 MMHG | BODY MASS INDEX: 35.42 KG/M2 | WEIGHT: 220.4 LBS | HEIGHT: 66 IN

## 2024-04-24 DIAGNOSIS — Z86.711 HISTORY OF PULMONARY EMBOLISM: ICD-10-CM

## 2024-04-24 DIAGNOSIS — R07.89 ATYPICAL CHEST PAIN: ICD-10-CM

## 2024-04-24 DIAGNOSIS — I10 ESSENTIAL HYPERTENSION: Primary | ICD-10-CM

## 2024-04-24 PROCEDURE — 3075F SYST BP GE 130 - 139MM HG: CPT | Performed by: FAMILY MEDICINE

## 2024-04-24 PROCEDURE — 1159F MED LIST DOCD IN RCRD: CPT | Performed by: FAMILY MEDICINE

## 2024-04-24 PROCEDURE — 99213 OFFICE O/P EST LOW 20 MIN: CPT | Performed by: FAMILY MEDICINE

## 2024-04-24 PROCEDURE — 3080F DIAST BP >= 90 MM HG: CPT | Performed by: FAMILY MEDICINE

## 2024-04-24 PROCEDURE — 1160F RVW MEDS BY RX/DR IN RCRD: CPT | Performed by: FAMILY MEDICINE

## 2024-04-24 RX ORDER — FUROSEMIDE 40 MG/1
40 TABLET ORAL DAILY
COMMUNITY

## 2024-04-24 RX ORDER — ALLOPURINOL 100 MG/1
100 TABLET ORAL DAILY
COMMUNITY

## 2024-04-24 NOTE — PROGRESS NOTES
Chief Complaint  Hypertension and Follow-up    Subjective        History of Present Illness  Joon Zhao presents to [facility] CARDIOLOGY   Mr. Zhao is a 78-year-old male patient coming in today for cardiac follow-up.  He underwent cardiac catheterization due to ongoing symptoms of chest pain in November, with minimal coronary disease, and no culprit lesion to be causing symptoms.  He was seen in the ER in December due to complaints of right leg pain, and underwent duplex scan showing chronic right lower extremity DVT to the popliteal, and chronic superficial phlebitis in the small saphenous, no treatment changes were made as he was already on long-term anticoagulation due to history of DVT and PE.  He continues to have some intermittent symptoms of pain across his leg, and some mild swelling of that right leg.  He reports intermittent episodes of swelling, and follows nephrology's guidelines based on his weight for dosing his furosemide.  Cardiac wise he denies any other concerns.  Specifically has no complaints of chest pains, palpitations, lightheadedness or dizziness.  He is currently undergoing workup for possible sarcoidosis.        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 (2) Hypertension. (3) Chronic kidney disease, Stage 3. (4) Diabetes mellitus. (5) Negative for coronary artery disease.       Past Medical History:   Diagnosis Date    Anemia     Arthritis     Back pain     BPH (benign prostatic hyperplasia) 03/02/2015    BPH with urinary obstruction 06/01/2018    Chest pain     CKD (chronic kidney disease), stage III     Controlled type 2 diabetes mellitus with diabetic polyneuropathy 07/05/2017    Dizziness 08/10/2017    DVT (deep venous thrombosis)     Erectile dysfunction 2007    Foot pain, bilateral 03/12/2018    GERD (gastroesophageal reflux disease)     Glaucoma (increased eye pressure)     High  cholesterol     Hyperlipidemia     Hypertension     Ingrowing nail 07/05/2017    Pain in both feet     PE (pulmonary thromboembolism)     Polyneuropathy 07/05/2017    Shortness of breath     Sleep apnea     Tinea unguium 07/05/2017       Allergies   Allergen Reactions    Ezetimibe Unknown - High Severity and Other (See Comments)    Statins Other (See Comments) and Unknown - High Severity     UNSURE OF REACTION    Atorvastatin Hives    Hydrochlorothiazide Hives    Simvastatin Hives    Colesevelam Other (See Comments)     Other Reaction(s): Generalized aches and pains    Doxazosin Other (See Comments)     Other Reaction(s): Unknown      pt states shortnes of breath    Hydrochlorothiazide W-Triamterene Rash     HCTZ        Past Surgical History:   Procedure Laterality Date    ADRENAL GLAND SURGERY      CARDIAC CATHETERIZATION N/A 11/16/2023    Procedure: Left Heart Cath with possible angioplasty;  Surgeon: Trip Mirza MD;  Location: [facility] CATH INVASIVE LOCATION;  Service: Cardiovascular;  Laterality: N/A;    COLONOSCOPY      CYSTOSCOPY      ENDOSCOPY N/A 1/2/2024    Procedure: ESOPHAGOGASTRODUODENOSCOPY WITH BIOPSIES;  Surgeon: Hosea Moreau MD;  Location: [facility] ENDOSCOPY;  Service: Gastroenterology;  Laterality: N/A;  HIATAL HERNIA    KIDNEY SURGERY Left     KIDNEY BIOSPY    PROSTATE SURGERY  12/2007    THYROID BIOPSY      TURP / TRANSURETHRAL INCISION / DRAINAGE PROSTATE  06/2019    UPPER GASTROINTESTINAL ENDOSCOPY          Social History  He  reports that he has never smoked. He has never been exposed to tobacco smoke. He has never used smokeless tobacco. He reports current alcohol use. He reports that he does not use drugs.    Family History  His family history includes Diabetes in his brother, maternal grandmother, son, and another family member; Heart disease in his mother and sister; Hypertension in his mother and sister; Stroke in his mother and sister.       Current Outpatient Medications on  File Prior to Visit   Medication Sig    acetaminophen-codeine (TYLENOL #3) 300-30 MG per tablet Take 1 tablet by mouth Every 4 (Four) Hours As Needed for Moderate Pain.    albuterol sulfate  (90 Base) MCG/ACT inhaler Inhale 2 puffs Every 6 (Six) Hours As Needed.    Alcohol Swabs (Easy Touch Alcohol Prep Medium) 70 % pads     allopurinol (ZYLOPRIM) 100 MG tablet Take 1 tablet by mouth Daily.    amLODIPine (NORVASC) 5 MG tablet Take 1 tablet by mouth Daily.    apixaban (ELIQUIS) 5 MG tablet tablet Take 1 tablet by mouth 2 (Two) Times a Day. REPORTS HAD INJECTION AT PAIN MANAGEMENT ON 11/13/23 AND THAT HIS LAST DOSE OF ELIQUIS WAS 11/9/23 PM DOSE    aspirin 81 MG EC tablet REPORTS THAT LAST DOSE OF ASA WAS 11/7/23 HAD STOPPED IN PREPARATION FOR INJECTION AT PAIN MANAGEMENT ON 11/13/23    B-D UF III MINI PEN NEEDLES 31G X 5 MM misc     cetirizine (zyrTEC) 10 MG tablet Take 1 tablet by mouth Daily.    cholecalciferol (VITAMIN D3) 25 MCG (1000 UT) tablet Take 1 tablet by mouth Daily.    coenzyme Q10 100 MG capsule Take 1 capsule by mouth Daily.    EPINEPHrine (EPIPEN) 0.3 MG/0.3ML solution auto-injector injection     fluticasone (FLONASE) 50 MCG/ACT nasal spray 2 spray(s) in each nostril daily    FREESTYLE LITE test strip     gabapentin (NEURONTIN) 800 MG tablet Take 1 tablet by mouth 3 (Three) Times a Day.    HumaLOG KwikPen 100 UNIT/ML solution pen-injector     ipratropium (ATROVENT) 0.03 % nasal spray 2 sprays into the nostril(s) as directed by provider Every 6 (Six) Hours As Needed. 2 sprays each nostril q 6 hrs    Lactobacillus (Acidophilus) 100 MG capsule Take  by mouth Daily.    Lancets (freestyle) lancets     Lantus SoloStar 100 UNIT/ML injection pen Inject 35 Units under the skin into the appropriate area as directed 2 (Two) Times a Day.    latanoprost (XALATAN) 0.005 % ophthalmic solution Administer 1 drop to both eyes Every Night.    losartan (COZAAR) 50 MG tablet Take 0.5 tablets by mouth Daily.     "Narcan 4 MG/0.1ML nasal spray     rOPINIRole (REQUIP) 0.25 MG tablet Take 1 tablet by mouth every night at bedtime.    tiotropium bromide-olodaterol (Stiolto Respimat) 2.5-2.5 MCG/ACT aerosol solution inhaler Inhale 2 puffs Daily.    baclofen (LIORESAL) 10 MG tablet Take 1 tablet by mouth 2 (Two) Times a Day.    furosemide (LASIX) 40 MG tablet Take 1 tablet by mouth Daily. *has instructions from neprho to titrate based on weight    [DISCONTINUED] Advair -21 MCG/ACT inhaler Inhale 2 puffs 2 (Two) Times a Day. (Patient not taking: Reported on 4/24/2024)    [DISCONTINUED] chlorthalidone (HYGROTON) 25 MG tablet Take 1 tablet by mouth 2 (two) times a day. (Patient not taking: Reported on 4/24/2024)    [DISCONTINUED] Diclofenac Sodium (VOLTAREN) 1 % gel gel Apply 4 g topically to the appropriate area as directed 4 (Four) Times a Day As Needed (pain). (Patient not taking: Reported on 4/24/2024)     No current facility-administered medications on file prior to visit.         Review of Systems   See HPI      Objective   Vitals:    04/24/24 0827   BP: 131/91   Pulse: 62   Weight: 100 kg (220 lb 6.4 oz)   Height: 167.6 cm (66\")         Physical Exam  General : Alert, awake, no acute distress  Neck : Supple, no carotid bruit, no jugular venous distention  CVS : Regular rate and rhythm, no murmur, rubs or gallops  Lungs: Clear to auscultation bilaterally, no crackles or rhonchi  Abdomen: Soft, nontender, bowel sounds active  Extremities: Warm, well-perfused, no pedal edema      Result Review     The following data was reviewed by ROSALIA Manriquez  proBNP   Date Value Ref Range Status   08/07/2023 <36.0 0.0 - 1,800.0 pg/mL Final     CMP          2/29/2024    08:26 3/22/2024    08:35 4/1/2024    06:53   CMP   Glucose 151  198  162    BUN 60  75  45    Creatinine 2.64  3.08  2.22    EGFR 24.0  20.0  29.6    Sodium 137  135  135    Potassium 4.0  3.9  4.6    Chloride 95  91  97    Calcium 10.2  9.9  10.1    Total Protein " "8.0  7.6  7.4    Albumin 4.3  4.2  4.1    Globulin 3.7  3.4  3.3    Total Bilirubin 0.5  0.5  0.3    Alkaline Phosphatase 73  83  63    AST (SGOT) 19  28  26    ALT (SGPT) 17  21  18    Albumin/Globulin Ratio 1.2  1.2  1.2    BUN/Creatinine Ratio 22.7  24.4  20.3    Anion Gap 10.2  14.5  12.1      CBC w/diff          12/1/2023    13:22 2/1/2024    06:25 2/29/2024    08:26   CBC w/Diff   WBC 5.54  5.07  4.80    RBC 4.39  4.41  4.53    Hemoglobin 13.4  13.5  13.9    Hematocrit 39.0  40.7  42.3    MCV 88.8  92.3  93.4    MCH 30.5  30.6  30.7    MCHC 34.4  33.2  32.9    RDW 11.7  12.3  12.2    Platelets 183  184  201    Neutrophil Rel % 56.8  39.4  37.4    Immature Granulocyte Rel % 0.2  0.6  0.4    Lymphocyte Rel % 31.9  47.5  47.7    Monocyte Rel % 10.1  10.5  12.7    Eosinophil Rel % 0.5  1.4  1.0    Basophil Rel % 0.5  0.6  0.8       Lab Results   Component Value Date    TSH 0.887 12/07/2023      No results found for: \"FREET4\"   No results found for: \"DDIMERQUANT\"  Magnesium   Date Value Ref Range Status   11/30/2023 1.8 1.6 - 2.4 mg/dL Final      No results found for: \"DIGOXIN\"   Lab Results   Component Value Date    TROPONINT 38 (H) 08/07/2023               Results for orders placed in visit on 07/12/21    Adult Transthoracic Echo Complete w/ Color, Spectral and Contrast if necessary per protocol    Interpretation Summary  · Calculated left ventricular 3D EF = 59% Left ventricular ejection fraction appears to be 56 - 60%.  · Estimated right ventricular systolic pressure from tricuspid regurgitation is normal (<35 mmHg).  · Left ventricular wall thickness is consistent with mild concentric hypertrophy.  · Left ventricular diastolic function is consistent with (grade I) impaired relaxation.  · Mild dilation of the aortic root is present.      Cardiac Cath   11/16/2023  Conclusions:  Normal epicardial coronary and anterior with minimal nonobstructive lesions noted in diagonal and obtuse marginal branches.  Normal " left ventricular end-diastolic pressure.  There are no culprit lesions identified to explain patient's symptoms.  If symptoms continue to proceed, noncardiac etiology needs to be pursued      Duplex scan     12/1/2023      Chronic right lower extremity deep vein thrombosis noted in the popliteal.    Chronic right lower extremity superficial thrombophlebitis noted in the small saphenous.    All other right sided veins appeared normal.        Assessment and Plan   Diagnoses and all orders for this visit:    1. Essential hypertension (Primary)  Assessment & Plan:  Diastolic pressure borderline elevated in the office today, but he brings home blood pressure logs which show good control.  Continue his current regiment with amlodipine, losartan, and furosemide.  Chlorthalidone was discontinued by nephrology.      2. Atypical chest pain  Assessment & Plan:  He underwent cardiac catheterization without only minimal nonobstructive coronary lesions.  He has since been seen by GI and is being treated for symptoms of GERD, and symptoms of chest pain improved.      3. History of pulmonary embolism  Assessment & Plan:  Following recent cardiac catheterization, he had complaints of lower extremity pain.  Duplex scan showed chronic right lower DVT in popliteal vein,, and superficial thrombophlebitis in the small saphenous vein.  She occasionally has some mild pain in his leg, but no significant change in swelling.  Will continue his long-term anticoagulation with Eliquis.                Follow Up   Return in about 6 months (around 10/24/2024) for with Dr. Mirza.    Patient was given instructions and counseling regarding his condition or for health maintenance advice. Please see specific information pulled into the AVS if appropriate.     Signed,  Keke Sandra, ROSALIA  04/24/2024     Dictated Utilizing Dragon Dictation: Please note that portions of this note were completed with a voice recognition program.  Part of this note may  be an electronic transcription/translation of spoken language to printed text using the Dragon Dictation System.

## 2024-04-24 NOTE — ASSESSMENT & PLAN NOTE
He underwent cardiac catheterization without only minimal nonobstructive coronary lesions.  He has since been seen by GI and is being treated for symptoms of GERD, and symptoms of chest pain improved.

## 2024-04-24 NOTE — ASSESSMENT & PLAN NOTE
Diastolic pressure borderline elevated in the office today, but he brings home blood pressure logs which show good control.  Continue his current regiment with amlodipine, losartan, and furosemide.  Chlorthalidone was discontinued by nephrology.

## 2024-04-24 NOTE — LETTER
April 24, 2024     Brian Henson MD  [address] [address] [address]  [address]  [facility] [address]    Patient: Joon Zhao   YOB: 1945   Date of Visit: 4/24/2024       Dear Brian Henson MD    Joon Zhao was in my office today. Below is a copy of my note.    If you have questions, please do not hesitate to call me. I look forward to following Joon along with you.         Sincerely,        ROSALIA Manriquez        CC: No Recipients    Chief Complaint  Hypertension and Follow-up    Subjective       History of Present Illness  Joon Zhao presents to [facility] CARDIOLOGY   Mr. Zhao is a 78-year-old male patient coming in today for cardiac follow-up.  He underwent cardiac catheterization due to ongoing symptoms of chest pain in November, with minimal coronary disease, and no culprit lesion to be causing symptoms.  He was seen in the ER in December due to complaints of right leg pain, and underwent duplex scan showing chronic right lower extremity DVT to the popliteal, and chronic superficial phlebitis in the small saphenous, no treatment changes were made as he was already on long-term anticoagulation due to history of DVT and PE.  He continues to have some intermittent symptoms of pain across his leg, and some mild swelling of that right leg.  He reports intermittent episodes of swelling, and follows nephrology's guidelines based on his weight for dosing his furosemide.  Cardiac wise he denies any other concerns.  Specifically has no complaints of chest pains, palpitations, lightheadedness or dizziness.  He is currently undergoing workup for possible sarcoidosis.        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 (2) Hypertension. (3) Chronic kidney disease, Stage 3. (4) Diabetes mellitus. (5) Negative for coronary artery disease.       Past Medical History:   Diagnosis Date   • Anemia    •  Arthritis    • Back pain    • BPH (benign prostatic hyperplasia) 03/02/2015   • BPH with urinary obstruction 06/01/2018   • Chest pain    • CKD (chronic kidney disease), stage III    • Controlled type 2 diabetes mellitus with diabetic polyneuropathy 07/05/2017   • Dizziness 08/10/2017   • DVT (deep venous thrombosis)    • Erectile dysfunction 2007   • Foot pain, bilateral 03/12/2018   • GERD (gastroesophageal reflux disease)    • Glaucoma (increased eye pressure)    • High cholesterol    • Hyperlipidemia    • Hypertension    • Ingrowing nail 07/05/2017   • Pain in both feet    • PE (pulmonary thromboembolism)    • Polyneuropathy 07/05/2017   • Shortness of breath    • Sleep apnea    • Tinea unguium 07/05/2017       Allergies   Allergen Reactions   • Ezetimibe Unknown - High Severity and Other (See Comments)   • Statins Other (See Comments) and Unknown - High Severity     UNSURE OF REACTION   • Atorvastatin Hives   • Hydrochlorothiazide Hives   • Simvastatin Hives   • Colesevelam Other (See Comments)     Other Reaction(s): Generalized aches and pains   • Doxazosin Other (See Comments)     Other Reaction(s): Unknown      pt states shortnes of breath   • Hydrochlorothiazide W-Triamterene Rash     HCTZ        Past Surgical History:   Procedure Laterality Date   • ADRENAL GLAND SURGERY     • CARDIAC CATHETERIZATION N/A 11/16/2023    Procedure: Left Heart Cath with possible angioplasty;  Surgeon: Trip Mirza MD;  Location: [facility] CATH INVASIVE LOCATION;  Service: Cardiovascular;  Laterality: N/A;   • COLONOSCOPY     • CYSTOSCOPY     • ENDOSCOPY N/A 1/2/2024    Procedure: ESOPHAGOGASTRODUODENOSCOPY WITH BIOPSIES;  Surgeon: Hosea Moreau MD;  Location: [facility] ENDOSCOPY;  Service: Gastroenterology;  Laterality: N/A;  HIATAL HERNIA   • KIDNEY SURGERY Left     KIDNEY BIOSPY   • PROSTATE SURGERY  12/2007   • THYROID BIOPSY     • TURP / TRANSURETHRAL INCISION / DRAINAGE PROSTATE  06/2019   • UPPER GASTROINTESTINAL  ENDOSCOPY          Social History  He  reports that he has never smoked. He has never been exposed to tobacco smoke. He has never used smokeless tobacco. He reports current alcohol use. He reports that he does not use drugs.    Family History  His family history includes Diabetes in his brother, maternal grandmother, son, and another family member; Heart disease in his mother and sister; Hypertension in his mother and sister; Stroke in his mother and sister.       Current Outpatient Medications on File Prior to Visit   Medication Sig   • acetaminophen-codeine (TYLENOL #3) 300-30 MG per tablet Take 1 tablet by mouth Every 4 (Four) Hours As Needed for Moderate Pain.   • albuterol sulfate  (90 Base) MCG/ACT inhaler Inhale 2 puffs Every 6 (Six) Hours As Needed.   • Alcohol Swabs (Easy Touch Alcohol Prep Medium) 70 % pads    • allopurinol (ZYLOPRIM) 100 MG tablet Take 1 tablet by mouth Daily.   • amLODIPine (NORVASC) 5 MG tablet Take 1 tablet by mouth Daily.   • apixaban (ELIQUIS) 5 MG tablet tablet Take 1 tablet by mouth 2 (Two) Times a Day. REPORTS HAD INJECTION AT PAIN MANAGEMENT ON 11/13/23 AND THAT HIS LAST DOSE OF ELIQUIS WAS 11/9/23 PM DOSE   • aspirin 81 MG EC tablet REPORTS THAT LAST DOSE OF ASA WAS 11/7/23 HAD STOPPED IN PREPARATION FOR INJECTION AT PAIN MANAGEMENT ON 11/13/23   • B-D UF III MINI PEN NEEDLES 31G X 5 MM misc    • cetirizine (zyrTEC) 10 MG tablet Take 1 tablet by mouth Daily.   • cholecalciferol (VITAMIN D3) 25 MCG (1000 UT) tablet Take 1 tablet by mouth Daily.   • coenzyme Q10 100 MG capsule Take 1 capsule by mouth Daily.   • EPINEPHrine (EPIPEN) 0.3 MG/0.3ML solution auto-injector injection    • fluticasone (FLONASE) 50 MCG/ACT nasal spray 2 spray(s) in each nostril daily   • FREESTYLE LITE test strip    • gabapentin (NEURONTIN) 800 MG tablet Take 1 tablet by mouth 3 (Three) Times a Day.   • HumaLOG KwikPen 100 UNIT/ML solution pen-injector    • ipratropium (ATROVENT) 0.03 % nasal spray  "2 sprays into the nostril(s) as directed by provider Every 6 (Six) Hours As Needed. 2 sprays each nostril q 6 hrs   • Lactobacillus (Acidophilus) 100 MG capsule Take  by mouth Daily.   • Lancets (freestyle) lancets    • Lantus SoloStar 100 UNIT/ML injection pen Inject 35 Units under the skin into the appropriate area as directed 2 (Two) Times a Day.   • latanoprost (XALATAN) 0.005 % ophthalmic solution Administer 1 drop to both eyes Every Night.   • losartan (COZAAR) 50 MG tablet Take 0.5 tablets by mouth Daily.   • Narcan 4 MG/0.1ML nasal spray    • rOPINIRole (REQUIP) 0.25 MG tablet Take 1 tablet by mouth every night at bedtime.   • tiotropium bromide-olodaterol (Stiolto Respimat) 2.5-2.5 MCG/ACT aerosol solution inhaler Inhale 2 puffs Daily.   • baclofen (LIORESAL) 10 MG tablet Take 1 tablet by mouth 2 (Two) Times a Day.   • furosemide (LASIX) 40 MG tablet Take 1 tablet by mouth Daily. *has instructions from nepo to titrate based on weight   • [DISCONTINUED] Advair -21 MCG/ACT inhaler Inhale 2 puffs 2 (Two) Times a Day. (Patient not taking: Reported on 4/24/2024)   • [DISCONTINUED] chlorthalidone (HYGROTON) 25 MG tablet Take 1 tablet by mouth 2 (two) times a day. (Patient not taking: Reported on 4/24/2024)   • [DISCONTINUED] Diclofenac Sodium (VOLTAREN) 1 % gel gel Apply 4 g topically to the appropriate area as directed 4 (Four) Times a Day As Needed (pain). (Patient not taking: Reported on 4/24/2024)     No current facility-administered medications on file prior to visit.         Review of Systems   See HPI      Objective  Vitals:    04/24/24 0827   BP: 131/91   Pulse: 62   Weight: 100 kg (220 lb 6.4 oz)   Height: 167.6 cm (66\")         Physical Exam  General : Alert, awake, no acute distress  Neck : Supple, no carotid bruit, no jugular venous distention  CVS : Regular rate and rhythm, no murmur, rubs or gallops  Lungs: Clear to auscultation bilaterally, no crackles or rhonchi  Abdomen: Soft, nontender, " "bowel sounds active  Extremities: Warm, well-perfused, no pedal edema      Result Review    The following data was reviewed by ROSALIA Manriquez  proBNP   Date Value Ref Range Status   08/07/2023 <36.0 0.0 - 1,800.0 pg/mL Final     CMP          2/29/2024    08:26 3/22/2024    08:35 4/1/2024    06:53   CMP   Glucose 151  198  162    BUN 60  75  45    Creatinine 2.64  3.08  2.22    EGFR 24.0  20.0  29.6    Sodium 137  135  135    Potassium 4.0  3.9  4.6    Chloride 95  91  97    Calcium 10.2  9.9  10.1    Total Protein 8.0  7.6  7.4    Albumin 4.3  4.2  4.1    Globulin 3.7  3.4  3.3    Total Bilirubin 0.5  0.5  0.3    Alkaline Phosphatase 73  83  63    AST (SGOT) 19  28  26    ALT (SGPT) 17  21  18    Albumin/Globulin Ratio 1.2  1.2  1.2    BUN/Creatinine Ratio 22.7  24.4  20.3    Anion Gap 10.2  14.5  12.1      CBC w/diff          12/1/2023    13:22 2/1/2024    06:25 2/29/2024    08:26   CBC w/Diff   WBC 5.54  5.07  4.80    RBC 4.39  4.41  4.53    Hemoglobin 13.4  13.5  13.9    Hematocrit 39.0  40.7  42.3    MCV 88.8  92.3  93.4    MCH 30.5  30.6  30.7    MCHC 34.4  33.2  32.9    RDW 11.7  12.3  12.2    Platelets 183  184  201    Neutrophil Rel % 56.8  39.4  37.4    Immature Granulocyte Rel % 0.2  0.6  0.4    Lymphocyte Rel % 31.9  47.5  47.7    Monocyte Rel % 10.1  10.5  12.7    Eosinophil Rel % 0.5  1.4  1.0    Basophil Rel % 0.5  0.6  0.8       Lab Results   Component Value Date    TSH 0.887 12/07/2023      No results found for: \"FREET4\"   No results found for: \"DDIMERQUANT\"  Magnesium   Date Value Ref Range Status   11/30/2023 1.8 1.6 - 2.4 mg/dL Final      No results found for: \"DIGOXIN\"   Lab Results   Component Value Date    TROPONINT 38 (H) 08/07/2023               Results for orders placed in visit on 07/12/21    Adult Transthoracic Echo Complete w/ Color, Spectral and Contrast if necessary per protocol    Interpretation Summary  · Calculated left ventricular 3D EF = 59% Left ventricular ejection fraction " appears to be 56 - 60%.  · Estimated right ventricular systolic pressure from tricuspid regurgitation is normal (<35 mmHg).  · Left ventricular wall thickness is consistent with mild concentric hypertrophy.  · Left ventricular diastolic function is consistent with (grade I) impaired relaxation.  · Mild dilation of the aortic root is present.      Cardiac Cath   11/16/2023  Conclusions:  Normal epicardial coronary and anterior with minimal nonobstructive lesions noted in diagonal and obtuse marginal branches.  Normal left ventricular end-diastolic pressure.  There are no culprit lesions identified to explain patient's symptoms.  If symptoms continue to proceed, noncardiac etiology needs to be pursued      Duplex scan     12/1/2023    •  Chronic right lower extremity deep vein thrombosis noted in the popliteal.  •  Chronic right lower extremity superficial thrombophlebitis noted in the small saphenous.  •  All other right sided veins appeared normal.        Assessment and Plan   Diagnoses and all orders for this visit:    1. Essential hypertension (Primary)  Assessment & Plan:  Diastolic pressure borderline elevated in the office today, but he brings home blood pressure logs which show good control.  Continue his current regiment with amlodipine, losartan, and furosemide.  Chlorthalidone was discontinued by nephrology.      2. Atypical chest pain  Assessment & Plan:  He underwent cardiac catheterization without only minimal nonobstructive coronary lesions.  He has since been seen by GI and is being treated for symptoms of GERD, and symptoms of chest pain improved.      3. History of pulmonary embolism  Assessment & Plan:  Following recent cardiac catheterization, he had complaints of lower extremity pain.  Duplex scan showed chronic right lower DVT in popliteal vein,, and superficial thrombophlebitis in the small saphenous vein.  She occasionally has some mild pain in his leg, but no significant change in swelling.   Will continue his long-term anticoagulation with Eliquis.                Follow Up   Return in about 6 months (around 10/24/2024) for with Dr. Mirza.    Patient was given instructions and counseling regarding his condition or for health maintenance advice. Please see specific information pulled into the AVS if appropriate.     Signed,  Keke Sandra, APRN  04/24/2024     Dictated Utilizing Dragon Dictation: Please note that portions of this note were completed with a voice recognition program.  Part of this note may be an electronic transcription/translation of spoken language to printed text using the Dragon Dictation System.

## 2024-04-24 NOTE — ASSESSMENT & PLAN NOTE
Following recent cardiac catheterization, he had complaints of lower extremity pain.  Duplex scan showed chronic right lower DVT in popliteal vein,, and superficial thrombophlebitis in the small saphenous vein.  She occasionally has some mild pain in his leg, but no significant change in swelling.  Will continue his long-term anticoagulation with Eliquis.

## 2024-05-01 ENCOUNTER — TRANSCRIBE ORDERS (OUTPATIENT)
Dept: ADMINISTRATIVE | Facility: HOSPITAL | Age: 79
End: 2024-05-01
Payer: MEDICARE

## 2024-05-01 ENCOUNTER — LAB (OUTPATIENT)
Dept: LAB | Facility: HOSPITAL | Age: 79
End: 2024-05-01
Payer: MEDICARE

## 2024-05-01 DIAGNOSIS — N18.30 STAGE 3 CHRONIC KIDNEY DISEASE, UNSPECIFIED WHETHER STAGE 3A OR 3B CKD: ICD-10-CM

## 2024-05-01 DIAGNOSIS — E55.9 VITAMIN D DEFICIENCY: ICD-10-CM

## 2024-05-01 DIAGNOSIS — E11.9 DIABETES MELLITUS WITHOUT COMPLICATION: ICD-10-CM

## 2024-05-01 DIAGNOSIS — I10 ESSENTIAL HYPERTENSION, BENIGN: ICD-10-CM

## 2024-05-01 DIAGNOSIS — M10.9 GOUT, UNSPECIFIED CAUSE, UNSPECIFIED CHRONICITY, UNSPECIFIED SITE: ICD-10-CM

## 2024-05-01 DIAGNOSIS — M10.9 GOUT, UNSPECIFIED CAUSE, UNSPECIFIED CHRONICITY, UNSPECIFIED SITE: Primary | ICD-10-CM

## 2024-05-01 DIAGNOSIS — N18.30 STAGE 3 CHRONIC KIDNEY DISEASE, UNSPECIFIED WHETHER STAGE 3A OR 3B CKD: Primary | ICD-10-CM

## 2024-05-01 LAB
25(OH)D3 SERPL-MCNC: 45.3 NG/ML (ref 30–100)
ALBUMIN SERPL-MCNC: 4.4 G/DL (ref 3.5–5.2)
ALBUMIN/GLOB SERPL: 1.4 G/DL
ALP SERPL-CCNC: 58 U/L (ref 39–117)
ALT SERPL W P-5'-P-CCNC: 22 U/L (ref 1–41)
ANION GAP SERPL CALCULATED.3IONS-SCNC: 9 MMOL/L (ref 5–15)
AST SERPL-CCNC: 26 U/L (ref 1–40)
BASOPHILS # BLD AUTO: 0.03 10*3/MM3 (ref 0–0.2)
BASOPHILS NFR BLD AUTO: 0.6 % (ref 0–1.5)
BILIRUB SERPL-MCNC: 0.5 MG/DL (ref 0–1.2)
BUN SERPL-MCNC: 36 MG/DL (ref 8–23)
BUN/CREAT SERPL: 14.8 (ref 7–25)
CALCIUM SPEC-SCNC: 9.8 MG/DL (ref 8.6–10.5)
CHLORIDE SERPL-SCNC: 103 MMOL/L (ref 98–107)
CO2 SERPL-SCNC: 29 MMOL/L (ref 22–29)
CREAT SERPL-MCNC: 2.44 MG/DL (ref 0.76–1.27)
CREAT UR-MCNC: 77.1 MG/DL
DEPRECATED RDW RBC AUTO: 43 FL (ref 37–54)
EGFRCR SERPLBLD CKD-EPI 2021: 26.4 ML/MIN/1.73
EOSINOPHIL # BLD AUTO: 0.05 10*3/MM3 (ref 0–0.4)
EOSINOPHIL NFR BLD AUTO: 1.1 % (ref 0.3–6.2)
ERYTHROCYTE [DISTWIDTH] IN BLOOD BY AUTOMATED COUNT: 12.6 % (ref 12.3–15.4)
GLOBULIN UR ELPH-MCNC: 3.1 GM/DL
GLUCOSE SERPL-MCNC: 96 MG/DL (ref 65–99)
HBA1C MFR BLD: 6.9 % (ref 4.8–5.6)
HCT VFR BLD AUTO: 40.3 % (ref 37.5–51)
HGB BLD-MCNC: 13.3 G/DL (ref 13–17.7)
IMM GRANULOCYTES # BLD AUTO: 0 10*3/MM3 (ref 0–0.05)
IMM GRANULOCYTES NFR BLD AUTO: 0 % (ref 0–0.5)
LYMPHOCYTES # BLD AUTO: 2.28 10*3/MM3 (ref 0.7–3.1)
LYMPHOCYTES NFR BLD AUTO: 48 % (ref 19.6–45.3)
MCH RBC QN AUTO: 30.8 PG (ref 26.6–33)
MCHC RBC AUTO-ENTMCNC: 33 G/DL (ref 31.5–35.7)
MCV RBC AUTO: 93.3 FL (ref 79–97)
MONOCYTES # BLD AUTO: 0.38 10*3/MM3 (ref 0.1–0.9)
MONOCYTES NFR BLD AUTO: 8 % (ref 5–12)
NEUTROPHILS NFR BLD AUTO: 2.01 10*3/MM3 (ref 1.7–7)
NEUTROPHILS NFR BLD AUTO: 42.3 % (ref 42.7–76)
NRBC BLD AUTO-RTO: 0 /100 WBC (ref 0–0.2)
PLATELET # BLD AUTO: 165 10*3/MM3 (ref 140–450)
PMV BLD AUTO: 11.7 FL (ref 6–12)
POTASSIUM SERPL-SCNC: 4.6 MMOL/L (ref 3.5–5.2)
PROT ?TM UR-MCNC: 5.4 MG/DL
PROT SERPL-MCNC: 7.5 G/DL (ref 6–8.5)
PROT/CREAT UR: 0.07 MG/G{CREAT}
RBC # BLD AUTO: 4.32 10*6/MM3 (ref 4.14–5.8)
SODIUM SERPL-SCNC: 141 MMOL/L (ref 136–145)
URATE SERPL-MCNC: 5 MG/DL (ref 3.4–7)
WBC NRBC COR # BLD AUTO: 4.75 10*3/MM3 (ref 3.4–10.8)

## 2024-05-01 PROCEDURE — 84156 ASSAY OF PROTEIN URINE: CPT

## 2024-05-01 PROCEDURE — 80053 COMPREHEN METABOLIC PANEL: CPT

## 2024-05-01 PROCEDURE — 83036 HEMOGLOBIN GLYCOSYLATED A1C: CPT

## 2024-05-01 PROCEDURE — 85025 COMPLETE CBC W/AUTO DIFF WBC: CPT

## 2024-05-01 PROCEDURE — 82570 ASSAY OF URINE CREATININE: CPT

## 2024-05-01 PROCEDURE — 84550 ASSAY OF BLOOD/URIC ACID: CPT

## 2024-05-01 PROCEDURE — 82306 VITAMIN D 25 HYDROXY: CPT

## 2024-05-01 PROCEDURE — 36415 COLL VENOUS BLD VENIPUNCTURE: CPT

## 2024-07-12 ENCOUNTER — LAB (OUTPATIENT)
Dept: LAB | Facility: HOSPITAL | Age: 79
End: 2024-07-12
Payer: MEDICARE

## 2024-07-12 ENCOUNTER — TRANSCRIBE ORDERS (OUTPATIENT)
Dept: ADMINISTRATIVE | Facility: HOSPITAL | Age: 79
End: 2024-07-12
Payer: MEDICARE

## 2024-07-12 DIAGNOSIS — M10.9 GOUT, UNSPECIFIED CAUSE, UNSPECIFIED CHRONICITY, UNSPECIFIED SITE: ICD-10-CM

## 2024-07-12 DIAGNOSIS — E11.21 TYPE 2 DIABETES MELLITUS WITH DIABETIC NEPHROPATHY, UNSPECIFIED WHETHER LONG TERM INSULIN USE: ICD-10-CM

## 2024-07-12 DIAGNOSIS — M10.9 GOUT, UNSPECIFIED CAUSE, UNSPECIFIED CHRONICITY, UNSPECIFIED SITE: Primary | ICD-10-CM

## 2024-07-12 DIAGNOSIS — N18.4 CHRONIC KIDNEY DISEASE, STAGE IV (SEVERE): ICD-10-CM

## 2024-07-12 LAB
BASOPHILS # BLD AUTO: 0.03 10*3/MM3 (ref 0–0.2)
BASOPHILS NFR BLD AUTO: 0.5 % (ref 0–1.5)
BILIRUB UR QL STRIP: NEGATIVE
CLARITY UR: CLEAR
COLOR UR: YELLOW
CREAT UR-MCNC: 153.6 MG/DL
DEPRECATED RDW RBC AUTO: 41.9 FL (ref 37–54)
EOSINOPHIL # BLD AUTO: 0.02 10*3/MM3 (ref 0–0.4)
EOSINOPHIL NFR BLD AUTO: 0.3 % (ref 0.3–6.2)
ERYTHROCYTE [DISTWIDTH] IN BLOOD BY AUTOMATED COUNT: 12.2 % (ref 12.3–15.4)
GLUCOSE UR STRIP-MCNC: ABNORMAL MG/DL
HBA1C MFR BLD: 6.4 % (ref 4.8–5.6)
HCT VFR BLD AUTO: 42 % (ref 37.5–51)
HGB BLD-MCNC: 14 G/DL (ref 13–17.7)
HGB UR QL STRIP.AUTO: NEGATIVE
IMM GRANULOCYTES # BLD AUTO: 0.02 10*3/MM3 (ref 0–0.05)
IMM GRANULOCYTES NFR BLD AUTO: 0.3 % (ref 0–0.5)
KETONES UR QL STRIP: NEGATIVE
LEUKOCYTE ESTERASE UR QL STRIP.AUTO: NEGATIVE
LYMPHOCYTES # BLD AUTO: 2.83 10*3/MM3 (ref 0.7–3.1)
LYMPHOCYTES NFR BLD AUTO: 44.4 % (ref 19.6–45.3)
MCH RBC QN AUTO: 31 PG (ref 26.6–33)
MCHC RBC AUTO-ENTMCNC: 33.3 G/DL (ref 31.5–35.7)
MCV RBC AUTO: 93.1 FL (ref 79–97)
MONOCYTES # BLD AUTO: 0.68 10*3/MM3 (ref 0.1–0.9)
MONOCYTES NFR BLD AUTO: 10.7 % (ref 5–12)
NEUTROPHILS NFR BLD AUTO: 2.79 10*3/MM3 (ref 1.7–7)
NEUTROPHILS NFR BLD AUTO: 43.8 % (ref 42.7–76)
NITRITE UR QL STRIP: NEGATIVE
NRBC BLD AUTO-RTO: 0 /100 WBC (ref 0–0.2)
PH UR STRIP.AUTO: 6 [PH] (ref 5–8)
PLATELET # BLD AUTO: 209 10*3/MM3 (ref 140–450)
PMV BLD AUTO: 11.9 FL (ref 6–12)
PROT ?TM UR-MCNC: 11.8 MG/DL
PROT UR QL STRIP: NEGATIVE
PROT/CREAT UR: 0.08 MG/G{CREAT}
RBC # BLD AUTO: 4.51 10*6/MM3 (ref 4.14–5.8)
SP GR UR STRIP: 1.03 (ref 1–1.03)
URATE SERPL-MCNC: 3.9 MG/DL (ref 3.4–7)
UROBILINOGEN UR QL STRIP: ABNORMAL
WBC NRBC COR # BLD AUTO: 6.37 10*3/MM3 (ref 3.4–10.8)

## 2024-07-12 PROCEDURE — 84550 ASSAY OF BLOOD/URIC ACID: CPT

## 2024-07-12 PROCEDURE — 83036 HEMOGLOBIN GLYCOSYLATED A1C: CPT

## 2024-07-12 PROCEDURE — 84156 ASSAY OF PROTEIN URINE: CPT

## 2024-07-12 PROCEDURE — 85025 COMPLETE CBC W/AUTO DIFF WBC: CPT

## 2024-07-12 PROCEDURE — 81003 URINALYSIS AUTO W/O SCOPE: CPT

## 2024-07-12 PROCEDURE — 36415 COLL VENOUS BLD VENIPUNCTURE: CPT

## 2024-07-12 PROCEDURE — 82570 ASSAY OF URINE CREATININE: CPT

## 2024-08-22 ENCOUNTER — OFFICE VISIT (OUTPATIENT)
Dept: PODIATRY | Facility: CLINIC | Age: 79
End: 2024-08-22
Payer: MEDICARE

## 2024-08-22 VITALS
DIASTOLIC BLOOD PRESSURE: 86 MMHG | HEART RATE: 68 BPM | WEIGHT: 221 LBS | OXYGEN SATURATION: 98 % | SYSTOLIC BLOOD PRESSURE: 144 MMHG | BODY MASS INDEX: 35.67 KG/M2 | TEMPERATURE: 98.4 F

## 2024-08-22 DIAGNOSIS — E11.8 DIABETIC FOOT: ICD-10-CM

## 2024-08-22 DIAGNOSIS — B35.1 ONYCHOMYCOSIS: ICD-10-CM

## 2024-08-22 DIAGNOSIS — M79.671 FOOT PAIN, BILATERAL: Primary | ICD-10-CM

## 2024-08-22 DIAGNOSIS — E11.9 NON-INSULIN DEPENDENT TYPE 2 DIABETES MELLITUS: ICD-10-CM

## 2024-08-22 DIAGNOSIS — L60.0 ONYCHOCRYPTOSIS: ICD-10-CM

## 2024-08-22 DIAGNOSIS — Z79.4 TYPE 2 DIABETES MELLITUS WITH DIABETIC POLYNEUROPATHY, WITH LONG-TERM CURRENT USE OF INSULIN: ICD-10-CM

## 2024-08-22 DIAGNOSIS — M79.672 FOOT PAIN, BILATERAL: Primary | ICD-10-CM

## 2024-08-22 DIAGNOSIS — G62.9 NEUROPATHY: ICD-10-CM

## 2024-08-22 DIAGNOSIS — E11.42 TYPE 2 DIABETES MELLITUS WITH DIABETIC POLYNEUROPATHY, WITH LONG-TERM CURRENT USE OF INSULIN: ICD-10-CM

## 2024-08-22 RX ORDER — EMPAGLIFLOZIN 25 MG/1
TABLET, FILM COATED ORAL
COMMUNITY
Start: 2024-05-21

## 2024-08-22 RX ORDER — OMEPRAZOLE 20 MG/1
20 CAPSULE, DELAYED RELEASE ORAL
COMMUNITY
Start: 2024-05-21

## 2024-08-22 NOTE — PROGRESS NOTES
[facility] - PODIATRY    Today's Date: 08/22/24    Patient Name: Joon Zhao  MRN: [id number]  CSN: [id number]  PCP: Brian Henson MD, Last PCP Visit: 8/20/2024  Referring Provider: No ref. provider found    SUBJECTIVE     Chief Complaint   Patient presents with    Left Foot - Follow-up, Nail Problem    Right Foot - Follow-up, Nail Problem     HPI: Joon Zhao, a 78 y.o.male, comes to clinic.    New, Established, New Problem:  established     Location:  Toenails    Duration:   Greater than five years    Onset:  Gradual    Nature:  sore with palpation.    Stable, worsening, improving:   Stable    Aggravating factors:  Pain with shoe gear and ambulation.    Previous Treatment:  debridement    Patient reported last blood glucose:  137  __________________________________    Medical changes: none    Patient denies any fevers, chills, nausea, vomiting, shortness of breathe, nor any other constitutional signs nor symptoms.       I have reviewed/confirmed previously documented HPI with no changes.   Past Medical History:   Diagnosis Date    Anemia     DENIES ANY CURRENT ISSUES    Arthritis     Back pain     CHRONIC NECK AND BACK PAIN    BPH (benign prostatic hyperplasia) 03/02/2015    BPH with urinary obstruction 06/01/2018    Chest pain     CKD (chronic kidney disease), stage III     Controlled type 2 diabetes mellitus with diabetic polyneuropathy 07/05/2017    Dizziness 08/10/2017    DVT (deep venous thrombosis)     Erectile dysfunction 2007    Foot pain, bilateral 03/12/2018    GERD (gastroesophageal reflux disease)     Glaucoma (increased eye pressure)     High cholesterol     Hyperlipidemia     Hypertension     Ingrowing nail 07/05/2017    Pain in both feet     PE (pulmonary thromboembolism)     Polyneuropathy 07/05/2017    Shortness of breath     Sleep apnea     Tinea unguium 07/05/2017     Past Surgical History:   Procedure Laterality Date    ADRENAL GLAND SURGERY      CARDIAC  CATHETERIZATION N/A 11/16/2023    Procedure: Left Heart Cath with possible angioplasty;  Surgeon: Trip Mirza MD;  Location: [facility] CATH INVASIVE LOCATION;  Service: Cardiovascular;  Laterality: N/A;    COLONOSCOPY      CYSTOSCOPY      ENDOSCOPY N/A 1/2/2024    Procedure: ESOPHAGOGASTRODUODENOSCOPY WITH BIOPSIES;  Surgeon: Hosea Moreau MD;  Location: [facility] ENDOSCOPY;  Service: Gastroenterology;  Laterality: N/A;  HIATAL HERNIA    KIDNEY SURGERY Left     KIDNEY BIOSPY    PROSTATE SURGERY  12/2007    THYROID BIOPSY      TURP / TRANSURETHRAL INCISION / DRAINAGE PROSTATE  06/2019    UPPER GASTROINTESTINAL ENDOSCOPY       Family History   Problem Relation Age of Onset    Hypertension Mother     Heart disease Mother     Stroke Mother     Hypertension Sister     Heart disease Sister     Stroke Sister     Diabetes Brother     Diabetes Son     Diabetes Maternal Grandmother     Diabetes Other     Cancer Neg Hx     Colon cancer Neg Hx     Malig Hyperthermia Neg Hx      Social History     Socioeconomic History    Marital status:    Tobacco Use    Smoking status: Never     Passive exposure: Never    Smokeless tobacco: Never   Vaping Use    Vaping status: Never Used   Substance and Sexual Activity    Alcohol use: Yes     Comment: rare    Drug use: Never    Sexual activity: Defer     Allergies   Allergen Reactions    Ezetimibe Unknown - High Severity and Other (See Comments)    Statins Other (See Comments) and Unknown - High Severity     UNSURE OF REACTION    Atorvastatin Hives    Hydrochlorothiazide Hives    Simvastatin Hives    Colesevelam Other (See Comments)     Other Reaction(s): Generalized aches and pains    Doxazosin Other (See Comments)     Other Reaction(s): Unknown      pt states shortnes of breath    Hydrochlorothiazide W-Triamterene Rash     HCTZ     Current Outpatient Medications   Medication Sig Dispense Refill    acetaminophen-codeine (TYLENOL #3) 300-30 MG per tablet Take 1 tablet by mouth  Every 4 (Four) Hours As Needed for Moderate Pain.      albuterol sulfate  (90 Base) MCG/ACT inhaler Inhale 2 puffs Every 6 (Six) Hours As Needed.      Alcohol Swabs (Easy Touch Alcohol Prep Medium) 70 % pads       allopurinol (ZYLOPRIM) 100 MG tablet Take 1 tablet by mouth Daily.      amLODIPine (NORVASC) 5 MG tablet Take 1 tablet by mouth Daily. 90 tablet 3    apixaban (ELIQUIS) 5 MG tablet tablet Take 1 tablet by mouth 2 (Two) Times a Day. REPORTS HAD INJECTION AT PAIN MANAGEMENT ON 11/13/23 AND THAT HIS LAST DOSE OF ELIQUIS WAS 11/9/23 PM DOSE      aspirin 81 MG EC tablet REPORTS THAT LAST DOSE OF ASA WAS 11/7/23 HAD STOPPED IN PREPARATION FOR INJECTION AT PAIN MANAGEMENT ON 11/13/23      B-D UF III MINI PEN NEEDLES 31G X 5 MM misc       baclofen (LIORESAL) 10 MG tablet Take 1 tablet by mouth 2 (Two) Times a Day.      cetirizine (zyrTEC) 10 MG tablet Take 1 tablet by mouth Daily.      cholecalciferol (VITAMIN D3) 25 MCG (1000 UT) tablet Take 1 tablet by mouth Daily.      coenzyme Q10 100 MG capsule Take 1 capsule by mouth Daily.      EPINEPHrine (EPIPEN) 0.3 MG/0.3ML solution auto-injector injection       fluticasone (FLONASE) 50 MCG/ACT nasal spray 2 spray(s) in each nostril daily      FREESTYLE LITE test strip       furosemide (LASIX) 40 MG tablet Take 1 tablet by mouth Daily. *has instructions from neprho to titrate based on weight      gabapentin (NEURONTIN) 800 MG tablet Take 1 tablet by mouth 3 (Three) Times a Day.      HumaLOG KwikPen 100 UNIT/ML solution pen-injector       ipratropium (ATROVENT) 0.03 % nasal spray 2 sprays into the nostril(s) as directed by provider Every 6 (Six) Hours As Needed. 2 sprays each nostril q 6 hrs      Jardiance 25 MG tablet tablet       Lactobacillus (Acidophilus) 100 MG capsule Take  by mouth Daily.      Lancets (freestyle) lancets       Lantus SoloStar 100 UNIT/ML injection pen Inject 35 Units under the skin into the appropriate area as directed 2 (Two) Times a Day.       latanoprost (XALATAN) 0.005 % ophthalmic solution Administer 1 drop to both eyes Every Night.      losartan (COZAAR) 50 MG tablet Take 0.5 tablets by mouth Daily.      Narcan 4 MG/0.1ML nasal spray       omeprazole (priLOSEC) 20 MG capsule 1 capsule.      rOPINIRole (REQUIP) 0.25 MG tablet Take 1 tablet by mouth every night at bedtime.      tiotropium bromide-olodaterol (Stiolto Respimat) 2.5-2.5 MCG/ACT aerosol solution inhaler Inhale 2 puffs Daily. 3 each 3     No current facility-administered medications for this visit.     Review of Systems   Constitutional: Negative.    Skin:         Painful toenails   All other systems reviewed and are negative.      OBJECTIVE     Vitals:    08/22/24 0753   BP: 144/86   Pulse: 68   Temp: 98.4 °F (36.9 °C)   SpO2: 98%       Lab Results   Component Value Date    HGBA1C 6.40 (H) 07/12/2024       Lab Results   Component Value Date    GLUCOSE 96 05/01/2024    CALCIUM 9.8 05/01/2024     05/01/2024    K 4.6 05/01/2024    CO2 29.0 05/01/2024     05/01/2024    BUN 36 (H) 05/01/2024    CREATININE 2.44 (H) 05/01/2024    EGFRIFAFRI 45 (L) 09/15/2021    BCR 14.8 05/01/2024    ANIONGAP 9.0 05/01/2024       Patient seen in no apparent distress.      PHYSICAL EXAM:     Foot/Ankle Exam    GENERAL  Appearance:  elderly  Orientation:  AAOx3  Affect:  appropriate  Gait:  unimpaired  Assistance:  independent  Right shoe gear: casual shoe  Left shoe gear: casual shoe    VASCULAR     Right Foot Vascularity   Normal vascular exam    Dorsalis pedis:  2+  Posterior tibial:  2+  Skin temperature:  warm  Edema grading:  None  CFT:  < 3 seconds  Pedal hair growth:  Present  Varicosities:  none     Left Foot Vascularity   Normal vascular exam    Dorsalis pedis:  2+  Posterior tibial:  2+  Skin temperature:  warm  Edema grading:  None  CFT:  < 3 seconds  Pedal hair growth:  Present  Varicosities:  none     NEUROLOGIC     Right Foot Neurologic   Light touch sensation: absent  Vibratory  sensation: absent  Hot/Cold sensation: absent  Protective Sensation using [place] Monofilament:   Sites tested: 10     Left Foot Neurologic   Light touch sensation: absent  Vibratory sensation: absent  Hot/Cold sensation:  absent  Protective Sensation using [place] Monofilament:   Sites tested: 10    MUSCULOSKELETAL     Right Foot Musculoskeletal   Arch:  Normal     Left Foot Musculoskeletal   Arch:  Normal    MUSCLE STRENGTH     Right Foot Muscle Strength   Foot dorsiflexion:  4  Foot plantar flexion:  4  Foot inversion:  4  Foot eversion:  4     Left Foot Muscle Strength   Foot dorsiflexion:  4  Foot plantar flexion:  4  Foot inversion:  4  Foot eversion:  4    RANGE OF MOTION     Right Foot Range of Motion   Foot and ankle ROM within normal limits       Left Foot Range of Motion   Foot and ankle ROM within normal limits      DERMATOLOGIC      Right Foot Dermatologic   Skin  Right foot skin is intact.   Nails  1.  Positive for elongated, onychomycosis, abnormal thickness, subungual debris and ingrown toenail.  2.  Positive for elongated, onychomycosis, abnormal thickness, subungual debris and ingrown toenail.  3.  Positive for elongated, onychomycosis, abnormal thickness, subungual debris and ingrown toenail.  4.  Positive for elongated, onychomycosis, abnormal thickness, subungual debris and ingrown toenail.  Nails comment:  Toenails 1 through 4     Left Foot Dermatologic   Skin  Left foot skin is intact.   Nails comment:  Toenails 1 through 3  Nails  1.  Positive for elongated, onychomycosis, abnormal thickness, subungual debris and ingrown toenail.  2.  Positive for elongated, onychomycosis, abnormal thickness, subungual debris and ingrown toenail.  3.  Positive for elongated, onychomycosis, abnormal thickness, subungual debris and ingrown toenail.    I have reexamined the patient the results are consistent with the previously documented exam.    ASSESSMENT/PLAN     Diagnoses and all orders  for this visit:    1. Foot pain, bilateral (Primary)    2. Diabetic foot    3. Neuropathy    4. Onychocryptosis    5. Non-insulin dependent type 2 diabetes mellitus    6. Onychomycosis    7. Type 2 diabetes mellitus with diabetic polyneuropathy, with long-term current use of insulin    Comprehensive lower extremity examination and evaluation was performed.    Discussed findings and treatment plan including risks, benefits, and treatment options with patient in detail. Patient agreed with treatment plan.    Toenails 1 through 4 on right and toenails 1 through 3 on the left were debrided in thickness and length and then smoothed with a Dremel Tool.  Tolerated the procedure well without complications.    An After Visit Summary was printed and given to the patient at discharge, including (if requested) any available informative/educational handouts regarding diagnosis, treatment, or medications. All questions were answered to patient/family satisfaction. Should symptoms fail to improve or worsen they agree to call or return to clinic or to go to the Emergency Department. Discussed the importance of following up with any needed screening tests/labs/specialist appointments and any requested follow-up recommended by me today. Importance of maintaining follow-up discussed and patient accepts that missed appointments can delay diagnosis and potentially lead to worsening of conditions.    Return in about 9 weeks (around 10/24/2024) for Toenail Care., or sooner if acute issues arise.    I have reviewed the assessment and plan and verified the accuracy of it. No changes to assessment and plan since the information was documented. Carl Payne DPM 08/22/24     I have dictated this note utilizing Dragon Dictation.  Please note that portions of this note were completed with a voice recognition program.  Part of this note may be an electronic transcription/translation of spoken language to printed text using the Dragon  Dictation System.      This document has been electronically signed by Carl Payne DPM on August 22, 2024 08:06 EDT

## 2024-09-05 ENCOUNTER — OFFICE VISIT (OUTPATIENT)
Dept: PULMONOLOGY | Facility: CLINIC | Age: 79
End: 2024-09-05
Payer: MEDICARE

## 2024-09-05 VITALS
WEIGHT: 213.4 LBS | SYSTOLIC BLOOD PRESSURE: 138 MMHG | DIASTOLIC BLOOD PRESSURE: 82 MMHG | RESPIRATION RATE: 16 BRPM | HEIGHT: 66 IN | OXYGEN SATURATION: 97 % | BODY MASS INDEX: 34.3 KG/M2 | HEART RATE: 65 BPM | TEMPERATURE: 97.6 F

## 2024-09-05 DIAGNOSIS — J30.9 ALLERGIC RHINITIS, UNSPECIFIED SEASONALITY, UNSPECIFIED TRIGGER: ICD-10-CM

## 2024-09-05 DIAGNOSIS — G47.33 OSA ON CPAP: Chronic | ICD-10-CM

## 2024-09-05 DIAGNOSIS — J43.2 CENTRILOBULAR EMPHYSEMA: Primary | ICD-10-CM

## 2024-09-05 DIAGNOSIS — R05.3 CHRONIC COUGH: ICD-10-CM

## 2024-09-05 DIAGNOSIS — J30.2 SEASONAL ALLERGIES: ICD-10-CM

## 2024-09-05 DIAGNOSIS — R06.09 DYSPNEA ON EXERTION: ICD-10-CM

## 2024-09-05 PROCEDURE — 99214 OFFICE O/P EST MOD 30 MIN: CPT | Performed by: NURSE PRACTITIONER

## 2024-09-05 PROCEDURE — 1159F MED LIST DOCD IN RCRD: CPT | Performed by: NURSE PRACTITIONER

## 2024-09-05 PROCEDURE — 1160F RVW MEDS BY RX/DR IN RCRD: CPT | Performed by: NURSE PRACTITIONER

## 2024-09-05 PROCEDURE — 3079F DIAST BP 80-89 MM HG: CPT | Performed by: NURSE PRACTITIONER

## 2024-09-05 PROCEDURE — 3075F SYST BP GE 130 - 139MM HG: CPT | Performed by: NURSE PRACTITIONER

## 2024-09-05 RX ORDER — BEMPEDOIC ACID 180 MG/1
TABLET, FILM COATED ORAL
COMMUNITY

## 2024-09-05 RX ORDER — TIOTROPIUM BROMIDE AND OLODATEROL 3.124; 2.736 UG/1; UG/1
2 SPRAY, METERED RESPIRATORY (INHALATION)
Qty: 3 EACH | Refills: 3 | Status: SHIPPED | OUTPATIENT
Start: 2024-09-05

## 2024-09-05 RX ORDER — PANTOPRAZOLE SODIUM 40 MG/1
40 TABLET, DELAYED RELEASE ORAL DAILY
COMMUNITY

## 2024-09-05 NOTE — PROGRESS NOTES
Primary Care Provider  Brian Henson MD     Referring Provider  No ref. provider found     Chief Complaint  Emphysema and Follow-up (1 year f/up )    Subjective          Joon Zhao presents to [facility]  History of Present Illness  Joon Zhao is a 78 y.o. male patient of Dr. Cazares for management of obstructive sleep apnea, emphysema, seasonal allergies, allergic rhinitis and dyspnea on exertion.    Patient states he is doing well since last visit.  He denies using any antibiotics or steroids for his lungs.  He denies any current fevers or chills.  His shortness of breath is mild in severity, worse with exertion and improved with rest.  He continues to take Stiolto as prescribed.  He takes Claritin, Flonase and azelastine nasal spray for seasonal allergies and allergic rhinitis. He continues wear his CPAP machine at night and [facility]      His history of smoking is   Tobacco Use: Low Risk  (9/5/2024)    Patient History     Smoking Tobacco Use: Never     Smokeless Tobacco Use: Never     Passive Exposure: Never   .    Review of Systems   Constitutional:  Negative for chills, fatigue, fever, unexpected weight gain and unexpected weight loss.   HENT:  Congestion: Nasal.    Respiratory:  Positive for shortness of breath. Negative for apnea, cough and wheezing.         Negative for Hemoptysis     Cardiovascular:  Negative for chest pain, palpitations and leg swelling.   Skin:         Negative for cyanosis      Sleep: Negative for Excessive daytime sleepiness  Negative for morning headaches  Negative for Snoring    Family History   Problem Relation Age of Onset    Hypertension Mother     Heart disease Mother     Stroke Mother     Hypertension Sister     Heart disease Sister     Stroke Sister     Diabetes Brother     Diabetes Son     Diabetes Maternal Grandmother     Diabetes Other     Cancer Neg Hx     Colon cancer Neg Hx      Malig Hyperthermia Neg Hx         Social History     Socioeconomic History    Marital status:    Tobacco Use    Smoking status: Never     Passive exposure: Never    Smokeless tobacco: Never   Vaping Use    Vaping status: Never Used   Substance and Sexual Activity    Alcohol use: Yes     Comment: rare    Drug use: Never    Sexual activity: Defer        Past Medical History:   Diagnosis Date    Anemia     DENIES ANY CURRENT ISSUES    Arthritis     Back pain     CHRONIC NECK AND BACK PAIN    BPH (benign prostatic hyperplasia) 03/02/2015    BPH with urinary obstruction 06/01/2018    Chest pain     CKD (chronic kidney disease), stage III     Controlled type 2 diabetes mellitus with diabetic polyneuropathy 07/05/2017    Dizziness 08/10/2017    DVT (deep venous thrombosis)     Erectile dysfunction 2007    Foot pain, bilateral 03/12/2018    GERD (gastroesophageal reflux disease)     Glaucoma (increased eye pressure)     High cholesterol     Hyperlipidemia     Hypertension     Ingrowing nail 07/05/2017    Pain in both feet     PE (pulmonary thromboembolism)     Polyneuropathy 07/05/2017    Shortness of breath     Sleep apnea     Tinea unguium 07/05/2017        Immunization History   Administered Date(s) Administered    COVID-19 (MODERNA) 1st,2nd,3rd Dose Monovalent 03/16/2021, 04/13/2021, 07/12/2022, 01/04/2023    COVID-19 (MODERNA) Monovalent Original Booster 12/02/2021    FluMist 2-49yrs 09/16/2022    FluMist 2-49yrs (Nasal) 09/16/2022    Fluad Quad 65+ 09/16/2022, 09/08/2023    Fluzone (or Fluarix & Flulaval for VFC) >6mos 10/01/2019    Fluzone High-Dose 65+YRS 09/15/2020, 10/08/2021    Fluzone High-Dose 65+yrs 09/15/2020, 10/08/2021    Hepatitis B 2 Dose Vaccine Heplisav-B 11/04/2015    Influenza Inj MDCK Preserative Free 11/04/2015    Influenza Seasonal Injectable 11/14/2013, 11/04/2015, 10/01/2016, 10/20/2016, 09/26/2017, 10/12/2018    Influenza, Unspecified 11/01/2002, 10/23/2003, 06/10/2005, 10/01/2005,  12/22/2006, 10/21/2008, 09/28/2009, 12/01/2010, 10/30/2011, 10/01/2013, 09/23/2014, 09/15/2020, 10/08/2021, 09/16/2022    Pneumococcal Conjugate 13-Valent (PCV13) 05/01/2017, 03/20/2020    Pneumococcal Polysaccharide (PPSV23) 05/01/2009, 07/08/2009, 11/25/2014, 09/25/2017    Shingrix 03/20/2020, 09/15/2020    Td, Unspecified 01/01/1996, 01/23/2006    Tdap 04/24/2018    influenza Split 10/29/2007, 12/04/2008, 10/31/2012         Allergies   Allergen Reactions    Ezetimibe Unknown - High Severity and Other (See Comments)    Statins Other (See Comments) and Unknown - High Severity     UNSURE OF REACTION    Atorvastatin Hives    Hydrochlorothiazide Hives    Simvastatin Hives    Colesevelam Other (See Comments)     Other Reaction(s): Generalized aches and pains    Doxazosin Other (See Comments)     Other Reaction(s): Unknown      pt states shortnes of breath    Hydrochlorothiazide W-Triamterene Rash     HCTZ          Current Outpatient Medications:     acetaminophen-codeine (TYLENOL #3) 300-30 MG per tablet, Take 1 tablet by mouth Every 4 (Four) Hours As Needed for Moderate Pain., Disp: , Rfl:     albuterol sulfate  (90 Base) MCG/ACT inhaler, Inhale 2 puffs Every 6 (Six) Hours As Needed., Disp: , Rfl:     Alcohol Swabs (Easy Touch Alcohol Prep Medium) 70 % pads, , Disp: , Rfl:     allopurinol (ZYLOPRIM) 100 MG tablet, Take 1 tablet by mouth Daily., Disp: , Rfl:     amLODIPine (NORVASC) 5 MG tablet, Take 1 tablet by mouth Daily., Disp: 90 tablet, Rfl: 3    apixaban (ELIQUIS) 5 MG tablet tablet, Take 1 tablet by mouth 2 (Two) Times a Day. REPORTS HAD INJECTION AT PAIN MANAGEMENT ON 11/13/23 AND THAT HIS LAST DOSE OF ELIQUIS WAS 11/9/23 PM DOSE, Disp: , Rfl:     aspirin 81 MG EC tablet, REPORTS THAT LAST DOSE OF ASA WAS 11/7/23 HAD STOPPED IN PREPARATION FOR INJECTION AT PAIN MANAGEMENT ON 11/13/23, Disp: , Rfl:     B-D UF III MINI PEN NEEDLES 31G X 5 MM misc, , Disp: , Rfl:     cetirizine (zyrTEC) 10 MG tablet, Take  1 tablet by mouth Daily., Disp: , Rfl:     cholecalciferol (VITAMIN D3) 25 MCG (1000 UT) tablet, Take 1 tablet by mouth Daily., Disp: , Rfl:     coenzyme Q10 100 MG capsule, Take 1 capsule by mouth Daily., Disp: , Rfl:     EPINEPHrine (EPIPEN) 0.3 MG/0.3ML solution auto-injector injection, , Disp: , Rfl:     fluticasone (FLONASE) 50 MCG/ACT nasal spray, 2 spray(s) in each nostril daily, Disp: , Rfl:     FREESTYLE LITE test strip, , Disp: , Rfl:     furosemide (LASIX) 40 MG tablet, Take 1 tablet by mouth Daily. *has instructions from neprho to titrate based on weight, Disp: , Rfl:     gabapentin (NEURONTIN) 800 MG tablet, Take 1 tablet by mouth 3 (Three) Times a Day., Disp: , Rfl:     HumaLOG KwikPen 100 UNIT/ML solution pen-injector, , Disp: , Rfl:     ipratropium (ATROVENT) 0.03 % nasal spray, 2 sprays into the nostril(s) as directed by provider Every 6 (Six) Hours As Needed. 2 sprays each nostril q 6 hrs, Disp: , Rfl:     Jardiance 25 MG tablet tablet, , Disp: , Rfl:     Lactobacillus (Acidophilus) 100 MG capsule, Take  by mouth Daily., Disp: , Rfl:     Lancets (freestyle) lancets, , Disp: , Rfl:     Lantus SoloStar 100 UNIT/ML injection pen, Inject 35 Units under the skin into the appropriate area as directed 2 (Two) Times a Day., Disp: , Rfl:     latanoprost (XALATAN) 0.005 % ophthalmic solution, Administer 1 drop to both eyes Every Night., Disp: , Rfl:     losartan (COZAAR) 50 MG tablet, Take 0.5 tablets by mouth Daily., Disp: , Rfl:     Narcan 4 MG/0.1ML nasal spray, , Disp: , Rfl:     Nexletol 180 MG tablet, , Disp: , Rfl:     pantoprazole (PROTONIX) 40 MG EC tablet, Take 1 tablet by mouth Daily., Disp: , Rfl:     rOPINIRole (REQUIP) 0.25 MG tablet, Take 1 tablet by mouth every night at bedtime., Disp: , Rfl:     tiotropium bromide-olodaterol (Stiolto Respimat) 2.5-2.5 MCG/ACT aerosol solution inhaler, Inhale 2 puffs Daily., Disp: 3 each, Rfl: 3    baclofen (LIORESAL) 10 MG tablet, Take 1 tablet by mouth 2  "(Two) Times a Day. (Patient not taking: Reported on 9/5/2024), Disp: , Rfl:     omeprazole (priLOSEC) 20 MG capsule, 1 capsule. (Patient not taking: Reported on 9/5/2024), Disp: , Rfl:      Objective   Physical Exam  Constitutional:       General: He is not in acute distress.     Appearance: Normal appearance. He is normal weight.   HENT:      Right Ear: Hearing normal.      Left Ear: Hearing normal.      Nose: No nasal tenderness or congestion.      Mouth/Throat:      Mouth: Mucous membranes are moist. No oral lesions.   Eyes:      Extraocular Movements: Extraocular movements intact.      Pupils: Pupils are equal, round, and reactive to light.   Cardiovascular:      Rate and Rhythm: Normal rate and regular rhythm.      Pulses: Normal pulses.      Heart sounds: Normal heart sounds. No murmur heard.  Pulmonary:      Effort: Pulmonary effort is normal.      Breath sounds: Normal breath sounds. No wheezing, rhonchi or rales.   Musculoskeletal:      Right lower leg: No edema.      Left lower leg: No edema.   Skin:     General: Skin is warm and dry.      Findings: No lesion or rash.   Neurological:      General: No focal deficit present.      Mental Status: He is alert and oriented to person, place, and time.   Psychiatric:         Mood and Affect: Affect normal. Mood is not anxious or depressed.         Vital Signs:   /82 (BP Location: Right arm, Patient Position: Sitting, Cuff Size: Large Adult)   Pulse 65   Temp 97.6 °F (36.4 °C) (Oral)   Resp 16   Ht 167.6 cm (66\")   Wt 96.8 kg (213 lb 6.4 oz)   SpO2 97% Comment: RA  BMI 34.44 kg/m²        Result Review :   The following data was reviewed by: ROSALIA Inman on 09/05/2024:  CMP          3/22/2024    08:35 4/1/2024    06:53 5/1/2024    08:49   CMP   Glucose 198  162  96    BUN 75  45  36    Creatinine 3.08  2.22  2.44    EGFR 20.0  29.6  26.4    Sodium 135  135  141    Potassium 3.9  4.6  4.6    Chloride 91  97  103    Calcium 9.9  10.1  9.8    Total " Protein 7.6  7.4  7.5    Albumin 4.2  4.1  4.4    Globulin 3.4  3.3  3.1    Total Bilirubin 0.5  0.3  0.5    Alkaline Phosphatase 83  63  58    AST (SGOT) 28  26  26    ALT (SGPT) 21  18  22    Albumin/Globulin Ratio 1.2  1.2  1.4    BUN/Creatinine Ratio 24.4  20.3  14.8    Anion Gap 14.5  12.1  9.0      CBC w/diff          2/29/2024    08:26 5/1/2024    08:49 7/12/2024    06:12   CBC w/Diff   WBC 4.80  4.75  6.37    RBC 4.53  4.32  4.51    Hemoglobin 13.9  13.3  14.0    Hematocrit 42.3  40.3  42.0    MCV 93.4  93.3  93.1    MCH 30.7  30.8  31.0    MCHC 32.9  33.0  33.3    RDW 12.2  12.6  12.2    Platelets 201  165  209    Neutrophil Rel % 37.4  42.3  43.8    Immature Granulocyte Rel % 0.4  0.0  0.3    Lymphocyte Rel % 47.7  48.0  44.4    Monocyte Rel % 12.7  8.0  10.7    Eosinophil Rel % 1.0  1.1  0.3    Basophil Rel % 0.8  0.6  0.5      Data reviewed : Radiologic studies chest CT 8/7/2023 and my last office note    Procedures        Assessment and Plan    Diagnoses and all orders for this visit:    1. Centrilobular emphysema (Primary)  -     tiotropium bromide-olodaterol (Stiolto Respimat) 2.5-2.5 MCG/ACT aerosol solution inhaler; Inhale 2 puffs Daily.  Dispense: 3 each; Refill: 3    2. Dyspnea on exertion  Comments:  continue albuterol as needed    3. GRAY on CPAP  Comments:  [facility]    4. Seasonal allergies  Comments:  continue Claritin, Flonase and azelastine nasal spray    5. Allergic rhinitis, unspecified seasonality, unspecified trigger  Comments:  continue Claritin, Flonase and azelastine nasal spray    6. Chronic cough  -     tiotropium bromide-olodaterol (Stiolto Respimat) 2.5-2.5 MCG/ACT aerosol solution inhaler; Inhale 2 puffs Daily.  Dispense: 3 each; Refill: 3          Follow Up   Return in about 1 year (around 9/5/2025) for Recheck.  Patient was given instructions and counseling regarding his condition or for health maintenance advice. Please see specific information pulled into the AVS if  appropriate.

## 2024-09-13 ENCOUNTER — LAB (OUTPATIENT)
Dept: LAB | Facility: HOSPITAL | Age: 79
End: 2024-09-13
Payer: MEDICARE

## 2024-09-13 ENCOUNTER — TRANSCRIBE ORDERS (OUTPATIENT)
Dept: ADMINISTRATIVE | Facility: HOSPITAL | Age: 79
End: 2024-09-13
Payer: MEDICARE

## 2024-09-13 DIAGNOSIS — I10 PRIMARY HYPERTENSION: ICD-10-CM

## 2024-09-13 DIAGNOSIS — N18.30 STAGE 3 CHRONIC KIDNEY DISEASE, UNSPECIFIED WHETHER STAGE 3A OR 3B CKD: Primary | ICD-10-CM

## 2024-09-13 DIAGNOSIS — E21.3 HYPERPARATHYROIDISM: ICD-10-CM

## 2024-09-13 DIAGNOSIS — N18.30 STAGE 3 CHRONIC KIDNEY DISEASE, UNSPECIFIED WHETHER STAGE 3A OR 3B CKD: ICD-10-CM

## 2024-09-13 DIAGNOSIS — E11.9 DIABETES MELLITUS WITHOUT COMPLICATION: ICD-10-CM

## 2024-09-13 LAB
25(OH)D3 SERPL-MCNC: 38.1 NG/ML (ref 30–100)
ALBUMIN SERPL-MCNC: 4.3 G/DL (ref 3.5–5.2)
ALBUMIN/GLOB SERPL: 1.4 G/DL
ALP SERPL-CCNC: 68 U/L (ref 39–117)
ALT SERPL W P-5'-P-CCNC: 17 U/L (ref 1–41)
ANION GAP SERPL CALCULATED.3IONS-SCNC: 9.7 MMOL/L (ref 5–15)
AST SERPL-CCNC: 30 U/L (ref 1–40)
BASOPHILS # BLD AUTO: 0.03 10*3/MM3 (ref 0–0.2)
BASOPHILS NFR BLD AUTO: 0.8 % (ref 0–1.5)
BILIRUB SERPL-MCNC: 0.5 MG/DL (ref 0–1.2)
BUN SERPL-MCNC: 33 MG/DL (ref 8–23)
BUN/CREAT SERPL: 14.5 (ref 7–25)
CALCIUM SPEC-SCNC: 9.5 MG/DL (ref 8.6–10.5)
CHLORIDE SERPL-SCNC: 106 MMOL/L (ref 98–107)
CO2 SERPL-SCNC: 25.3 MMOL/L (ref 22–29)
CREAT SERPL-MCNC: 2.28 MG/DL (ref 0.76–1.27)
DEPRECATED RDW RBC AUTO: 46.5 FL (ref 37–54)
EGFRCR SERPLBLD CKD-EPI 2021: 28.7 ML/MIN/1.73
EOSINOPHIL # BLD AUTO: 0.05 10*3/MM3 (ref 0–0.4)
EOSINOPHIL NFR BLD AUTO: 1.3 % (ref 0.3–6.2)
ERYTHROCYTE [DISTWIDTH] IN BLOOD BY AUTOMATED COUNT: 13.2 % (ref 12.3–15.4)
GLOBULIN UR ELPH-MCNC: 3 GM/DL
GLUCOSE SERPL-MCNC: 156 MG/DL (ref 65–99)
HCT VFR BLD AUTO: 43.6 % (ref 37.5–51)
HGB BLD-MCNC: 14.3 G/DL (ref 13–17.7)
IMM GRANULOCYTES # BLD AUTO: 0.01 10*3/MM3 (ref 0–0.05)
IMM GRANULOCYTES NFR BLD AUTO: 0.3 % (ref 0–0.5)
LYMPHOCYTES # BLD AUTO: 2.03 10*3/MM3 (ref 0.7–3.1)
LYMPHOCYTES NFR BLD AUTO: 51.8 % (ref 19.6–45.3)
MCH RBC QN AUTO: 31.4 PG (ref 26.6–33)
MCHC RBC AUTO-ENTMCNC: 32.8 G/DL (ref 31.5–35.7)
MCV RBC AUTO: 95.6 FL (ref 79–97)
MONOCYTES # BLD AUTO: 0.55 10*3/MM3 (ref 0.1–0.9)
MONOCYTES NFR BLD AUTO: 14 % (ref 5–12)
NEUTROPHILS NFR BLD AUTO: 1.25 10*3/MM3 (ref 1.7–7)
NEUTROPHILS NFR BLD AUTO: 31.8 % (ref 42.7–76)
NRBC BLD AUTO-RTO: 0 /100 WBC (ref 0–0.2)
PHOSPHATE SERPL-MCNC: 3.1 MG/DL (ref 2.5–4.5)
PLATELET # BLD AUTO: 170 10*3/MM3 (ref 140–450)
PMV BLD AUTO: 11.9 FL (ref 6–12)
POTASSIUM SERPL-SCNC: 4.4 MMOL/L (ref 3.5–5.2)
PROT SERPL-MCNC: 7.3 G/DL (ref 6–8.5)
PTH-INTACT SERPL-MCNC: 69.8 PG/ML (ref 15–65)
RBC # BLD AUTO: 4.56 10*6/MM3 (ref 4.14–5.8)
SODIUM SERPL-SCNC: 141 MMOL/L (ref 136–145)
WBC NRBC COR # BLD AUTO: 3.92 10*3/MM3 (ref 3.4–10.8)

## 2024-09-13 PROCEDURE — 36415 COLL VENOUS BLD VENIPUNCTURE: CPT

## 2024-09-13 PROCEDURE — 85025 COMPLETE CBC W/AUTO DIFF WBC: CPT

## 2024-09-13 PROCEDURE — 84100 ASSAY OF PHOSPHORUS: CPT

## 2024-09-13 PROCEDURE — 83970 ASSAY OF PARATHORMONE: CPT

## 2024-09-13 PROCEDURE — 82306 VITAMIN D 25 HYDROXY: CPT

## 2024-09-13 PROCEDURE — 80053 COMPREHEN METABOLIC PANEL: CPT

## 2024-10-10 ENCOUNTER — LAB (OUTPATIENT)
Dept: LAB | Facility: HOSPITAL | Age: 79
End: 2024-10-10
Payer: MEDICARE

## 2024-10-10 ENCOUNTER — TRANSCRIBE ORDERS (OUTPATIENT)
Dept: ADMINISTRATIVE | Facility: HOSPITAL | Age: 79
End: 2024-10-10
Payer: MEDICARE

## 2024-10-10 DIAGNOSIS — N18.30 STAGE 3 CHRONIC KIDNEY DISEASE, UNSPECIFIED WHETHER STAGE 3A OR 3B CKD: Primary | ICD-10-CM

## 2024-10-10 DIAGNOSIS — I10 ESSENTIAL (PRIMARY) HYPERTENSION: ICD-10-CM

## 2024-10-10 DIAGNOSIS — N18.30 STAGE 3 CHRONIC KIDNEY DISEASE, UNSPECIFIED WHETHER STAGE 3A OR 3B CKD: ICD-10-CM

## 2024-10-10 DIAGNOSIS — E11.9 DIABETES MELLITUS WITHOUT COMPLICATION: ICD-10-CM

## 2024-10-10 LAB
ALBUMIN SERPL-MCNC: 4.3 G/DL (ref 3.5–5.2)
ALBUMIN/GLOB SERPL: 1.5 G/DL
ALP SERPL-CCNC: 73 U/L (ref 39–117)
ALT SERPL W P-5'-P-CCNC: 13 U/L (ref 1–41)
ANION GAP SERPL CALCULATED.3IONS-SCNC: 8 MMOL/L (ref 5–15)
AST SERPL-CCNC: 19 U/L (ref 1–40)
BASOPHILS # BLD AUTO: 0.03 10*3/MM3 (ref 0–0.2)
BASOPHILS NFR BLD AUTO: 0.6 % (ref 0–1.5)
BILIRUB SERPL-MCNC: 0.8 MG/DL (ref 0–1.2)
BUN SERPL-MCNC: 32 MG/DL (ref 8–23)
BUN/CREAT SERPL: 16.4 (ref 7–25)
CALCIUM SPEC-SCNC: 10 MG/DL (ref 8.6–10.5)
CHLORIDE SERPL-SCNC: 100 MMOL/L (ref 98–107)
CO2 SERPL-SCNC: 29 MMOL/L (ref 22–29)
CREAT SERPL-MCNC: 1.95 MG/DL (ref 0.76–1.27)
DEPRECATED RDW RBC AUTO: 43.1 FL (ref 37–54)
EGFRCR SERPLBLD CKD-EPI 2021: 34.6 ML/MIN/1.73
EOSINOPHIL # BLD AUTO: 0.04 10*3/MM3 (ref 0–0.4)
EOSINOPHIL NFR BLD AUTO: 0.8 % (ref 0.3–6.2)
ERYTHROCYTE [DISTWIDTH] IN BLOOD BY AUTOMATED COUNT: 12.6 % (ref 12.3–15.4)
GLOBULIN UR ELPH-MCNC: 2.9 GM/DL
GLUCOSE SERPL-MCNC: 107 MG/DL (ref 65–99)
HBA1C MFR BLD: 6.7 % (ref 4.8–5.6)
HCT VFR BLD AUTO: 44.6 % (ref 37.5–51)
HGB BLD-MCNC: 14.7 G/DL (ref 13–17.7)
IMM GRANULOCYTES # BLD AUTO: 0.02 10*3/MM3 (ref 0–0.05)
IMM GRANULOCYTES NFR BLD AUTO: 0.4 % (ref 0–0.5)
LYMPHOCYTES # BLD AUTO: 2.55 10*3/MM3 (ref 0.7–3.1)
LYMPHOCYTES NFR BLD AUTO: 50.2 % (ref 19.6–45.3)
MCH RBC QN AUTO: 31.1 PG (ref 26.6–33)
MCHC RBC AUTO-ENTMCNC: 33 G/DL (ref 31.5–35.7)
MCV RBC AUTO: 94.3 FL (ref 79–97)
MONOCYTES # BLD AUTO: 0.56 10*3/MM3 (ref 0.1–0.9)
MONOCYTES NFR BLD AUTO: 11 % (ref 5–12)
NEUTROPHILS NFR BLD AUTO: 1.88 10*3/MM3 (ref 1.7–7)
NEUTROPHILS NFR BLD AUTO: 37 % (ref 42.7–76)
NRBC BLD AUTO-RTO: 0 /100 WBC (ref 0–0.2)
PLATELET # BLD AUTO: 190 10*3/MM3 (ref 140–450)
PMV BLD AUTO: 11.9 FL (ref 6–12)
POTASSIUM SERPL-SCNC: 4.6 MMOL/L (ref 3.5–5.2)
PROT SERPL-MCNC: 7.2 G/DL (ref 6–8.5)
RBC # BLD AUTO: 4.73 10*6/MM3 (ref 4.14–5.8)
SODIUM SERPL-SCNC: 137 MMOL/L (ref 136–145)
WBC NRBC COR # BLD AUTO: 5.08 10*3/MM3 (ref 3.4–10.8)

## 2024-10-10 PROCEDURE — 85025 COMPLETE CBC W/AUTO DIFF WBC: CPT

## 2024-10-10 PROCEDURE — 83036 HEMOGLOBIN GLYCOSYLATED A1C: CPT

## 2024-10-10 PROCEDURE — 80053 COMPREHEN METABOLIC PANEL: CPT

## 2024-10-10 PROCEDURE — 36415 COLL VENOUS BLD VENIPUNCTURE: CPT

## 2024-11-06 ENCOUNTER — LAB (OUTPATIENT)
Facility: HOSPITAL | Age: 79
End: 2024-11-06
Payer: MEDICARE

## 2024-11-06 ENCOUNTER — OFFICE VISIT (OUTPATIENT)
Dept: UROLOGY | Facility: CLINIC | Age: 79
End: 2024-11-06
Payer: MEDICARE

## 2024-11-06 ENCOUNTER — TRANSCRIBE ORDERS (OUTPATIENT)
Dept: ADMINISTRATIVE | Facility: HOSPITAL | Age: 79
End: 2024-11-06
Payer: MEDICARE

## 2024-11-06 VITALS
HEIGHT: 66 IN | WEIGHT: 216 LBS | DIASTOLIC BLOOD PRESSURE: 75 MMHG | SYSTOLIC BLOOD PRESSURE: 114 MMHG | HEART RATE: 74 BPM | BODY MASS INDEX: 34.72 KG/M2 | TEMPERATURE: 97.8 F

## 2024-11-06 DIAGNOSIS — Z80.42 FAMILY HISTORY OF PROSTATE CANCER: ICD-10-CM

## 2024-11-06 DIAGNOSIS — Z77.098 AGENT ORANGE EXPOSURE: ICD-10-CM

## 2024-11-06 DIAGNOSIS — E11.9 DIABETES MELLITUS WITHOUT COMPLICATION: ICD-10-CM

## 2024-11-06 DIAGNOSIS — I10 HYPERTENSION, UNSPECIFIED TYPE: ICD-10-CM

## 2024-11-06 DIAGNOSIS — N18.4 CHRONIC KIDNEY DISEASE, STAGE IV (SEVERE): ICD-10-CM

## 2024-11-06 DIAGNOSIS — N18.4 CHRONIC KIDNEY DISEASE, STAGE IV (SEVERE): Primary | ICD-10-CM

## 2024-11-06 DIAGNOSIS — N40.0 BENIGN PROSTATIC HYPERPLASIA WITHOUT LOWER URINARY TRACT SYMPTOMS: Primary | ICD-10-CM

## 2024-11-06 LAB
ALBUMIN SERPL-MCNC: 4.3 G/DL (ref 3.5–5.2)
ALBUMIN/GLOB SERPL: 1.6 G/DL
ALP SERPL-CCNC: 57 U/L (ref 39–117)
ALT SERPL W P-5'-P-CCNC: 18 U/L (ref 1–41)
ANION GAP SERPL CALCULATED.3IONS-SCNC: 7.3 MMOL/L (ref 5–15)
AST SERPL-CCNC: 28 U/L (ref 1–40)
BASOPHILS # BLD AUTO: 0.03 10*3/MM3 (ref 0–0.2)
BASOPHILS NFR BLD AUTO: 0.6 % (ref 0–1.5)
BILIRUB BLD-MCNC: NEGATIVE MG/DL
BILIRUB SERPL-MCNC: 0.7 MG/DL (ref 0–1.2)
BUN SERPL-MCNC: 42 MG/DL (ref 8–23)
BUN/CREAT SERPL: 17.2 (ref 7–25)
CALCIUM SPEC-SCNC: 9.6 MG/DL (ref 8.6–10.5)
CHLORIDE SERPL-SCNC: 100 MMOL/L (ref 98–107)
CLARITY, POC: CLEAR
CO2 SERPL-SCNC: 31.7 MMOL/L (ref 22–29)
COLOR UR: YELLOW
CREAT SERPL-MCNC: 2.44 MG/DL (ref 0.76–1.27)
DEPRECATED RDW RBC AUTO: 42.3 FL (ref 37–54)
EGFRCR SERPLBLD CKD-EPI 2021: 26.4 ML/MIN/1.73
EOSINOPHIL # BLD AUTO: 0.04 10*3/MM3 (ref 0–0.4)
EOSINOPHIL NFR BLD AUTO: 0.8 % (ref 0.3–6.2)
ERYTHROCYTE [DISTWIDTH] IN BLOOD BY AUTOMATED COUNT: 12 % (ref 12.3–15.4)
EXPIRATION DATE: ABNORMAL
GLOBULIN UR ELPH-MCNC: 2.7 GM/DL
GLUCOSE SERPL-MCNC: 109 MG/DL (ref 65–99)
GLUCOSE UR STRIP-MCNC: ABNORMAL MG/DL
HCT VFR BLD AUTO: 41.7 % (ref 37.5–51)
HGB BLD-MCNC: 13.5 G/DL (ref 13–17.7)
IMM GRANULOCYTES # BLD AUTO: 0.01 10*3/MM3 (ref 0–0.05)
IMM GRANULOCYTES NFR BLD AUTO: 0.2 % (ref 0–0.5)
KETONES UR QL: NEGATIVE
LEUKOCYTE EST, POC: NEGATIVE
LYMPHOCYTES # BLD AUTO: 2.58 10*3/MM3 (ref 0.7–3.1)
LYMPHOCYTES NFR BLD AUTO: 51.7 % (ref 19.6–45.3)
Lab: ABNORMAL
MCH RBC QN AUTO: 31 PG (ref 26.6–33)
MCHC RBC AUTO-ENTMCNC: 32.4 G/DL (ref 31.5–35.7)
MCV RBC AUTO: 95.6 FL (ref 79–97)
MONOCYTES # BLD AUTO: 0.6 10*3/MM3 (ref 0.1–0.9)
MONOCYTES NFR BLD AUTO: 12 % (ref 5–12)
NEUTROPHILS NFR BLD AUTO: 1.73 10*3/MM3 (ref 1.7–7)
NEUTROPHILS NFR BLD AUTO: 34.7 % (ref 42.7–76)
NITRITE UR-MCNC: NEGATIVE MG/ML
NRBC BLD AUTO-RTO: 0 /100 WBC (ref 0–0.2)
PH UR: 6.5 [PH] (ref 5–8)
PLATELET # BLD AUTO: 164 10*3/MM3 (ref 140–450)
PMV BLD AUTO: 11.8 FL (ref 6–12)
POTASSIUM SERPL-SCNC: 4.4 MMOL/L (ref 3.5–5.2)
PROT SERPL-MCNC: 7 G/DL (ref 6–8.5)
PROT UR STRIP-MCNC: NEGATIVE MG/DL
RBC # BLD AUTO: 4.36 10*6/MM3 (ref 4.14–5.8)
RBC # UR STRIP: NEGATIVE /UL
SODIUM SERPL-SCNC: 139 MMOL/L (ref 136–145)
SP GR UR: 1.01 (ref 1–1.03)
UROBILINOGEN UR QL: ABNORMAL
WBC NRBC COR # BLD AUTO: 4.99 10*3/MM3 (ref 3.4–10.8)

## 2024-11-06 PROCEDURE — 85025 COMPLETE CBC W/AUTO DIFF WBC: CPT

## 2024-11-06 PROCEDURE — 36415 COLL VENOUS BLD VENIPUNCTURE: CPT

## 2024-11-06 PROCEDURE — 80053 COMPREHEN METABOLIC PANEL: CPT

## 2024-11-06 RX ORDER — ALLOPURINOL 100 MG/1
1 TABLET ORAL DAILY
COMMUNITY

## 2024-11-06 RX ORDER — INSULIN LISPRO 100 [IU]/ML
INJECTION, SOLUTION INTRAVENOUS; SUBCUTANEOUS SEE ADMIN INSTRUCTIONS
COMMUNITY
Start: 2023-12-13 | End: 2025-01-28

## 2024-11-06 RX ORDER — LOSARTAN POTASSIUM 50 MG/1
1 TABLET ORAL DAILY
COMMUNITY

## 2024-11-06 RX ORDER — CETIRIZINE HYDROCHLORIDE 10 MG/1
1 TABLET ORAL DAILY
COMMUNITY
Start: 2024-09-23

## 2024-11-06 RX ORDER — INSULIN GLARGINE 100 [IU]/ML
INJECTION, SOLUTION SUBCUTANEOUS
COMMUNITY

## 2024-11-06 RX ORDER — ALBUTEROL SULFATE 90 UG/1
INHALANT RESPIRATORY (INHALATION)
COMMUNITY

## 2024-11-06 RX ORDER — ASPIRIN 81 MG/1
1 TABLET ORAL DAILY
COMMUNITY

## 2024-11-06 RX ORDER — TIOTROPIUM BROMIDE AND OLODATEROL 3.124; 2.736 UG/1; UG/1
SPRAY, METERED RESPIRATORY (INHALATION) EVERY 24 HOURS
COMMUNITY

## 2024-11-06 RX ORDER — FUROSEMIDE 40 MG/1
1 TABLET ORAL DAILY
COMMUNITY

## 2024-11-06 RX ORDER — FLUTICASONE PROPIONATE 50 MCG
SPRAY, SUSPENSION (ML) NASAL EVERY 24 HOURS
COMMUNITY

## 2024-11-06 RX ORDER — BEMPEDOIC ACID 180 MG/1
1 TABLET, FILM COATED ORAL DAILY
COMMUNITY
Start: 2024-09-18

## 2024-11-06 RX ORDER — GABAPENTIN 800 MG/1
1 TABLET ORAL DAILY
COMMUNITY

## 2024-11-06 RX ORDER — IPRATROPIUM BROMIDE 21 UG/1
2 SPRAY, METERED NASAL EVERY 12 HOURS
COMMUNITY

## 2024-11-06 RX ORDER — ROPINIROLE 0.25 MG/1
TABLET, FILM COATED ORAL EVERY 24 HOURS
COMMUNITY

## 2024-11-06 RX ORDER — PANTOPRAZOLE SODIUM 40 MG/1
1 TABLET, DELAYED RELEASE ORAL DAILY
COMMUNITY

## 2024-11-06 NOTE — PROGRESS NOTES
"Chief Complaint  Benign prostatic hyperplasia without lower urinary tract sy (Annual f/u)    History of prostate cancer in his stepbrother.  same mother but different father    Subjective no acute distress        Joon Zhao presents to [facility] UROLOGY  History of Present Illness    78-year-old -American male is here for follow-up for His prostate gland.  Patient was exposed to agent orange in [place].  History of prostate cancer in his stepbrother who has a different father.    Patient had a UroLift almost 6 years ago and has no difficulty with urination.  He has no penile pain anymore like he used to.  Patient is diabetic and has CKD stage III under care of a nephrologist.  Patient has no nocturia he does have frequency in the daytime which probably is from glycosuria.    Objective no acute distress  Vital Signs:   /75 (BP Location: Left arm, Patient Position: Sitting, Cuff Size: Adult)   Pulse 74   Temp 97.8 °F (36.6 °C) (Temporal)   Ht 167.6 cm (66\")   Wt 98 kg (216 lb)   BMI 34.86 kg/m²     Allergies   Allergen Reactions    Ezetimibe Unknown - High Severity and Other (See Comments)    Statins Other (See Comments) and Unknown - High Severity     UNSURE OF REACTION    Atorvastatin Hives    Hydrochlorothiazide Hives    Simvastatin Hives    Colesevelam Other (See Comments)     Other Reaction(s): Generalized aches and pains    Doxazosin Other (See Comments)     Other Reaction(s): Unknown      pt states shortnes of breath    Hydrochlorothiazide W-Triamterene Rash     HCTZ      Past medical history:  has a past medical history of Anemia, Arthritis, Back pain, BPH (benign prostatic hyperplasia) (03/02/2015), BPH with urinary obstruction (06/01/2018), Chest pain, CKD (chronic kidney disease), stage III, Controlled type 2 diabetes mellitus with diabetic polyneuropathy (07/05/2017), Coronary artery disease, Dizziness (08/10/2017), DVT (deep venous thrombosis), Erectile " dysfunction (2007), Foot pain, bilateral (03/12/2018), GERD (gastroesophageal reflux disease), Glaucoma (increased eye pressure), High cholesterol, Hyperlipidemia, Hypertension, Ingrowing nail (07/05/2017), Pain in both feet, PE (pulmonary thromboembolism), Polyneuropathy (07/05/2017), Shortness of breath, Sleep apnea, and Tinea unguium (07/05/2017).   Past surgical history:  has a past surgical history that includes Adrenal gland surgery; Kidney surgery (Left); Colonoscopy; Cystoscopy; Prostate surgery (12/2007); TURP / transurethral incision / drainage prostate (06/2019); Cardiac catheterization (N/A, 11/16/2023); Upper gastrointestinal endoscopy; Thyroid Biopsy; and Esophagogastroduodenoscopy (N/A, 1/2/2024).  Personal history: family history includes Diabetes in his brother, maternal grandmother, son, and another family member; Heart disease in his mother and sister; Hypertension in his mother and sister; Kidney disease in his mother; Stroke in his mother and sister.  Social history:  reports that he has never smoked. He has never been exposed to tobacco smoke. He has never used smokeless tobacco. He reports that he does not currently use alcohol. He reports that he does not use drugs.    Review of Systems    Please see past medical and surgical history    Physical Exam  Constitutional:       General: He is not in acute distress.     Appearance: Normal appearance. He is obese. He is not ill-appearing or toxic-appearing.   HENT:      Head: Normocephalic and atraumatic.      Ears:      Comments: Patient has some hearing loss and using hearing aids  Abdominal:      Palpations: Abdomen is soft.      Tenderness: There is no abdominal tenderness. There is no right CVA tenderness or left CVA tenderness.      Hernia: A hernia is present.      Comments: Divarication of recti and umbilical hernia present   Genitourinary:     Comments: Patient has normal penis.    Right and left scrotum is normal.    Right left testicle  and epididymis is normal.    NAOMIE.  Prostate gland is just about 20 g and feels benign.  Musculoskeletal:         General: Normal range of motion.   Skin:     General: Skin is warm.      Coloration: Skin is not jaundiced.   Neurological:      General: No focal deficit present.      Mental Status: He is alert and oriented to person, place, and time.      Motor: No weakness.      Gait: Gait normal.   Psychiatric:         Mood and Affect: Mood normal.         Behavior: Behavior normal.         Thought Content: Thought content normal.         Judgment: Judgment normal.        Result Review :                 Assessment and Plan    Diagnoses and all orders for this visit:    1. Benign prostatic hyperplasia without lower urinary tract symptoms (Primary)  -     POC Urinalysis Dipstick, Automated    2. Agent orange exposure    3. Family history of prostate cancer    Patient is urinating without any difficulties and has CKD stage III being followed by a nephrologist every month.  I will let the patient being followed by the nephrologist and in 1 years time he can be referred to a urologist for NAOMIE.  Patient understands and I can see him on a as needed basis till 31 December 2024     Brief Urine Lab Results  (Last result in the past 365 days)        Color   Clarity   Blood   Leuk Est   Nitrite   Protein   CREAT   Urine HCG        11/06/24 0848 Yellow   Clear   Negative   Negative   Negative   Negative                    Follow Up   No follow-ups on file.  Patient was given instructions and counseling regarding his condition or for health maintenance advice. Please see specific information pulled into the AVS if appropriate.     Isabelle Florian MD

## 2024-11-14 ENCOUNTER — OFFICE VISIT (OUTPATIENT)
Dept: PODIATRY | Facility: CLINIC | Age: 79
End: 2024-11-14
Payer: MEDICARE

## 2024-11-14 VITALS
HEIGHT: 66 IN | HEART RATE: 65 BPM | TEMPERATURE: 97.3 F | OXYGEN SATURATION: 100 % | SYSTOLIC BLOOD PRESSURE: 128 MMHG | BODY MASS INDEX: 34.72 KG/M2 | WEIGHT: 216 LBS | DIASTOLIC BLOOD PRESSURE: 62 MMHG

## 2024-11-14 DIAGNOSIS — M79.672 FOOT PAIN, BILATERAL: Primary | ICD-10-CM

## 2024-11-14 DIAGNOSIS — M79.671 FOOT PAIN, BILATERAL: Primary | ICD-10-CM

## 2024-11-14 DIAGNOSIS — Z79.4 TYPE 2 DIABETES MELLITUS WITH DIABETIC POLYNEUROPATHY, WITH LONG-TERM CURRENT USE OF INSULIN: ICD-10-CM

## 2024-11-14 DIAGNOSIS — B35.1 ONYCHOMYCOSIS: ICD-10-CM

## 2024-11-14 DIAGNOSIS — G62.9 NEUROPATHY: ICD-10-CM

## 2024-11-14 DIAGNOSIS — E11.42 TYPE 2 DIABETES MELLITUS WITH DIABETIC POLYNEUROPATHY, WITH LONG-TERM CURRENT USE OF INSULIN: ICD-10-CM

## 2024-11-14 DIAGNOSIS — E11.9 NON-INSULIN DEPENDENT TYPE 2 DIABETES MELLITUS: ICD-10-CM

## 2024-11-14 DIAGNOSIS — E11.8 DM FEET: ICD-10-CM

## 2024-11-14 DIAGNOSIS — L60.0 ONYCHOCRYPTOSIS: ICD-10-CM

## 2024-11-14 NOTE — PROGRESS NOTES
[facility] - PODIATRY    Today's Date: 11/14/24    Patient Name: Joon Zhao  MRN: [id number]  CSN: [id number]  PCP: Brian Henson MD, Last PCP Visit: 21 October 2024  Referring Provider: No ref. provider found    SUBJECTIVE     Chief Complaint   Patient presents with    Left Foot - Follow-up, Nail Problem    Right Foot - Follow-up, Nail Problem     HPI: Joon Zhao, a 78 y.o.male, comes to clinic.    New, Established, New Problem:  established     Location:  Toenails    Duration:   Greater than five years    Onset:  Gradual    Nature:  sore with palpation.    Stable, worsening, improving:   Stable    Aggravating factors:  Pain with shoe gear and ambulation.    Previous Treatment:  debridement    Patient reported last blood glucose: 161  __________________________________    Medical changes: No changes    Patient denies any fevers, chills, nausea, vomiting, shortness of breathe, nor any other constitutional signs nor symptoms.       I have reviewed/confirmed previously documented HPI with no changes.   Past Medical History:   Diagnosis Date    Anemia     DENIES ANY CURRENT ISSUES    Arthritis     Back pain     CHRONIC NECK AND BACK PAIN    BPH (benign prostatic hyperplasia) 03/02/2015    BPH with urinary obstruction 06/01/2018    Chest pain     CKD (chronic kidney disease), stage III     Controlled type 2 diabetes mellitus with diabetic polyneuropathy 07/05/2017    COPD (chronic obstructive pulmonary disease)     Coronary artery disease     Dizziness 08/10/2017    DVT (deep venous thrombosis)     Erectile dysfunction 2007    Foot pain, bilateral 03/12/2018    GERD (gastroesophageal reflux disease)     Glaucoma (increased eye pressure)     Gout     High cholesterol     Hyperlipidemia     Hypertension     Ingrowing nail 07/05/2017    Pain in both feet     PE (pulmonary thromboembolism)     Polyneuropathy 07/05/2017    Shortness of breath     Sleep apnea     Tinea unguium 07/05/2017      Past Surgical History:   Procedure Laterality Date    ADRENAL GLAND SURGERY      CARDIAC CATHETERIZATION N/A 11/16/2023    Procedure: Left Heart Cath with possible angioplasty;  Surgeon: Trip Mirza MD;  Location: [facility] CATH INVASIVE LOCATION;  Service: Cardiovascular;  Laterality: N/A;    COLONOSCOPY      CYSTOSCOPY      ENDOSCOPY N/A 1/2/2024    Procedure: ESOPHAGOGASTRODUODENOSCOPY WITH BIOPSIES;  Surgeon: Hosea Moreau MD;  Location: [facility] ENDOSCOPY;  Service: Gastroenterology;  Laterality: N/A;  HIATAL HERNIA    KIDNEY SURGERY Left     KIDNEY BIOSPY    PROSTATE SURGERY  12/2007    THYROID BIOPSY      TURP / TRANSURETHRAL INCISION / DRAINAGE PROSTATE  06/2019    UPPER GASTROINTESTINAL ENDOSCOPY       Family History   Problem Relation Age of Onset    Hypertension Mother     Heart disease Mother     Stroke Mother     Kidney disease Mother     Hypertension Sister     Heart disease Sister     Stroke Sister     Diabetes Brother     Diabetes Son     Diabetes Maternal Grandmother     Diabetes Other     Cancer Neg Hx     Colon cancer Neg Hx     Malig Hyperthermia Neg Hx      Social History     Socioeconomic History    Marital status:    Tobacco Use    Smoking status: Never     Passive exposure: Never    Smokeless tobacco: Never   Vaping Use    Vaping status: Never Used   Substance and Sexual Activity    Alcohol use: Not Currently     Comment: rare    Drug use: Never    Sexual activity: Not Currently     Partners: Female     Birth control/protection: None     Allergies   Allergen Reactions    Ezetimibe Unknown - High Severity and Other (See Comments)    Statins Other (See Comments) and Unknown - High Severity     UNSURE OF REACTION    Atorvastatin Hives    Hydrochlorothiazide Hives    Simvastatin Hives    Colesevelam Other (See Comments)     Other Reaction(s): Generalized aches and pains    Doxazosin Other (See Comments)     Other Reaction(s): Unknown      pt states shortnes of breath     Hydrochlorothiazide W-Triamterene Rash     HCTZ     Current Outpatient Medications   Medication Sig Dispense Refill    acetaminophen-codeine (TYLENOL #3) 300-30 MG per tablet Take 1 tablet by mouth Every 4 (Four) Hours As Needed for Moderate Pain.      albuterol sulfate  (90 Base) MCG/ACT inhaler Every 4 (Four) Hours.      Alcohol Swabs (Easy Touch Alcohol Prep Medium) 70 % pads       allopurinol (ZYLOPRIM) 100 MG tablet Take 1 tablet by mouth Daily.      amLODIPine (NORVASC) 5 MG tablet Take 1 tablet by mouth Daily. 90 tablet 3    apixaban (ELIQUIS) 5 MG tablet tablet Take 1 tablet by mouth 2 (Two) Times a Day. REPORTS HAD INJECTION AT PAIN MANAGEMENT ON 11/13/23 AND THAT HIS LAST DOSE OF ELIQUIS WAS 11/9/23 PM DOSE      aspirin 81 MG EC tablet Take 1 tablet by mouth Daily.      B-D UF III MINI PEN NEEDLES 31G X 5 MM misc       cetirizine (zyrTEC) 10 MG tablet Take 1 tablet by mouth Daily.      cholecalciferol (VITAMIN D3) 25 MCG (1000 UT) tablet Take 1 tablet by mouth Daily.      coenzyme Q10 100 MG capsule Take 1 capsule by mouth Daily.      empagliflozin (Jardiance) 25 MG tablet tablet Take 1 tablet by mouth Daily.      EPINEPHrine (EPIPEN) 0.3 MG/0.3ML solution auto-injector injection       fluticasone (FLONASE) 50 MCG/ACT nasal spray Daily.      FREESTYLE LITE test strip       furosemide (LASIX) 40 MG tablet Take 1 tablet by mouth Daily.      gabapentin (NEURONTIN) 800 MG tablet Take 1 tablet by mouth Daily.      insulin glargine (Lantus) 100 UNIT/ML injection as directed Subcutaneous      Insulin Lispro, 1 Unit Dial, (HumaLOG KwikPen) 100 UNIT/ML solution pen-injector See Admin Instructions.      ipratropium (ATROVENT) 0.03 % nasal spray Administer 2 sprays into the nostril(s) as directed by provider Every 12 (Twelve) Hours.      Lactobacillus (Acidophilus) 100 MG capsule Take  by mouth Daily.      Lancets (freestyle) lancets       latanoprost (XALATAN) 0.005 % ophthalmic solution Administer 1 drop to  both eyes Every Night.      losartan (COZAAR) 50 MG tablet Take 1 tablet by mouth Daily.      Narcan 4 MG/0.1ML nasal spray       pantoprazole (PROTONIX) 40 MG EC tablet Take 1 tablet by mouth Daily.      rOPINIRole (REQUIP) 0.25 MG tablet Daily.      tiotropium bromide-olodaterol (Stiolto Respimat) 2.5-2.5 MCG/ACT aerosol solution inhaler Daily.      Bempedoic Acid (Nexletol) 180 MG tablet Take 1 tablet by mouth Daily.       No current facility-administered medications for this visit.     Review of Systems   Constitutional: Negative.    Skin:         Painful toenails   All other systems reviewed and are negative.      OBJECTIVE     Vitals:    11/14/24 0730   BP: 128/62   Pulse: 65   Temp: 97.3 °F (36.3 °C)   SpO2: 100%       Lab Results   Component Value Date    HGBA1C 6.70 (H) 10/10/2024       Lab Results   Component Value Date    GLUCOSE 109 (H) 11/06/2024    CALCIUM 9.6 11/06/2024     11/06/2024    K 4.4 11/06/2024    CO2 31.7 (H) 11/06/2024     11/06/2024    BUN 42 (H) 11/06/2024    CREATININE 2.44 (H) 11/06/2024    EGFRIFAFRI 45 (L) 09/15/2021    BCR 17.2 11/06/2024    ANIONGAP 7.3 11/06/2024       Patient seen in no apparent distress.      PHYSICAL EXAM:     Foot/Ankle Exam    GENERAL  Appearance:  elderly  Orientation:  AAOx3  Affect:  appropriate  Gait:  unimpaired  Assistance:  independent  Right shoe gear: casual shoe  Left shoe gear: casual shoe    VASCULAR     Right Foot Vascularity   Normal vascular exam    Dorsalis pedis:  2+  Posterior tibial:  2+  Skin temperature:  warm  Edema grading:  None  CFT:  < 3 seconds  Pedal hair growth:  Present  Varicosities:  none     Left Foot Vascularity   Normal vascular exam    Dorsalis pedis:  2+  Posterior tibial:  2+  Skin temperature:  warm  Edema grading:  None  CFT:  < 3 seconds  Pedal hair growth:  Present  Varicosities:  none     NEUROLOGIC     Right Foot Neurologic   Light touch sensation: absent  Vibratory sensation: absent  Hot/Cold sensation:  absent  Protective Sensation using Oxford-Willie Monofilament:   Sites tested: 10     Left Foot Neurologic   Light touch sensation: absent  Vibratory sensation: absent  Hot/Cold sensation:  absent  Protective Sensation using Oxford-Willie Monofilament:   Sites tested: 10    MUSCULOSKELETAL     Right Foot Musculoskeletal   Arch:  Normal     Left Foot Musculoskeletal   Arch:  Normal    MUSCLE STRENGTH     Right Foot Muscle Strength   Foot dorsiflexion:  4  Foot plantar flexion:  4  Foot inversion:  4  Foot eversion:  4     Left Foot Muscle Strength   Foot dorsiflexion:  4  Foot plantar flexion:  4  Foot inversion:  4  Foot eversion:  4    RANGE OF MOTION     Right Foot Range of Motion   Foot and ankle ROM within normal limits       Left Foot Range of Motion   Foot and ankle ROM within normal limits      DERMATOLOGIC      Right Foot Dermatologic   Skin  Right foot skin is intact.   Nails  1.  Positive for elongated, onychomycosis, abnormal thickness, subungual debris and ingrown toenail.  2.  Positive for elongated, onychomycosis, abnormal thickness, subungual debris and ingrown toenail.  3.  Positive for elongated, onychomycosis, abnormal thickness, subungual debris and ingrown toenail.  4.  Positive for elongated, onychomycosis, abnormal thickness, subungual debris and ingrown toenail.  Nails comment:  Toenails 1 through 4     Left Foot Dermatologic   Skin  Left foot skin is intact.   Nails comment:  Toenails 1 through 3  Nails  1.  Positive for elongated, onychomycosis, abnormal thickness, subungual debris and ingrown toenail.  2.  Positive for elongated, onychomycosis, abnormal thickness, subungual debris and ingrown toenail.  3.  Positive for elongated, onychomycosis, abnormal thickness, subungual debris and ingrown toenail.    I have reexamined the patient the results are consistent with the previously documented exam.    ASSESSMENT/PLAN     Diagnoses and all orders for this visit:    1. Foot pain,  bilateral (Primary)    2. DM feet    3. Onychocryptosis    4. Neuropathy    5. Type 2 diabetes mellitus with diabetic polyneuropathy, with long-term current use of insulin    6. Non-insulin dependent type 2 diabetes mellitus    7. Onychomycosis    Comprehensive lower extremity examination and evaluation was performed.    Discussed findings and treatment plan including risks, benefits, and treatment options with patient in detail. Patient agreed with treatment plan.    Toenails 1 through 4 on right and toenails 1 through 3 on the left were debrided in thickness and length and then smoothed with a Dremel Tool.  Tolerated the procedure well without complications.    An After Visit Summary was printed and given to the patient at discharge, including (if requested) any available informative/educational handouts regarding diagnosis, treatment, or medications. All questions were answered to patient/family satisfaction. Should symptoms fail to improve or worsen they agree to call or return to clinic or to go to the Emergency Department. Discussed the importance of following up with any needed screening tests/labs/specialist appointments and any requested follow-up recommended by me today. Importance of maintaining follow-up discussed and patient accepts that missed appointments can delay diagnosis and potentially lead to worsening of conditions.    Return in about 9 weeks (around 1/16/2025) for Toenail Care., or sooner if acute issues arise.    I have reviewed the assessment and plan and verified the accuracy of it. No changes to assessment and plan since the information was documented. Carl Payne DPM 11/14/24     I have dictated this note utilizing Dragon Dictation.  Please note that portions of this note were completed with a voice recognition program.  Part of this note may be an electronic transcription/translation of spoken language to printed text using the Dragon Dictation System.      This document has been  electronically signed by Carl Payne DPM on November 14, 2024 07:44 EST

## 2025-01-02 ENCOUNTER — LAB (OUTPATIENT)
Facility: HOSPITAL | Age: 80
End: 2025-01-02
Payer: MEDICARE

## 2025-01-02 ENCOUNTER — TRANSCRIBE ORDERS (OUTPATIENT)
Facility: HOSPITAL | Age: 80
End: 2025-01-02
Payer: MEDICARE

## 2025-01-02 DIAGNOSIS — E11.22 TYPE 2 DIABETES MELLITUS WITH STAGE 3 CHRONIC KIDNEY DISEASE, UNSPECIFIED WHETHER LONG TERM INSULIN USE, UNSPECIFIED WHETHER STAGE 3A OR 3B CKD: ICD-10-CM

## 2025-01-02 DIAGNOSIS — N18.30 TYPE 2 DIABETES MELLITUS WITH STAGE 3 CHRONIC KIDNEY DISEASE, UNSPECIFIED WHETHER LONG TERM INSULIN USE, UNSPECIFIED WHETHER STAGE 3A OR 3B CKD: ICD-10-CM

## 2025-01-02 DIAGNOSIS — N18.30 TYPE 2 DIABETES MELLITUS WITH STAGE 3 CHRONIC KIDNEY DISEASE, UNSPECIFIED WHETHER LONG TERM INSULIN USE, UNSPECIFIED WHETHER STAGE 3A OR 3B CKD: Primary | ICD-10-CM

## 2025-01-02 DIAGNOSIS — E11.22 TYPE 2 DIABETES MELLITUS WITH STAGE 3 CHRONIC KIDNEY DISEASE, UNSPECIFIED WHETHER LONG TERM INSULIN USE, UNSPECIFIED WHETHER STAGE 3A OR 3B CKD: Primary | ICD-10-CM

## 2025-01-02 LAB — HBA1C MFR BLD: 6.5 % (ref 4.8–5.6)

## 2025-01-02 PROCEDURE — 83036 HEMOGLOBIN GLYCOSYLATED A1C: CPT

## 2025-01-02 PROCEDURE — 36415 COLL VENOUS BLD VENIPUNCTURE: CPT

## 2025-01-17 ENCOUNTER — OFFICE VISIT (OUTPATIENT)
Dept: CARDIOLOGY | Facility: CLINIC | Age: 80
End: 2025-01-17
Payer: MEDICARE

## 2025-01-17 VITALS
HEART RATE: 67 BPM | BODY MASS INDEX: 34.07 KG/M2 | HEIGHT: 66 IN | DIASTOLIC BLOOD PRESSURE: 99 MMHG | SYSTOLIC BLOOD PRESSURE: 146 MMHG | WEIGHT: 212 LBS

## 2025-01-17 DIAGNOSIS — R07.89 OTHER CHEST PAIN: Primary | ICD-10-CM

## 2025-01-17 DIAGNOSIS — I10 ESSENTIAL HYPERTENSION: ICD-10-CM

## 2025-01-17 DIAGNOSIS — Z86.711 HISTORY OF PULMONARY EMBOLISM: ICD-10-CM

## 2025-01-17 PROCEDURE — 1159F MED LIST DOCD IN RCRD: CPT | Performed by: INTERNAL MEDICINE

## 2025-01-17 PROCEDURE — 3077F SYST BP >= 140 MM HG: CPT | Performed by: INTERNAL MEDICINE

## 2025-01-17 PROCEDURE — 1160F RVW MEDS BY RX/DR IN RCRD: CPT | Performed by: INTERNAL MEDICINE

## 2025-01-17 PROCEDURE — 99213 OFFICE O/P EST LOW 20 MIN: CPT | Performed by: INTERNAL MEDICINE

## 2025-01-17 PROCEDURE — 3080F DIAST BP >= 90 MM HG: CPT | Performed by: INTERNAL MEDICINE

## 2025-01-17 NOTE — ASSESSMENT & PLAN NOTE
Patient has history of recurrent DVT and PE.  He had a flareup following Cardiac catheterization done in 2023.  He is on lifelong anticoagulation which will be continued.

## 2025-01-17 NOTE — PROGRESS NOTES
CARDIOLOGY FOLLOW-UP PROGRESS NOTE        Chief Complaint  Follow-up and Hypertension    Subjective            Joon Zhao presents to [facility] CARDIOLOGY  History of Present Illness      Mr. Zhao is here for a routine 6-month follow-up visit.  He denies any new complaints at this time.  He has not had any chest pain for a while.  Antihypertensive medications and diuretics are currently managed by nephrologist.  Most recent labs showed a creatinine of 2.4.  No recent hospitalizations or ER visits.    Past History:    Medical History:  Past Medical History:   Diagnosis Date    Anemia     DENIES ANY CURRENT ISSUES    Arthritis     Back pain     CHRONIC NECK AND BACK PAIN    BPH (benign prostatic hyperplasia) 03/02/2015    BPH with urinary obstruction 06/01/2018    Chest pain     CKD (chronic kidney disease), stage III     Controlled type 2 diabetes mellitus with diabetic polyneuropathy 07/05/2017    COPD (chronic obstructive pulmonary disease)     Coronary artery disease     Dizziness 08/10/2017    DVT (deep venous thrombosis)     Erectile dysfunction 2007    Foot pain, bilateral 03/12/2018    GERD (gastroesophageal reflux disease)     Glaucoma (increased eye pressure)     Gout     High cholesterol     Hyperlipidemia     Hypertension     Ingrowing nail 07/05/2017    Pain in both feet     PE (pulmonary thromboembolism)     Polyneuropathy 07/05/2017    Shortness of breath     Sleep apnea     Tinea unguium 07/05/2017       Family History: family history includes Diabetes in his brother, maternal grandmother, son, and another family member; Heart disease in his mother and sister; Hypertension in his mother and sister; Kidney disease in his mother; Stroke in his mother and sister.     Social History: reports that he has never smoked. He has never been exposed to tobacco smoke. He has never used smokeless tobacco. He reports that he does not currently use alcohol. He reports that he does not  use drugs.    Allergies: Ezetimibe, Statins, Atorvastatin, Hydrochlorothiazide, Simvastatin, Colesevelam, Doxazosin, and Hydrochlorothiazide w-triamterene    Current Outpatient Medications on File Prior to Visit   Medication Sig    acetaminophen-codeine (TYLENOL #3) 300-30 MG per tablet Take 1 tablet by mouth Every 4 (Four) Hours As Needed for Moderate Pain.    albuterol sulfate  (90 Base) MCG/ACT inhaler Every 4 (Four) Hours.    Alcohol Swabs (Easy Touch Alcohol Prep Medium) 70 % pads     allopurinol (ZYLOPRIM) 100 MG tablet Take 1 tablet by mouth Daily.    amLODIPine (NORVASC) 5 MG tablet Take 1 tablet by mouth Daily.    apixaban (ELIQUIS) 5 MG tablet tablet Take 1 tablet by mouth 2 (Two) Times a Day. REPORTS HAD INJECTION AT PAIN MANAGEMENT ON 11/13/23 AND THAT HIS LAST DOSE OF ELIQUIS WAS 11/9/23 PM DOSE    aspirin 81 MG EC tablet Take 1 tablet by mouth Daily.    B-D UF III MINI PEN NEEDLES 31G X 5 MM misc     Bempedoic Acid (Nexletol) 180 MG tablet Take 1 tablet by mouth Daily.    cetirizine (zyrTEC) 10 MG tablet Take 1 tablet by mouth Daily.    cholecalciferol (VITAMIN D3) 25 MCG (1000 UT) tablet Take 1 tablet by mouth Daily.    coenzyme Q10 100 MG capsule Take 1 capsule by mouth Daily.    empagliflozin (Jardiance) 25 MG tablet tablet Take 1 tablet by mouth Daily.    EPINEPHrine (EPIPEN) 0.3 MG/0.3ML solution auto-injector injection     fluticasone (FLONASE) 50 MCG/ACT nasal spray Daily.    FREESTYLE LITE test strip     furosemide (LASIX) 40 MG tablet Take 1 tablet by mouth Daily.    gabapentin (NEURONTIN) 800 MG tablet Take 1 tablet by mouth Daily.    insulin glargine (Lantus) 100 UNIT/ML injection as directed Subcutaneous    Insulin Lispro, 1 Unit Dial, (HumaLOG KwikPen) 100 UNIT/ML solution pen-injector See Admin Instructions.    ipratropium (ATROVENT) 0.03 % nasal spray Administer 2 sprays into the nostril(s) as directed by provider Every 12 (Twelve) Hours.    Lactobacillus (Acidophilus) 100 MG  "capsule Take  by mouth Daily.    Lancets (freestyle) lancets     latanoprost (XALATAN) 0.005 % ophthalmic solution Administer 1 drop to both eyes Every Night.    losartan (COZAAR) 50 MG tablet Take 0.5 tablets by mouth Daily.    Narcan 4 MG/0.1ML nasal spray     pantoprazole (PROTONIX) 40 MG EC tablet Take 1 tablet by mouth Daily.    rOPINIRole (REQUIP) 0.25 MG tablet Daily.    tiotropium bromide-olodaterol (Stiolto Respimat) 2.5-2.5 MCG/ACT aerosol solution inhaler Daily.     No current facility-administered medications on file prior to visit.          Review of Systems   Respiratory:  Negative for cough, shortness of breath and wheezing.    Cardiovascular:  Negative for chest pain, palpitations and leg swelling.   Gastrointestinal:  Negative for nausea and vomiting.   Neurological:  Negative for dizziness and syncope.        Objective     /99   Pulse 67   Ht 167.6 cm (66\")   Wt 96.2 kg (212 lb)   BMI 34.22 kg/m²       Physical Exam    General : Alert, awake, no acute distress  Neck : Supple, no carotid bruit, no jugular venous distention  CVS : Regular rate and rhythm, no murmur, rubs or gallops  Lungs: Clear to auscultation bilaterally, no crackles or rhonchi  Abdomen: Soft, nontender, bowel sounds heard in all 4 quadrants  Extremities: Warm, well-perfused, trace edema bilaterally    Result Review :     The following data was reviewed by: Trip Mirza MD on 01/17/2025:    CMP          9/13/2024    06:18 10/10/2024    06:06 11/6/2024    07:55   CMP   Glucose 156  107  109    BUN 33  32  42    Creatinine 2.28  1.95  2.44    EGFR 28.7  34.6  26.4    Sodium 141  137  139    Potassium 4.4  4.6  4.4    Chloride 106  100  100    Calcium 9.5  10.0  9.6    Total Protein 7.3  7.2  7.0    Albumin 4.3  4.3  4.3    Globulin 3.0  2.9  2.7    Total Bilirubin 0.5  0.8  0.7    Alkaline Phosphatase 68  73  57    AST (SGOT) 30  19  28    ALT (SGPT) 17  13  18    Albumin/Globulin Ratio 1.4  1.5  1.6    BUN/Creatinine " Ratio 14.5  16.4  17.2    Anion Gap 9.7  8.0  7.3      CBC          9/13/2024    06:18 10/10/2024    06:06 11/6/2024    07:55   CBC   WBC 3.92  5.08  4.99    RBC 4.56  4.73  4.36    Hemoglobin 14.3  14.7  13.5    Hematocrit 43.6  44.6  41.7    MCV 95.6  94.3  95.6    MCH 31.4  31.1  31.0    MCHC 32.8  33.0  32.4    RDW 13.2  12.6  12.0    Platelets 170  190  164               Data reviewed: Cardiology studies        Results for orders placed in visit on 07/12/21    Adult Transthoracic Echo Complete w/ Color, Spectral and Contrast if necessary per protocol    Interpretation Summary  · Calculated left ventricular 3D EF = 59% Left ventricular ejection fraction appears to be 56 - 60%.  · Estimated right ventricular systolic pressure from tricuspid regurgitation is normal (<35 mmHg).  · Left ventricular wall thickness is consistent with mild concentric hypertrophy.  · Left ventricular diastolic function is consistent with (grade I) impaired relaxation.  · Mild dilation of the aortic root is present.                   Assessment and Plan        Diagnoses and all orders for this visit:    1. Other chest pain (Primary)  Assessment & Plan:  Previous cardiac cath showed normal coronary arteries.  Symptoms currently subsided.      2. History of pulmonary embolism  Assessment & Plan:  Patient has history of recurrent DVT and PE.  He had a flareup following Cardiac catheterization done in 2023.  He is on lifelong anticoagulation which will be continued.      3. Essential hypertension  Assessment & Plan:  Pressure mildly elevated in the office today, well-controlled during previous visits.  He reports well-controlled blood pressure at home.  He is currently on amlodipine, losartan and furosemide, also managed by nephrology.  Medications will be continued.                Follow Up     Return in about 1 year (around 1/17/2026) for Next scheduled follow up.    Patient was given instructions and counseling regarding his condition or  for health maintenance advice. Please see specific information pulled into the AVS if appropriate.

## 2025-01-17 NOTE — ASSESSMENT & PLAN NOTE
Pressure mildly elevated in the office today, well-controlled during previous visits.  He reports well-controlled blood pressure at home.  He is currently on amlodipine, losartan and furosemide, also managed by nephrology.  Medications will be continued.

## 2025-02-04 ENCOUNTER — OFFICE VISIT (OUTPATIENT)
Dept: PODIATRY | Facility: CLINIC | Age: 80
End: 2025-02-04
Payer: MEDICARE

## 2025-02-04 VITALS
WEIGHT: 216 LBS | HEART RATE: 66 BPM | SYSTOLIC BLOOD PRESSURE: 125 MMHG | DIASTOLIC BLOOD PRESSURE: 81 MMHG | OXYGEN SATURATION: 98 % | HEIGHT: 66 IN | BODY MASS INDEX: 34.72 KG/M2

## 2025-02-04 DIAGNOSIS — E11.42 TYPE 2 DIABETES MELLITUS WITH DIABETIC POLYNEUROPATHY, WITH LONG-TERM CURRENT USE OF INSULIN: ICD-10-CM

## 2025-02-04 DIAGNOSIS — G62.9 NEUROPATHY: ICD-10-CM

## 2025-02-04 DIAGNOSIS — B35.1 ONYCHOMYCOSIS: ICD-10-CM

## 2025-02-04 DIAGNOSIS — E11.9 NON-INSULIN DEPENDENT TYPE 2 DIABETES MELLITUS: ICD-10-CM

## 2025-02-04 DIAGNOSIS — Z79.4 TYPE 2 DIABETES MELLITUS WITH DIABETIC POLYNEUROPATHY, WITH LONG-TERM CURRENT USE OF INSULIN: ICD-10-CM

## 2025-02-04 DIAGNOSIS — L60.0 ONYCHOCRYPTOSIS: ICD-10-CM

## 2025-02-04 DIAGNOSIS — M79.671 FOOT PAIN, BILATERAL: Primary | ICD-10-CM

## 2025-02-04 DIAGNOSIS — M79.672 FOOT PAIN, BILATERAL: Primary | ICD-10-CM

## 2025-02-04 DIAGNOSIS — E11.8 DM FEET: ICD-10-CM

## 2025-02-04 NOTE — PROGRESS NOTES
[facility] - PODIATRY    Today's Date: 02/04/25    Patient Name: Joon Zhao  MRN: [id number]  CSN: [id number]  PCP: Brian Henson MD, Last PCP Visit: 3 February 2025  Referring Provider: No ref. provider found    SUBJECTIVE     Chief Complaint   Patient presents with    Left Foot - Follow-up, Nail Problem    Right Foot - Follow-up, Nail Problem     HPI: Joon Zhao, a 79 y.o.male, comes to clinic.    New, Established, New Problem:  established     Location:  Toenails    Duration:   Greater than five years    Onset:  Gradual    Nature:  sore with palpation.    Stable, worsening, improving:   Stable    Aggravating factors:  Pain with shoe gear and ambulation.    Previous Treatment:  debridement    Patient reported last blood glucose: 123  __________________________________    Medical changes: None    Patient denies any fevers, chills, nausea, vomiting, shortness of breathe, nor any other constitutional signs nor symptoms.       I have reviewed/confirmed previously documented HPI with no changes.     Past Medical History:   Diagnosis Date    Anemia     DENIES ANY CURRENT ISSUES    Arthritis     Back pain     CHRONIC NECK AND BACK PAIN    BPH (benign prostatic hyperplasia) 03/02/2015    BPH with urinary obstruction 06/01/2018    Chest pain     CKD (chronic kidney disease), stage III     Controlled type 2 diabetes mellitus with diabetic polyneuropathy 07/05/2017    COPD (chronic obstructive pulmonary disease)     Coronary artery disease     Dizziness 08/10/2017    DVT (deep venous thrombosis)     Erectile dysfunction 2007    Foot pain, bilateral 03/12/2018    GERD (gastroesophageal reflux disease)     Glaucoma (increased eye pressure)     Gout     High cholesterol     Hyperlipidemia     Hypertension     Ingrowing nail 07/05/2017    Pain in both feet     PE (pulmonary thromboembolism)     Polyneuropathy 07/05/2017    Shortness of breath     Sleep apnea     Tinea unguium 07/05/2017     Past  Surgical History:   Procedure Laterality Date    ADRENAL GLAND SURGERY      CARDIAC CATHETERIZATION N/A 11/16/2023    Procedure: Left Heart Cath with possible angioplasty;  Surgeon: Trip Mirza MD;  Location: [facility] CATH INVASIVE LOCATION;  Service: Cardiovascular;  Laterality: N/A;    COLONOSCOPY      CYSTOSCOPY      ENDOSCOPY N/A 01/02/2024    Procedure: ESOPHAGOGASTRODUODENOSCOPY WITH BIOPSIES;  Surgeon: Hosea Moreau MD;  Location: [facility] ENDOSCOPY;  Service: Gastroenterology;  Laterality: N/A;  HIATAL HERNIA    EYE SURGERY      KIDNEY SURGERY Left     KIDNEY BIOSPY    PROSTATE SURGERY  12/2007    THYROID BIOPSY      TURP / TRANSURETHRAL INCISION / DRAINAGE PROSTATE  06/2019    UPPER GASTROINTESTINAL ENDOSCOPY       Family History   Problem Relation Age of Onset    Hypertension Mother     Heart disease Mother     Stroke Mother     Kidney disease Mother     Hypertension Sister     Heart disease Sister     Stroke Sister     Diabetes Brother     Diabetes Son     Diabetes Maternal Grandmother     Diabetes Other     Cancer Neg Hx     Colon cancer Neg Hx     Malig Hyperthermia Neg Hx      Social History     Socioeconomic History    Marital status:    Tobacco Use    Smoking status: Never     Passive exposure: Never    Smokeless tobacco: Never   Vaping Use    Vaping status: Never Used   Substance and Sexual Activity    Alcohol use: Not Currently     Comment: rare    Drug use: Never    Sexual activity: Not Currently     Partners: Female     Birth control/protection: None     Allergies   Allergen Reactions    Ezetimibe Unknown - High Severity and Other (See Comments)    Statins Other (See Comments) and Unknown - High Severity     UNSURE OF REACTION    Atorvastatin Hives    Hydrochlorothiazide Hives    Simvastatin Hives    Colesevelam Other (See Comments)     Other Reaction(s): Generalized aches and pains    Doxazosin Other (See Comments)     Other Reaction(s): Unknown      pt states shortnes of breath     Hydrochlorothiazide W-Triamterene Rash     HCTZ     Current Outpatient Medications   Medication Sig Dispense Refill    acetaminophen-codeine (TYLENOL #3) 300-30 MG per tablet Take 1 tablet by mouth Every 4 (Four) Hours As Needed for Moderate Pain.      albuterol sulfate  (90 Base) MCG/ACT inhaler Every 4 (Four) Hours.      Alcohol Swabs (Easy Touch Alcohol Prep Medium) 70 % pads       allopurinol (ZYLOPRIM) 100 MG tablet Take 1 tablet by mouth Daily.      amLODIPine (NORVASC) 5 MG tablet Take 1 tablet by mouth Daily. 90 tablet 3    apixaban (ELIQUIS) 5 MG tablet tablet Take 1 tablet by mouth 2 (Two) Times a Day. REPORTS HAD INJECTION AT PAIN MANAGEMENT ON 11/13/23 AND THAT HIS LAST DOSE OF ELIQUIS WAS 11/9/23 PM DOSE      aspirin 81 MG EC tablet Take 1 tablet by mouth Daily.      B-D UF III MINI PEN NEEDLES 31G X 5 MM misc       Bempedoic Acid (Nexletol) 180 MG tablet Take 1 tablet by mouth Daily.      cetirizine (zyrTEC) 10 MG tablet Take 1 tablet by mouth Daily.      cholecalciferol (VITAMIN D3) 25 MCG (1000 UT) tablet Take 1 tablet by mouth Daily.      coenzyme Q10 100 MG capsule Take 1 capsule by mouth Daily.      empagliflozin (Jardiance) 25 MG tablet tablet Take 1 tablet by mouth Daily.      EPINEPHrine (EPIPEN) 0.3 MG/0.3ML solution auto-injector injection       fluticasone (FLONASE) 50 MCG/ACT nasal spray Daily.      FREESTYLE LITE test strip       furosemide (LASIX) 40 MG tablet Take 1 tablet by mouth Daily.      gabapentin (NEURONTIN) 800 MG tablet Take 1 tablet by mouth Daily.      insulin glargine (Lantus) 100 UNIT/ML injection as directed Subcutaneous      ipratropium (ATROVENT) 0.03 % nasal spray Administer 2 sprays into the nostril(s) as directed by provider Every 12 (Twelve) Hours.      Lactobacillus (Acidophilus) 100 MG capsule Take  by mouth Daily.      Lancets (freestyle) lancets       latanoprost (XALATAN) 0.005 % ophthalmic solution Administer 1 drop to both eyes Every Night.       losartan (COZAAR) 50 MG tablet Take 0.5 tablets by mouth Daily.      Narcan 4 MG/0.1ML nasal spray       pantoprazole (PROTONIX) 40 MG EC tablet Take 1 tablet by mouth Daily.      rOPINIRole (REQUIP) 0.25 MG tablet Daily.      tiotropium bromide-olodaterol (Stiolto Respimat) 2.5-2.5 MCG/ACT aerosol solution inhaler Daily.      Insulin Lispro, 1 Unit Dial, (HumaLOG KwikPen) 100 UNIT/ML solution pen-injector See Admin Instructions.       No current facility-administered medications for this visit.     Review of Systems   Constitutional: Negative.    Skin:         Painful toenails   All other systems reviewed and are negative.      OBJECTIVE     Vitals:    02/04/25 0729   BP: 125/81   Pulse: 66   SpO2: 98%       Lab Results   Component Value Date    HGBA1C 6.50 (H) 01/02/2025       Lab Results   Component Value Date    GLUCOSE 109 (H) 11/06/2024    CALCIUM 9.6 11/06/2024     11/06/2024    K 4.4 11/06/2024    CO2 31.7 (H) 11/06/2024     11/06/2024    BUN 42 (H) 11/06/2024    CREATININE 2.44 (H) 11/06/2024    EGFRIFAFRI 45 (L) 09/15/2021    BCR 17.2 11/06/2024    ANIONGAP 7.3 11/06/2024       Patient seen in no apparent distress.      PHYSICAL EXAM:     Foot/Ankle Exam    GENERAL  Appearance:  elderly  Orientation:  AAOx3  Affect:  appropriate  Gait:  unimpaired  Assistance:  independent  Right shoe gear: casual shoe  Left shoe gear: casual shoe    VASCULAR     Right Foot Vascularity   Normal vascular exam    Dorsalis pedis:  2+  Posterior tibial:  2+  Skin temperature:  warm  Edema grading:  None  CFT:  < 3 seconds  Pedal hair growth:  Present  Varicosities:  none     Left Foot Vascularity   Normal vascular exam    Dorsalis pedis:  2+  Posterior tibial:  2+  Skin temperature:  warm  Edema grading:  None  CFT:  < 3 seconds  Pedal hair growth:  Present  Varicosities:  none     NEUROLOGIC     Right Foot Neurologic   Light touch sensation: absent  Vibratory sensation: absent  Hot/Cold sensation:  absent  Protective Sensation using Beason-Willie Monofilament:   Sites tested: 10     Left Foot Neurologic   Light touch sensation: absent  Vibratory sensation: absent  Hot/Cold sensation:  absent  Protective Sensation using Beason-Willie Monofilament:   Sites tested: 10    MUSCULOSKELETAL     Right Foot Musculoskeletal   Arch:  Normal     Left Foot Musculoskeletal   Arch:  Normal    MUSCLE STRENGTH     Right Foot Muscle Strength   Foot dorsiflexion:  4  Foot plantar flexion:  4  Foot inversion:  4  Foot eversion:  4     Left Foot Muscle Strength   Foot dorsiflexion:  4  Foot plantar flexion:  4  Foot inversion:  4  Foot eversion:  4    RANGE OF MOTION     Right Foot Range of Motion   Foot and ankle ROM within normal limits       Left Foot Range of Motion   Foot and ankle ROM within normal limits      DERMATOLOGIC      Right Foot Dermatologic   Skin  Right foot skin is intact.   Nails  1.  Positive for elongated, onychomycosis, abnormal thickness, subungual debris and ingrown toenail.  2.  Positive for elongated, onychomycosis, abnormal thickness, subungual debris and ingrown toenail.  3.  Positive for elongated, onychomycosis, abnormal thickness, subungual debris and ingrown toenail.  4.  Positive for elongated, onychomycosis, abnormal thickness, subungual debris and ingrown toenail.  Nails comment:  Toenails 1 through 4     Left Foot Dermatologic   Skin  Left foot skin is intact.   Nails comment:  Toenails 1 through 3  Nails  1.  Positive for elongated, onychomycosis, abnormal thickness, subungual debris and ingrown toenail.  2.  Positive for elongated, onychomycosis, abnormal thickness, subungual debris and ingrown toenail.  3.  Positive for elongated, onychomycosis, abnormal thickness, subungual debris and ingrown toenail.    I have reexamined the patient the results are consistent with the previously documented exam.    ASSESSMENT/PLAN     Diagnoses and all orders for this visit:    1. Foot pain,  bilateral (Primary)    2. Type 2 diabetes mellitus with diabetic polyneuropathy, with long-term current use of insulin    3. DM feet    4. Non-insulin dependent type 2 diabetes mellitus    5. Onychocryptosis    6. Onychomycosis    7. Neuropathy    Comprehensive lower extremity examination and evaluation was performed.    Discussed findings and treatment plan including risks, benefits, and treatment options with patient in detail. Patient agreed with treatment plan.    Toenails 1 through 4 on right and toenails 1 through 3 on the left were debrided in thickness and length and then smoothed with a Dremel Tool.  Tolerated the procedure well without complications.    An After Visit Summary was printed and given to the patient at discharge, including (if requested) any available informative/educational handouts regarding diagnosis, treatment, or medications. All questions were answered to patient/family satisfaction. Should symptoms fail to improve or worsen they agree to call or return to clinic or to go to the Emergency Department. Discussed the importance of following up with any needed screening tests/labs/specialist appointments and any requested follow-up recommended by me today. Importance of maintaining follow-up discussed and patient accepts that missed appointments can delay diagnosis and potentially lead to worsening of conditions.    Return in about 9 weeks (around 4/8/2025) for Toenail Care., or sooner if acute issues arise.    I have reviewed the assessment and plan and verified the accuracy of it. No changes to assessment and plan since the information was documented. Carl Payne DPM 02/04/25     I have dictated this note utilizing Dragon Dictation.  Please note that portions of this note were completed with a voice recognition program.  Part of this note may be an electronic transcription/translation of spoken language to printed text using the Dragon Dictation System.      This document has been  electronically signed by Carl Payne DPM on February 4, 2025 07:34 EST

## 2025-02-05 ENCOUNTER — TRANSCRIBE ORDERS (OUTPATIENT)
Dept: ADMINISTRATIVE | Facility: HOSPITAL | Age: 80
End: 2025-02-05
Payer: MEDICARE

## 2025-02-05 ENCOUNTER — LAB (OUTPATIENT)
Facility: HOSPITAL | Age: 80
End: 2025-02-05
Payer: MEDICARE

## 2025-02-05 DIAGNOSIS — N18.4 CHRONIC KIDNEY DISEASE, STAGE IV (SEVERE): Primary | ICD-10-CM

## 2025-02-05 DIAGNOSIS — E11.9 DIABETES MELLITUS WITHOUT COMPLICATION: ICD-10-CM

## 2025-02-05 DIAGNOSIS — I10 ESSENTIAL HYPERTENSION, MALIGNANT: ICD-10-CM

## 2025-02-05 DIAGNOSIS — N18.4 CHRONIC KIDNEY DISEASE, STAGE IV (SEVERE): ICD-10-CM

## 2025-02-05 LAB
ALBUMIN SERPL-MCNC: 4.4 G/DL (ref 3.5–5.2)
ALBUMIN/GLOB SERPL: 1.3 G/DL
ALP SERPL-CCNC: 64 U/L (ref 39–117)
ALT SERPL W P-5'-P-CCNC: 21 U/L (ref 1–41)
ANION GAP SERPL CALCULATED.3IONS-SCNC: 9 MMOL/L (ref 5–15)
AST SERPL-CCNC: 42 U/L (ref 1–40)
BASOPHILS # BLD AUTO: 0.02 10*3/MM3 (ref 0–0.2)
BASOPHILS NFR BLD AUTO: 0.4 % (ref 0–1.5)
BILIRUB SERPL-MCNC: 0.6 MG/DL (ref 0–1.2)
BUN SERPL-MCNC: 26 MG/DL (ref 8–23)
BUN/CREAT SERPL: 13.5 (ref 7–25)
CALCIUM SPEC-SCNC: 10 MG/DL (ref 8.6–10.5)
CHLORIDE SERPL-SCNC: 102 MMOL/L (ref 98–107)
CO2 SERPL-SCNC: 25 MMOL/L (ref 22–29)
CREAT SERPL-MCNC: 1.92 MG/DL (ref 0.76–1.27)
CREAT UR-MCNC: 72.9 MG/DL
DEPRECATED RDW RBC AUTO: 40.3 FL (ref 37–54)
EGFRCR SERPLBLD CKD-EPI 2021: 35 ML/MIN/1.73
EOSINOPHIL # BLD AUTO: 0.04 10*3/MM3 (ref 0–0.4)
EOSINOPHIL NFR BLD AUTO: 0.8 % (ref 0.3–6.2)
ERYTHROCYTE [DISTWIDTH] IN BLOOD BY AUTOMATED COUNT: 11.9 % (ref 12.3–15.4)
GLOBULIN UR ELPH-MCNC: 3.3 GM/DL
GLUCOSE SERPL-MCNC: 99 MG/DL (ref 65–99)
HCT VFR BLD AUTO: 45.3 % (ref 37.5–51)
HGB BLD-MCNC: 15.5 G/DL (ref 13–17.7)
IMM GRANULOCYTES # BLD AUTO: 0.02 10*3/MM3 (ref 0–0.05)
IMM GRANULOCYTES NFR BLD AUTO: 0.4 % (ref 0–0.5)
LYMPHOCYTES # BLD AUTO: 2.12 10*3/MM3 (ref 0.7–3.1)
LYMPHOCYTES NFR BLD AUTO: 41.2 % (ref 19.6–45.3)
MCH RBC QN AUTO: 31.7 PG (ref 26.6–33)
MCHC RBC AUTO-ENTMCNC: 34.2 G/DL (ref 31.5–35.7)
MCV RBC AUTO: 92.6 FL (ref 79–97)
MONOCYTES # BLD AUTO: 0.47 10*3/MM3 (ref 0.1–0.9)
MONOCYTES NFR BLD AUTO: 9.1 % (ref 5–12)
NEUTROPHILS NFR BLD AUTO: 2.47 10*3/MM3 (ref 1.7–7)
NEUTROPHILS NFR BLD AUTO: 48.1 % (ref 42.7–76)
NRBC BLD AUTO-RTO: 0 /100 WBC (ref 0–0.2)
PLATELET # BLD AUTO: 183 10*3/MM3 (ref 140–450)
PMV BLD AUTO: 12.1 FL (ref 6–12)
POTASSIUM SERPL-SCNC: 4.3 MMOL/L (ref 3.5–5.2)
PROT ?TM UR-MCNC: 7.7 MG/DL
PROT SERPL-MCNC: 7.7 G/DL (ref 6–8.5)
PROT/CREAT UR: 0.11 MG/G{CREAT}
RBC # BLD AUTO: 4.89 10*6/MM3 (ref 4.14–5.8)
SODIUM SERPL-SCNC: 136 MMOL/L (ref 136–145)
WBC NRBC COR # BLD AUTO: 5.14 10*3/MM3 (ref 3.4–10.8)

## 2025-02-05 PROCEDURE — 36415 COLL VENOUS BLD VENIPUNCTURE: CPT

## 2025-02-05 PROCEDURE — 85025 COMPLETE CBC W/AUTO DIFF WBC: CPT

## 2025-02-05 PROCEDURE — 84156 ASSAY OF PROTEIN URINE: CPT

## 2025-02-05 PROCEDURE — 82570 ASSAY OF URINE CREATININE: CPT

## 2025-02-05 PROCEDURE — 80053 COMPREHEN METABOLIC PANEL: CPT

## 2025-04-09 ENCOUNTER — TRANSCRIBE ORDERS (OUTPATIENT)
Facility: HOSPITAL | Age: 80
End: 2025-04-09
Payer: MEDICARE

## 2025-04-09 ENCOUNTER — LAB (OUTPATIENT)
Facility: HOSPITAL | Age: 80
End: 2025-04-09
Payer: MEDICARE

## 2025-04-09 DIAGNOSIS — N18.4 CHRONIC KIDNEY DISEASE, STAGE IV (SEVERE): Primary | ICD-10-CM

## 2025-04-09 DIAGNOSIS — N18.4 CHRONIC KIDNEY DISEASE, STAGE IV (SEVERE): ICD-10-CM

## 2025-04-09 DIAGNOSIS — I10 PRIMARY HYPERTENSION: ICD-10-CM

## 2025-04-09 LAB
ALBUMIN SERPL-MCNC: 4.2 G/DL (ref 3.5–5.2)
ALBUMIN/GLOB SERPL: 1.4 G/DL
ALP SERPL-CCNC: 77 U/L (ref 39–117)
ALT SERPL W P-5'-P-CCNC: 18 U/L (ref 1–41)
ANION GAP SERPL CALCULATED.3IONS-SCNC: 8.8 MMOL/L (ref 5–15)
AST SERPL-CCNC: 29 U/L (ref 1–40)
BASOPHILS # BLD AUTO: 0.01 10*3/MM3 (ref 0–0.2)
BASOPHILS NFR BLD AUTO: 0.2 % (ref 0–1.5)
BILIRUB SERPL-MCNC: 0.5 MG/DL (ref 0–1.2)
BUN SERPL-MCNC: 28 MG/DL (ref 8–23)
BUN/CREAT SERPL: 13.8 (ref 7–25)
CALCIUM SPEC-SCNC: 9.8 MG/DL (ref 8.6–10.5)
CHLORIDE SERPL-SCNC: 104 MMOL/L (ref 98–107)
CO2 SERPL-SCNC: 26.2 MMOL/L (ref 22–29)
CREAT SERPL-MCNC: 2.03 MG/DL (ref 0.76–1.27)
DEPRECATED RDW RBC AUTO: 42.4 FL (ref 37–54)
EGFRCR SERPLBLD CKD-EPI 2021: 32.7 ML/MIN/1.73
EOSINOPHIL # BLD AUTO: 0.07 10*3/MM3 (ref 0–0.4)
EOSINOPHIL NFR BLD AUTO: 1.6 % (ref 0.3–6.2)
ERYTHROCYTE [DISTWIDTH] IN BLOOD BY AUTOMATED COUNT: 12.3 % (ref 12.3–15.4)
GLOBULIN UR ELPH-MCNC: 2.9 GM/DL
GLUCOSE SERPL-MCNC: 92 MG/DL (ref 65–99)
HCT VFR BLD AUTO: 43.8 % (ref 37.5–51)
HGB BLD-MCNC: 14.5 G/DL (ref 13–17.7)
IMM GRANULOCYTES # BLD AUTO: 0 10*3/MM3 (ref 0–0.05)
IMM GRANULOCYTES NFR BLD AUTO: 0 % (ref 0–0.5)
LYMPHOCYTES # BLD AUTO: 2.19 10*3/MM3 (ref 0.7–3.1)
LYMPHOCYTES NFR BLD AUTO: 51 % (ref 19.6–45.3)
MCH RBC QN AUTO: 31.1 PG (ref 26.6–33)
MCHC RBC AUTO-ENTMCNC: 33.1 G/DL (ref 31.5–35.7)
MCV RBC AUTO: 94 FL (ref 79–97)
MONOCYTES # BLD AUTO: 0.49 10*3/MM3 (ref 0.1–0.9)
MONOCYTES NFR BLD AUTO: 11.4 % (ref 5–12)
NEUTROPHILS NFR BLD AUTO: 1.53 10*3/MM3 (ref 1.7–7)
NEUTROPHILS NFR BLD AUTO: 35.8 % (ref 42.7–76)
NRBC BLD AUTO-RTO: 0 /100 WBC (ref 0–0.2)
PLATELET # BLD AUTO: 175 10*3/MM3 (ref 140–450)
PMV BLD AUTO: 11.9 FL (ref 6–12)
POTASSIUM SERPL-SCNC: 4.7 MMOL/L (ref 3.5–5.2)
PROT SERPL-MCNC: 7.1 G/DL (ref 6–8.5)
RBC # BLD AUTO: 4.66 10*6/MM3 (ref 4.14–5.8)
SODIUM SERPL-SCNC: 139 MMOL/L (ref 136–145)
WBC NRBC COR # BLD AUTO: 4.29 10*3/MM3 (ref 3.4–10.8)

## 2025-04-09 PROCEDURE — 80053 COMPREHEN METABOLIC PANEL: CPT

## 2025-04-09 PROCEDURE — 36415 COLL VENOUS BLD VENIPUNCTURE: CPT

## 2025-04-09 PROCEDURE — 85025 COMPLETE CBC W/AUTO DIFF WBC: CPT

## 2025-04-29 ENCOUNTER — OFFICE VISIT (OUTPATIENT)
Dept: PODIATRY | Facility: CLINIC | Age: 80
End: 2025-04-29
Payer: MEDICARE

## 2025-04-29 VITALS
HEART RATE: 63 BPM | BODY MASS INDEX: 31.25 KG/M2 | DIASTOLIC BLOOD PRESSURE: 77 MMHG | TEMPERATURE: 96.7 F | OXYGEN SATURATION: 96 % | HEIGHT: 69 IN | WEIGHT: 211 LBS | SYSTOLIC BLOOD PRESSURE: 116 MMHG

## 2025-04-29 DIAGNOSIS — B35.1 ONYCHOMYCOSIS: ICD-10-CM

## 2025-04-29 DIAGNOSIS — M79.671 FOOT PAIN, BILATERAL: Primary | ICD-10-CM

## 2025-04-29 DIAGNOSIS — E11.8 DM FEET: ICD-10-CM

## 2025-04-29 DIAGNOSIS — G62.9 NEUROPATHY: ICD-10-CM

## 2025-04-29 DIAGNOSIS — Z79.4 TYPE 2 DIABETES MELLITUS WITH DIABETIC POLYNEUROPATHY, WITH LONG-TERM CURRENT USE OF INSULIN: ICD-10-CM

## 2025-04-29 DIAGNOSIS — M79.672 FOOT PAIN, BILATERAL: Primary | ICD-10-CM

## 2025-04-29 DIAGNOSIS — L60.0 ONYCHOCRYPTOSIS: ICD-10-CM

## 2025-04-29 DIAGNOSIS — E11.9 NON-INSULIN DEPENDENT TYPE 2 DIABETES MELLITUS: ICD-10-CM

## 2025-04-29 DIAGNOSIS — E11.42 TYPE 2 DIABETES MELLITUS WITH DIABETIC POLYNEUROPATHY, WITH LONG-TERM CURRENT USE OF INSULIN: ICD-10-CM

## 2025-04-29 RX ORDER — BUDESONIDE, GLYCOPYRROLATE, AND FORMOTEROL FUMARATE 160; 9; 4.8 UG/1; UG/1; UG/1
2 AEROSOL, METERED RESPIRATORY (INHALATION)
COMMUNITY

## 2025-04-29 NOTE — PROGRESS NOTES
[facility] - PODIATRY    Today's Date: 04/29/25    Patient Name: Joon Zhao  MRN: [id number]  CSN: [id number]  PCP: Brian Henson MD, Last PCP Visit: 4/29/2025  Referring Provider: No ref. provider found    SUBJECTIVE     Chief Complaint   Patient presents with    Left Foot - Follow-up, Nail Problem    Right Foot - Follow-up, Nail Problem     HPI: Joon Zhao, a 79 y.o.male, comes to clinic.    New, Established, New Problem:  established     Location:  Toenails    Duration:   Greater than five years    Onset:  Gradual    Nature:  sore with palpation.    Stable, worsening, improving:   Stable    Aggravating factors:  Pain with shoe gear and ambulation.    Previous Treatment:  debridement    Patient reported last blood glucose: 235  __________________________________    Medical changes: no changes    Patient denies any fevers, chills, nausea, vomiting, shortness of breathe, nor any other constitutional signs nor symptoms.       I have reviewed/confirmed previously documented HPI with no changes.     Past Medical History:   Diagnosis Date    Anemia     DENIES ANY CURRENT ISSUES    Arthritis     Back pain     CHRONIC NECK AND BACK PAIN    BPH (benign prostatic hyperplasia) 03/02/2015    BPH with urinary obstruction 06/01/2018    Cervical disc disorder June 2020    Chest pain     CKD (chronic kidney disease), stage III     Controlled type 2 diabetes mellitus with diabetic polyneuropathy 07/05/2017    COPD (chronic obstructive pulmonary disease)     Coronary artery disease     Dizziness 08/10/2017    DVT (deep venous thrombosis)     Erectile dysfunction 2007    Foot pain, bilateral 03/12/2018    GERD (gastroesophageal reflux disease)     Glaucoma (increased eye pressure)     Gout     Hernia     High cholesterol     Hyperlipidemia     Hypertension     Ingrowing nail 07/05/2017    Irritable bowel syndrome     Lactose intolerance     Low back pain 1966    Lumbosacral disc disease 2020    Pain  in both feet     Pancreatitis     PE (pulmonary thromboembolism)     Polyneuropathy 07/05/2017    Primary central sleep apnea     Shortness of breath     Sleep apnea     Thoracic disc disorder 1966    Tinea unguium 07/05/2017     Past Surgical History:   Procedure Laterality Date    ADRENAL GLAND SURGERY      CARDIAC CATHETERIZATION N/A 11/16/2023    Procedure: Left Heart Cath with possible angioplasty;  Surgeon: Trip Mirza MD;  Location: [facility] CATH INVASIVE LOCATION;  Service: Cardiovascular;  Laterality: N/A;    COLONOSCOPY      CYSTOSCOPY      ENDOSCOPY N/A 01/02/2024    Procedure: ESOPHAGOGASTRODUODENOSCOPY WITH BIOPSIES;  Surgeon: Hosea Moreau MD;  Location: [facility] ENDOSCOPY;  Service: Gastroenterology;  Laterality: N/A;  HIATAL HERNIA    EYE SURGERY      KIDNEY SURGERY Left     KIDNEY BIOSPY    PROSTATE SURGERY  12/2007    THYROID BIOPSY      TURP / TRANSURETHRAL INCISION / DRAINAGE PROSTATE  06/2019    UPPER GASTROINTESTINAL ENDOSCOPY       Family History   Problem Relation Age of Onset    Hypertension Mother     Heart disease Mother     Stroke Mother     Kidney disease Mother     Asthma Mother     Arthritis Mother     Hypertension Sister     Heart disease Sister     Stroke Sister     Diabetes Brother     Cancer Brother     Diabetes Son     Kidney disease Son     Diabetes Maternal Grandmother     Diabetes Other     Hypertension Father     Diabetes Maternal Uncle     Diabetes Maternal Uncle     Colon cancer Neg Hx     Malig Hyperthermia Neg Hx      Social History     Socioeconomic History    Marital status:    Tobacco Use    Smoking status: Never     Passive exposure: Never    Smokeless tobacco: Never   Vaping Use    Vaping status: Never Used   Substance and Sexual Activity    Alcohol use: Not Currently     Comment: rare    Drug use: Never    Sexual activity: Not Currently     Partners: Female     Birth control/protection: None     Allergies   Allergen Reactions    Ezetimibe Unknown -  High Severity and Other (See Comments)    Statins Other (See Comments) and Unknown - High Severity     UNSURE OF REACTION    Atorvastatin Hives    Hydrochlorothiazide Hives    Simvastatin Hives     simvastatin    Colesevelam Other (See Comments)     Other Reaction(s): Generalized aches and pains    Doxazosin Other (See Comments)     Other Reaction(s): Unknown      pt states shortnes of breath    Hydrochlorothiazide-Triamterene Rash     HCTZ     Current Outpatient Medications   Medication Sig Dispense Refill    acetaminophen-codeine (TYLENOL #3) 300-30 MG per tablet Take 1 tablet by mouth Every 4 (Four) Hours As Needed for Moderate Pain.      albuterol sulfate  (90 Base) MCG/ACT inhaler Every 4 (Four) Hours.      Alcohol Swabs (Easy Touch Alcohol Prep Medium) 70 % pads       allopurinol (ZYLOPRIM) 100 MG tablet Take 1 tablet by mouth Daily.      amLODIPine (NORVASC) 5 MG tablet Take 1 tablet by mouth Daily. 90 tablet 3    apixaban (ELIQUIS) 5 MG tablet tablet Take 1 tablet by mouth 2 (Two) Times a Day. REPORTS HAD INJECTION AT PAIN MANAGEMENT ON 11/13/23 AND THAT HIS LAST DOSE OF ELIQUIS WAS 11/9/23 PM DOSE      aspirin 81 MG EC tablet Take 1 tablet by mouth Daily.      B-D UF III MINI PEN NEEDLES 31G X 5 MM misc       Bempedoic Acid (Nexletol) 180 MG tablet Take 1 tablet by mouth Daily.      Breztri Aerosphere 160-9-4.8 MCG/ACT aerosol inhaler Inhale 2 puffs.      cetirizine (zyrTEC) 10 MG tablet Take 1 tablet by mouth Daily.      cholecalciferol (VITAMIN D3) 25 MCG (1000 UT) tablet Take 1 tablet by mouth Daily.      coenzyme Q10 100 MG capsule Take 1 capsule by mouth Daily.      empagliflozin (Jardiance) 25 MG tablet tablet Take 1 tablet by mouth Daily.      EPINEPHrine (EPIPEN) 0.3 MG/0.3ML solution auto-injector injection       fluticasone (FLONASE) 50 MCG/ACT nasal spray Daily.      FREESTYLE LITE test strip       furosemide (LASIX) 40 MG tablet Take 1 tablet by mouth Daily.      gabapentin (NEURONTIN)  800 MG tablet Take 1 tablet by mouth Daily.      insulin glargine (Lantus) 100 UNIT/ML injection as directed Subcutaneous      Insulin Lispro, 1 Unit Dial, (HumaLOG KwikPen) 100 UNIT/ML solution pen-injector inject 2-6 units TID      ipratropium (ATROVENT) 0.03 % nasal spray Administer 2 sprays into the nostril(s) as directed by provider Every 12 (Twelve) Hours.      Lactobacillus (Acidophilus) 100 MG capsule Take  by mouth Daily.      Lancets (freestyle) lancets       Lantus SoloStar 100 UNIT/ML injection pen inject 35 units in the morning and 38 units at bedtime      latanoprost (XALATAN) 0.005 % ophthalmic solution Administer 1 drop to both eyes Every Night.      losartan (COZAAR) 50 MG tablet Take 0.5 tablets by mouth Daily.      Narcan 4 MG/0.1ML nasal spray       omeprazole (priLOSEC) 20 MG capsule       pantoprazole (PROTONIX) 40 MG EC tablet Take 1 tablet by mouth Daily.      rOPINIRole (REQUIP) 0.25 MG tablet Daily.      Diclofenac Sodium (VOLTAREN) 1 % gel gel Apply 4 g topically to the appropriate area as directed 4 (Four) Times a Day. 100 g 5    Insulin Lispro, 1 Unit Dial, (HumaLOG KwikPen) 100 UNIT/ML solution pen-injector See Admin Instructions.       No current facility-administered medications for this visit.     Review of Systems   Constitutional: Negative.    Skin:         Painful toenails   All other systems reviewed and are negative.      OBJECTIVE     Vitals:    04/29/25 0737   BP: 116/77   Pulse: 63   Temp: 96.7 °F (35.9 °C)   SpO2: 96%       Lab Results   Component Value Date    HGBA1C 6.50 (H) 01/02/2025       Lab Results   Component Value Date    GLUCOSE 92 04/09/2025    CALCIUM 9.8 04/09/2025     04/09/2025    K 4.7 04/09/2025    CO2 26.2 04/09/2025     04/09/2025    BUN 28 (H) 04/09/2025    CREATININE 2.03 (H) 04/09/2025    EGFRIFAFRI 45 (L) 09/15/2021    BCR 13.8 04/09/2025    ANIONGAP 8.8 04/09/2025       Patient seen in no apparent distress.      PHYSICAL EXAM:      Foot/Ankle Exam    GENERAL  Appearance:  elderly  Orientation:  AAOx3  Affect:  appropriate  Gait:  unimpaired  Assistance:  independent  Right shoe gear: casual shoe  Left shoe gear: casual shoe    VASCULAR     Right Foot Vascularity   Normal vascular exam    Dorsalis pedis:  2+  Posterior tibial:  2+  Skin temperature:  warm  Edema grading:  None  CFT:  < 3 seconds  Pedal hair growth:  Present  Varicosities:  none     Left Foot Vascularity   Normal vascular exam    Dorsalis pedis:  2+  Posterior tibial:  2+  Skin temperature:  warm  Edema grading:  None  CFT:  < 3 seconds  Pedal hair growth:  Present  Varicosities:  none     NEUROLOGIC     Right Foot Neurologic   Light touch sensation: absent  Vibratory sensation: absent  Hot/Cold sensation: absent  Protective Sensation using [place] Monofilament:   Sites tested: 10     Left Foot Neurologic   Light touch sensation: absent  Vibratory sensation: absent  Hot/Cold sensation:  absent  Protective Sensation using [place] Monofilament:   Sites tested: 10    MUSCULOSKELETAL     Right Foot Musculoskeletal   Arch:  Normal     Left Foot Musculoskeletal   Arch:  Normal    MUSCLE STRENGTH     Right Foot Muscle Strength   Foot dorsiflexion:  4  Foot plantar flexion:  4  Foot inversion:  4  Foot eversion:  4     Left Foot Muscle Strength   Foot dorsiflexion:  4  Foot plantar flexion:  4  Foot inversion:  4  Foot eversion:  4    RANGE OF MOTION     Right Foot Range of Motion   Foot and ankle ROM within normal limits       Left Foot Range of Motion   Foot and ankle ROM within normal limits      DERMATOLOGIC      Right Foot Dermatologic   Skin  Right foot skin is intact.   Nails  1.  Positive for elongated, onychomycosis, abnormal thickness, subungual debris and ingrown toenail.  2.  Positive for elongated, onychomycosis, abnormal thickness, subungual debris and ingrown toenail.  3.  Positive for elongated, onychomycosis, abnormal thickness, subungual debris and  ingrown toenail.  4.  Positive for elongated, onychomycosis, abnormal thickness, subungual debris and ingrown toenail.  Nails comment:  Toenails 1 through 4     Left Foot Dermatologic   Skin  Left foot skin is intact.   Nails comment:  Toenails 1 through 3  Nails  1.  Positive for elongated, onychomycosis, abnormal thickness, subungual debris and ingrown toenail.  2.  Positive for elongated, onychomycosis, abnormal thickness, subungual debris and ingrown toenail.  3.  Positive for elongated, onychomycosis, abnormal thickness, subungual debris and ingrown toenail.    I have reexamined the patient the results are consistent with the previously documented exam.    ASSESSMENT/PLAN     Diagnoses and all orders for this visit:    1. Foot pain, bilateral (Primary)  -     Diclofenac Sodium (VOLTAREN) 1 % gel gel; Apply 4 g topically to the appropriate area as directed 4 (Four) Times a Day.  Dispense: 100 g; Refill: 5    2. Onychocryptosis    3. Type 2 diabetes mellitus with diabetic polyneuropathy, with long-term current use of insulin    4. Onychomycosis    5. DM feet    6. Neuropathy    7. Non-insulin dependent type 2 diabetes mellitus    Comprehensive lower extremity examination and evaluation was performed.    Discussed findings and treatment plan including risks, benefits, and treatment options with patient in detail. Patient agreed with treatment plan.    Toenails 1 through 4 on right and toenails 1 through 3 on the left were debrided in thickness and length and then smoothed with a Dremel Tool.  Tolerated the procedure well without complications.    An After Visit Summary was printed and given to the patient at discharge, including (if requested) any available informative/educational handouts regarding diagnosis, treatment, or medications. All questions were answered to patient/family satisfaction. Should symptoms fail to improve or worsen they agree to call or return to clinic or to go to the Emergency Department.  Discussed the importance of following up with any needed screening tests/labs/specialist appointments and any requested follow-up recommended by me today. Importance of maintaining follow-up discussed and patient accepts that missed appointments can delay diagnosis and potentially lead to worsening of conditions.    Return in about 9 weeks (around 7/1/2025) for Toenail Care., or sooner if acute issues arise.    I have reviewed the assessment and plan and verified the accuracy of it. No changes to assessment and plan since the information was documented. Carl Pyane DPM 04/29/25     I have dictated this note utilizing Dragon Dictation.  Please note that portions of this note were completed with a voice recognition program.  Part of this note may be an electronic transcription/translation of spoken language to printed text using the Dragon Dictation System.      This document has been electronically signed by Carl Payne DPM on April 29, 2025 08:03 EDT

## 2025-06-05 ENCOUNTER — LAB (OUTPATIENT)
Facility: HOSPITAL | Age: 80
End: 2025-06-05
Payer: MEDICARE

## 2025-06-05 ENCOUNTER — TRANSCRIBE ORDERS (OUTPATIENT)
Facility: HOSPITAL | Age: 80
End: 2025-06-05
Payer: MEDICARE

## 2025-06-05 DIAGNOSIS — I10 BENIGN HYPERTENSION: ICD-10-CM

## 2025-06-05 DIAGNOSIS — N18.4 CHRONIC RENAL DISEASE, STAGE IV: ICD-10-CM

## 2025-06-05 DIAGNOSIS — E11.9 DIABETES MELLITUS WITHOUT COMPLICATION: ICD-10-CM

## 2025-06-05 DIAGNOSIS — N18.4 CHRONIC RENAL DISEASE, STAGE IV: Primary | ICD-10-CM

## 2025-06-05 LAB
ALBUMIN SERPL-MCNC: 4.1 G/DL (ref 3.5–5.2)
ALBUMIN/GLOB SERPL: 1.6 G/DL
ALP SERPL-CCNC: 72 U/L (ref 39–117)
ALT SERPL W P-5'-P-CCNC: 18 U/L (ref 1–41)
ANION GAP SERPL CALCULATED.3IONS-SCNC: 10.2 MMOL/L (ref 5–15)
AST SERPL-CCNC: 27 U/L (ref 1–40)
BASOPHILS # BLD MANUAL: 0 10*3/MM3 (ref 0–0.2)
BASOPHILS NFR BLD MANUAL: 0 % (ref 0–1.5)
BILIRUB SERPL-MCNC: 0.6 MG/DL (ref 0–1.2)
BUN SERPL-MCNC: 47 MG/DL (ref 8–23)
BUN/CREAT SERPL: 15.5 (ref 7–25)
CALCIUM SPEC-SCNC: 9.7 MG/DL (ref 8.6–10.5)
CHLORIDE SERPL-SCNC: 104 MMOL/L (ref 98–107)
CO2 SERPL-SCNC: 28.8 MMOL/L (ref 22–29)
CREAT SERPL-MCNC: 3.03 MG/DL (ref 0.76–1.27)
DEPRECATED RDW RBC AUTO: 41.6 FL (ref 37–54)
EGFRCR SERPLBLD CKD-EPI 2021: 20.2 ML/MIN/1.73
EOSINOPHIL # BLD MANUAL: 0.1 10*3/MM3 (ref 0–0.4)
EOSINOPHIL NFR BLD MANUAL: 2 % (ref 0.3–6.2)
ERYTHROCYTE [DISTWIDTH] IN BLOOD BY AUTOMATED COUNT: 12.2 % (ref 12.3–15.4)
GLOBULIN UR ELPH-MCNC: 2.6 GM/DL
GLUCOSE SERPL-MCNC: 131 MG/DL (ref 65–99)
HBA1C MFR BLD: 6.4 % (ref 4.8–5.6)
HCT VFR BLD AUTO: 37.8 % (ref 37.5–51)
HGB BLD-MCNC: 12.9 G/DL (ref 13–17.7)
LYMPHOCYTES # BLD MANUAL: 2.83 10*3/MM3 (ref 0.7–3.1)
LYMPHOCYTES NFR BLD MANUAL: 9 % (ref 5–12)
MCH RBC QN AUTO: 32.2 PG (ref 26.6–33)
MCHC RBC AUTO-ENTMCNC: 34.1 G/DL (ref 31.5–35.7)
MCV RBC AUTO: 94.3 FL (ref 79–97)
MONOCYTES # BLD: 0.45 10*3/MM3 (ref 0.1–0.9)
NEUTROPHILS # BLD AUTO: 1.59 10*3/MM3 (ref 1.7–7)
NEUTROPHILS NFR BLD MANUAL: 32 % (ref 42.7–76)
PLAT MORPH BLD: NORMAL
PLATELET # BLD AUTO: 137 10*3/MM3 (ref 140–450)
PMV BLD AUTO: 12.3 FL (ref 6–12)
POTASSIUM SERPL-SCNC: 4.6 MMOL/L (ref 3.5–5.2)
PROT SERPL-MCNC: 6.7 G/DL (ref 6–8.5)
RBC # BLD AUTO: 4.01 10*6/MM3 (ref 4.14–5.8)
RBC MORPH BLD: NORMAL
SODIUM SERPL-SCNC: 143 MMOL/L (ref 136–145)
VARIANT LYMPHS NFR BLD MANUAL: 57 % (ref 19.6–45.3)
WBC MORPH BLD: NORMAL
WBC NRBC COR # BLD AUTO: 4.97 10*3/MM3 (ref 3.4–10.8)

## 2025-06-05 PROCEDURE — 83036 HEMOGLOBIN GLYCOSYLATED A1C: CPT

## 2025-06-05 PROCEDURE — 80053 COMPREHEN METABOLIC PANEL: CPT

## 2025-06-05 PROCEDURE — 85025 COMPLETE CBC W/AUTO DIFF WBC: CPT

## 2025-06-05 PROCEDURE — 85007 BL SMEAR W/DIFF WBC COUNT: CPT

## 2025-06-05 PROCEDURE — 36415 COLL VENOUS BLD VENIPUNCTURE: CPT

## 2025-06-12 ENCOUNTER — LAB (OUTPATIENT)
Facility: HOSPITAL | Age: 80
End: 2025-06-12
Payer: MEDICARE

## 2025-06-12 ENCOUNTER — TRANSCRIBE ORDERS (OUTPATIENT)
Facility: HOSPITAL | Age: 80
End: 2025-06-12
Payer: MEDICARE

## 2025-06-12 DIAGNOSIS — N18.4 CHRONIC RENAL DISEASE, STAGE IV: Primary | ICD-10-CM

## 2025-06-12 DIAGNOSIS — N18.4 CHRONIC RENAL DISEASE, STAGE IV: ICD-10-CM

## 2025-06-12 LAB
ALBUMIN SERPL-MCNC: 4.2 G/DL (ref 3.5–5.2)
ALBUMIN/GLOB SERPL: 1.4 G/DL
ALP SERPL-CCNC: 77 U/L (ref 39–117)
ALT SERPL W P-5'-P-CCNC: 18 U/L (ref 1–41)
ANION GAP SERPL CALCULATED.3IONS-SCNC: 11 MMOL/L (ref 5–15)
AST SERPL-CCNC: 34 U/L (ref 1–40)
BILIRUB SERPL-MCNC: 0.5 MG/DL (ref 0–1.2)
BUN SERPL-MCNC: 35 MG/DL (ref 8–23)
BUN/CREAT SERPL: 17 (ref 7–25)
CALCIUM SPEC-SCNC: 9.6 MG/DL (ref 8.6–10.5)
CHLORIDE SERPL-SCNC: 105 MMOL/L (ref 98–107)
CO2 SERPL-SCNC: 26 MMOL/L (ref 22–29)
CREAT SERPL-MCNC: 2.06 MG/DL (ref 0.76–1.27)
EGFRCR SERPLBLD CKD-EPI 2021: 32.2 ML/MIN/1.73
GLOBULIN UR ELPH-MCNC: 2.9 GM/DL
GLUCOSE SERPL-MCNC: 84 MG/DL (ref 65–99)
POTASSIUM SERPL-SCNC: 4.6 MMOL/L (ref 3.5–5.2)
PROT SERPL-MCNC: 7.1 G/DL (ref 6–8.5)
SODIUM SERPL-SCNC: 142 MMOL/L (ref 136–145)

## 2025-06-12 PROCEDURE — 36415 COLL VENOUS BLD VENIPUNCTURE: CPT

## 2025-06-12 PROCEDURE — 80053 COMPREHEN METABOLIC PANEL: CPT

## 2025-07-02 ENCOUNTER — TRANSCRIBE ORDERS (OUTPATIENT)
Facility: HOSPITAL | Age: 80
End: 2025-07-02
Payer: MEDICARE

## 2025-07-02 ENCOUNTER — LAB (OUTPATIENT)
Facility: HOSPITAL | Age: 80
End: 2025-07-02
Payer: MEDICARE

## 2025-07-02 DIAGNOSIS — E11.9 DIABETES MELLITUS WITHOUT COMPLICATION: ICD-10-CM

## 2025-07-02 DIAGNOSIS — E83.42 HYPOMAGNESEMIA: ICD-10-CM

## 2025-07-02 DIAGNOSIS — N18.30 STAGE 3 CHRONIC KIDNEY DISEASE, UNSPECIFIED WHETHER STAGE 3A OR 3B CKD: ICD-10-CM

## 2025-07-02 DIAGNOSIS — N18.30 STAGE 3 CHRONIC KIDNEY DISEASE, UNSPECIFIED WHETHER STAGE 3A OR 3B CKD: Primary | ICD-10-CM

## 2025-07-02 DIAGNOSIS — I10 BENIGN HYPERTENSION: ICD-10-CM

## 2025-07-02 LAB
ALBUMIN SERPL-MCNC: 4 G/DL (ref 3.5–5.2)
ALBUMIN/GLOB SERPL: 1.1 G/DL
ALP SERPL-CCNC: 61 U/L (ref 39–117)
ALT SERPL W P-5'-P-CCNC: 18 U/L (ref 1–41)
ANION GAP SERPL CALCULATED.3IONS-SCNC: 10 MMOL/L (ref 5–15)
AST SERPL-CCNC: 26 U/L (ref 1–40)
BASOPHILS # BLD AUTO: 0.04 10*3/MM3 (ref 0–0.2)
BASOPHILS NFR BLD AUTO: 0.6 % (ref 0–1.5)
BILIRUB SERPL-MCNC: 0.4 MG/DL (ref 0–1.2)
BILIRUB UR QL STRIP: NEGATIVE
BUN SERPL-MCNC: 44 MG/DL (ref 8–23)
BUN/CREAT SERPL: 19.6 (ref 7–25)
CALCIUM SPEC-SCNC: 10 MG/DL (ref 8.6–10.5)
CHLORIDE SERPL-SCNC: 101 MMOL/L (ref 98–107)
CLARITY UR: CLEAR
CO2 SERPL-SCNC: 27 MMOL/L (ref 22–29)
COLOR UR: YELLOW
CREAT SERPL-MCNC: 2.25 MG/DL (ref 0.76–1.27)
CREAT UR-MCNC: 134.5 MG/DL
DEPRECATED RDW RBC AUTO: 41.5 FL (ref 37–54)
EGFRCR SERPLBLD CKD-EPI 2021: 28.9 ML/MIN/1.73
EOSINOPHIL # BLD AUTO: 0.09 10*3/MM3 (ref 0–0.4)
EOSINOPHIL NFR BLD AUTO: 1.4 % (ref 0.3–6.2)
ERYTHROCYTE [DISTWIDTH] IN BLOOD BY AUTOMATED COUNT: 12.2 % (ref 12.3–15.4)
FERRITIN SERPL-MCNC: 748 NG/ML (ref 30–400)
GLOBULIN UR ELPH-MCNC: 3.7 GM/DL
GLUCOSE SERPL-MCNC: 57 MG/DL (ref 65–99)
GLUCOSE UR STRIP-MCNC: ABNORMAL MG/DL
HCT VFR BLD AUTO: 38.5 % (ref 37.5–51)
HGB BLD-MCNC: 12.9 G/DL (ref 13–17.7)
HGB UR QL STRIP.AUTO: NEGATIVE
IMM GRANULOCYTES # BLD AUTO: 0.04 10*3/MM3 (ref 0–0.05)
IMM GRANULOCYTES NFR BLD AUTO: 0.6 % (ref 0–0.5)
IRON 24H UR-MRATE: 95 MCG/DL (ref 59–158)
IRON SATN MFR SERPL: 26 % (ref 20–50)
KETONES UR QL STRIP: ABNORMAL
LEUKOCYTE ESTERASE UR QL STRIP.AUTO: NEGATIVE
LYMPHOCYTES # BLD AUTO: 2.83 10*3/MM3 (ref 0.7–3.1)
LYMPHOCYTES NFR BLD AUTO: 44.6 % (ref 19.6–45.3)
MAGNESIUM SERPL-MCNC: 2.4 MG/DL (ref 1.6–2.4)
MCH RBC QN AUTO: 31.3 PG (ref 26.6–33)
MCHC RBC AUTO-ENTMCNC: 33.5 G/DL (ref 31.5–35.7)
MCV RBC AUTO: 93.4 FL (ref 79–97)
MONOCYTES # BLD AUTO: 0.64 10*3/MM3 (ref 0.1–0.9)
MONOCYTES NFR BLD AUTO: 10.1 % (ref 5–12)
NEUTROPHILS NFR BLD AUTO: 2.7 10*3/MM3 (ref 1.7–7)
NEUTROPHILS NFR BLD AUTO: 42.7 % (ref 42.7–76)
NITRITE UR QL STRIP: NEGATIVE
NRBC BLD AUTO-RTO: 0 /100 WBC (ref 0–0.2)
PH UR STRIP.AUTO: 6 [PH] (ref 5–8)
PLATELET # BLD AUTO: 306 10*3/MM3 (ref 140–450)
PMV BLD AUTO: 10.7 FL (ref 6–12)
POTASSIUM SERPL-SCNC: 4.5 MMOL/L (ref 3.5–5.2)
PROT ?TM UR-MCNC: 8.7 MG/DL
PROT SERPL-MCNC: 7.7 G/DL (ref 6–8.5)
PROT UR QL STRIP: NEGATIVE
PROT/CREAT UR: 0.06 MG/G{CREAT}
RBC # BLD AUTO: 4.12 10*6/MM3 (ref 4.14–5.8)
SODIUM SERPL-SCNC: 138 MMOL/L (ref 136–145)
SP GR UR STRIP: 1.02 (ref 1–1.03)
TIBC SERPL-MCNC: 365 MCG/DL (ref 298–536)
TRANSFERRIN SERPL-MCNC: 245 MG/DL (ref 200–360)
UROBILINOGEN UR QL STRIP: ABNORMAL
WBC NRBC COR # BLD AUTO: 6.34 10*3/MM3 (ref 3.4–10.8)

## 2025-07-02 PROCEDURE — 36415 COLL VENOUS BLD VENIPUNCTURE: CPT

## 2025-07-02 PROCEDURE — 82570 ASSAY OF URINE CREATININE: CPT

## 2025-07-02 PROCEDURE — 80053 COMPREHEN METABOLIC PANEL: CPT

## 2025-07-02 PROCEDURE — 84156 ASSAY OF PROTEIN URINE: CPT

## 2025-07-02 PROCEDURE — 83735 ASSAY OF MAGNESIUM: CPT

## 2025-07-02 PROCEDURE — 82728 ASSAY OF FERRITIN: CPT

## 2025-07-02 PROCEDURE — 84466 ASSAY OF TRANSFERRIN: CPT

## 2025-07-02 PROCEDURE — 83540 ASSAY OF IRON: CPT

## 2025-07-02 PROCEDURE — 81003 URINALYSIS AUTO W/O SCOPE: CPT

## 2025-07-02 PROCEDURE — 85025 COMPLETE CBC W/AUTO DIFF WBC: CPT

## 2025-07-10 ENCOUNTER — TELEPHONE (OUTPATIENT)
Dept: CARDIOLOGY | Facility: CLINIC | Age: 80
End: 2025-07-10
Payer: MEDICARE

## 2025-07-10 NOTE — TELEPHONE ENCOUNTER
Procedure: Epidural Steroid Injection    Med Directive: Eliquis 3 days prior-24 hr post; aspirin 6 days prior- 24 hr post -PCP    PMH: PE/DVT, HTN    Last Seen: 1/17/2025

## 2025-07-11 NOTE — TELEPHONE ENCOUNTER
Moderate risk, may hold Eliquis 3 days prior to procedure in 24 hours post procedures and aspirin 6 days prior to procedure 24 hours post procedure.

## 2025-07-22 ENCOUNTER — OFFICE VISIT (OUTPATIENT)
Dept: PODIATRY | Facility: CLINIC | Age: 80
End: 2025-07-22
Payer: MEDICARE

## 2025-07-22 VITALS
WEIGHT: 204 LBS | DIASTOLIC BLOOD PRESSURE: 88 MMHG | BODY MASS INDEX: 30.13 KG/M2 | OXYGEN SATURATION: 95 % | SYSTOLIC BLOOD PRESSURE: 135 MMHG | TEMPERATURE: 98 F | HEART RATE: 65 BPM

## 2025-07-22 DIAGNOSIS — B35.1 ONYCHOMYCOSIS: Primary | ICD-10-CM

## 2025-07-22 DIAGNOSIS — E11.9 NON-INSULIN DEPENDENT TYPE 2 DIABETES MELLITUS: ICD-10-CM

## 2025-07-22 DIAGNOSIS — M79.672 FOOT PAIN, BILATERAL: ICD-10-CM

## 2025-07-22 DIAGNOSIS — Z79.4 TYPE 2 DIABETES MELLITUS WITH DIABETIC POLYNEUROPATHY, WITH LONG-TERM CURRENT USE OF INSULIN: ICD-10-CM

## 2025-07-22 DIAGNOSIS — M79.671 FOOT PAIN, BILATERAL: ICD-10-CM

## 2025-07-22 DIAGNOSIS — G62.9 NEUROPATHY: ICD-10-CM

## 2025-07-22 DIAGNOSIS — L60.0 ONYCHOCRYPTOSIS: ICD-10-CM

## 2025-07-22 DIAGNOSIS — E11.8 DM FEET: ICD-10-CM

## 2025-07-22 DIAGNOSIS — E11.42 TYPE 2 DIABETES MELLITUS WITH DIABETIC POLYNEUROPATHY, WITH LONG-TERM CURRENT USE OF INSULIN: ICD-10-CM

## 2025-07-22 NOTE — PROGRESS NOTES
[facility] - PODIATRY    Today's Date: 07/22/25    Patient Name: Joon Zhao  MRN: [id number]  CSN: [id number]  PCP: Brian Henson MD, Last PCP Visit: 6/30/2025  Referring Provider: No ref. provider found    SUBJECTIVE     Chief Complaint   Patient presents with    Left Foot - Follow-up, Nail Problem    Right Foot - Follow-up, Nail Problem     HPI: Joon Zhao, a 79 y.o.male, comes to clinic.    New, Established, New Problem:  established     Location:  Toenails    Duration:   Greater than five years    Onset:  Gradual    Nature:  sore with palpation.    Stable, worsening, improving:   Stable    Aggravating factors:  Pain with shoe gear and ambulation.    Previous Treatment:  debridement    Patient reported last blood glucose: 211  __________________________________    Medical changes: none    Patient denies any fevers, chills, nausea, vomiting, shortness of breathe, nor any other constitutional signs nor symptoms.       I have reviewed/confirmed previously documented HPI with no changes.     Past Medical History:   Diagnosis Date    Anemia     DENIES ANY CURRENT ISSUES    Arthritis     Back pain     CHRONIC NECK AND BACK PAIN    BPH (benign prostatic hyperplasia) 03/02/2015    BPH with urinary obstruction 06/01/2018    Cervical disc disorder June 2020    Chest pain     CKD (chronic kidney disease), stage III     Controlled type 2 diabetes mellitus with diabetic polyneuropathy 07/05/2017    COPD (chronic obstructive pulmonary disease)     Coronary artery disease     Dizziness 08/10/2017    DVT (deep venous thrombosis)     Erectile dysfunction 2007    Foot pain, bilateral 03/12/2018    GERD (gastroesophageal reflux disease)     Glaucoma (increased eye pressure)     Gout     Hernia     High cholesterol     Hyperlipidemia     Hypertension     Ingrowing nail 07/05/2017    Irritable bowel syndrome     Lactose intolerance     Low back pain 1966    Lumbosacral disc disease 2020    Pain in  both feet     Pancreatitis     PE (pulmonary thromboembolism)     Polyneuropathy 07/05/2017    Primary central sleep apnea     Shortness of breath     Sleep apnea     Thoracic disc disorder 1966    Tinea unguium 07/05/2017     Past Surgical History:   Procedure Laterality Date    ADRENAL GLAND SURGERY      CARDIAC CATHETERIZATION N/A 11/16/2023    Procedure: Left Heart Cath with possible angioplasty;  Surgeon: rTip Mirza MD;  Location: [facility] CATH INVASIVE LOCATION;  Service: Cardiovascular;  Laterality: N/A;    COLONOSCOPY      CYSTOSCOPY      ENDOSCOPY N/A 01/02/2024    Procedure: ESOPHAGOGASTRODUODENOSCOPY WITH BIOPSIES;  Surgeon: Hosea Moreau MD;  Location: [facility] ENDOSCOPY;  Service: Gastroenterology;  Laterality: N/A;  HIATAL HERNIA    EYE SURGERY      KIDNEY SURGERY Left     KIDNEY BIOSPY    PROSTATE SURGERY  12/2007    THYROID BIOPSY      TURP / TRANSURETHRAL INCISION / DRAINAGE PROSTATE  06/2019    UPPER GASTROINTESTINAL ENDOSCOPY       Family History   Problem Relation Age of Onset    Hypertension Mother     Heart disease Mother     Stroke Mother     Kidney disease Mother     Asthma Mother     Arthritis Mother     Hypertension Sister     Heart disease Sister     Stroke Sister     Diabetes Brother     Cancer Brother     Diabetes Son     Kidney disease Son     Diabetes Maternal Grandmother     Diabetes Other     Hypertension Father     Diabetes Maternal Uncle     Diabetes Maternal Uncle     Colon cancer Neg Hx     Malig Hyperthermia Neg Hx      Social History     Socioeconomic History    Marital status:    Tobacco Use    Smoking status: Never     Passive exposure: Never    Smokeless tobacco: Never   Vaping Use    Vaping status: Never Used   Substance and Sexual Activity    Alcohol use: Not Currently     Comment: rare    Drug use: Never    Sexual activity: Not Currently     Partners: Female     Birth control/protection: None     Allergies   Allergen Reactions    Ezetimibe Unknown - High  Severity and Other (See Comments)    Statins Other (See Comments) and Unknown - High Severity     UNSURE OF REACTION    Atorvastatin Hives    Hydrochlorothiazide Hives    Simvastatin Hives     simvastatin    Colesevelam Other (See Comments)     Other Reaction(s): Generalized aches and pains    Doxazosin Other (See Comments)     Other Reaction(s): Unknown      pt states shortnes of breath    Hydrochlorothiazide-Triamterene Rash     HCTZ     Current Outpatient Medications   Medication Sig Dispense Refill    acetaminophen (TYLENOL) 500 MG tablet Take 2 tablets by mouth Every 6 (Six) Hours As Needed (Back or neck pain). .  Take only 1 tablet if taking with acetaminophen-codeine. (Max acetaminophen dose is 1000 mg every 6 hours)      acetaminophen-codeine (TYLENOL #3) 300-30 MG per tablet Take 1 tablet by mouth Every 4 (Four) Hours As Needed for Moderate Pain.      albuterol sulfate  (90 Base) MCG/ACT inhaler Every 4 (Four) Hours.      Alcohol Swabs (Easy Touch Alcohol Prep Medium) 70 % pads       allopurinol (ZYLOPRIM) 100 MG tablet Take 1 tablet by mouth Daily.      amLODIPine (NORVASC) 5 MG tablet Take 1 tablet by mouth Daily. 90 tablet 3    apixaban (ELIQUIS) 5 MG tablet tablet Take 1 tablet by mouth 2 (Two) Times a Day. REPORTS HAD INJECTION AT PAIN MANAGEMENT ON 11/13/23 AND THAT HIS LAST DOSE OF ELIQUIS WAS 11/9/23 PM DOSE      aspirin 81 MG EC tablet Take 1 tablet by mouth Daily.      B-D UF III MINI PEN NEEDLES 31G X 5 MM misc       Bempedoic Acid (Nexletol) 180 MG tablet Take 1 tablet by mouth Daily.      Breztri Aerosphere 160-9-4.8 MCG/ACT aerosol inhaler Inhale 2 puffs.      cetirizine (zyrTEC) 10 MG tablet Take 1 tablet by mouth Daily.      cholecalciferol (VITAMIN D3) 25 MCG (1000 UT) tablet Take 1 tablet by mouth Daily.      coenzyme Q10 100 MG capsule Take 1 capsule by mouth Daily.      empagliflozin (Jardiance) 25 MG tablet tablet Take 1 tablet by mouth Daily.      EPINEPHrine (EPIPEN) 0.3  MG/0.3ML solution auto-injector injection       fluticasone (FLONASE) 50 MCG/ACT nasal spray Daily.      FREESTYLE LITE test strip       furosemide (LASIX) 40 MG tablet Take 1 tablet by mouth Daily.      gabapentin (NEURONTIN) 800 MG tablet Take 1 tablet by mouth Daily.      insulin glargine (Lantus) 100 UNIT/ML injection as directed Subcutaneous      Insulin Lispro, 1 Unit Dial, (HumaLOG KwikPen) 100 UNIT/ML solution pen-injector inject 2-6 units TID      ipratropium (ATROVENT) 0.03 % nasal spray Administer 2 sprays into the nostril(s) as directed by provider Every 12 (Twelve) Hours.      Lactobacillus (Acidophilus) 100 MG capsule Take  by mouth Daily.      Lancets (freestyle) lancets       Lantus SoloStar 100 UNIT/ML injection pen inject 35 units in the morning and 38 units at bedtime      latanoprost (XALATAN) 0.005 % ophthalmic solution Administer 1 drop to both eyes Every Night.      lidocaine (LIDODERM) 5 % Place 1 patch on the skin as directed by provider Daily. Remove & Discard patch within 12 hours or as directed by MD 30 each 0    losartan (COZAAR) 50 MG tablet Take 0.5 tablets by mouth Daily.      Narcan 4 MG/0.1ML nasal spray       pantoprazole (PROTONIX) 40 MG EC tablet Take 1 tablet by mouth Daily.      rOPINIRole (REQUIP) 0.25 MG tablet Daily.      Insulin Lispro, 1 Unit Dial, (HumaLOG KwikPen) 100 UNIT/ML solution pen-injector See Admin Instructions.       No current facility-administered medications for this visit.     Review of Systems   Constitutional: Negative.    Skin:         Painful toenails   All other systems reviewed and are negative.      OBJECTIVE     Vitals:    07/22/25 0803   BP: 135/88   Pulse: 65   Temp: 98 °F (36.7 °C)   SpO2: 95%       Lab Results   Component Value Date    HGBA1C 6.40 (H) 06/05/2025       Lab Results   Component Value Date    GLUCOSE 57 (L) 07/02/2025    CALCIUM 10.0 07/02/2025     07/02/2025    K 4.5 07/02/2025    CO2 27.0 07/02/2025     07/02/2025     BUN 44.0 (H) 07/02/2025    CREATININE 2.25 (H) 07/02/2025    EGFRIFAFRI 45 (L) 09/15/2021    BCR 19.6 07/02/2025    ANIONGAP 10.0 07/02/2025       Patient seen in no apparent distress.      PHYSICAL EXAM:     Foot/Ankle Exam    GENERAL  Diabetic foot exam performed    Appearance:  elderly  Orientation:  AAOx3  Affect:  appropriate  Gait:  unimpaired  Assistance:  independent  Right shoe gear: casual shoe  Left shoe gear: casual shoe    VASCULAR     Right Foot Vascularity   Normal vascular exam    Dorsalis pedis:  2+  Posterior tibial:  2+  Skin temperature:  warm  Edema grading:  None  CFT:  < 3 seconds  Pedal hair growth:  Present  Varicosities:  none     Left Foot Vascularity   Normal vascular exam    Dorsalis pedis:  2+  Posterior tibial:  2+  Skin temperature:  warm  Edema grading:  None  CFT:  < 3 seconds  Pedal hair growth:  Present  Varicosities:  none     NEUROLOGIC     Right Foot Neurologic   Light touch sensation: absent  Vibratory sensation: absent  Hot/Cold sensation: absent  Protective Sensation using [place] Monofilament:   Sites tested: 10     Left Foot Neurologic   Light touch sensation: absent  Vibratory sensation: absent  Hot/Cold sensation:  absent  Protective Sensation using [place] Monofilament:   Sites tested: 10    MUSCULOSKELETAL     Right Foot Musculoskeletal   Arch:  Normal     Left Foot Musculoskeletal   Arch:  Normal    MUSCLE STRENGTH     Right Foot Muscle Strength   Foot dorsiflexion:  4  Foot plantar flexion:  4  Foot inversion:  4  Foot eversion:  4     Left Foot Muscle Strength   Foot dorsiflexion:  4  Foot plantar flexion:  4  Foot inversion:  4  Foot eversion:  4    RANGE OF MOTION     Right Foot Range of Motion   Foot and ankle ROM within normal limits       Left Foot Range of Motion   Foot and ankle ROM within normal limits      DERMATOLOGIC      Right Foot Dermatologic   Skin  Right foot skin is intact.   Nails  1.  Positive for elongated, onychomycosis,  abnormal thickness, subungual debris and ingrown toenail.  2.  Positive for elongated, onychomycosis, abnormal thickness, subungual debris and ingrown toenail.  3.  Positive for elongated, onychomycosis, abnormal thickness, subungual debris and ingrown toenail.  4.  Positive for elongated, onychomycosis, abnormal thickness, subungual debris and ingrown toenail.  Nails comment:  Toenails 1 through 4     Left Foot Dermatologic   Skin  Left foot skin is intact.   Nails comment:  Toenails 1 through 3  Nails  1.  Positive for elongated, onychomycosis, abnormal thickness, subungual debris and ingrown toenail.  2.  Positive for elongated, onychomycosis, abnormal thickness, subungual debris and ingrown toenail.  3.  Positive for elongated, onychomycosis, abnormal thickness, subungual debris and ingrown toenail.    Diabetic Foot Exam Performed and Monofilament Test Performed    I have reexamined the patient the results are consistent with the previously documented exam.    ASSESSMENT/PLAN     Diagnoses and all orders for this visit:    1. Onychomycosis (Primary)    2. Type 2 diabetes mellitus with diabetic polyneuropathy, with long-term current use of insulin    3. Non-insulin dependent type 2 diabetes mellitus    4. Neuropathy    5. Onychocryptosis    6. Foot pain, bilateral    7. DM feet    Comprehensive lower extremity examination and evaluation was performed.    Discussed findings and treatment plan including risks, benefits, and treatment options with patient in detail. Patient agreed with treatment plan.    Toenails 1 through 4 on right and toenails 1 through 3 on the left were debrided in thickness and length and then smoothed with a Dremel Tool.  Tolerated the procedure well without complications.    Diabetic foot exam performed and documented this date, compliant with CQM required standards. Detail of findings as noted in physical exam.  Lower extremity Neurologic exam for diabetic patient performed and documented  this date, compliant with PQRS required standards. Detail of findings as noted in physical exam.  Advised patient importance of good routine lower extremity hygiene. Advised patient importance of evaluating for intact skin and pain free nail borders.  Advised patient to use mirror to evaluate plantar/ soles of feet for better visualization. Advised patient monitor and phone office to be seen if any cracking to skin, open lesions, painful nail borders or if nails become elongated prior to next visit. Advised patient importance of daily cleansing of lower extremities, followed by good skin cream to maintain normal hydration of skin. Also advised patient importance of close daily monitoring of blood sugar. Advised to regulate diet and medications to maintain control of blood sugar in optimal range. Contact primary care provider if difficulties maintaining blood sugar levels.  Advised Patient of presence of Diabetes Mellitus condition.  Advised Patient risk of progression and worsening or improvement, then return of condition.  Will monitor condition for any change in future. Treat with most appropriate treatment pending status of condition.  Counseled and advised patient extensively on nature and ramifications of diabetes. Standard instructions given to patient for good diabetic foot care and maintenance. Advised importance of careful monitoring to avoid break down and complications secondary to diabetes. Advised patient importance of strict maintenance of blood sugar control. Advised patient of possible ominous results from neglect of condition, i.e.: amputation/ loss of digits, feet and legs, or even death.  Patient states understands counseling, will monitor closely, continue good hygiene and routine diabetic foot care. Patient will contact office is questions or problems.      Patient is to monitor for recurrence and any new symptoms and to contact Dr. Payne's office for a follow-up appointment.      The patient  states understanding and agreement with this plan.      An After Visit Summary was printed and given to the patient at discharge, including (if requested) any available informative/educational handouts regarding diagnosis, treatment, or medications. All questions were answered to patient/family satisfaction. Should symptoms fail to improve or worsen they agree to call or return to clinic or to go to the Emergency Department. Discussed the importance of following up with any needed screening tests/labs/specialist appointments and any requested follow-up recommended by me today. Importance of maintaining follow-up discussed and patient accepts that missed appointments can delay diagnosis and potentially lead to worsening of conditions.    Return in about 9 weeks (around 9/23/2025) for Toenail Care., or sooner if acute issues arise.    I have reviewed the assessment and plan and verified the accuracy of it. No changes to assessment and plan since the information was documented. Carl Payne DPM 07/22/25     I have dictated this note utilizing Dragon Dictation.  Please note that portions of this note were completed with a voice recognition program.  Part of this note may be an electronic transcription/translation of spoken language to printed text using the Dragon Dictation System.      This document has been electronically signed by Carl Payne DPM on July 22, 2025 08:39 EDT

## 2025-07-30 ENCOUNTER — LAB (OUTPATIENT)
Facility: HOSPITAL | Age: 80
End: 2025-07-30
Payer: MEDICARE

## 2025-07-30 ENCOUNTER — TRANSCRIBE ORDERS (OUTPATIENT)
Facility: HOSPITAL | Age: 80
End: 2025-07-30
Payer: MEDICARE

## 2025-07-30 DIAGNOSIS — N18.30 STAGE 3 CHRONIC KIDNEY DISEASE, UNSPECIFIED WHETHER STAGE 3A OR 3B CKD: ICD-10-CM

## 2025-07-30 DIAGNOSIS — I10 ESSENTIAL HYPERTENSION, MALIGNANT: ICD-10-CM

## 2025-07-30 DIAGNOSIS — E11.9 DIABETES MELLITUS WITHOUT COMPLICATION: ICD-10-CM

## 2025-07-30 DIAGNOSIS — N18.30 STAGE 3 CHRONIC KIDNEY DISEASE, UNSPECIFIED WHETHER STAGE 3A OR 3B CKD: Primary | ICD-10-CM

## 2025-07-30 LAB
ALBUMIN SERPL-MCNC: 4.3 G/DL (ref 3.5–5.2)
ALBUMIN/GLOB SERPL: 1.3 G/DL
ALP SERPL-CCNC: 70 U/L (ref 39–117)
ALT SERPL W P-5'-P-CCNC: 16 U/L (ref 1–41)
ANION GAP SERPL CALCULATED.3IONS-SCNC: 11 MMOL/L (ref 5–15)
AST SERPL-CCNC: 25 U/L (ref 1–40)
BILIRUB SERPL-MCNC: 0.5 MG/DL (ref 0–1.2)
BUN SERPL-MCNC: 39 MG/DL (ref 8–23)
BUN/CREAT SERPL: 14 (ref 7–25)
CALCIUM SPEC-SCNC: 9.9 MG/DL (ref 8.6–10.5)
CHLORIDE SERPL-SCNC: 102 MMOL/L (ref 98–107)
CO2 SERPL-SCNC: 26 MMOL/L (ref 22–29)
CREAT SERPL-MCNC: 2.79 MG/DL (ref 0.76–1.27)
CREAT UR-MCNC: 86.5 MG/DL
EGFRCR SERPLBLD CKD-EPI 2021: 22.3 ML/MIN/1.73
GLOBULIN UR ELPH-MCNC: 3.2 GM/DL
GLUCOSE SERPL-MCNC: 111 MG/DL (ref 65–99)
HBA1C MFR BLD: 6.6 % (ref 4.8–5.6)
POTASSIUM SERPL-SCNC: 4.6 MMOL/L (ref 3.5–5.2)
PROT ?TM UR-MCNC: 6.8 MG/DL
PROT SERPL-MCNC: 7.5 G/DL (ref 6–8.5)
PROT/CREAT UR: 0.08 MG/G{CREAT}
SODIUM SERPL-SCNC: 139 MMOL/L (ref 136–145)

## 2025-07-30 PROCEDURE — 83036 HEMOGLOBIN GLYCOSYLATED A1C: CPT

## 2025-07-30 PROCEDURE — 82570 ASSAY OF URINE CREATININE: CPT

## 2025-07-30 PROCEDURE — 36415 COLL VENOUS BLD VENIPUNCTURE: CPT

## 2025-07-30 PROCEDURE — 84156 ASSAY OF PROTEIN URINE: CPT

## 2025-07-30 PROCEDURE — 80053 COMPREHEN METABOLIC PANEL: CPT

## (undated) DEVICE — LINER SURG CANSTR SXN S/RIGD 1500CC

## (undated) DEVICE — BLCK/BITE BLOX WO/DENTL/RIM W/STRAP 54F

## (undated) DEVICE — GW INQWIRE FC PTFE STD J/1.5 .035 260

## (undated) DEVICE — SINGLE-USE BIOPSY FORCEPS: Brand: RADIAL JAW 4

## (undated) DEVICE — Device

## (undated) DEVICE — SOLIDIFIER LIQLOC PLS 1500CC BT

## (undated) DEVICE — Device: Brand: DEFENDO AIR/WATER/SUCTION AND BIOPSY VALVE

## (undated) DEVICE — CATH LAB PACK: Brand: MEDLINE INDUSTRIES, INC.

## (undated) DEVICE — CONN JET HYDRA H20 AUXILIARY DISP

## (undated) DEVICE — GW FC FLOP/TP .035 260CM 3MM

## (undated) DEVICE — CATH F4 INF JR 4 100CM: Brand: INFINITI

## (undated) DEVICE — CATH F4 INF JL 4 100CM: Brand: INFINITI

## (undated) DEVICE — SOL IRRG H2O PL/BG 1000ML STRL

## (undated) DEVICE — INTRO SHEATH PRELUDE PRO .035 4F11X50 LF

## (undated) DEVICE — GLIDESHEATH SLENDER STAINLESS STEEL KIT: Brand: GLIDESHEATH SLENDER

## (undated) DEVICE — RADIFOCUS GLIDEWIRE: Brand: GLIDEWIRE

## (undated) DEVICE — RADIFOCUS OPTITORQUE ANGIOGRAPHIC CATHETER: Brand: OPTITORQUE